# Patient Record
Sex: FEMALE | Race: WHITE | NOT HISPANIC OR LATINO | Employment: OTHER | URBAN - METROPOLITAN AREA
[De-identification: names, ages, dates, MRNs, and addresses within clinical notes are randomized per-mention and may not be internally consistent; named-entity substitution may affect disease eponyms.]

---

## 2017-04-06 ENCOUNTER — OFFICE VISIT (OUTPATIENT)
Dept: GASTROENTEROLOGY | Facility: CLINIC | Age: 82
End: 2017-04-06
Payer: MEDICARE

## 2017-04-06 VITALS
BODY MASS INDEX: 19.46 KG/M2 | HEART RATE: 81 BPM | SYSTOLIC BLOOD PRESSURE: 136 MMHG | DIASTOLIC BLOOD PRESSURE: 64 MMHG | HEIGHT: 66 IN | WEIGHT: 121.06 LBS

## 2017-04-06 DIAGNOSIS — R13.10 DYSPHAGIA, UNSPECIFIED TYPE: Primary | ICD-10-CM

## 2017-04-06 PROCEDURE — 99999 PR PBB SHADOW E&M-NEW PATIENT-LVL II: CPT | Mod: PBBFAC,,, | Performed by: INTERNAL MEDICINE

## 2017-04-06 PROCEDURE — 99202 OFFICE O/P NEW SF 15 MIN: CPT | Mod: PBBFAC,PO | Performed by: INTERNAL MEDICINE

## 2017-04-06 PROCEDURE — 99203 OFFICE O/P NEW LOW 30 MIN: CPT | Mod: S$PBB,,, | Performed by: INTERNAL MEDICINE

## 2017-04-06 RX ORDER — FLUTICASONE PROPIONATE AND SALMETEROL 250; 50 UG/1; UG/1
1 POWDER RESPIRATORY (INHALATION) 2 TIMES DAILY
COMMUNITY
End: 2020-05-11 | Stop reason: CLARIF

## 2017-04-06 RX ORDER — OMEPRAZOLE 20 MG/1
20 CAPSULE, DELAYED RELEASE ORAL EVERY MORNING
COMMUNITY
End: 2020-05-11 | Stop reason: CLARIF

## 2017-04-06 RX ORDER — VITAMIN B COMPLEX
1 CAPSULE ORAL DAILY
COMMUNITY
End: 2020-07-25 | Stop reason: CLARIF

## 2017-04-06 RX ORDER — MOMETASONE FUROATE 50 UG/1
2 SPRAY, METERED NASAL DAILY PRN
COMMUNITY
End: 2020-04-04 | Stop reason: CLARIF

## 2017-04-06 RX ORDER — SUCRALFATE 1 G/1
1 TABLET ORAL 2 TIMES DAILY
COMMUNITY
End: 2019-09-05

## 2017-04-06 RX ORDER — CLONIDINE HYDROCHLORIDE 0.1 MG/1
0.1 TABLET ORAL ONCE
COMMUNITY
End: 2019-09-05

## 2017-04-06 RX ORDER — LEVOCETIRIZINE DIHYDROCHLORIDE 5 MG/1
TABLET, FILM COATED ORAL
Refills: 1 | COMMUNITY
Start: 2017-01-19 | End: 2020-04-04 | Stop reason: CLARIF

## 2017-04-06 RX ORDER — AMLODIPINE BESYLATE 5 MG/1
TABLET ORAL
Refills: 3 | Status: ON HOLD | COMMUNITY
Start: 2017-01-19 | End: 2019-09-06 | Stop reason: SDUPTHER

## 2017-04-06 RX ORDER — ONDANSETRON HYDROCHLORIDE 8 MG/1
8 TABLET, FILM COATED ORAL EVERY 12 HOURS PRN
COMMUNITY
End: 2020-05-11 | Stop reason: CLARIF

## 2017-04-06 RX ORDER — MEGESTROL ACETATE 20 MG/1
40 TABLET ORAL 2 TIMES DAILY
COMMUNITY
End: 2019-09-05

## 2017-04-06 NOTE — PROGRESS NOTES
"Subjective:       Patient ID: Melania Beavers is a 83 y.o. female.    This patient is new to me.  Referring MD:  Dr. Plascencia for dysphagia.      Chief Complaint: Dysphagia    HPI Comments: Patient seen for dysphagia, occurring with solid foods, frequency intermittent, alleviated/exacerbated by nothing in particular, associated signs/symptoms including vague abdominal discomfort, onset recent but she has had similar symptoms in the past.  She has had colonoscopy before and her most recent EGD was with Dr. Hummel and she was dilated at that time.  No GI bleeding or weight loss noted.  No change in bowel habits or family history of GI cancer.        Review of Systems   Constitutional: Negative for chills, fatigue and fever.   HENT: Positive for trouble swallowing. Negative for sore throat.    Respiratory: Negative for cough, shortness of breath and wheezing.    Cardiovascular: Negative for chest pain and palpitations.   Gastrointestinal: Negative for abdominal pain, blood in stool, nausea and vomiting.   Genitourinary: Negative for dysuria and hematuria.   Musculoskeletal: Negative for arthralgias and myalgias.   Skin: Negative for color change and rash.   Neurological: Negative for dizziness and headaches.   Hematological: Negative for adenopathy.   Psychiatric/Behavioral: Negative for confusion. The patient is not nervous/anxious.    All other systems reviewed and are negative.      Objective:       Vitals:    04/06/17 1405   BP: 136/64   Pulse: 81   Weight: 54.9 kg (121 lb 0.5 oz)   Height: 5' 6" (1.676 m)       Physical Exam   Constitutional: She appears well-developed and well-nourished.   HENT:   Head: Normocephalic and atraumatic.   Eyes: Pupils are equal, round, and reactive to light. No scleral icterus.   Neck: Normal range of motion.   Cardiovascular: Normal rate and regular rhythm.    No murmur heard.  Pulmonary/Chest: Effort normal and breath sounds normal. She has no wheezes.   Abdominal: Soft. Bowel sounds " are normal. She exhibits no distension. There is no tenderness.   Musculoskeletal: She exhibits no edema or tenderness.   Lymphadenopathy:     She has no cervical adenopathy.   Neurological: She is alert.   Skin: Skin is warm and dry. No rash noted.         Will request and review old labs, imaging, and endoscopy reports.      Assessment:       1. Dysphagia, unspecified type        Plan:       1.  Avoid NSAIDs  2.  Obtain old records as above  3.  Schedule EGD with possible dilation.  4.  Further recommendations to follow after above.         ADDENDUM:  Colonoscopy with Dr. Hummel in 2012 showed diverticulosis.  EGD from 2015 showed schatzki ring, hiatal hernia and focal intestinal metaplasia.

## 2017-05-03 ENCOUNTER — ANESTHESIA EVENT (OUTPATIENT)
Dept: ENDOSCOPY | Facility: HOSPITAL | Age: 82
End: 2017-05-03
Payer: MEDICARE

## 2017-05-03 ENCOUNTER — ANESTHESIA (OUTPATIENT)
Dept: ENDOSCOPY | Facility: HOSPITAL | Age: 82
End: 2017-05-03
Payer: MEDICARE

## 2017-05-03 ENCOUNTER — SURGERY (OUTPATIENT)
Age: 82
End: 2017-05-03

## 2017-05-03 ENCOUNTER — HOSPITAL ENCOUNTER (OUTPATIENT)
Facility: HOSPITAL | Age: 82
Discharge: HOME | End: 2017-05-03
Attending: INTERNAL MEDICINE | Admitting: INTERNAL MEDICINE
Payer: MEDICARE

## 2017-05-03 VITALS
RESPIRATION RATE: 16 BRPM | SYSTOLIC BLOOD PRESSURE: 178 MMHG | TEMPERATURE: 98 F | HEART RATE: 77 BPM | OXYGEN SATURATION: 97 % | DIASTOLIC BLOOD PRESSURE: 79 MMHG

## 2017-05-03 DIAGNOSIS — K29.70 GASTRITIS, PRESENCE OF BLEEDING UNSPECIFIED, UNSPECIFIED CHRONICITY, UNSPECIFIED GASTRITIS TYPE: ICD-10-CM

## 2017-05-03 DIAGNOSIS — K31.7 GASTRIC POLYP: ICD-10-CM

## 2017-05-03 DIAGNOSIS — K44.9 HIATAL HERNIA: ICD-10-CM

## 2017-05-03 DIAGNOSIS — K22.2 SCHATZKI'S RING: Primary | ICD-10-CM

## 2017-05-03 DIAGNOSIS — R13.10 DYSPHAGIA: ICD-10-CM

## 2017-05-03 PROCEDURE — 27201089 HC SNARE, DISP (ANY): Performed by: INTERNAL MEDICINE

## 2017-05-03 PROCEDURE — 63600175 PHARM REV CODE 636 W HCPCS

## 2017-05-03 PROCEDURE — 25000003 PHARM REV CODE 250

## 2017-05-03 PROCEDURE — 43251 EGD REMOVE LESION SNARE: CPT | Mod: ,,, | Performed by: INTERNAL MEDICINE

## 2017-05-03 PROCEDURE — 25000003 PHARM REV CODE 250: Performed by: INTERNAL MEDICINE

## 2017-05-03 PROCEDURE — 43239 EGD BIOPSY SINGLE/MULTIPLE: CPT | Mod: 59,,, | Performed by: INTERNAL MEDICINE

## 2017-05-03 PROCEDURE — 43239 EGD BIOPSY SINGLE/MULTIPLE: CPT | Performed by: INTERNAL MEDICINE

## 2017-05-03 PROCEDURE — 88342 IMHCHEM/IMCYTCHM 1ST ANTB: CPT | Mod: 26,,, | Performed by: PATHOLOGY

## 2017-05-03 PROCEDURE — 37000009 HC ANESTHESIA EA ADD 15 MINS: Performed by: INTERNAL MEDICINE

## 2017-05-03 PROCEDURE — 37000008 HC ANESTHESIA 1ST 15 MINUTES: Performed by: INTERNAL MEDICINE

## 2017-05-03 PROCEDURE — D9220A PRA ANESTHESIA: Mod: ANES,,, | Performed by: ANESTHESIOLOGY

## 2017-05-03 PROCEDURE — 43248 EGD GUIDE WIRE INSERTION: CPT | Mod: 59,,, | Performed by: INTERNAL MEDICINE

## 2017-05-03 PROCEDURE — 88305 TISSUE EXAM BY PATHOLOGIST: CPT | Performed by: PATHOLOGY

## 2017-05-03 PROCEDURE — 43251 EGD REMOVE LESION SNARE: CPT | Performed by: INTERNAL MEDICINE

## 2017-05-03 PROCEDURE — D9220A PRA ANESTHESIA: Mod: CRNA,,, | Performed by: NURSE ANESTHETIST, CERTIFIED REGISTERED

## 2017-05-03 PROCEDURE — 27201012 HC FORCEPS, HOT/COLD, DISP: Performed by: INTERNAL MEDICINE

## 2017-05-03 PROCEDURE — 43248 EGD GUIDE WIRE INSERTION: CPT | Performed by: INTERNAL MEDICINE

## 2017-05-03 RX ORDER — LIDOCAINE HYDROCHLORIDE 20 MG/ML
INJECTION, SOLUTION EPIDURAL; INFILTRATION; INTRACAUDAL; PERINEURAL
Status: COMPLETED
Start: 2017-05-03 | End: 2017-05-03

## 2017-05-03 RX ORDER — SODIUM CHLORIDE 9 MG/ML
INJECTION, SOLUTION INTRAVENOUS CONTINUOUS
Status: DISCONTINUED | OUTPATIENT
Start: 2017-05-03 | End: 2017-05-03 | Stop reason: HOSPADM

## 2017-05-03 RX ORDER — PROPOFOL 10 MG/ML
INJECTION, EMULSION INTRAVENOUS
Status: COMPLETED
Start: 2017-05-03 | End: 2017-05-03

## 2017-05-03 RX ADMIN — PROPOFOL 100 MG: 10 INJECTION, EMULSION INTRAVENOUS at 11:05

## 2017-05-03 RX ADMIN — LIDOCAINE HYDROCHLORIDE 100 MG: 20 INJECTION, SOLUTION EPIDURAL; INFILTRATION; INTRACAUDAL; PERINEURAL at 11:05

## 2017-05-03 RX ADMIN — PROPOFOL 30 MG: 10 INJECTION, EMULSION INTRAVENOUS at 11:05

## 2017-05-03 RX ADMIN — SODIUM CHLORIDE: 0.9 INJECTION, SOLUTION INTRAVENOUS at 09:05

## 2017-05-03 RX ADMIN — PROPOFOL 20 MG: 10 INJECTION, EMULSION INTRAVENOUS at 11:05

## 2017-05-03 NOTE — ANESTHESIA POSTPROCEDURE EVALUATION
Anesthesia Post Evaluation    Patient: Melania Beavers    Procedure(s) Performed: Procedure(s) (LRB):  ESOPHAGOGASTRODUODENOSCOPY (EGD) (N/A)    Final Anesthesia Type: general  Patient location during evaluation: PACU  Patient participation: Yes- Able to Participate  Level of consciousness: awake and alert  Post-procedure vital signs: reviewed and stable  Pain management: adequate  Airway patency: patent  PONV status at discharge: No PONV  Anesthetic complications: no      Cardiovascular status: hemodynamically stable  Respiratory status: unassisted and room air  Hydration status: euvolemic  Follow-up not needed.        Visit Vitals    BP (!) 178/79    Pulse 77    Temp 36.5 °C (97.7 °F)    Resp 16    SpO2 97%       Pain/Rickey Score: Pain Assessment Performed: Yes (5/3/2017 11:50 AM)  Presence of Pain: complains of pain/discomfort (5/3/2017 11:50 AM)  Rickey Score: 10 (5/3/2017 11:45 AM)

## 2017-05-03 NOTE — PLAN OF CARE
Pt is awake and calm c/o sore throat, tolerating ice chips, Her son is at the bedside  Report to Kathie GONCALVES  Pt is aware to take her BP medications when she goes home

## 2017-05-03 NOTE — DISCHARGE INSTRUCTIONS
Esophageal Dilation     A balloon dilator may be used to widen a stricture in the esophagus.   An esophageal dilation is a procedure used to widen a narrowed section of your esophagus. This is the tube that leads from your throat to your stomach. Narrowing (stricture) of the esophagus can cause problems. These include trouble swallowing. This sheet explains what to expect with esophageal dilation.  Why esophageal dilation is needed  Several problems can be treated with esophageal dilation. They include:  · Peptic stricture. This is caused by reflux esophagitis. With this problem, the esophagus is irritated by acid reflux (heartburn). This occurs when acid from your stomach flows back up into the esophagus. Stomach acid damages the lining of the esophagus. This leads to a buildup of scar tissue. As a result, the esophagus is narrowed.  · Schatzkis ring. This is an abnormal ring of tissue. It forms where the esophagus meets the stomach. It can cause trouble swallowing. It can also cause food to get stuck in the esophagus. The cause of this condition is not known.  · Achalasia. This condition stops food and liquids from moving into your stomach from the esophagus. It affects the lower esophageal sphincter (LES). The LES is a muscular ring that opens (relaxes) when you swallow. With achalasia, the LES does not relax. This condition can also cause problems with peristalsis. This is the normal muscular action of the esophagus that moves food into the stomach.  · Eosinophilic esophagitis. This is a redness and swelling (inflammation) in the esophagus. It is caused by an environmental trigger such as a food allergy. It can lead to pain, trouble swallowing, and strictures.  · Less common causes of stricture. Other causes of stricture include radiation treatment and cancer.  Before you have esophageal dilation  · Tell your provider about any medicines you take. This includes prescription medicines, over-the-counter  medicines, herbs, vitamins, and other supplements. Be sure to mention aspirin or any blood thinners youre taking.  · Let your provider know if you need to take antibiotics before dental procedures. You may need to take them before esophageal dilation as well.  · Tell your provider about any health conditions you have, such as heart or lung disease. Also mention any allergies to medicines.  · Youll need to have an empty stomach for the procedure. Follow your providers instructions for not eating and drinking before the procedure.  · Arrange to have a family member or friend drive you home after the procedure.  During the procedure  · You may be given local anesthesia to numb your throat. Youll also likely be given medicine to relax you. The procedure takes about 15 minutes. It does not cause trouble breathing.  · A tube called an endoscope (scope) is used. This is a narrow tube with a tiny light and camera at the end. The scope is inserted through your mouth and into your esophagus. It lets your provider see inside your esophagus. To help guide your provider, an imaging method called fluoroscopy may also be used. This creates a moving X-ray image on a computer screen.   · Next, special tiny tools are carefully guided through your mouth and down into the esophagus. They widen the stricture and are then removed. Different types of instruments are used. The type used depends on the size and cause of the stricture. Types include:  ¨ Balloon dilator. A tiny empty balloon is put into the stricture using an endoscope. The balloon is slowly filled with air. The air is removed from the balloon when the stricture is widened to the right size. Balloon dilators are used to treat many types of strictures.  ¨ Guided wire dilator. A thin wire is eased down the esophagus. A small tube thats wider on one end is guided down the wire. It is put into the stricture to stretch it. These dilators are used to treat all kinds of  strictures.  ¨ Bougies. These are weighted, cone-shaped tubes. Starting with smaller cones, your provider uses increasingly larger cones until the stricture is stretched the right amount. Bougies are often used to treat strictures that are simple (short, straight, and not very narrow).  After the procedure  · Youll be watched closely until your provider says youre ready to go home. Youll need to have a friend or family member drive you home.  · You may have a sore throat for the rest of the day.  · You may have pain behind your breastbone for a short time afterwards.  · You can start drinking fluids again after the numbness in your throat goes away. You can resume eating the same day or the next day.  Risks and possible complications  Risks and possible complications for esophageal dilation include:  · Infection  · A tear or hole in the esophagus lining, causing bleeding and possibly needing surgery to fix  · Risks of anesthesia  Follow-up  You may need to have the procedure repeated one or more times. This depends on the cause and extent of the narrowing. Repeat procedures can allow the dilation to take place more slowly. This reduces the risks of the procedure.  If your stricture was caused by reflux esophagitis, youll likely need to take medicine to treat that condition. Your provider will tell you more.  When to call your provider  Call your healthcare provider right away if you have any of the following after the procedure:  · Fever of 100.4°F (38.0°C)  · Chest pain  · Trouble swallowing  · Vomiting blood or material that looks like coffee grounds  · Bleeding  · Black, tarry, or bloody stools   Date Last Reviewed: 7/1/2016 © 2000-2016 [employer]. [address] [place] [address], [place] [address]. All rights reserved. This information is not intended as a substitute for professional medical care. Always follow your healthcare professional's instructions.      What Is a Hiatal Hernia?    Hiatal hernia  is when the area where the stomach and esophagus meet bulges up through the diaphragm into the chest cavity. In some cases, part of the stomach may bulge above the diaphragm. Stomach acid may move up into the esophagus and cause symptoms. The symptoms are often blamed on gastroesophageal reflux disease (GERD). You may only know about the hernia when it shows up on an X-ray taken for other reasons.   What you may feel  The hiatus is a normal hole in the diaphragm. The esophagus passes through this hole and leads to the stomach. In some cases, part of the stomach may bulge above the diaphragm. This bulge is called a hernia. Stomach acid may move up into the esophagus and cause symptoms.  When you eat, the muscle at the hiatus relaxes to allow food to pass into the stomach. It tightens again to keep food and digestive acids in the stomach.  Many people with hiatal hernias have mild symptoms. You may notice the following GERD symptoms:  · Heartburn or other chest discomfort  · A feeling of chest fullness after a meal  · Frequent burping  · Acid taste in the mouth  · Trouble swallowing  Treating symptoms  If you have been diagnosed with hiatal hernia, these suggestions may help improve symptoms:  · Lose excess weight. Extra weight puts pressure on the stomach and esophagus.  · Dont lie down after eating. Sit up for at least an hour after eating. Lying down after eating can increase symptoms.  · Avoid certain foods and drinks. These include fatty foods, chocolate, coffee, mint, and other foods that cause symptoms for you.  · Dont smoke or drink alcohol. These can worsen symptoms.  · Look at your medicines. Discuss your medicines with your healthcare provider. Many medicines can cause symptoms.  · Consider an antacid medicine. Ask your healthcare provider about over-the-counter and prescription medicines that may help.  · Ask about surgery, if needed. Surgery is a treatment choice for some people. Your healthcare  provider can determine if surgery is an option for you.    Date Last Reviewed: 10/1/2016  © 4256-5886 [employer]. [address] [place] [address], [place] [address]. All rights reserved. This information is not intended as a substitute for professional medical care. Always follow your healthcare professional's instructions.      Gastritis (Adult)    Gastritis is inflammation and irritation of the stomach lining. It can be present for a short time (acute) or be long lasting (chronic). Gastritis is often caused by infection with bacteria called H pylori. More than a third of people in the [place] have this bacteria in their bodies. In many cases, H pylori causes no problems or symptoms. In some people, though, the infection irritates the stomach lining and causes gastritis. Other causes of stomach irritation include drinking alcohol or taking pain-relieving medicines called NSAIDs (such as aspirin or ibuprofen).   Symptoms of gastritis can include:  · Abdominal pain or bloating  · Loss of appetite  · Nausea or vomiting  · Vomiting blood or having black stools  · Feeling more tired than usual  An inflamed and irritated stomach lining is more likely to develop a sore called an ulcer. To help prevent this, gastritis should be treated.  Home care  If needed, medicines may be prescribed. If you have H pylori infection, treating it will likely relieve your symptoms. Other changes can help reduce stomach irritation and help it heal.  · If you have been prescribed medicines for H pylori infection, take them as directed. Take all of the medicine until it is finished or your healthcare provider tells you to stop, even if you feel better.  · Your healthcare provider may recommend avoiding NSAIDs. If you take daily aspirin for your heart or other medical reasons, do not stop without talking to your healthcare provider first.  · Avoid drinking alcohol.  · Stop smoking. Smoking can irritate the stomach and delay healing. As much  as possible, stay away from second hand smoke.  Follow-up care  Follow up with your healthcare provider, or as advised by our staff. Testing may be needed to check for inflammation or an ulcer.  When to seek medical advice  Call your healthcare provider for any of the following:  · Stomach pain that gets worse or moves to the lower right abdomen (appendix area)  · Chest pain that appears or gets worse, or spreads to the back, neck, shoulder, or arm  · Frequent vomiting (cant keep down liquids)  · Blood in the stool or vomit (red or black in color)  · Feeling weak or dizzy  · Fever of 100.4ºF (38ºC) or higher, or as directed by your healthcare provider  Date Last Reviewed: 6/22/2015  © 4991-1222 [employer]. [address] [place] [address], [place] [address]. All rights reserved. This information is not intended as a substitute for professional medical care. Always follow your healthcare professional's instructions.

## 2017-05-03 NOTE — ANESTHESIA PREPROCEDURE EVALUATION
05/03/2017  Melania Beavers is a 83 y.o., female.    Anesthesia Evaluation    I have reviewed the Patient Summary Reports.    I have reviewed the Nursing Notes.      Review of Systems  Anesthesia Hx:  No problems with previous Anesthesia    Social:  Non-Smoker    Hematology/Oncology:  Hematology Normal   Oncology Normal     EENT/Dental:EENT/Dental Normal   Cardiovascular:   Hypertension, well controlled    Pulmonary:  Pulmonary Normal    Hepatic/GI:   GERD, well controlled Dysphagia    Musculoskeletal:  Musculoskeletal Normal    Neurological:  Neurology Normal    Endocrine:  Endocrine Normal    Dermatological:  Skin Normal    Psych:  Psychiatric Normal           Physical Exam  General:  Well nourished    Airway/Jaw/Neck:  AIRWAY FINDINGS: Normal      Eyes/Ears/Nose:  EYES/EARS/NOSE FINDINGS: Normal   Dental:  DENTAL FINDINGS: Normal   Chest/Lungs:  Chest/Lungs Findings: Clear to auscultation, Normal Respiratory Rate     Heart/Vascular:  Heart Findings: Rate: Normal  Rhythm: Regular Rhythm  Sounds: Normal  Heart Murmur  Systolic  Systolic Heart Murmur Description: L Upper Sternal Border  Systolic Heart Murmur Grade: Grade III     Abdomen:  Abdomen Findings: Normal    Musculoskeletal:  Musculoskeletal Findings: Normal   Skin:  Skin Findings: Normal    Mental Status:  Mental Status Findings: Normal        Anesthesia Plan  Type of Anesthesia, risks & benefits discussed:  Anesthesia Type:  general  Patient's Preference:   Intra-op Monitoring Plan:   Intra-op Monitoring Plan Comments:   Post Op Pain Control Plan:   Post Op Pain Control Plan Comments:   Induction:   IV  Beta Blocker:  Patient is not currently on a Beta-Blocker (No further documentation required).       Informed Consent: Patient understands risks and agrees with Anesthesia plan.  Questions answered. Anesthesia consent signed with patient.  ASA Score:  2     Day of Surgery Review of History & Physical:    H&P update referred to the provider.         Ready For Surgery From Anesthesia Perspective.

## 2017-05-03 NOTE — TRANSFER OF CARE
Anesthesia Transfer of Care Note    Patient: Melania Beavers    Procedure(s) Performed: Procedure(s) (LRB):  ESOPHAGOGASTRODUODENOSCOPY (EGD) (N/A)    Patient location: GI    Anesthesia Type: general    Transport from OR: Transported from OR on room air with adequate spontaneous ventilation    Post pain: adequate analgesia    Post assessment: no apparent anesthetic complications    Post vital signs: stable    Level of consciousness: awake    Nausea/Vomiting: no nausea/vomiting    Complications: none          Last vitals:   Visit Vitals    BP (!) 179/83 (BP Location: Left arm, Patient Position: Lying, BP Method: Automatic)    Pulse 82    Temp 36.6 °C (97.8 °F)    Resp 17    SpO2 96%

## 2017-05-03 NOTE — IP AVS SNAPSHOT
[address] [place] [address]  Phone: [phone]           Patient Discharge Instructions   Our goal is to set you up for success. This packet includes information on your condition, medications, and your home care.  It will help you care for yourself to prevent having to return to the hospital.     Please ask your nurse if you have any questions.      There are many details to remember when preparing to leave the hospital. Here is what you will need to do:    1. Take your medicine. If you are prescribed medications, review your Medication List on the following pages. You may have new medications to  at the pharmacy and others that you'll need to stop taking. Review the instructions for how and when to take your medications. Talk with your doctor or nurses if you are unsure of what to do.     2. Go to your follow-up appointments. Specific follow-up information is listed in the following pages. Your may be contacted by a nurse or clinical provider about future appointments. Be sure we have all of the phone numbers to reach you. Please contact your provider's office if you are unable to make an appointment.     3. Watch for warning signs. Your doctor or nurse will give you detailed warning signs to watch for and when to call for assistance. These instructions may also include educational information about your condition. If you experience any of warning signs to your health, call your doctor.           [facility]  Unless otherwise directed by your provider, please   contact [facility], our nurse care line   that is available for 24/7 assistance.     [phone] (toll-free)     Registered nurses in the [facility]   provide: appointment scheduling, clinical advisement, health education, and other advisory services.                  ** Verify the list of medication(s) below is accurate and up to date. Carry this with you in case of  emergency. If your medications have changed, please notify your healthcare provider.             Medication List      CONTINUE taking these medications        Additional Info                      amlodipine 5 MG tablet   Commonly known as:  NORVASC   Refills:  3    Instructions:  TK SS T PO D     Begin Date    AM    Noon    PM    Bedtime       b complex vitamins capsule   Refills:  0   Dose:  1 capsule    Instructions:  Take 1 capsule by mouth once daily.     Begin Date    AM    Noon    PM    Bedtime       cloNIDine 0.1 MG tablet   Commonly known as:  CATAPRES   Refills:  0   Dose:  0.1 mg    Instructions:  Take 0.1 mg by mouth once.     Begin Date    AM    Noon    PM    Bedtime       FERRALET 90 DUAL-IRON DELIVERY 90-1-12-50 mg-mg-mcg-mg Tab   Refills:  5   Generic drug:  iron,carbon,gluc-FA-B12-C-DSS    Instructions:  TK 1 T PO QD     Begin Date    AM    Noon    PM    Bedtime       fluticasone-salmeterol 250-50 mcg/dose 250-50 mcg/dose diskus inhaler   Commonly known as:  ADVAIR   Refills:  0   Dose:  1 puff    Instructions:  Inhale 1 puff into the lungs 2 (two) times daily. Controller     Begin Date    AM    Noon    PM    Bedtime       levocetirizine 5 MG tablet   Commonly known as:  XYZAL   Refills:  1    Instructions:  TK 1 T PO QD IN THE BALDEMAR     Begin Date    AM    Noon    PM    Bedtime       megestrol 20 MG Tab   Commonly known as:  MEGACE   Refills:  0   Dose:  40 mg    Instructions:  Take 40 mg by mouth 2 (two) times daily.     Begin Date    AM    Noon    PM    Bedtime       mometasone 50 mcg/actuation nasal spray   Commonly known as:  NASONEX   Refills:  0   Dose:  2 spray    Instructions:  2 sprays by Nasal route once daily.     Begin Date    AM    Noon    PM    Bedtime       omeprazole 20 MG capsule   Commonly known as:  PRILOSEC   Refills:  0   Dose:  20 mg    Instructions:  Take 20 mg by mouth 2 (two) times daily.     Begin Date    AM    Noon    PM    Bedtime       ondansetron 8 MG tablet   Commonly  known as:  ZOFRAN   Refills:  0    Instructions:  Take by mouth every 12 (twelve) hours as needed for Nausea.     Begin Date    AM    Noon    PM    Bedtime       sucralfate 1 gram tablet   Commonly known as:  CARAFATE   Refills:  0   Dose:  1 g    Instructions:  Take 1 g by mouth 2 (two) times daily.     Begin Date    AM    Noon    PM    Bedtime       VITAMIN D-3 ORAL   Refills:  0    Instructions:  Take by mouth.     Begin Date    AM    Noon    PM    Bedtime                  Please bring to all follow up appointments:    1. A copy of your discharge instructions.  2. All medicines you are currently taking in their original bottles.  3. Identification and insurance card.    Please arrive 15 minutes ahead of scheduled appointment time.    Please call 24 hours in advance if you must reschedule your appointment and/or time.        Follow-up Information     Follow up with Wang Castanon MD.    Specialty:  Gastroenterology    Why:  As needed    Contact information:    [address] [address]  [phone]          Discharge Instructions     Future Orders    Activity as tolerated     Diet general     Questions:    Total calories:      Fat restriction, if any:      Protein restriction, if any:      Na restriction, if any:      Fluid restriction:      Additional restrictions:          Discharge Instructions         Esophageal Dilation     A balloon dilator may be used to widen a stricture in the esophagus.   An esophageal dilation is a procedure used to widen a narrowed section of your esophagus. This is the tube that leads from your throat to your stomach. Narrowing (stricture) of the esophagus can cause problems. These include trouble swallowing. This sheet explains what to expect with esophageal dilation.  Why esophageal dilation is needed  Several problems can be treated with esophageal dilation. They include:  · Peptic stricture. This is caused by reflux esophagitis. With this problem, the  esophagus is irritated by acid reflux (heartburn). This occurs when acid from your stomach flows back up into the esophagus. Stomach acid damages the lining of the esophagus. This leads to a buildup of scar tissue. As a result, the esophagus is narrowed.  · Schatzkis ring. This is an abnormal ring of tissue. It forms where the esophagus meets the stomach. It can cause trouble swallowing. It can also cause food to get stuck in the esophagus. The cause of this condition is not known.  · Achalasia. This condition stops food and liquids from moving into your stomach from the esophagus. It affects the lower esophageal sphincter (LES). The LES is a muscular ring that opens (relaxes) when you swallow. With achalasia, the LES does not relax. This condition can also cause problems with peristalsis. This is the normal muscular action of the esophagus that moves food into the stomach.  · Eosinophilic esophagitis. This is a redness and swelling (inflammation) in the esophagus. It is caused by an environmental trigger such as a food allergy. It can lead to pain, trouble swallowing, and strictures.  · Less common causes of stricture. Other causes of stricture include radiation treatment and cancer.  Before you have esophageal dilation  · Tell your provider about any medicines you take. This includes prescription medicines, over-the-counter medicines, herbs, vitamins, and other supplements. Be sure to mention aspirin or any blood thinners youre taking.  · Let your provider know if you need to take antibiotics before dental procedures. You may need to take them before esophageal dilation as well.  · Tell your provider about any health conditions you have, such as heart or lung disease. Also mention any allergies to medicines.  · Youll need to have an empty stomach for the procedure. Follow your providers instructions for not eating and drinking before the procedure.  · Arrange to have a family member or friend drive you home  after the procedure.  During the procedure  · You may be given local anesthesia to numb your throat. Youll also likely be given medicine to relax you. The procedure takes about 15 minutes. It does not cause trouble breathing.  · A tube called an endoscope (scope) is used. This is a narrow tube with a tiny light and camera at the end. The scope is inserted through your mouth and into your esophagus. It lets your provider see inside your esophagus. To help guide your provider, an imaging method called fluoroscopy may also be used. This creates a moving X-ray image on a computer screen.   · Next, special tiny tools are carefully guided through your mouth and down into the esophagus. They widen the stricture and are then removed. Different types of instruments are used. The type used depends on the size and cause of the stricture. Types include:  ¨ Balloon dilator. A tiny empty balloon is put into the stricture using an endoscope. The balloon is slowly filled with air. The air is removed from the balloon when the stricture is widened to the right size. Balloon dilators are used to treat many types of strictures.  ¨ Guided wire dilator. A thin wire is eased down the esophagus. A small tube thats wider on one end is guided down the wire. It is put into the stricture to stretch it. These dilators are used to treat all kinds of strictures.  ¨ Bougies. These are weighted, cone-shaped tubes. Starting with smaller cones, your provider uses increasingly larger cones until the stricture is stretched the right amount. Bougies are often used to treat strictures that are simple (short, straight, and not very narrow).  After the procedure  · Youll be watched closely until your provider says youre ready to go home. Youll need to have a friend or family member drive you home.  · You may have a sore throat for the rest of the day.  · You may have pain behind your breastbone for a short time afterwards.  · You can start drinking  fluids again after the numbness in your throat goes away. You can resume eating the same day or the next day.  Risks and possible complications  Risks and possible complications for esophageal dilation include:  · Infection  · A tear or hole in the esophagus lining, causing bleeding and possibly needing surgery to fix  · Risks of anesthesia  Follow-up  You may need to have the procedure repeated one or more times. This depends on the cause and extent of the narrowing. Repeat procedures can allow the dilation to take place more slowly. This reduces the risks of the procedure.  If your stricture was caused by reflux esophagitis, youll likely need to take medicine to treat that condition. Your provider will tell you more.  When to call your provider  Call your healthcare provider right away if you have any of the following after the procedure:  · Fever of 100.4°F (38.0°C)  · Chest pain  · Trouble swallowing  · Vomiting blood or material that looks like coffee grounds  · Bleeding  · Black, tarry, or bloody stools   Date Last Reviewed: 7/1/2016 © 2000-2016 [employer]. [address] [place] [address]. All rights reserved. This information is not intended as a substitute for professional medical care. Always follow your healthcare professional's instructions.      What Is a Hiatal Hernia?    Hiatal hernia is when the area where the stomach and esophagus meet bulges up through the diaphragm into the chest cavity. In some cases, part of the stomach may bulge above the diaphragm. Stomach acid may move up into the esophagus and cause symptoms. The symptoms are often blamed on gastroesophageal reflux disease (GERD). You may only know about the hernia when it shows up on an X-ray taken for other reasons.   What you may feel  The hiatus is a normal hole in the diaphragm. The esophagus passes through this hole and leads to the stomach. In some cases, part of the stomach may bulge above the diaphragm.  This bulge is called a hernia. Stomach acid may move up into the esophagus and cause symptoms.  When you eat, the muscle at the hiatus relaxes to allow food to pass into the stomach. It tightens again to keep food and digestive acids in the stomach.  Many people with hiatal hernias have mild symptoms. You may notice the following GERD symptoms:  · Heartburn or other chest discomfort  · A feeling of chest fullness after a meal  · Frequent burping  · Acid taste in the mouth  · Trouble swallowing  Treating symptoms  If you have been diagnosed with hiatal hernia, these suggestions may help improve symptoms:  · Lose excess weight. Extra weight puts pressure on the stomach and esophagus.  · Dont lie down after eating. Sit up for at least an hour after eating. Lying down after eating can increase symptoms.  · Avoid certain foods and drinks. These include fatty foods, chocolate, coffee, mint, and other foods that cause symptoms for you.  · Dont smoke or drink alcohol. These can worsen symptoms.  · Look at your medicines. Discuss your medicines with your healthcare provider. Many medicines can cause symptoms.  · Consider an antacid medicine. Ask your healthcare provider about over-the-counter and prescription medicines that may help.  · Ask about surgery, if needed. Surgery is a treatment choice for some people. Your healthcare provider can determine if surgery is an option for you.    Date Last Reviewed: 10/1/2016  © 9524-1260 [employer]. [address] [place] [address]. All rights reserved. This information is not intended as a substitute for professional medical care. Always follow your healthcare professional's instructions.      Gastritis (Adult)    Gastritis is inflammation and irritation of the stomach lining. It can be present for a short time (acute) or be long lasting (chronic). Gastritis is often caused by infection with bacteria called H pylori. More than a third of people in the [place]  have this bacteria in their bodies. In many cases, H pylori causes no problems or symptoms. In some people, though, the infection irritates the stomach lining and causes gastritis. Other causes of stomach irritation include drinking alcohol or taking pain-relieving medicines called NSAIDs (such as aspirin or ibuprofen).   Symptoms of gastritis can include:  · Abdominal pain or bloating  · Loss of appetite  · Nausea or vomiting  · Vomiting blood or having black stools  · Feeling more tired than usual  An inflamed and irritated stomach lining is more likely to develop a sore called an ulcer. To help prevent this, gastritis should be treated.  Home care  If needed, medicines may be prescribed. If you have H pylori infection, treating it will likely relieve your symptoms. Other changes can help reduce stomach irritation and help it heal.  · If you have been prescribed medicines for H pylori infection, take them as directed. Take all of the medicine until it is finished or your healthcare provider tells you to stop, even if you feel better.  · Your healthcare provider may recommend avoiding NSAIDs. If you take daily aspirin for your heart or other medical reasons, do not stop without talking to your healthcare provider first.  · Avoid drinking alcohol.  · Stop smoking. Smoking can irritate the stomach and delay healing. As much as possible, stay away from second hand smoke.  Follow-up care  Follow up with your healthcare provider, or as advised by our staff. Testing may be needed to check for inflammation or an ulcer.  When to seek medical advice  Call your healthcare provider for any of the following:  · Stomach pain that gets worse or moves to the lower right abdomen (appendix area)  · Chest pain that appears or gets worse, or spreads to the back, neck, shoulder, or arm  · Frequent vomiting (cant keep down liquids)  · Blood in the stool or vomit (red or black in color)  · Feeling weak or dizzy  · Fever of 100.4ºF  (38ºC) or higher, or as directed by your healthcare provider  Date Last Reviewed: 6/22/2015  © 1314-1793 [employer]. [address] [place] [address], [place] [address]. All rights reserved. This information is not intended as a substitute for professional medical care. Always follow your healthcare professional's instructions.                Admission Information     Date & Time Provider Department CSN    5/3/2017  8:25 AM Wang Castanon MD [facility] [id number]      Care Providers     Provider Role Specialty Primary office phone    Wang Castanon MD Attending Provider Gastroenterology [phone]    Wang Castanon MD Surgeon  Gastroenterology [phone]      Your Vitals Were     BP Pulse Temp Resp SpO2       158/68 (BP Location: Left arm, Patient Position: Lying, BP Method: Automatic) 72 97.7 °F (36.5 °C) 16 96%       Recent Lab Values     No lab values to display.      Allergies as of 5/3/2017     No Known Allergies      Advance Directives     An advance directive is a document which, in the event you are no longer able to make decisions for yourself, tells your healthcare team what kind of treatment you do or do not want to receive, or who you would like to make those decisions for you.  If you do not currently have an advance directive, [facility] encourages you to create one.  For more information call:  [phone]), [phone] ([phone]),  or log on to [email or web address].        Language Assistance Services     ATTENTION: Language assistance services are available, free of charge. Please call [phone].      ATENCIÓN: Si habla español, tiene a woody disposición servicios gratuitos de asistencia lingüística. Llame al [phone].     CHÚ Ý: N?u b?n nói Ti?ng Vi?t, có các d?ch v? h? tr? ngôn ng? mi?n phí dành cho b?n. G?i s? [phone].        [facility] Sign-Up     Activating your [facility] account is as easy as 1-2-3!     1) Visit  [email or web address], select Sign Up Now, enter this activation code and your date of birth, then select Next.  [email or web address]  Expires: 6/17/2017 11:44 AM      2) Create a username and password to use when you visit [facility] in the future and select a security question in case you lose your password and select Next.    3) Enter your e-mail address and click Sign Up!    Additional Information  If you have questions, please e-mail [email or web address] or call [phone] to talk to our [facility] staff. Remember, [facility] is NOT to be used for urgent needs. For medical emergencies, dial 911.          [facility] complies with applicable Federal civil rights laws and does not discriminate on the basis of race, color, national origin, age, disability, or sex.

## 2017-05-31 ENCOUNTER — TELEPHONE (OUTPATIENT)
Dept: GASTROENTEROLOGY | Facility: CLINIC | Age: 82
End: 2017-05-31

## 2019-02-27 ENCOUNTER — TELEPHONE (OUTPATIENT)
Dept: FAMILY MEDICINE | Facility: CLINIC | Age: 84
End: 2019-02-27

## 2019-07-29 DIAGNOSIS — R63.4 LOSS OF WEIGHT: Primary | ICD-10-CM

## 2019-08-06 ENCOUNTER — HOSPITAL ENCOUNTER (OUTPATIENT)
Dept: RADIOLOGY | Facility: HOSPITAL | Age: 84
Discharge: HOME | End: 2019-08-06
Attending: FAMILY MEDICINE
Payer: MEDICARE

## 2019-08-06 DIAGNOSIS — R63.4 LOSS OF WEIGHT: ICD-10-CM

## 2019-08-06 PROCEDURE — 76700 US EXAM ABDOM COMPLETE: CPT | Mod: TC

## 2019-08-06 PROCEDURE — 71046 X-RAY EXAM CHEST 2 VIEWS: CPT | Mod: TC

## 2019-09-05 ENCOUNTER — HOSPITAL ENCOUNTER (INPATIENT)
Facility: HOSPITAL | Age: 84
LOS: 2 days | Discharge: HOME | DRG: 069 | End: 2019-09-07
Attending: EMERGENCY MEDICINE | Admitting: INTERNAL MEDICINE
Payer: MEDICARE

## 2019-09-05 ENCOUNTER — CLINICAL SUPPORT (OUTPATIENT)
Dept: CARDIOLOGY | Facility: HOSPITAL | Age: 84
DRG: 069 | End: 2019-09-05
Attending: EMERGENCY MEDICINE
Payer: MEDICARE

## 2019-09-05 VITALS — BODY MASS INDEX: 15.91 KG/M2 | HEIGHT: 66 IN | WEIGHT: 99 LBS

## 2019-09-05 DIAGNOSIS — R47.01 APHASIA: ICD-10-CM

## 2019-09-05 DIAGNOSIS — I63.9 ISCHEMIC STROKE: ICD-10-CM

## 2019-09-05 DIAGNOSIS — G45.9 TIA (TRANSIENT ISCHEMIC ATTACK): Primary | ICD-10-CM

## 2019-09-05 DIAGNOSIS — I63.9 STROKE: ICD-10-CM

## 2019-09-05 DIAGNOSIS — I63.9 CVA (CEREBRAL VASCULAR ACCIDENT): ICD-10-CM

## 2019-09-05 PROBLEM — I10 HTN (HYPERTENSION): Status: ACTIVE | Noted: 2019-09-05

## 2019-09-05 PROBLEM — K21.9 GERD (GASTROESOPHAGEAL REFLUX DISEASE): Status: ACTIVE | Noted: 2019-09-05

## 2019-09-05 LAB
ALBUMIN SERPL BCP-MCNC: 4.3 G/DL (ref 3.5–5.2)
ALP SERPL-CCNC: 35 U/L (ref 55–135)
ALT SERPL W/O P-5'-P-CCNC: 11 U/L (ref 10–44)
AMPHET+METHAMPHET UR QL: NEGATIVE
ANION GAP SERPL CALC-SCNC: 15 MMOL/L (ref 8–16)
APTT PPP: 32.4 SEC (ref 26.2–34.7)
AST SERPL-CCNC: 18 U/L (ref 10–40)
BARBITURATES UR QL SCN>200 NG/ML: NORMAL
BASOPHILS # BLD AUTO: 0.01 K/UL (ref 0–0.2)
BASOPHILS NFR BLD: 0.2 % (ref 0–1.9)
BENZODIAZ UR QL SCN>200 NG/ML: NEGATIVE
BILIRUB DIRECT SERPL-MCNC: 0.1 MG/DL (ref 0.1–0.3)
BILIRUB SERPL-MCNC: 0.7 MG/DL (ref 0.1–1)
BILIRUB UR QL STRIP: NEGATIVE
BUN SERPL-MCNC: 22 MG/DL (ref 6–30)
BZE UR QL SCN: NEGATIVE
CANNABINOIDS UR QL SCN: NEGATIVE
CHLORIDE SERPL-SCNC: 108 MMOL/L (ref 95–110)
CHOLEST SERPL-MCNC: 187 MG/DL (ref 120–199)
CHOLEST/HDLC SERPL: 3 {RATIO} (ref 2–5)
CK MB SERPL-MCNC: 1.5 NG/ML (ref 0.1–6.5)
CK SERPL-CCNC: 51 U/L (ref 20–180)
CLARITY UR: CLEAR
COLOR UR: YELLOW
CREAT SERPL-MCNC: 1.6 MG/DL (ref 0.5–1.4)
CREAT SERPL-MCNC: 1.7 MG/DL (ref 0.5–1.4)
CREAT UR-MCNC: 58 MG/DL (ref 15–325)
DIFFERENTIAL METHOD: ABNORMAL
EOSINOPHIL # BLD AUTO: 0.1 K/UL (ref 0–0.5)
EOSINOPHIL NFR BLD: 1.4 % (ref 0–8)
ERYTHROCYTE [DISTWIDTH] IN BLOOD BY AUTOMATED COUNT: 14.6 % (ref 11.5–14.5)
EST. GFR  (AFRICAN AMERICAN): 33.6 ML/MIN/1.73 M^2
EST. GFR  (NON AFRICAN AMERICAN): 29.2 ML/MIN/1.73 M^2
ESTIMATED AVG GLUCOSE: 114 MG/DL (ref 68–131)
ESTIMATED AVG GLUCOSE: 114 MG/DL (ref 68–131)
GLUCOSE SERPL-MCNC: 110 MG/DL (ref 70–110)
GLUCOSE UR QL STRIP: NEGATIVE
HBA1C MFR BLD HPLC: 5.6 % (ref 4.5–6.2)
HBA1C MFR BLD HPLC: 5.6 % (ref 4.5–6.2)
HCT VFR BLD AUTO: 31.4 % (ref 37–48.5)
HCT VFR BLD CALC: 30 %PCV (ref 36–54)
HDLC SERPL-MCNC: 62 MG/DL (ref 40–75)
HDLC SERPL: 33.2 % (ref 20–50)
HGB BLD-MCNC: 9.9 G/DL (ref 12–16)
HGB UR QL STRIP: NEGATIVE
IMM GRANULOCYTES # BLD AUTO: 0.06 K/UL (ref 0–0.04)
IMM GRANULOCYTES NFR BLD AUTO: 1.2 % (ref 0–0.5)
INR PPP: 1
KETONES UR QL STRIP: NEGATIVE
LDLC SERPL CALC-MCNC: 109.4 MG/DL (ref 63–159)
LEUKOCYTE ESTERASE UR QL STRIP: NEGATIVE
LIPASE SERPL-CCNC: 58 U/L (ref 4–60)
LYMPHOCYTES # BLD AUTO: 1.4 K/UL (ref 1–4.8)
LYMPHOCYTES NFR BLD: 26.6 % (ref 18–48)
MAGNESIUM SERPL-MCNC: 2 MG/DL (ref 1.6–2.6)
MCH RBC QN AUTO: 29.6 PG (ref 27–31)
MCHC RBC AUTO-ENTMCNC: 31.5 G/DL (ref 32–36)
MCV RBC AUTO: 94 FL (ref 82–98)
MONOCYTES # BLD AUTO: 1.1 K/UL (ref 0.3–1)
MONOCYTES NFR BLD: 21.5 % (ref 4–15)
NEUTROPHILS # BLD AUTO: 2.5 K/UL (ref 1.8–7.7)
NEUTROPHILS NFR BLD: 49.1 % (ref 38–73)
NITRITE UR QL STRIP: NEGATIVE
NONHDLC SERPL-MCNC: 125 MG/DL
NRBC BLD-RTO: 0 /100 WBC
OPIATES UR QL SCN: NEGATIVE
PCP UR QL SCN>25 NG/ML: NEGATIVE
PH UR STRIP: 7 [PH] (ref 5–8)
PLATELET # BLD AUTO: 183 K/UL (ref 150–350)
PMV BLD AUTO: 10.5 FL (ref 9.2–12.9)
POC IONIZED CALCIUM: 1.33 MMOL/L (ref 1.06–1.42)
POC TCO2 (MEASURED): 22 MMOL/L (ref 23–29)
POTASSIUM BLD-SCNC: 4.5 MMOL/L (ref 3.5–5.1)
PROT SERPL-MCNC: 7.1 G/DL (ref 6–8.4)
PROT UR QL STRIP: NEGATIVE
PROTHROMBIN TIME: 13.1 SEC (ref 11.7–14)
RBC # BLD AUTO: 3.35 M/UL (ref 4–5.4)
SAMPLE: ABNORMAL
SODIUM BLD-SCNC: 140 MMOL/L (ref 136–145)
SP GR UR STRIP: 1 (ref 1–1.03)
TOXICOLOGY INFORMATION: NORMAL
TRIGL SERPL-MCNC: 78 MG/DL (ref 30–150)
TROPONIN I SERPL DL<=0.01 NG/ML-MCNC: 0.05 NG/ML (ref 0.02–0.04)
TROPONIN I SERPL DL<=0.01 NG/ML-MCNC: 0.06 NG/ML (ref 0.02–0.04)
TSH SERPL DL<=0.005 MIU/L-ACNC: 2.11 UIU/ML (ref 0.34–5.6)
URN SPEC COLLECT METH UR: NORMAL
UROBILINOGEN UR STRIP-ACNC: NEGATIVE EU/DL
WBC # BLD AUTO: 5.07 K/UL (ref 3.9–12.7)

## 2019-09-05 PROCEDURE — 83735 ASSAY OF MAGNESIUM: CPT

## 2019-09-05 PROCEDURE — 36415 COLL VENOUS BLD VENIPUNCTURE: CPT

## 2019-09-05 PROCEDURE — 99285 EMERGENCY DEPT VISIT HI MDM: CPT | Mod: 25

## 2019-09-05 PROCEDURE — 82553 CREATINE MB FRACTION: CPT

## 2019-09-05 PROCEDURE — 84484 ASSAY OF TROPONIN QUANT: CPT

## 2019-09-05 PROCEDURE — 83690 ASSAY OF LIPASE: CPT

## 2019-09-05 PROCEDURE — 82550 ASSAY OF CK (CPK): CPT

## 2019-09-05 PROCEDURE — 85730 THROMBOPLASTIN TIME PARTIAL: CPT

## 2019-09-05 PROCEDURE — 51702 INSERT TEMP BLADDER CATH: CPT

## 2019-09-05 PROCEDURE — 80061 LIPID PANEL: CPT

## 2019-09-05 PROCEDURE — 80307 DRUG TEST PRSMV CHEM ANLYZR: CPT

## 2019-09-05 PROCEDURE — 84443 ASSAY THYROID STIM HORMONE: CPT

## 2019-09-05 PROCEDURE — 63600175 PHARM REV CODE 636 W HCPCS: Performed by: INTERNAL MEDICINE

## 2019-09-05 PROCEDURE — 21400001 HC TELEMETRY ROOM

## 2019-09-05 PROCEDURE — 93306 TTE W/DOPPLER COMPLETE: CPT

## 2019-09-05 PROCEDURE — 83036 HEMOGLOBIN GLYCOSYLATED A1C: CPT

## 2019-09-05 PROCEDURE — 85610 PROTHROMBIN TIME: CPT

## 2019-09-05 PROCEDURE — 25000003 PHARM REV CODE 250: Performed by: EMERGENCY MEDICINE

## 2019-09-05 PROCEDURE — 84484 ASSAY OF TROPONIN QUANT: CPT | Mod: 91

## 2019-09-05 PROCEDURE — 93005 ELECTROCARDIOGRAM TRACING: CPT

## 2019-09-05 PROCEDURE — 82565 ASSAY OF CREATININE: CPT

## 2019-09-05 PROCEDURE — 85025 COMPLETE CBC W/AUTO DIFF WBC: CPT

## 2019-09-05 PROCEDURE — 80076 HEPATIC FUNCTION PANEL: CPT

## 2019-09-05 PROCEDURE — 81003 URINALYSIS AUTO W/O SCOPE: CPT

## 2019-09-05 RX ORDER — MEGESTROL ACETATE 40 MG/ML
200 SUSPENSION ORAL EVERY MORNING
COMMUNITY
End: 2020-04-04 | Stop reason: CLARIF

## 2019-09-05 RX ORDER — GLUCAGON 1 MG
1 KIT INJECTION
Status: DISCONTINUED | OUTPATIENT
Start: 2019-09-05 | End: 2019-09-07 | Stop reason: HOSPADM

## 2019-09-05 RX ORDER — IBUPROFEN 200 MG
16 TABLET ORAL
Status: DISCONTINUED | OUTPATIENT
Start: 2019-09-05 | End: 2019-09-07 | Stop reason: HOSPADM

## 2019-09-05 RX ORDER — SODIUM CHLORIDE 0.9 % (FLUSH) 0.9 %
10 SYRINGE (ML) INJECTION
Status: DISCONTINUED | OUTPATIENT
Start: 2019-09-05 | End: 2019-09-07 | Stop reason: HOSPADM

## 2019-09-05 RX ORDER — FLUTICASONE PROPIONATE 50 MCG
1 SPRAY, SUSPENSION (ML) NASAL DAILY PRN
Status: DISCONTINUED | OUTPATIENT
Start: 2019-09-05 | End: 2019-09-07 | Stop reason: HOSPADM

## 2019-09-05 RX ORDER — ASCORBIC ACID 500 MG
500 TABLET ORAL 2 TIMES DAILY
Status: ON HOLD | COMMUNITY
End: 2020-04-08 | Stop reason: HOSPADM

## 2019-09-05 RX ORDER — DEXTROSE MONOHYDRATE AND SODIUM CHLORIDE 5; .9 G/100ML; G/100ML
INJECTION, SOLUTION INTRAVENOUS CONTINUOUS
Status: DISCONTINUED | OUTPATIENT
Start: 2019-09-05 | End: 2019-09-06

## 2019-09-05 RX ORDER — NAPROXEN SODIUM 220 MG/1
81 TABLET, FILM COATED ORAL DAILY
Status: DISCONTINUED | OUTPATIENT
Start: 2019-09-06 | End: 2019-09-07 | Stop reason: HOSPADM

## 2019-09-05 RX ORDER — IBUPROFEN 200 MG
24 TABLET ORAL
Status: DISCONTINUED | OUTPATIENT
Start: 2019-09-05 | End: 2019-09-07 | Stop reason: HOSPADM

## 2019-09-05 RX ORDER — NAPROXEN SODIUM 220 MG/1
324 TABLET, FILM COATED ORAL
Status: COMPLETED | OUTPATIENT
Start: 2019-09-05 | End: 2019-09-05

## 2019-09-05 RX ADMIN — ASPIRIN 81 MG 324 MG: 81 TABLET ORAL at 10:09

## 2019-09-05 RX ADMIN — DEXTROSE AND SODIUM CHLORIDE: 5; .9 INJECTION, SOLUTION INTRAVENOUS at 04:09

## 2019-09-05 NOTE — CONSULTS
[facility]  Neurology  Consult Note    Patient Name: Melania Beavers  MRN: [id number]  Admission Date: 9/5/2019  Hospital Length of Stay: 0 days  Code Status: Full Code   Attending Provider: Dominick Gatica MD   Consulting Provider: Larissa Renee NP  Primary Care Physician: Luis Brown MD  Principal Problem:Stroke    Consults   Subjective:     Chief Complaint:  AMS     HPI:   Called by Dr. Gatica from the ED for stat consult. Pt presented to the ED this AM due to the above complaint. Pt lives with her daughter, but was home alone this morning. Her daughter spoke to her on the phone and the patient seemed confused. After going home to check on her, the patient remained confused. No apparent trauma at the scene. The daughter reported that the patient was acting strangely and laughing inappropriately. She has not had any recent medication changes. Upon arrival to the ED, her exam was non-focal, but she was still confused. The patient's last known normal time is not known, as she seems to have woke up with these symptoms. Stat CT head was done and reviewed personally without any obvious acute pathologies, but the patient was not a tPA candidate. Her initial NIHSS score is 4 due to her moderate level of aphasia. Pt was seen in radiology after her CT scan. She is unable to provide any history.      NKDA    PMH: GERD, HTN  PSH: EGD, hysterectomy  No reported family history  Non smoker    Cr 1.6H  LDL pending  A1C pending    CT brain:  Negative acute  -----------------------------    ROS: unable to obtain due to altered mental status    Physical Exam:  Consitutional: NAD  HENT:   Head: Normocephalic and atraumatic.   Eyes: Conjunctivae and EOM are normal.   Neck: Normal range of motion. Neck supple.  Cardiovascular: Normal rate. No murmur.  Pulmonary/Chest: Breath sounds normal.Normal effort   Abdominal: Soft. Bowel sounds are normal. There is no tenderness.   Musculoskeletal: Normal range of  motion. Normal inspection   Neurological: She is alert and oriented to person. She is unable to state her age or the current month. She is unable to follow simple commands. + Repitition, but 0/5 naming objects - does have moderate expressive and receptive aphasia. CN 2-12 intact. Motor exam is nonfocal with 4/5 strength throughout. Sensation normal in upper and lower extremities. No dysmetria/ataxia. Gait not tested. Normal DTRs.   Skin: No rash noted.   Psychiatric: inattentive      Assessment and Plan:     1. Encephalopathy, unclear etiology  - R/o CVA - pt is not a candidate for tPA, as her last known normal time is unclear  - MRI brain, MRA head, CUS and TTE with bubble today  - LDL, HbA1C  - PT/OT/ST  - Evaluate for other possible causes of encephalopathy  - Delirium precautions    Case discussed with Dr. Gatica in the ED. Will follow.    Stroke education was provided.  If patient has acute neurological changes including weakness, confusion, speech changes, facial droop, difficulty walking, and sensory changes immediately call 911.  Follow up Neurology in 2 weeks at [phone].  Medication side effects discussed with the patient and/or caregiver.    Delirium recommendations:  Attempt to facilitate normal sleep-wake cycle: Keep all lights on and window shades open during the day, patient can sit up in bed or ideally out of bed if appropriate, can keep TV on. At night, close window shades, turn off TV, turn lights off and minimize intrusions.  Frequent redirection and reorientation as appropriate.  Minimize use of antipsychotics, only use if nonpharmacologic measures are ineffective.  Identify and treat potential causes such as medications and acute illness. Multiple causes are common.  Optimize physiology, environment, and medications to promote brain recovery.  Detect and treat agitation or distress with non-pharmacologic means, if possible.  Communicate diagnosis to patients and caregivers and provide  ongoing support.  Attempt to prevent delirium complications such as immobility, falls, pressure sores, dehydration, malnourishment, and isolation.      VTE Risk Mitigation (From admission, onward)        Ordered     Place sequential compression device  Until discontinued      09/05/19 1051     IP VTE HIGH RISK PATIENT  Once      09/05/19 1051          Thank you for your consult. I will follow-up with patient. Please contact us if you have any additional questions.    Larissa Renee NP  Neurology  [facility]

## 2019-09-05 NOTE — H&P
Former[facility]  History & Physical    Patient Name: Melania Beavers  MRN: [id number]  Admission Date: 9/5/2019  Attending Physician: Jarad Tran MD   Primary Care Provider: Luis Brown MD         Patient information was obtained from patient, relative(s) and ER records.     Subjective:     Principal Problem:Stroke    Chief Complaint:   Chief Complaint   Patient presents with    Altered Mental Status     altered mental status onset 1 hour.         HPI:  The patient is an 85 years old woman, past medical history hypertension, GERD, coming to emergency department for acute confusion, and also likely slurred speech that occurred this morning.  Patient is disoriented to time and space, however she does know name of the president.  She only has like some left facial droop.  No other focal neurological deficits at this time.  No headache, no double vision, no sensorial deficits, motor strength seems to be 5/5 bilateral upper extremities, bilateral lower extremities,.  No chest pain, no cough, no fever, no nausea, no vomiting, no abdominal pain, no diarrhea, no hematuria, no dysuria, no other acute complaints.  All ROS reviewed, and found negative other than described in history of present illness    Past Medical History:   Diagnosis Date    GERD (gastroesophageal reflux disease)     Hypertension        Past Surgical History:   Procedure Laterality Date    ESOPHAGOGASTRODUODENOSCOPY (EGD) N/A 5/3/2017    Performed by Wang Castanon MD at [facility] ENDO    HYSTERECTOMY         Review of patient's allergies indicates:   Allergen Reactions    Heparin analogues        No current facility-administered medications on file prior to encounter.      Current Outpatient Medications on File Prior to Encounter   Medication Sig    amlodipine (NORVASC) 5 MG tablet TAKE 5MG BY MOUTH ONCE DAILY    ascorbic acid, vitamin C, (VITAMIN C) 500 MG tablet Take 500 mg by mouth 2 (two) times daily.    b  complex vitamins capsule Take 1 capsule by mouth once daily.    FERRALET 90 DUAL-IRON DELIVERY 90-1-12-50 mg-mg-mcg-mg Tab TAKE 1 TABLET BY MOUTH DAILY    fluticasone-salmeterol 250-50 mcg/dose (ADVAIR) 250-50 mcg/dose diskus inhaler Inhale 1 puff into the lungs 2 (two) times daily. Controller    megestrol (MEGACE) 400 mg/10 mL (40 mg/mL) Susp Take 200 mg by mouth every morning.    omeprazole (PRILOSEC) 20 MG capsule Take 20 mg by mouth every morning.     lanolin/mineral oil/petrolatum (ARTIFICIAL TEARS OPHT) Place 1 drop into both eyes 3 (three) times daily as needed.    levocetirizine (XYZAL) 5 MG tablet TAKE 5MG BY MOUTH DAILY AS NEEDED    mometasone (NASONEX) 50 mcg/actuation nasal spray 2 sprays by Nasal route daily as needed.     ondansetron (ZOFRAN) 8 MG tablet Take 8 mg by mouth every 12 (twelve) hours as needed for Nausea.     [DISCONTINUED] CALCIUM CARBONATE/VITAMIN D3 (VITAMIN D-3 ORAL) Take by mouth.    [DISCONTINUED] cloNIDine (CATAPRES) 0.1 MG tablet Take 0.1 mg by mouth once.    [DISCONTINUED] megestrol (MEGACE) 20 MG Tab Take 40 mg by mouth 2 (two) times daily.    [DISCONTINUED] sucralfate (CARAFATE) 1 gram tablet Take 1 g by mouth 2 (two) times daily.     Family History     Mother had HTN        Tobacco Use    Smoking status: Never Smoker   Substance and Sexual Activity    Alcohol use: No    Drug use: No    Sexual activity: Never     Review of Systems  Objective:     Vital Signs (Most Recent):  Temp: 98.2 °F (36.8 °C) (09/05/19 1652)  Pulse: 79 (09/05/19 1652)  Resp: (!) 37 (09/05/19 1200)  BP: (!) 176/76 (09/05/19 1652)  SpO2: 97 % (09/05/19 1652) Vital Signs (24h Range):  Temp:  [98.1 °F (36.7 °C)-98.2 °F (36.8 °C)] 98.2 °F (36.8 °C)  Pulse:  [70-98] 79  Resp:  [18-39] 37  SpO2:  [97 %-100 %] 97 %  BP: (158-182)/(69-82) 176/76     Weight: 45.2 kg (99 lb 10.4 oz)  Body mass index is 16.08 kg/m².    Physical Exam   Constitutional: She appears well-developed and well-nourished.    HENT:   Head: Normocephalic and atraumatic.   Eyes: Pupils are equal, round, and reactive to light. EOM are normal.   Neck: Normal range of motion.   Cardiovascular: Normal rate, regular rhythm, normal heart sounds and intact distal pulses.   Pulmonary/Chest: Effort normal and breath sounds normal.   Abdominal: Soft. Bowel sounds are normal.   Musculoskeletal: Normal range of motion.   Neurological: She is alert.   Skin: Skin is warm and dry.   Psychiatric: She has a normal mood and affect. Her behavior is normal.   Nursing note and vitals reviewed.   Neurological, patient is awake oriented x2, she is not oriented to place and time next somehow she is slowly mentally , she does have some left facial droop.  However she denies any sensorial deficits, motor strength 5/5 bilateral upper extremities, finger-to-nose intact, no pronator drift      CRANIAL NERVES     CN III, IV, VI   Pupils are equal, round, and reactive to light.  Extraocular motions are normal.       Significant Labs:   BMP:   Recent Labs   Lab 09/05/19  0854   CREATININE 1.6*   MG 2.0     CBC:   Recent Labs   Lab 09/05/19  0853 09/05/19  0854   WBC  --  5.07   HGB  --  9.9*   HCT 30* 31.4*   PLT  --  183         Assessment/Plan:     Active Diagnoses:    Diagnosis Date Noted POA    PRINCIPAL PROBLEM:  Stroke [I63.9] 09/05/2019 Unknown    Dysphagia [R13.10] 05/03/2017 Yes    HTN (hypertension) [I10] 09/05/2019 Unknown    GERD (gastroesophageal reflux disease) [K21.9] 09/05/2019 Unknown    Ischemic stroke [I63.9] 09/05/2019 Yes      Problems Resolved During this Admission:     VTE Risk Mitigation (From admission, onward)        Ordered     Place sequential compression device  Until discontinued      09/05/19 1051     IP VTE HIGH RISK PATIENT  Once      09/05/19 1051        Possible stroke, with left facial deficit  The.  Patient was out of the tPA window  NPO for now, carotid Doppler  Echocardiography, Neurology consult, give  aspirin    Hypertension  Not on medications, monitor, for now permissive hypertension    Malnutrition, BMI 16, consider dietary consult    Elevated creatinine, unknown if the patient has chronic kidney disease is baseline, patient likely dehydrated, start IV fluids, monitor    Normocytic anemia, monitor CBC, consider anemia workup if hemoglobin continues to drop  DVT prophylaxis Lovenox subcu          Jarad Tran MD  Department of Hospital Medicine   [facility]

## 2019-09-05 NOTE — ED PROVIDER NOTES
Encounter Date: 9/5/2019       History   No chief complaint on file.      Patient was last seen normal last night.  Patient does live with a daughter but was not seen this morning.  She was talking to her other daughter on the phone and seemed very confused.  Family arrived and patient was confused.  There is no focal weakness.  No known trauma.  Patient with no pain. Family also report patient seems to be laughing intermittently.  No new medications.  No relieving factors at home.  Symptoms continue in the emergency room.        Review of patient's allergies indicates:  No Known Allergies  Past Medical History:   Diagnosis Date    GERD (gastroesophageal reflux disease)     Hypertension      Past Surgical History:   Procedure Laterality Date    ESOPHAGOGASTRODUODENOSCOPY (EGD) N/A 5/3/2017    Performed by Wagn [facility] at [facility]    HYSTERECTOMY       No family history on file.  Social History     Tobacco Use    Smoking status: Never Smoker   Substance Use Topics    Alcohol use: No    Drug use: No     Review of Systems   Constitutional: Negative for chills and fever.   HENT: Negative for congestion.    Eyes: Negative for visual disturbance.   Respiratory: Negative for shortness of breath.    Cardiovascular: Negative for chest pain and palpitations.   Gastrointestinal: Negative for abdominal pain and vomiting.   Genitourinary: Negative for dysuria.   Musculoskeletal: Negative for joint swelling.   Neurological: Negative for headaches.   Psychiatric/Behavioral: Positive for confusion.       Physical Exam     Initial Vitals   BP Pulse Resp Temp SpO2   -- -- -- -- --      MAP       --         Physical Exam    Nursing note and vitals reviewed.  Constitutional: She is not diaphoretic. No distress.   HENT:   Head: Normocephalic and atraumatic.   Eyes: Conjunctivae and EOM are normal.   Neck: Normal range of motion. Neck supple.   Cardiovascular: Normal rate.   Pulmonary/Chest: Breath sounds normal.    Abdominal: Soft. Bowel sounds are normal. There is no tenderness.   Musculoskeletal: Normal range of motion.   Moves all extremities no problems   Neurological: She is alert.   Cranial nerves 2-12 intact. No pronator drift.  Strength is good to all extremities.  Sensation intact.  Strength is very symmetric.   Skin: No rash noted.   Psychiatric:   Patient does follow commands but really does not speak much.  She does get confused with complex commands.         ED Course   Procedures  Labs Reviewed - No data to display       Imaging Results    None          Medical Decision Making:   History:   Old Medical Records: I decided to obtain old medical records.  Clinical Tests:   Lab Tests: Reviewed  Radiological Study: Reviewed  Medical Tests: Reviewed  ED Management:  Patient presents with altered mental status of unclear etiology.  Originally time of onset was very unclear.  Stroke activation initiated.  Daughter now at bedside reports that she was not seen normal this morning and last time normal was last night.  Neurology immediately consult and did evaluate emergency room.  Repeat examination for with daughter and son at bedside now patient continued to no focal neurologic deficits.  Patient now is talking much better.  She is able to hold a conversation.  Family report her speech is much improved.  Patient not a tPA candidate secondary to unknown time of onset and rapid improvement of symptoms. Will give aspirin.  Check urinalysis for possible infection.                      Clinical Impression:       ICD-10-CM ICD-9-CM   1. Ischemic stroke I63.9 434.91   2. Aphasia R47.01 784.3                                Dominick Gatica MD  09/05/19 1027

## 2019-09-05 NOTE — NURSING
Patient passed a bed side swallow test in ED and passed one here on the floor.  Per Dr Tran -- OK to give cardiac diet. RBVO

## 2019-09-06 PROBLEM — G45.9 TIA (TRANSIENT ISCHEMIC ATTACK): Status: ACTIVE | Noted: 2019-09-06

## 2019-09-06 PROBLEM — I63.9 ISCHEMIC STROKE: Status: RESOLVED | Noted: 2019-09-05 | Resolved: 2019-09-06

## 2019-09-06 PROBLEM — G93.41 ACUTE METABOLIC ENCEPHALOPATHY: Status: ACTIVE | Noted: 2019-09-06

## 2019-09-06 PROCEDURE — 21400001 HC TELEMETRY ROOM

## 2019-09-06 PROCEDURE — 97530 THERAPEUTIC ACTIVITIES: CPT

## 2019-09-06 PROCEDURE — 97535 SELF CARE MNGMENT TRAINING: CPT

## 2019-09-06 PROCEDURE — 25000003 PHARM REV CODE 250: Performed by: INTERNAL MEDICINE

## 2019-09-06 PROCEDURE — 92610 EVALUATE SWALLOWING FUNCTION: CPT

## 2019-09-06 PROCEDURE — 95819 EEG AWAKE AND ASLEEP: CPT

## 2019-09-06 PROCEDURE — 63600175 PHARM REV CODE 636 W HCPCS: Performed by: INTERNAL MEDICINE

## 2019-09-06 PROCEDURE — 97161 PT EVAL LOW COMPLEX 20 MIN: CPT

## 2019-09-06 PROCEDURE — 97165 OT EVAL LOW COMPLEX 30 MIN: CPT

## 2019-09-06 RX ORDER — ATORVASTATIN CALCIUM 40 MG/1
40 TABLET, FILM COATED ORAL DAILY
Qty: 90 TABLET | Refills: 3 | Status: SHIPPED | OUTPATIENT
Start: 2019-09-06 | End: 2020-09-05

## 2019-09-06 RX ORDER — LISINOPRIL 2.5 MG/1
2.5 TABLET ORAL DAILY
Qty: 30 TABLET | Refills: 3 | Status: SHIPPED | OUTPATIENT
Start: 2019-09-06 | End: 2019-09-07 | Stop reason: HOSPADM

## 2019-09-06 RX ORDER — NAPROXEN SODIUM 220 MG/1
81 TABLET, FILM COATED ORAL DAILY
Refills: 0 | Status: ON HOLD | COMMUNITY
Start: 2019-09-07 | End: 2019-10-04 | Stop reason: SDUPTHER

## 2019-09-06 RX ORDER — HYDRALAZINE HYDROCHLORIDE 20 MG/ML
10 INJECTION INTRAMUSCULAR; INTRAVENOUS ONCE
Status: COMPLETED | OUTPATIENT
Start: 2019-09-06 | End: 2019-09-06

## 2019-09-06 RX ORDER — SODIUM CHLORIDE 9 MG/ML
INJECTION, SOLUTION INTRAVENOUS CONTINUOUS
Status: DISCONTINUED | OUTPATIENT
Start: 2019-09-06 | End: 2019-09-07 | Stop reason: HOSPADM

## 2019-09-06 RX ORDER — AMLODIPINE BESYLATE 5 MG/1
10 TABLET ORAL DAILY
Qty: 30 TABLET | Refills: 3 | Status: SHIPPED | OUTPATIENT
Start: 2019-09-06 | End: 2019-09-07 | Stop reason: SDUPTHER

## 2019-09-06 RX ADMIN — ASPIRIN 81 MG 81 MG: 81 TABLET ORAL at 08:09

## 2019-09-06 RX ADMIN — SODIUM CHLORIDE 500 ML: 0.9 INJECTION, SOLUTION INTRAVENOUS at 04:09

## 2019-09-06 RX ADMIN — SODIUM CHLORIDE: 0.9 INJECTION, SOLUTION INTRAVENOUS at 10:09

## 2019-09-06 RX ADMIN — DEXTROSE AND SODIUM CHLORIDE: 5; .9 INJECTION, SOLUTION INTRAVENOUS at 01:09

## 2019-09-06 RX ADMIN — HYDRALAZINE HYDROCHLORIDE 10 MG: 20 INJECTION INTRAMUSCULAR; INTRAVENOUS at 02:09

## 2019-09-06 NOTE — PLAN OF CARE
Problem: Infection  Goal: Infection Symptom Resolution    Intervention: Prevent or Manage Infection  No s/s of infection.

## 2019-09-06 NOTE — PT/OT/SLP EVAL
Physical Therapy Evaluation    Patient Name:  Melania Beavers   MRN:  [id number]    Recommendations:     Discharge Recommendations:  home health  Discharge Equipment Recommendations: none   Barriers to discharge: None    Assessment:     Melania Beavers is a 85 y.o. female admitted with a medical diagnosis of Stroke.  She presents with the following impairments/functional limitations:  weakness, impaired endurance, gait instability, impaired functional mobilty, decreased safety awareness, impaired balance, impaired self care skills.    Rehab Prognosis: Good; patient would benefit from acute skilled PT services to address these deficits and reach maximum level of function.    Recent Surgery: * No surgery found *      Plan:     During this hospitalization, patient to be seen 6 x/week to address the identified rehab impairments via gait training, therapeutic activities, therapeutic exercises and progress toward the following goals:    · Plan of Care Expires:  10/06/19    Subjective     Chief Complaint: a little unsteady  Patient/Family Comments/goals: go home  Pain/Comfort:  · Pain Rating 1: 0/10    Patients cultural, spiritual, Gnosticist conflicts given the current situation:      Living Environment:  Lives in single story house with 1 step to enter with daughter  Prior to admission, patients level of function was mod I.  Equipment used at home: walker, rolling, cane, straight, 3-in-1 commode, bath bench.  DME owned (not currently used): none.  Upon discharge, patient will have assistance from her daughter.    Objective:     Communicated with OT prior to session.  Patient found supine with bed alarm, peripheral IV  upon PT entry to room.    General Precautions: Standard, fall   Orthopedic Precautions:    Braces:       Exams:  · Sensation:    · -       Intact  · RLE Strength: WFL  · LLE Strength: WFL    Functional Mobility:  · Bed Mobility:     · Supine to Sit: supervision  · Transfers:     · Sit to Stand:  contact guard  assistance with no AD  · Bed to Chair: contact guard assistance with  no AD  using  Stand Pivot  · Toilet Transfer: minimum assistance with  grab bars  using  Stand Pivot  · Gait: 150ft with no AD and min A. Small step length and slow speed. Mild unteadiness with one loss of balance         Therapeutic Activities and Exercises:   toileting with min A for sit to stand from the commode. SBA with perineal hygiene    AM-PAC 6 CLICK MOBILITY  Total Score:20     Patient left up in chair with all lines intact, call button in reach and NAD and bedside table in front of the patient.    GOALS:   Multidisciplinary Problems     Physical Therapy Goals        Problem: Physical Therapy Goal    Goal Priority Disciplines Outcome Goal Variances Interventions   Physical Therapy Goal     PT, PT/OT      Description:  Goals to be met by: discharge     Patient will increase functional independence with mobility by performin. Supine to sit with Modified [place]  2. Sit to stand transfer with Modified [place]  3. Bed to chair transfer with Stand-by Assistance   4. Gait  x 150 feet with Supervision with no AD                    History:     Past Medical History:   Diagnosis Date    GERD (gastroesophageal reflux disease)     Hypertension        Past Surgical History:   Procedure Laterality Date    ESOPHAGOGASTRODUODENOSCOPY (EGD) N/A 5/3/2017    Performed by [facility]anon [facility] at [facility] ENDO    HYSTERECTOMY         Time Tracking:     PT Received On: 19  PT Start Time: 1005     PT Stop Time: 1020  PT Total Time (min): 15 min     Billable Minutes: Evaluation 5 min in and Therapeutic Activity 10 min      Kathrine Huffman, PT  2019

## 2019-09-06 NOTE — PLAN OF CARE
Problem: SLP Goal  Goal: SLP Goal  Outcome: Ongoing (interventions implemented as appropriate)  No s/s of oropharyngeal dysphagia. Orientedx4. No significant cognitive communication deficits within BSA.

## 2019-09-06 NOTE — PROGRESS NOTES
[facility]  Neurology  Progress Note    Patient Name: Melania Beavers  MRN: [id number]  Admission Date: 9/5/2019  Hospital Length of Stay: 1 days  Code Status: Full Code   Attending Provider: Jarad Tran MD  Primary Care Physician: Luis Brown MD   Principal Problem:Stroke    Subjective:     Interval History: Pt seen and examined. Friend at bedside. Pt is back to baseline and without any complaints.     Chief Complaint:  AMS      HPI:   Called by Dr. Gatica from the ED for stat consult. Pt presented to the ED this AM due to the above complaint. Pt lives with her daughter, but was home alone this morning. Her daughter spoke to her on the phone and the patient seemed confused. After going home to check on her, the patient remained confused. No apparent trauma at the scene. The daughter reported that the patient was acting strangely and laughing inappropriately. She has not had any recent medication changes. Upon arrival to the ED, her exam was non-focal, but she was still confused. The patient's last known normal time is not known, as she seems to have woke up with these symptoms. Stat CT head was done and reviewed personally without any obvious acute pathologies, but the patient was not a tPA candidate. Her initial NIHSS score is 4 due to her moderate level of aphasia. Pt was seen in radiology after her CT scan. She is unable to provide any history.      NKDA     PMH: GERD, HTN  PSH: EGD, hysterectomy  No reported family history  Non smoker     Cr 1.6H  LDL 109H  A1C 5.6     CT brain:  Negative acute    MRI brain:  1. 4 mm hyperintense focus on diffusion-weighted images in the right frontal lobe near the vertex is probably artifactual.  Tiny focus of acute cortical ischemia is felt less likely but not absolutely excluded.  2. Changes of minor chronic microvascular white matter disease.  3. No other significant abnormalities    MRA brain:  Normal    CUS:  1.  Nodular calcified plaque in the right  carotid bulb causing less than 50% stenosis.  2.  No clinically significant degree of stenosis in the left carotid artery.  3.  Bilateral antegrade vertebral artery flow.    TTE: pending    EKG: NSR    Current Neurological Medications: reviewed    Current Facility-Administered Medications   Medication Dose Route Frequency Provider Last Rate Last Dose    aspirin chewable tablet 81 mg  81 mg Oral Daily Jarad Tran MD   81 mg at 09/06/19 0853    dextrose 5 % and 0.9 % NaCl infusion   Intravenous Continuous Jarad Tran MD 75 mL/hr at 09/05/19 1645      dextrose 50% injection 12.5 g  12.5 g Intravenous PRN Jarad Tran MD        dextrose 50% injection 25 g  25 g Intravenous PRN Jarad Tran MD        fluticasone propionate 50 mcg/actuation nasal spray 50 mcg  1 spray Each Nostril Daily PRN Jarad Tran MD        glucagon (human recombinant) injection 1 mg  1 mg Intramuscular PRN Jarad Tran MD        glucose chewable tablet 16 g  16 g Oral BOBN Jarad Tran MD        glucose chewable tablet 24 g  24 g Oral PRN Jarad Tran MD        sodium chloride 0.9% flush 10 mL  10 mL Intravenous PRN Jarad Tran MD           Review of Systemsas per HPI  Objective:     Vital Signs (Most Recent):  Temp: 98.3 °F (36.8 °C) (09/06/19 0745)  Pulse: 76 (09/06/19 0745)  Resp: (!) 21 (09/06/19 0745)  BP: (!) 160/83 (09/06/19 0745)  SpO2: 98 % (09/06/19 0745) Vital Signs (24h Range):  Temp:  [97.8 °F (36.6 °C)-98.3 °F (36.8 °C)] 98.3 °F (36.8 °C)  Pulse:  [70-88] 76  Resp:  [20-39] 21  SpO2:  [97 %-100 %] 98 %  BP: (150-182)/(69-86) 160/83     Weight: 45.9 kg (101 lb 3.1 oz)  Body mass index is 16.33 kg/m².    Physical Exam   Constitutional: She is oriented to person, place, and time. She appears well-developed and well-nourished.   HENT:   Head: Normocephalic and atraumatic.   Eyes: Pupils are equal, round, and reactive to light. Conjunctivae and EOM are normal.   Neck: Normal range of motion. Neck supple.    Pulmonary/Chest: Effort normal.   Abdominal: Soft. She exhibits no distension.   Musculoskeletal: Normal range of motion.   Neurological: She is alert and oriented to person, place, and time. She has a normal Finger-Nose-Finger Test and a normal Heel to [place] Test.   Skin: Skin is warm and dry.   Psychiatric: Her speech is normal and behavior is normal.       NEUROLOGICAL EXAMINATION:     MENTAL STATUS   Oriented to person, place, and time.   Attention: normal. Concentration: normal.   Speech: speech is normal   Level of consciousness: alert  Knowledge: good.   Able to name object. Able to repeat.     CRANIAL NERVES   Cranial nerves II through XII intact.     CN III, IV, VI   Pupils are equal, round, and reactive to light.  Extraocular motions are normal.     MOTOR EXAM     Strength   Right neck flexion: 4/5  Left neck flexion: 4/5  Right neck extension: 4/5  Left neck extension: 4/5  Right deltoid: 4/5  Left deltoid: 4/5  Right biceps: 4/5  Left biceps: 4/5  Right triceps: 4/5  Left triceps: 4/5  Right wrist flexion: 4/5  Left wrist flexion: 4/5  Right wrist extension: 4/5  Left wrist extension: 4/5  Right interossei: 4/5  Left interossei: 4/5  Right abdominals: 4/5  Left abdominals: 4/5  Right iliopsoas: 4/5  Left iliopsoas: 4/5  Right quadriceps: 4/5  Left quadriceps: 4/5  Right hamstrin/5  Left hamstrin/5  Right glutei: 4/5  Left glutei: 4/5  Right anterior tibial: 4/5  Left anterior tibial: 4/5  Right posterior tibial: 4/5  Left posterior tibial: 4/5  Right peroneal: 4/5  Left peroneal: 4/5  Right gastroc: 4/5  Left gastroc: 4/5    SENSORY EXAM   Light touch normal.     GAIT AND COORDINATION      Coordination   Finger to nose coordination: normal  Heel to shin coordination: normal    Tremor   Resting tremor: present       Faint R hand resting tremor, intermittent  No CWR on exam        Significant Labs: All pertinent lab results from the past 24 hours have been reviewed.    Significant Imaging: I have  reviewed and interpreted all pertinent imaging results/findings within the past 24 hours.    Assessment and Plan:   1. Encephalopathy, unclear etiology  - Agree with radiology report - imaging negative for acute CVA  - Resolved, pt now back to baseline  - Suspect clinical TIA  - Recommend daily ASA 81 mg and Lipitor 10mg daily for CVA prevention - stroke education provided  - Will add an EEG today for completeness    After EEG, pt may be discharged at the discretion of IM with follow up as below  No driving until seen in clinic     Stroke education was provided.  If patient has acute neurological changes including weakness, confusion, speech changes, facial droop, difficulty walking, and sensory changes immediately call 911.    Follow up Neurology in 2-4 weeks at [phone].  Medication side effects discussed with the patient and/or caregiver.     cations such as immobility, falls, pressure sores, dehydration, malnourishment, and isolation.  Active Diagnoses:    Diagnosis Date Noted POA    PRINCIPAL PROBLEM:  Stroke [I63.9] 09/05/2019 Unknown    HTN (hypertension) [I10] 09/05/2019 Unknown    GERD (gastroesophageal reflux disease) [K21.9] 09/05/2019 Unknown    Ischemic stroke [I63.9] 09/05/2019 Yes    Dysphagia [R13.10] 05/03/2017 Yes      Problems Resolved During this Admission:       VTE Risk Mitigation (From admission, onward)        Ordered     Place sequential compression device  Until discontinued      09/05/19 1051     IP VTE HIGH RISK PATIENT  Once      09/05/19 1051          Larissa Renee NP  Neurology  [facility]

## 2019-09-06 NOTE — PT/OT/SLP EVAL
Occupational Therapy   Evaluation and Discharge Note    Name: Melania Beavers  MRN: [id number]  Admitting Diagnosis:  Stroke      Recommendations:     Discharge Recommendations: home  Discharge Equipment Recommendations:  none  Barriers to discharge:  None    Assessment:     Melania Beavers is a 85 y.o. female with a medical diagnosis of possible TIA.  Patient appears at baseline level of function and will not benefit from continued skilled OT services.  Plan:     During this hospitalization, patient does not require further acute OT services.  Please re-consult if situation changes.    · Plan of Care Reviewed with: patient    Subjective     Chief Complaint: none  Patient/Family Comments/goals: return home    Occupational Profile:  Living Environment: Pt lives with her daughter in a single story home with 0STE.  She has a walk-in shower with a seat and wall bars inside.  The toilet has a 3-in-one commode over it for added height and armrests to assist with toilet t/f independence.    Previous level of function: Modified Independent; does not go out into community much per pt report   Equipment Used at home:  walker, rolling, 3-in-1 commode, cane, straight, shower chair  Assistance upon Discharge: family PRN; pt does not have 24/7 assist because daughter works     Pain/Comfort:  · Pain Rating 1: 0/10  · Pain Rating Post-Intervention 1: 0/10    Objective:     Communicated with: RN prior to session.  Patient found supine with bed alarm, peripheral IV upon OT entry to room.    General Precautions: Standard, fall   Orthopedic Precautions:N/A   Braces: N/A     Occupational Performance:    Bed Mobility:    · Patient completed Scooting/Bridging with supervision  · Patient completed Supine to Sit with supervision  · Patient completed Sit to Supine with supervision    Functional Mobility/Transfers:  · Patient completed Sit <> Stand Transfer with stand by assistance  with  no assistive device   · Patient completed Toilet Transfer  Stand Pivot technique with stand by assistance with  grab bars  · Functional Mobility:  CGA short distance in hospital room using no AD; AD not used due to environmental limitations in bathroom     Activities of Daily Living:  · Grooming: stand by assistance standing at sink   · Toileting: stand by assistance on toilet     Cognitive/Visual Perceptual:  Cognitive/Psychosocial Skills:     -       Oriented to: Person, Place, Time and Situation   -       Follows Commands/attention:Follows multistep  commands  -       Communication: clear/fluent  -       Memory: No Deficits noted  -       Safety awareness/insight to disability: intact   -       Mood/Affect/Coping skills/emotional control: Appropriate to situation  Visual/Perceptual:      -Intact      Physical Exam:  Balance:    -       good sitting, fair standing  Upper Extremity Range of Motion:     -       Right Upper Extremity: WNL  -       Left Upper Extremity: WNL  Upper Extremity Strength: BUE strength 4/5   Strength: WFL bilaterally   Fine Motor Coordination:    -       Intact  Gross motor coordination:   WFL    AMPAC 6 Click ADL:  AMPAC Total Score: 24    Treatment & Education:  OT role/discharge  Education:    Patient left supine with all lines intact and call button in reach    History:     Past Medical History:   Diagnosis Date    GERD (gastroesophageal reflux disease)     Hypertension        Past Surgical History:   Procedure Laterality Date    ESOPHAGOGASTRODUODENOSCOPY (EGD) N/A 5/3/2017    Performed by [facility] at [facility]    HYSTERECTOMY         Time Tracking:     OT Date of Treatment: 09/06/19  OT Start Time: 0922  OT Stop Time: 0944  OT Total Time (min): 22 min    Billable Minutes:Evaluation 12  Self Care/Home Management 10    Musa Ta, OT  9/6/2019

## 2019-09-06 NOTE — PLAN OF CARE
09/06/19 1644   Discharge Assessment   Assessment Type Discharge Planning Reassessment     Possible DC home this evening or tomorrow for HH, went to pt and family and spoke with them about Freedom of Choice Form for HH, explained to patient and family that they have the right to choose any agency, and a list of agencies was provided to patient and family to review, they verbalized an understanding, but want to look over list first and will sign form later upon dc. CM will follow with dc planning needs.

## 2019-09-06 NOTE — PT/OT/SLP EVAL
Speech Language Pathology Evaluation  Bedside Swallow    Patient Name:  Melania Beavers   MRN:  [id number]  Admitting Diagnosis: Stroke    Recommendations:                 General Recommendations:  cognitive linguistic evaluation to be completed pending etiolgoy of altered mental status  Diet recommendations:  Regular, Thin   Aspiration Precautions: Standard aspiration precautions   General Precautions: Standard, fall  Communication strategies:  none    History:     Past Medical History:   Diagnosis Date    GERD (gastroesophageal reflux disease)     Hypertension        Past Surgical History:   Procedure Laterality Date    ESOPHAGOGASTRODUODENOSCOPY (EGD) N/A 5/3/2017    Performed [facility] at [facility] END[facility]    HYSTERECTOMY         Social History: Patient lives with daughter.    Prior Intubation HX:  None this admit    Modified Barium Swallow: none within epic system    Imaging Results          MRI Brain Without Contrast (Final result)  Result time 09/05/19 13:33:52    Final result by Carlos Szymanski MD (09/05/19 13:33:52)                 Impression:      1. 4 mm hyperintense focus on diffusion-weighted images in the right frontal lobe near the vertex is probably artifactual.  Tiny focus of acute cortical ischemia is felt less likely but not absolutely excluded.  2. Changes of minor chronic microvascular white matter disease.  3. No other significant abnormalities.      Electronically signed by: Carlos Szymanski MD  Date:    09/05/2019  Time:    13:33             Narrative:    EXAMINATION:  MRI BRAIN WITHOUT CONTRAST    CLINICAL HISTORY:  Confusion/delirium, altered LOC, unexplained;.    TECHNIQUE:  Multiplanar noninfusion imaging was performed.    COMPARISON:  CT brain, September 5, 2019    FINDINGS:  There is no evidence of intracranial mass, hemorrhage, or midline shift.  Ventricles and sulci are normal.  There are no pathologic extra-axial fluid collections.    FLAIR images demonstrate findings of  minor chronic microvascular white matter disease.  On diffusion-weighted images, there is a tiny hyperintense focus in the right frontal lobe at the vertex (series 3, image 50) which is probably artifactual in nature.  Tiny (4 mm) focus of acute cortical ischemia is not absolutely excluded, but felt less likely.  No other regions of diffusion restriction are identified.  There is no evidence of demyelinating disease.    Cerebellum and brainstem are unremarkable.  The orbits, paranasal sinuses, skull base, and sella turcica are within normal limits.                               US Carotid Bilateral (Final result)  Result time 09/05/19 12:32:11    Final result by Chaitanya Lord MD (09/05/19 12:32:11)                 Impression:      1.  Nodular calcified plaque in the right carotid bulb causing less than 50% stenosis.    2.  No clinically significant degree of stenosis in the left carotid artery.    3.  Bilateral antegrade vertebral artery flow.      Electronically signed by: Chaitanya Lord MD  Date:    09/05/2019  Time:    12:32             Narrative:    CLINICAL HISTORY:  ([id number])86 y/o  (12/4/1933) F    possible CVA;    TECHNIQUE:  (A#[id number], exam time 9/5/2019 12:23)    US CAROTID BILATERAL [id number]    Duplex sonographic ultrasound of the bilateral carotid arteries.  Color and grayscale images were obtained.    CMS Mandated Quality Data - Carotid - 195    All measurements and percent stenosis described below were determined using NASCE criteria or criteria similar to NASCET, as defined by the Society of Radiologists in Ultrasound Consensus Conference, Radiology, 2003    COMPARISON:  Ultrasound from 05/03/2016.    FINDINGS:  For the RIGHT carotid artery,  there is diffuse intimal thickening with nodular calcified plaques seen in the right carotid bulb.  The highest peak systolic velocity noted in the right internal carotid artery is 113 cm/s. The highest peak diastolic velocity noted in the right  internal carotid artery is 21 cm/s. The ICA/CCA peak systolic ratio is 1.5.  These values suggest less than 50% stenosis..  The right vertebral artery shows antegrade flow. The external carotid artery is patent.    For the LEFT carotid artery,  there is diffuse intimal thickening without focal stenosis. The highest peak systolic velocity noted in the left internal carotid artery is 68 cm/s. The highest peak diastolic velocity noted in the left internal carotid artery is 19 cm/s. The ICA/CCA peak systolic ratio is 1.0.  These values suggest no clinically significant degree of stenosis. The left vertebral artery shows antegrade flow. The external carotid artery is patent.                               MRA Brain without contrast (Final result)  Result time 09/05/19 12:57:56    Final result by Trinidad Lazaro MD (09/05/19 12:57:56)                 Impression:      Normal MRA brain.      Electronically signed by: Trinidad Lazaro MD  Date:    09/05/2019  Time:    12:57             Narrative:    EXAMINATION:  MRA BRAIN WITHOUT CONTRAST    CLINICAL HISTORY:  Stroke;    TECHNIQUE:  3-D time-of-flight MR angiography of brain without contrast.    COMPARISON:  None    FINDINGS:  Major intracranial arteries show normal intraluminal flow related signal. Negative for vascular occlusion, focal stenosis, or dissection. Negative for aneurysm or evidence of vasculitis.                               X-Ray Chest AP Portable (Final result)  Result time 09/05/19 09:28:21    Final result by Paulino Orellana IV, MD (09/05/19 09:28:21)                 Impression:      Stable biapical pleuroparenchymal scarring.    No acute infiltrate.      Electronically signed by: Paulino Orellana IV., MD  Date:    09/05/2019  Time:    09:28             Narrative:    EXAMINATION:  XR CHEST AP PORTABLE    CLINICAL HISTORY:  Altered mental status;    COMPARISON:  08/06/2019.    FINDINGS:  The heart is normal in size.  There is no evidence of acute pulmonary  "vascular engorgement.  There is arteriosclerotic calcification within the aortic arch.    There is biapical pleuroparenchymal scarring, similar to prior exams.  There are no confluent parenchymal infiltrates or significant volume loss.  No effusion is demonstrated.  Monitoring electrodes overlie the chest.                               CT Head Without Contrast (Final result)  Result time 09/05/19 09:10:09    Final result by Carlos Szmyanski MD (09/05/19 09:10:09)                 Impression:      1. Chronic findings as described.  No acute intracranial abnormalities.  2. Findings were discussed with Dr. Gatica at 09:05 on September 5, 2019..      Electronically signed by: Carlos Szymanski MD  Date:    09/05/2019  Time:    09:10             Narrative:    EXAMINATION:  CT HEAD WITHOUT CONTRAST    CLINICAL HISTORY:  Weakness;.    TECHNIQUE:  CMS MANDATED QUALITY DATA - CT RADIATION - 436    All CT scans at this facility utilize dose modulation, iterative reconstruction, and/or weight based dosing when appropriate to reduce radiation dose to as low as reasonably achievable.    Non infusion images were obtained from the skull base to the vertex.    COMPARISON:  None.    FINDINGS:  There is no evidence of intracranial mass, hemorrhage, or midline shift.  Ventricles and sulci are normal for age.  There are no pathologic extra-axial fluid collections.    There is no CT evidence of acute ischemic change.  Changes of mild chronic microvascular white matter disease are noted.  Small focus of encephalomalacia in the right cerebellum.  The calvarium is intact.                                  Prior diet: regular/thin.    Subjective     "Well I guess my kids thought I was confused and got worried. I could see where they were coming from."  Patient goals: none stated      Pain/Comfort:  · Pain Rating 1: 0/10    Objective:     Oral Musculature Evaluation  · Oral Musculature: WFL  · Dentition: present and adequate  · Secretion " Management: adequate  · Mucosal Quality: good  · Mandibular Strength and Mobility: WFL  · Oral Labial Strength and Mobility: WFL  · Lingual Strength and Mobility: WFL  · Velar Elevation: WFL  · Buccal Strength and Mobility: WFL  · Volitional Cough: (present; dry in nature)  · Volitional Swallow: (hyolaryngeal movement present to digital palpation)  · Voice Prior to PO Intake: (clear; no dysphonia)    Cognitive communication status: Pt alert and able to follow simple commands. Oriented x4. Initially, poor initiation however with familiarization of clinician Pt fluent and appropriate at the conversational level. Word retrieval deficit noted x1 which would be WNL for age.     Bedside Swallow Eval:   Consistencies Assessed:  Thin liquids 3oz via sequential cup sips  Solids 1/4 cracker x2     Oral Phase:   Pt with adequate oral containment and coordination across consistencies. Timely mastication, adequate bolus formation and oral clearance which resulted in no evidence of appreciable oral residue with solid consistencies.    Pharyngeal Phase:   Adequate hyolaryngeal movement to digital palpation. Pt tolerated 3oz thin liquid via sequential sips with no overt s/s of aspiration or changes in vocal quality, passing 3oz water challenge. Therefore, risk of aspiration not indicated. Multiple swallows not observed across consistencies trialled.     Compensatory Strategies  None   ·     Treatment: Dicussed stroke protocol and role of SLP in acute care setting. Pt with no further questions or concerns at this time.     Assessment:     Melania Beavers is a 85 y.o. female with an SLP diagnosis of no s/s of oropharyngeal dysphagia.  Cognitive linguistic evaluation to be completed pending etiolgoy of altered mental status.     Goals:   Multidisciplinary Problems     SLP Goals        Problem: SLP Goal    Goal Priority Disciplines Outcome   SLP Goal     SLP Ongoing (interventions implemented as appropriate)                   Plan:      · Patient to be seen:  (pending etiolgoy of altered mental status )   · Plan of Care expires:     · Plan of Care reviewed with:  patient   · SLP Follow-Up:  (cognitive linguistic evaluation to be completed pending etiolgoy of altered mental status)        Time Tracking:     SLP Treatment Date:   09/06/19  Speech Start Time:  0904  Speech Stop Time:  0912     Speech Total Time (min):  8 min    Billable Minutes: Eval Swallow and Oral Function 8    Daniel Figueredo CCC-SLP  09/06/2019

## 2019-09-06 NOTE — PHYSICIAN QUERY
PT Name: Melania Beavers  MR #: [id number]     Physician Query Form - Documentation Clarification      CDS/: Carlyn Henley RN, CCDS               Contact information:  [phone]  09/09/19:  Query withdrawn.  Discharge Summary does not document malnutrition.    This form is a permanent document in the medical record.     Query Date: September 6, 2019    By submitting this query, we are merely seeking further clarification of documentation. Please utilize your independent clinical judgment when addressing the question(s) below.    The Medical record reflects the following:    Supporting Clinical Findings Location in Medical Record   Current outpatient medications on file prior to encounter  megestrol    Constitutional: She appears well-developed and well-nourished.   Malnutrition, BMI 16, consider dietary consult     [facility]      [facility]                                                                                      Doctor, Please specify diagnosis or diagnoses associated with above clinical findings.  Is there anything else you can add to support the diagnosis of malnutrition?    Provider Use Only                                                                                                                                 [  ] Clinically Undetermined

## 2019-09-06 NOTE — PROCEDURES
EEG Report    Patient name: Melania Beavers     33    Date of Service: 19    Duration: 31 minutes    Requested By: Lino Diamond M.D.                                                                                     Reading Physician: Lino Diamond M.D.        Reason for Study: Seizure.     Clinical History: 84yo woman who presented with transient aphasia and confusion. EEG done to rule out seizure activity.    AED/sedation: none      Exam Notes:  The recording was performed with the standard 10-20 electrode placement with additional ECG electrodes.     Summary:    There is a posterior dominant rhythm of 7-8 Hz which attenuates during drowsiness. Stage II sleep structures are not seen.    Photic stimulation is performed with no abnormal reactivity noted. Hyperventilation is not performed.    No epileptiform discharges or seizures are captured.      Impression:    This is an abnormal awake and drowsy routine EEG due to diffuse slowing of the background activity consistent with a mild encephalopathy.

## 2019-09-06 NOTE — PROGRESS NOTES
Critical access hospital Medicine  Progress Note    Patient Name: Melania Beavers  MRN: [id number]  Patient Class: IP- Inpatient   Admission Date: 9/5/2019  Length of Stay: 1 days  Attending Physician: Jarad Tran MD  Primary Care Provider: Luis Brown MD        Subjective:     Principal Problem:TIA (transient ischemic attack)    Interval History:  Patient improved today, discussed the case with Neurology, brain MRI did not show stroke, unclear what was the cause of her acute metabolic encephalopathy, also her EEG was normal.  Her systolic blood pressure was 180, she received hydralazine IV push 1 time dose, afterwards her systolic blood pressure dropped to 90s, the patient was little bit more confused.  Discharge was held.  As per family request Cardiology consult placed for Dr. atkinson.  Also as per family the patient has been was very sensitive her blood pressure medications, with the major drops in blood pressure.    Review of Systems  Objective:     Vital Signs (Most Recent):  Temp: 97.5 °F (36.4 °C) (09/06/19 1527)  Pulse: 87 (09/06/19 1527)  Resp: 17 (09/06/19 1527)  BP: (!) 91/47 (09/06/19 1527)  SpO2: 99 % (09/06/19 1527) Vital Signs (24h Range):  Temp:  [97.5 °F (36.4 °C)-98.3 °F (36.8 °C)] 97.5 °F (36.4 °C)  Pulse:  [73-88] 87  Resp:  [17-24] 17  SpO2:  [97 %-99 %] 99 %  BP: ()/(47-86) 91/47     Weight: 45.9 kg (101 lb 3.1 oz)  Body mass index is 16.33 kg/m².    Intake/Output Summary (Last 24 hours) at 9/6/2019 1646  Last data filed at 9/6/2019 0900  Gross per 24 hour   Intake 360 ml   Output --   Net 360 ml      Physical Exam  General, no acute distress  Lungs:  Clear to auscultation bilaterally  Cardiac regular rhythm rate, no audible murmurs  Abdomen, soft nontender nondistended  Extremities no calf tenderness no edema  Neurological no focal neurological deficits, also her apparent left facial droop seems to be mostly resolved, other sensorial, or other monitor  deficits      Significant Labs:   BMP:   Recent Labs   Lab 09/05/19  0854   CREATININE 1.6*   MG 2.0     CBC:   Recent Labs   Lab 09/05/19  0853 09/05/19  0854   WBC  --  5.07   HGB  --  9.9*   HCT 30* 31.4*   PLT  --  183         Assessment/Plan:      Active Diagnoses:    Diagnosis Date Noted POA    PRINCIPAL PROBLEM:  TIA (transient ischemic attack) [G45.9] 09/06/2019 Unknown    Acute metabolic encephalopathy [G93.41] 09/06/2019 Unknown    Stroke [I63.9] 09/05/2019 No    Dysphagia [R13.10] 05/03/2017 Yes    HTN (hypertension) [I10] 09/05/2019 Yes    GERD (gastroesophageal reflux disease) [K21.9] 09/05/2019 Yes      Problems Resolved During this Admission:    Diagnosis Date Noted Date Resolved POA    Ischemic stroke [I63.9] 09/05/2019 09/06/2019 Yes     VTE Risk Mitigation (From admission, onward)        Ordered     Place sequential compression device  Until discontinued      09/05/19 1051     IP VTE HIGH RISK PATIENT  Once      09/05/19 1051        TIA  9/6/19  Resolved, continue aspirin continue statin which will be on discharge    9/5/19  The.  Patient was out of the tPA window  NPO for now, carotid Doppler  Echocardiography, Neurology consult, give aspirin     Hypertension  She apparently was on amlodipine 5 mg p.o. daily, which was on hold here with was 180.  Patient received hydralazine push 1 time dose 10 mg, blood systolic blood pressure dropped to 90.  Apparently the patient is very sensitive to blood pressure medications, cardiology consult placed as per family.     Malnutrition, BMI 16, consider dietary consult     Elevated creatinine, unknown if the patient has chronic kidney disease is baseline, patient likely dehydrated, start IV fluids, monitor     Normocytic anemia, monitor CBC, consider anemia workup if hemoglobin continues to drop  DVT prophylaxis Lovenox subcu       Jarad Tran MD  Department of Hospital Medicine   [facility]

## 2019-09-06 NOTE — PLAN OF CARE
Problem: Adult Inpatient Plan of Care  Goal: Plan of Care Review  Outcome: Ongoing (interventions implemented as appropriate)  No deficits noted.   09/06/19 0343   Plan of Care Review   Plan of Care Reviewed With patient   Progress improving

## 2019-09-07 VITALS
DIASTOLIC BLOOD PRESSURE: 72 MMHG | WEIGHT: 113.56 LBS | RESPIRATION RATE: 18 BRPM | HEIGHT: 66 IN | BODY MASS INDEX: 18.25 KG/M2 | OXYGEN SATURATION: 99 % | TEMPERATURE: 98 F | SYSTOLIC BLOOD PRESSURE: 168 MMHG | HEART RATE: 75 BPM

## 2019-09-07 LAB
AORTIC ROOT ANNULUS: 2.36 CM
AORTIC VALVE CUSP SEPERATION: 1.37 CM
AV INDEX (PROSTH): 0.26
AV MEAN GRADIENT: 32 MMHG
AV PEAK GRADIENT: 48 MMHG
AV VALVE AREA: 0.99 CM2
AV VELOCITY RATIO: 28.46
BARBITURATES UR QL SCN: NEGATIVE NG/ML
BSA FOR ECHO PROCEDURE: 1.45 M2
CV ECHO LV RWT: 0.47 CM
DOP CALC AO PEAK VEL: 3.47 M/S
DOP CALC AO VTI: 92.91 CM
DOP CALC LVOT AREA: 3.9 CM2
DOP CALC LVOT DIAMETER: 2.22 CM
DOP CALC LVOT PEAK VEL: 98.76 M/S
DOP CALC LVOT STROKE VOLUME: 92.35 CM3
DOP CALCLVOT PEAK VEL VTI: 23.87 CM
E WAVE DECELERATION TIME: 229 MSEC
E/A RATIO: 0.92
E/E' RATIO: 14.17 M/S
ECHO LV POSTERIOR WALL: 0.97 CM (ref 0.6–1.1)
FRACTIONAL SHORTENING: 37 % (ref 28–44)
INTERVENTRICULAR SEPTUM: 1.12 CM (ref 0.6–1.1)
LABORATORY COMMENT REPORT: NORMAL
LEFT ATRIUM SIZE: 3.48 CM
LEFT INTERNAL DIMENSION IN SYSTOLE: 2.59 CM (ref 2.1–4)
LEFT VENTRICLE DIASTOLIC VOLUME INDEX: 42.54 ML/M2
LEFT VENTRICLE DIASTOLIC VOLUME: 63.7 ML
LEFT VENTRICLE MASS INDEX: 94 G/M2
LEFT VENTRICLE SYSTOLIC VOLUME INDEX: 11.2 ML/M2
LEFT VENTRICLE SYSTOLIC VOLUME: 16.8 ML
LEFT VENTRICULAR INTERNAL DIMENSION IN DIASTOLE: 4.1 CM (ref 3.5–6)
LEFT VENTRICULAR MASS: 140.59 G
LV LATERAL E/E' RATIO: 14.17 M/S
LV SEPTAL E/E' RATIO: 14.17 M/S
MV PEAK A VEL: 0.92 M/S
MV PEAK E VEL: 0.85 M/S
PISA TR MAX VEL: 1.98 M/S
PULM VEIN S/D RATIO: 1.58
PV PEAK D VEL: 44.2 M/S
PV PEAK S VEL: 70.01 M/S
PV PEAK VELOCITY: 95.33 CM/S
RA PRESSURE: 3 MMHG
RIGHT VENTRICULAR END-DIASTOLIC DIMENSION: 176 CM
TDI LATERAL: 0.06 M/S
TDI SEPTAL: 0.06 M/S
TDI: 0.06 M/S
TR MAX PG: 16 MMHG
TV REST PULMONARY ARTERY PRESSURE: 19 MMHG

## 2019-09-07 PROCEDURE — 25000003 PHARM REV CODE 250: Performed by: INTERNAL MEDICINE

## 2019-09-07 PROCEDURE — 97116 GAIT TRAINING THERAPY: CPT

## 2019-09-07 RX ORDER — AMLODIPINE BESYLATE 5 MG/1
5 TABLET ORAL DAILY
Status: DISCONTINUED | OUTPATIENT
Start: 2019-09-07 | End: 2019-09-07 | Stop reason: HOSPADM

## 2019-09-07 RX ORDER — AMLODIPINE BESYLATE 5 MG/1
5 TABLET ORAL DAILY
Qty: 30 TABLET | Refills: 3 | OUTPATIENT
Start: 2019-09-07 | End: 2020-04-04 | Stop reason: CLARIF

## 2019-09-07 RX ADMIN — ASPIRIN 81 MG 81 MG: 81 TABLET ORAL at 08:09

## 2019-09-07 RX ADMIN — AMLODIPINE BESYLATE 5 MG: 5 TABLET ORAL at 09:09

## 2019-09-07 NOTE — NURSING
Prescriptions for aspirin 81 mg qd, atorvastatin 40 mg qd, and amlodipine 5 mg qd called in to patient's pharmacy.

## 2019-09-07 NOTE — CONSULTS
[facility]  Cardiology  Consult    Patient Name: Melania Beavers  MRN: [id number]  Admission Date: 9/5/2019  Code Status: Full Code   Attending Provider: Jarad Tran MD   Primary Care Physician: Luis Brown MD  Principal Problem:TIA (transient ischemic attack)    Patient information was obtained from patient and ER records.     Subjective:     Chief Complaint:  TIA    HPI:     The patient is an 85 years old woman, past medical history hypertension, GERD, coming to emergency department for acute confusion, and also likely slurred speech that occurred this morning.  Patient is disoriented to time and space, however she does know name of the president.  She only has like some left facial droop.  No other focal neurological deficits at this time.  No headache, no double vision, no sensorial deficits, motor strength seems to be 5/5 bilateral upper extremities, bilateral lower extremities,.  No chest pain, no cough, no fever, no nausea, no vomiting, no abdominal pain, no diarrhea, no hematuria, no dysuria, no other acute complaints.  All ROS reviewed, and found negative other than described in history of present illness       Past Medical History:   Diagnosis Date    GERD (gastroesophageal reflux disease)     Hypertension        Past Surgical History:   Procedure Laterality Date    ESOPHAGOGASTRODUODENOSCOPY (EGD) N/A 5/3/2017    Performed by Wang Castanon MD at [facility] ENDO    HYSTERECTOMY         Review of patient's allergies indicates:   Allergen Reactions    Heparin analogues        No current facility-administered medications on file prior to encounter.      Current Outpatient Medications on File Prior to Encounter   Medication Sig    ascorbic acid, vitamin C, (VITAMIN C) 500 MG tablet Take 500 mg by mouth 2 (two) times daily.    b complex vitamins capsule Take 1 capsule by mouth once daily.    FERRALET 90 DUAL-IRON DELIVERY 90-1-12-50 mg-mg-mcg-mg Tab TAKE 1 TABLET BY MOUTH DAILY     fluticasone-salmeterol 250-50 mcg/dose (ADVAIR) 250-50 mcg/dose diskus inhaler Inhale 1 puff into the lungs 2 (two) times daily. Controller    megestrol (MEGACE) 400 mg/10 mL (40 mg/mL) Susp Take 200 mg by mouth every morning.    omeprazole (PRILOSEC) 20 MG capsule Take 20 mg by mouth every morning.     [DISCONTINUED] amlodipine (NORVASC) 5 MG tablet TAKE 5MG BY MOUTH ONCE DAILY    lanolin/mineral oil/petrolatum (ARTIFICIAL TEARS OPHT) Place 1 drop into both eyes 3 (three) times daily as needed.    levocetirizine (XYZAL) 5 MG tablet TAKE 5MG BY MOUTH DAILY AS NEEDED    mometasone (NASONEX) 50 mcg/actuation nasal spray 2 sprays by Nasal route daily as needed.     ondansetron (ZOFRAN) 8 MG tablet Take 8 mg by mouth every 12 (twelve) hours as needed for Nausea.      Family History     None        Tobacco Use    Smoking status: Never Smoker   Substance and Sexual Activity    Alcohol use: No    Drug use: No    Sexual activity: Never       REVIEW OF SYSTEMS:    Constitutional: +Weakness and Confusion  Eyes: No double vision, No blurred vision  Neuro: No headaches, No dizziness  Respiratory: Negative for cough, shortness of breath and wheezing.    Cardiovascular: Negative for chest pain. Negative for palpitations and leg swelling.   Gastrointestinal: Negative for abdominal pain, No melena, diarrhea, nausea and vomiting.   Genitourinary: Negative for dysuria and frequency, Negative for hematuria  Skin: Negative for bruising, Negative for edema or discoloration noted.   Endocrine: Negative for polyphagia, Negative for heat intolerance, Negative for cold intolerance  Psychiatric: + Confusion  Musculoskeletal: Negative for neck pain, Negative for muscle weakness, Negative for back pain     Objective:     Vital Signs (Most Recent):  Temp: 98.2 °F (36.8 °C) (09/06/19 1951)  Pulse: 83 (09/06/19 1951)  Resp: 18 (09/06/19 1951)  BP: 130/61 (09/06/19 1951)  SpO2: 98 % (09/06/19 1951) Vital Signs (24h Range):  Temp:  [97.5  °F (36.4 °C)-98.3 °F (36.8 °C)] 98.2 °F (36.8 °C)  Pulse:  [73-88] 83  Resp:  [17-24] 18  SpO2:  [97 %-99 %] 98 %  BP: ()/(47-86) 130/61     Weight: 45.9 kg (101 lb 3.1 oz)  Body mass index is 16.33 kg/m².    SpO2: 98 %  O2 Device (Oxygen Therapy): room air      Intake/Output Summary (Last 24 hours) at 9/6/2019 2002  Last data filed at 9/6/2019 1400  Gross per 24 hour   Intake 600 ml   Output --   Net 600 ml       Lines/Drains/Airways     Peripheral Intravenous Line                 Peripheral IV - Single Lumen 09/06/19 0640 22 G Anterior;Right Forearm less than 1 day                PHYSICAL EXAM:    GENERAL: well built, well nourished, well-developed in no apparent distress alert and oriented.   HEENT: Normocephalic. Pupils normal and conjunctivae normal.  Mucous membranes normal, no cyanosis or icterus, trachea central,no pallor or icterus is noted..   NECK: No JVD. BRUIT noted  THYROID: Thyroid not enlarged. No nodules present..   CARDIAC: Grade 3 systolic murmur   CHEST ANATOMY: normal.   LUNGS: Clear to auscultation. No wheezing or rhonchi..    ABDOMEN: Soft no masses or organomegaly.  No abdomen pulsations or bruits.  Normal bowel sounds. No pulsations and no masses felt, No guarding or rebound.   URINARY: No pimentel catheter   EXTREMITIES: No cyanosis, clubbing or edema noted at this time., no calf tenderness bilaterally.   PERIPHERAL VASCULAR SYSTEM: Good palpable distal pulses.   CENTRAL NERVOUS SYSTEM: No focal motor or sensory deficits noted.   SKIN: Skin without lesions, moist, well perfused.   MUSCLE STRENGTH & TONE: No noteable weakness, atrophy or abnormal movement.       Significant Labs:       Significant IResults for QUAN ROYAL (MRN [id number]) as of 9/6/2019 20:05   Ref. Range 9/5/2019 08:54 9/5/2019 09:40 9/5/2019 15:28 9/5/2019 15:28 9/5/2019 22:17 9/5/2019 22:17   WBC Latest Ref Range: 3.90 - 12.70 K/uL 5.07        RBC Latest Ref Range: 4.00 - 5.40 M/uL 3.35 (L)        Hemoglobin Latest Ref  Range: 12.0 - 16.0 g/dL 9.9 (L)        Hematocrit Latest Ref Range: 37.0 - 48.5 % 31.4 (L)        MCV Latest Ref Range: 82 - 98 fL 94        MCH Latest Ref Range: 27.0 - 31.0 pg 29.6        MCHC Latest Ref Range: 32.0 - 36.0 g/dL 31.5 (L)        RDW Latest Ref Range: 11.5 - 14.5 % 14.6 (H)        Platelets Latest Ref Range: 150 - 350 K/uL 183        MPV Latest Ref Range: 9.2 - 12.9 fL 10.5        Gran% Latest Ref Range: 38.0 - 73.0 % 49.1        Gran # (ANC) Latest Ref Range: 1.8 - 7.7 K/uL 2.5        Lymph% Latest Ref Range: 18.0 - 48.0 % 26.6        Lymph # Latest Ref Range: 1.0 - 4.8 K/uL 1.4        Mono% Latest Ref Range: 4.0 - 15.0 % 21.5 (H)        Mono # Latest Ref Range: 0.3 - 1.0 K/uL 1.1 (H)        Eosinophil% Latest Ref Range: 0.0 - 8.0 % 1.4        Eos # Latest Ref Range: 0.0 - 0.5 K/uL 0.1        Basophil% Latest Ref Range: 0.0 - 1.9 % 0.2        Baso # Latest Ref Range: 0.00 - 0.20 K/uL 0.01        nRBC Latest Ref Range: 0 /100 WBC 0        Differential Method Unknown Automated        Immature Grans (Abs) Latest Ref Range: 0.00 - 0.04 K/uL 0.06 (H)        Immature Granulocytes Latest Ref Range: 0.0 - 0.5 % 1.2 (H)        Coumadin Monitoring INR Unknown 1.0        PT Latest Ref Range: 11.7 - 14.0 sec 13.1        aPTT Latest Ref Range: 26.2 - 34.7 sec 32.4        Creatinine Latest Ref Range: 0.5 - 1.4 mg/dL 1.6 (H)        eGFR if non African American Latest Ref Range: >60 mL/min/1.73 m^2 29.2 (A)        eGFR if African American Latest Ref Range: >60 mL/min/1.73 m^2 33.6 (A)        Magnesium Latest Ref Range: 1.6 - 2.6 mg/dL 2.0        Alkaline Phosphatase Latest Ref Range: 55 - 135 U/L 35 (L)        PROTEIN TOTAL Latest Ref Range: 6.0 - 8.4 g/dL 7.1        Albumin Latest Ref Range: 3.5 - 5.2 g/dL 4.3        BILIRUBIN TOTAL Latest Ref Range: 0.1 - 1.0 mg/dL 0.7        Bilirubin, Direct Latest Ref Range: 0.1 - 0.3 mg/dL 0.1        AST Latest Ref Range: 10 - 40 U/L 18        ALT Latest Ref Range: 10 - 44  U/L 11        Lipase Latest Ref Range: 4 - 60 U/L 58        Triglycerides Latest Ref Range: 30 - 150 mg/dL   78      Cholesterol Latest Ref Range: 120 - 199 mg/dL   187      HDL Latest Ref Range: 40 - 75 mg/dL   62      Hdl/Cholesterol Ratio Latest Ref Range: 20.0 - 50.0 %   33.2      LDL Cholesterol External Latest Ref Range: 63.0 - 159.0 mg/dL   109.4      Non-HDL Cholesterol Latest Units: mg/dL   125      Total Cholesterol/HDL Ratio Latest Ref Range: 2.0 - 5.0    3.0      CPK Latest Ref Range: 20 - 180 U/L 51        CPK MB Latest Ref Range: 0.1 - 6.5 ng/mL 1.5        Troponin I Latest Ref Range: 0.020 - 0.040 ng/mL 0.058 (HH)  0.050 (H) 0.050 (H) 0.048 (H) 0.048 (H)   Hemoglobin A1C External Latest Ref Range: 4.5 - 6.2 %   5.6 5.6     Estimated Avg Glucose Latest Ref Range: 68 - 131 mg/dL   114 114     TSH Latest Ref Range: 0.340 - 5.600 uIU/mL 2.110        maging:     EXAMINATION:  CT HEAD WITHOUT CONTRAST    CLINICAL HISTORY:  Weakness;.    TECHNIQUE:  CMS MANDATED QUALITY DATA - CT RADIATION - 436    All CT scans at this facility utilize dose modulation, iterative reconstruction, and/or weight based dosing when appropriate to reduce radiation dose to as low as reasonably achievable.    Non infusion images were obtained from the skull base to the vertex.    COMPARISON:  None.    FINDINGS:  There is no evidence of intracranial mass, hemorrhage, or midline shift.  Ventricles and sulci are normal for age.  There are no pathologic extra-axial fluid collections.    There is no CT evidence of acute ischemic change.  Changes of mild chronic microvascular white matter disease are noted.  Small focus of encephalomalacia in the right cerebellum.  The calvarium is intact.      Impression       1. Chronic findings as described.  No acute intracranial abnormalities.  2. Findings were discussed with Dr. Gatica at 09:05 on September 5, 2019..       CLINICAL HISTORY:  Confusion/delirium, altered LOC,  unexplained;.    TECHNIQUE:  Multiplanar noninfusion imaging was performed.    COMPARISON:  CT brain, September 5, 2019    FINDINGS:  There is no evidence of intracranial mass, hemorrhage, or midline shift.  Ventricles and sulci are normal.  There are no pathologic extra-axial fluid collections.    FLAIR images demonstrate findings of minor chronic microvascular white matter disease.  On diffusion-weighted images, there is a tiny hyperintense focus in the right frontal lobe at the vertex (series 3, image 50) which is probably artifactual in nature.  Tiny (4 mm) focus of acute cortical ischemia is not absolutely excluded, but felt less likely.  No other regions of diffusion restriction are identified.  There is no evidence of demyelinating disease.    Cerebellum and brainstem are unremarkable.  The orbits, paranasal sinuses, skull base, and sella turcica are within normal limits.      Impression       1. 4 mm hyperintense focus on diffusion-weighted images in the right frontal lobe near the vertex is probably artifactual.  Tiny focus of acute cortical ischemia is felt less likely but not absolutely excluded.  2. Changes of minor chronic microvascular white matter disease.  3. No other significant abnormalities.     1.  Nodular calcified plaque in the right carotid bulb causing less than 50% stenosis.    2.  No clinically significant degree of stenosis in the left carotid artery.    3.  Bilateral antegrade vertebral artery flow.      Electronically signed by: Chaitanya Lord MD  Date: 09/05/2019  Time: 12:32    I HAVE REVIEWED :    The vital signs, nurses notes, and all the pertinent radiology and labs.     Assessment and Plan:     1. Confusion episode R/O TIA  2. History of Aortic Stenosis last EDNA 1.3 in 2017- Repeat--   3. HTN  4. Carotid Bruit- less than 50% in bilaterally  5. COPD  6. GERD  7. History of Anemia- following up with Dr. Tabor    PLAN:  She seems volume depleted to me.  No further cardiac testing  warranted  Recommend wearing support stocking and keeping hydrated  EKG today shows NSR    Active Diagnoses:    Diagnosis Date Noted POA    PRINCIPAL PROBLEM:  TIA (transient ischemic attack) [G45.9] 09/06/2019 Unknown    Acute metabolic encephalopathy [G93.41] 09/06/2019 Unknown    Stroke [I63.9] 09/05/2019 No    HTN (hypertension) [I10] 09/05/2019 Yes    GERD (gastroesophageal reflux disease) [K21.9] 09/05/2019 Yes    Dysphagia [R13.10] 05/03/2017 Yes      Problems Resolved During this Admission:    Diagnosis Date Noted Date Resolved POA    Ischemic stroke [I63.9] 09/05/2019 09/06/2019 Yes           VTE Risk Mitigation (From admission, onward)        Ordered     Place sequential compression device  Until discontinued      09/05/19 1051     IP VTE HIGH RISK PATIENT  Once      09/05/19 1051          Adrianne Ruiz NP  Cardiology   [facility]      I have personally interviewed and examined the patient, I have reviewed the Nurse Practitioner's history and physical, assessment, and plan. I have personally evaluated the patient at bedside and agree with the findings.

## 2019-09-07 NOTE — PT/OT/SLP PROGRESS
Physical Therapy  Treatment    Melania Beavers   MRN: [id number]   Admitting Diagnosis: TIA (transient ischemic attack)    PT Received On: 09/07/19  PT Start Time: 0903     PT Stop Time: 0918    PT Total Time (min): 15 min       Billable Minutes:  Gait Training 15    Treatment Type: Treatment  PT/PTA: PT     PTA Visit Number: 0       General Precautions: Standard, fall  Orthopedic Precautions: N/A   Braces: N/A         Subjective:  Communicated with nurse Dunaway prior to session.    Pain/Comfort  Pain Rating 1: 0/10    Objective:   Patient found with: SCD    Functional Mobility:  Bed Mobility: mod I     Transfers: mos I -supervision     Gait: Ambulated with  ft with supervision-SBA       Stairs:  NT    Balance:   Static Sit: GOOD-: Takes MODERATE challenges from all directions but inconsistently  Dynamic Sit: GOOD-: Incosistently Maintains balance through MODERATE excursions of active trunk movement,     Static Stand: FAIR: Maintains without assist but unable to take challenges  Dynamic stand: FAIR+: Needs CLOSE SUPERVISION during gait and is able to right self with minor LOB     Therapeutic Activities and Exercises:  OOB activity, bedmobility, sitting balance edge of bed, sit to stand and GT with RW. Up in chair as tolerated.    AM-PAC 6 CLICK MOBILITY  How much help from another person does this patient currently need?   1 = Unable, Total/Dependent Assistance  2 = A lot, Maximum/Moderate Assistance  3 = A little, Minimum/Contact Guard/Supervision  4 = None, Modified [place]/Independent    Turning over in bed (including adjusting bedclothes, sheets and blankets)?: 4  Sitting down on and standing up from a chair with arms (e.g., wheelchair, bedside commode, etc.): 4  Moving from lying on back to sitting on the side of the bed?: 4  Moving to and from a bed to a chair (including a wheelchair)?: 4  Need to walk in hospital room?: 4  Climbing 3-5 steps with a railing?: 3  Basic Mobility Total Score: 23    AM-PAC  Raw Score CMS G-Code Modifier Level of Impairment Assistance   6 % Total / Unable   7 - 9 CM 80 - 100% Maximal Assist   10 - 14 CL 60 - 80% Moderate Assist   15 - 19 CK 40 - 60% Moderate Assist   20 - 22 CJ 20 - 40% Minimal Assist   23 CI 1-20% SBA / CGA   24 CH 0% Independent/ Mod I     Patient left up in chair with daughter present.    Assessment:  Melania Beavers is a 85 y.o. female with a medical diagnosis of TIA (transient ischemic attack) and presents with decreased functional mobility and gait.    Rehab identified problem list/impairments: Rehab identified problem list/impairments: weakness, impaired endurance, impaired functional mobilty    Rehab potential is good.    Activity tolerance: Good    Discharge recommendations: Discharge Facility/Level of Care Needs: home     Barriers to discharge:      Equipment recommendations:       GOALS:   Multidisciplinary Problems     Physical Therapy Goals        Problem: Physical Therapy Goal    Goal Priority Disciplines Outcome Goal Variances Interventions   Physical Therapy Goal     PT, PT/OT Ongoing (interventions implemented as appropriate)     Description:  Goals to be met by: discharge     Patient will increase functional independence with mobility by performin. Supine to sit with Modified [place]  2. Sit to stand transfer with Modified [place]  3. Bed to chair transfer with Stand-by Assistance   4. Gait  x 150 feet with Supervision with no AD                     PLAN:    Patient to be seen 6 x/week  to address the above listed problems via gait training, therapeutic activities, therapeutic exercises  Plan of Care expires: 10/06/19  Plan of Care reviewed with: patient, daughter         Deniz Mcgarry, PT  2019

## 2019-09-07 NOTE — DISCHARGE SUMMARY
Replaced by [facility]  Discharge Summary      Patient Name: Melania Beavers  MRN: [id number]  Admission Date: 9/5/2019  Hospital Length of Stay: 2 days  Discharge Date and Time:  09/07/2019 1:22 PM  Attending Physician: Jarad Tran MD   Discharging Provider: Jarad Tran MD  Primary Care Provider: Luis Brown MD      HPI: The patient is an 85 years old woman, past medical history hypertension, GERD, coming to emergency department for acute confusion, and also likely slurred speech that occurred this morning.  Patient is disoriented to time and space, however she does know name of the president.  She only has like some left facial droop.  No other focal neurological deficits at this time.  No headache, no double vision, no sensorial deficits, motor strength seems to be 5/5 bilateral upper extremities, bilateral lower extremities,.  No chest pain, no cough, no fever, no nausea, no vomiting, no abdominal pain, no diarrhea, no hematuria, no dysuria, no other acute complaints.  All ROS reviewed, and found negative other than described in history of present illness     Hospital Course:   The patient was admitted for and a episode of confusion, TA was suspected, so her symptoms are resolved.  The patient had fluctuating blood pressure.  She has been evaluated by Neurology, she underwent an EEG which was not suggestive of seizure activity.  The patient has been cleared by Neurology for discharge. She also had foot patient blood pressure, has been evaluated weighted by Cardiology, she will be discharged home a her prior home medications, and follow-up with the Cardiology as an outpatient.  I discussed discharge plan with the patient, her daughter present at bedside and they voiced understanding and agreement.  Discharge time 31 min.    Physical examination  General, no acute distress  Lungs, clear to auscultation bilaterally  Cardiac regular rhythm and rate, no audible murmurs  Abdomen soft  nontender nondistended  Extremities no calf tenderness no edema    Consults:   Consults (From admission, onward)        Status Ordering Provider     Inpatient consult to Cardiology  Once     Provider:  Fran House MD    Completed ALIN TRAN     Inpatient consult to Hospitalist  Once     Provider:  Alin Tran MD    Acknowledged ALIN TRAN     Inpatient consult to Neurology  Once     Provider:  Lino Diamond MD    Acknowledged ALIN TRAN     Inpatient consult to Neurology  Once     Provider:  Lino Diamond MD    Acknowledged ALIN TRAN     Inpatient consult to Registered Dietitian/Nutritionist  Once     Provider:  (Not yet assigned)    Acknowledged ALIN TRAN          No new Assessment & Plan notes have been filed under this hospital service since the last note was generated.  Service: Hospital Medicine    Final Active Diagnoses:    Diagnosis Date Noted POA    PRINCIPAL PROBLEM:  TIA (transient ischemic attack) [G45.9] 09/06/2019 Unknown    Acute metabolic encephalopathy [G93.41] 09/06/2019 Unknown    Stroke [I63.9] 09/05/2019 No    Dysphagia [R13.10] 05/03/2017 Yes    HTN (hypertension) [I10] 09/05/2019 Yes    GERD (gastroesophageal reflux disease) [K21.9] 09/05/2019 Yes      Problems Resolved During this Admission:    Diagnosis Date Noted Date Resolved POA    Ischemic stroke [I63.9] 09/05/2019 09/06/2019 Yes       Discharged Condition: fair    Disposition: [place]-Health Care [facility] [place]    Follow Up:  Follow-up Information     Luis Brown MD In 3 days.    Specialty:  Family Medicine  Contact information:  [address] [place] [id number]  [phone]                 Patient Instructions:      Ambulatory referral to Cardiology   Referral Priority: Routine Referral Type: Consultation   Referral Reason: Specialty Services Required   Requested Specialty: Cardiology   Number of Visits Requested: 1     Ambulatory referral to Neurology   Referral Priority: Routine Referral Type:  Consultation   Referral Reason: Specialty Services Required   Requested Specialty: Neurology   Number of Visits Requested: 1     Referral to OutPatient  Physical therapy   Referral Priority: Routine Referral Type: Physical Medicine   Referral Reason: Specialty Services Required   Requested Specialty: Physical Therapy   Number of Visits Requested: 1     Referral to Home health   Referral Priority: Routine Referral Type: Home Health Care   Referral Reason: Specialty Services Required   Requested Specialty: Home Health Services   Number of Visits Requested: 1     Diet Cardiac     Skilled Nurse to evaluate patient and develop plan of care to be approved by MD     Patient is homebound   Order Comments: due to weakness     Skilled Nurse to complete comprehensive assessment including vital signs   Order Comments: Instruct on disease process and signs and symptoms of complications to report to MD. Review/verify medication list sent home with the patient at time of discharge and instruct patient/caregiver as needed. Frequency may be adjusted depending on start of care date.     Activity as tolerated       Significant Diagnostic Studies: Labs: CMP No results for input(s): NA, K, CL, CO2, GLU, BUN, CREATININE, CALCIUM, PROT, ALBUMIN, BILITOT, ALKPHOS, AST, ALT, ANIONGAP, ESTGFRAFRICA, EGFRNONAA in the last 48 hours. and CBC No results for input(s): WBC, HGB, HCT, PLT in the last 48 hours.    Pending Diagnostic Studies:     None         Medications:  Reconciled Home Medications:      Medication List      START taking these medications    aspirin 81 MG Chew  Take 1 tablet (81 mg total) by mouth once daily.     atorvastatin 40 MG tablet  Commonly known as:  LIPITOR  Take 1 tablet (40 mg total) by mouth once daily.     lisinopril 2.5 MG tablet  Commonly known as:  PRINIVIL,ZESTRIL  Take 1 tablet (2.5 mg total) by mouth once daily.        CHANGE how you take these medications    amLODIPine 5 MG tablet  Commonly known as:   NORVASC  Take 2 tablets (10 mg total) by mouth once daily.  What changed:  See the new instructions.     megestrol 400 mg/10 mL (40 mg/mL) Susp  Commonly known as:  MEGACE  Take 200 mg by mouth every morning.  What changed:  Another medication with the same name was removed. Continue taking this medication, and follow the directions you see here.        CONTINUE taking these medications    ARTIFICIAL TEARS OPHT  Place 1 drop into both eyes 3 (three) times daily as needed.     b complex vitamins capsule  Take 1 capsule by mouth once daily.     FERRALET 90 DUAL-IRON DELIVERY 90-1-12-50 mg-mg-mcg-mg Tab  Generic drug:  iron,carbon,gluc-FA-B12-C-dss  TAKE 1 TABLET BY MOUTH DAILY     fluticasone-salmeterol 250-50 mcg/dose 250-50 mcg/dose diskus inhaler  Commonly known as:  ADVAIR  Inhale 1 puff into the lungs 2 (two) times daily. Controller     levocetirizine 5 MG tablet  Commonly known as:  XYZAL  TAKE 5MG BY MOUTH DAILY AS NEEDED     mometasone 50 mcg/actuation nasal spray  Commonly known as:  NASONEX  2 sprays by Nasal route daily as needed.     omeprazole 20 MG capsule  Commonly known as:  PRILOSEC  Take 20 mg by mouth every morning.     ondansetron 8 MG tablet  Commonly known as:  ZOFRAN  Take 8 mg by mouth every 12 (twelve) hours as needed for Nausea.     VITAMIN C 500 MG tablet  Generic drug:  ascorbic acid (vitamin C)  Take 500 mg by mouth 2 (two) times daily.            Indwelling Lines/Drains at time of discharge:   Lines/Drains/Airways          None          Time spent on the discharge of patient: 31 minutes  Patient was seen and examined on the date of discharge and determined to be suitable for discharge.         Jarad Tran MD  Department of Hospital Medicine  [facility]

## 2019-09-07 NOTE — PLAN OF CARE
09/07/19 1442   Discharge Reassessment   Assessment Type Discharge Planning Reassessment   Anticipated Discharge Disposition Home-Health   Discharge Plan A Home Health   DME Needed Upon Discharge  none   Patient choice form signed by patient/caregiver Yes  (Completed with the patient today at 1433. She and her dtr chose [facility] from the [facility] list .)   Post-Acute Status   Post-Acute Authorization Home Health/Hospice   Home Health/Hospice Status Referrals Sent  (Referral sent via [facility] fax to [facility]. Message left with answering service to informing of the referral.)

## 2019-09-07 NOTE — PROGRESS NOTES
Interval History: Ms. Beavers is in no distress. She is having a visit  with her family. She denies chest pain or SOB.        Review of Systems     Denies Chest pain, sob, or palpitations  No recent fever, cough chills or congestion  No blood in the urine or stool  No myalgias  No recent arm, neck, or jaw pain  No lightheadedness or dizziness  No Double vision, blurry, vision or headache     Objective:     Vital Signs (Most Recent):  Temp: 98 °F (36.7 °C) (09/07/19 1100)  Pulse: 75 (09/07/19 1100)  Resp: 18 (09/07/19 1100)  BP: (!) 168/72 (09/07/19 1100)  SpO2: 99 % (09/07/19 1100) Vital Signs (24h Range):  Temp:  [98 °F (36.7 °C)-98.6 °F (37 °C)] 98 °F (36.7 °C)  Pulse:  [71-83] 75  Resp:  [16-18] 18  SpO2:  [96 %-99 %] 99 %  BP: (130-176)/(61-77) 168/72     Weight: 51.5 kg (113 lb 8.6 oz)  Body mass index is 18.33 kg/m².     SpO2: 99 %  O2 Device (Oxygen Therapy): room air      Intake/Output Summary (Last 24 hours) at 9/7/2019 1634  Last data filed at 9/7/2019 0400  Gross per 24 hour   Intake 675 ml   Output --   Net 675 ml       Lines/Drains/Airways          None             amLODIPine  5 mg Oral Daily    aspirin  81 mg Oral Daily         PHYSICAL EXAM:    GENERAL: well built, well nourished, well-developed in no apparent distress alert and oriented.   HEENT: Normocephalic. Pupils normal and conjunctivae normal.  Mucous membranes normal, no cyanosis or icterus, trachea central, positive for pallor or icterus is noted..   NECK: No JVD. BRUIT noted  THYROID: Thyroid not enlarged. No nodules present..   CARDIAC: Grade 3 systolic murmur   CHEST ANATOMY: normal.   LUNGS: Clear to auscultation. No wheezing or rhonchi..    ABDOMEN: Soft no masses or organomegaly.  No abdomen pulsations or bruits.  Normal bowel sounds. No pulsations and no masses felt, No guarding or rebound.   URINARY: No pimentel catheter   EXTREMITIES: No cyanosis, clubbing or edema noted at this time., no calf tenderness bilaterally.   PERIPHERAL  VASCULAR SYSTEM: Good palpable distal pulses.   CENTRAL NERVOUS SYSTEM: No focal motor or sensory deficits noted.   SKIN: Skin without lesions, moist, well perfused.   MUSCLE STRENGTH & TONE: No noteable weakness, atrophy or abnormal movement.      I HAVE REVIEWED :    The vital signs, nurses notes, and all the pertinent radiology and labs.       Significant Labs:     Significant Imaging:   · Normal left ventricular systolic function. The estimated ejection fraction is 60%  · Grade I (mild) left ventricular diastolic dysfunction consistent with impaired relaxation.  · No wall motion abnormalities.  · Normal left atrial pressure.  · Normal right ventricular systolic function.  · Mild aortic regurgitation.  · Moderate-to-severe aortic valve stenosis.  · Aortic valve area is 0.99 cm2; peak velocity is 3.47 m/s; mean gradient is 32 mmHg.  · Mild mitral regurgitation.  · Normal central venous pressure (3 mm Hg).    I HAVE REVIEWED :    The vital signs, nurses notes, and all the pertinent radiology and labs.       ASSESSMENT & PLAN:       1. Confusion episode R/O TIA--Improved with BP stabilization and Hydration  2. History of Aortic Stenosis last EDNA 1.3 in 2017- Repeat-- 0.99 cm2, mean gradient 32mmHg  3. HTN  4. Carotid Bruit- less than 50% in bilaterally  5. COPD  6. GERD  7. History of Anemia- following up with Dr. Tabor     PLAN:  From a cardiac standpoint patient can be discharged  Encourage Support Stockings and Hydration  She can follow up in the office in 2 weeks time.     I have personally interviewed and examined the patient, I have reviewed the Nurse Practitioner's history and physical, assessment, and plan. I have personally evaluated the patient at bedside and agree with the findings.

## 2019-09-09 NOTE — PHYSICIAN QUERY
PT Name: Melania Beavers  MR #: [id number]     Physician Query Form - Diagnosis Clarification      CDS/: Carlyn Henley               Contact information:    This form is a permanent document in the medical record.     Query Date: September 9, 2019    By submitting this query, we are merely seeking further clarification of documentation.  Please utilize your independent clinical judgment when addressing the question(s) below.     The medical record contains the following:      Findings Supporting Clinical Information Location in Medical Record   Principal problem:    Stroke   Ischemic stroke  Possible stroke                            Stroke              Ischemic stroke   Dyspahgia  Possible stroke, with left facial deficit  The.  Patient was out of the tPA window  NPO for now, carotid Doppler  Echocardiography, Neurology consult, give aspirin    NIHSS 4 (could not state age or month, followed 1/2 commands, mild aphasia - named 0/5 objects)  I reviewed the MRI which did not show evidence of an acute stroke (I agree with radiology report that the hyperintense focus in the right frontal cortex appears artifactual rather than a true infarct, and it would not explain the patient's symptoms). MRA brain was unremarkable. CUS showed less than 50% stenosis in R ICA, no significant stenosis in L ICA.  -would still recommend further workup for possible TIA    The patient was admitted for and a episode of confusion, TA was suspected, so her symptoms are resolved.    Final Active Diagnoses:   Diagnosis Date Noted POA    PRINCIPAL PROBLEM:  TIA (transient ischemic attack) [G45.9] 09/06/2019 Unknown    Acute metabolic encephalopathy [G93.41] 09/06/2019 Unknown    Stroke [I63.9] 09/05/2019 No    Dysphagia [R13.10] 05/03/2017 Yes     Problems Resolved During this Admission:    Diagnosis Date Noted Date Resolved POA    Ischemic stroke [I63.9] 09/05/2019 09/06/2019 Yes      09/05/19 H & P            09/05/19 Neurology  consult              09/07/19 D/C Summary    09/07/19 D/C Summary                09/07/19 D/C Summary       Please clarify if the stroke diagnosis has been:    [  ] Ruled In:  Present on admission [   ] Yes or  [   ] No   [  ] Ruled In, Now Resolved:  Present on admission [   ] Yes or  [   ] No   [ x ] Ruled Out   [  ] Other/Clarification of findings (please specify):   [  ] Clinically insignificant     [  ] Clinically undetermined     Please document in your progress notes daily for the duration of treatment, until resolved, and include in your discharge summary.    Stroke ruled out, like TIA

## 2019-10-01 ENCOUNTER — HOSPITAL ENCOUNTER (INPATIENT)
Facility: HOSPITAL | Age: 84
LOS: 4 days | Discharge: NURSING, REHAB OR LONG-TERM CARE | DRG: 064 | End: 2019-10-05
Attending: EMERGENCY MEDICINE
Payer: MEDICARE

## 2019-10-01 DIAGNOSIS — I63.9 ACUTE CVA (CEREBROVASCULAR ACCIDENT): ICD-10-CM

## 2019-10-01 DIAGNOSIS — I63.9 STROKE: ICD-10-CM

## 2019-10-01 DIAGNOSIS — R06.00 DYSPNEA: ICD-10-CM

## 2019-10-01 DIAGNOSIS — G45.9 TIA (TRANSIENT ISCHEMIC ATTACK): ICD-10-CM

## 2019-10-01 DIAGNOSIS — R47.01 EXPRESSIVE APHASIA: Primary | ICD-10-CM

## 2019-10-01 DIAGNOSIS — R13.10 DYSPHAGIA, UNSPECIFIED TYPE: ICD-10-CM

## 2019-10-01 DIAGNOSIS — I63.9 CVA (CEREBRAL VASCULAR ACCIDENT): ICD-10-CM

## 2019-10-01 DIAGNOSIS — I95.1 ORTHOSTATIC HYPOTENSION: ICD-10-CM

## 2019-10-01 DIAGNOSIS — R47.01 APHASIA DETERMINED BY EXAMINATION: ICD-10-CM

## 2019-10-01 DIAGNOSIS — R47.01 APHASIC DISTURBANCE: ICD-10-CM

## 2019-10-01 LAB
ALBUMIN SERPL BCP-MCNC: 3.8 G/DL (ref 3.5–5.2)
ALP SERPL-CCNC: 37 U/L (ref 55–135)
ALT SERPL W/O P-5'-P-CCNC: 6 U/L (ref 10–44)
ANION GAP SERPL CALC-SCNC: 8 MMOL/L (ref 8–16)
APTT PPP: 27.3 SEC (ref 26.2–34.7)
AST SERPL-CCNC: 17 U/L (ref 10–40)
BACTERIA #/AREA URNS HPF: ABNORMAL /HPF
BASOPHILS # BLD AUTO: 0.01 K/UL (ref 0–0.2)
BASOPHILS NFR BLD: 0.2 % (ref 0–1.9)
BILIRUB SERPL-MCNC: 1.1 MG/DL (ref 0.1–1)
BILIRUB UR QL STRIP: NEGATIVE
BUN SERPL-MCNC: 24 MG/DL (ref 8–23)
CALCIUM SERPL-MCNC: 9.5 MG/DL (ref 8.7–10.5)
CHLORIDE SERPL-SCNC: 110 MMOL/L (ref 95–110)
CLARITY UR: ABNORMAL
CO2 SERPL-SCNC: 20 MMOL/L (ref 23–29)
COLOR UR: YELLOW
CREAT SERPL-MCNC: 1.7 MG/DL (ref 0.5–1.4)
DIFFERENTIAL METHOD: ABNORMAL
EOSINOPHIL # BLD AUTO: 0.1 K/UL (ref 0–0.5)
EOSINOPHIL NFR BLD: 1.2 % (ref 0–8)
ERYTHROCYTE [DISTWIDTH] IN BLOOD BY AUTOMATED COUNT: 14.3 % (ref 11.5–14.5)
EST. GFR  (AFRICAN AMERICAN): 31.3 ML/MIN/1.73 M^2
EST. GFR  (NON AFRICAN AMERICAN): 27.1 ML/MIN/1.73 M^2
GLUCOSE SERPL-MCNC: 87 MG/DL (ref 70–110)
GLUCOSE UR QL STRIP: NEGATIVE
HCT VFR BLD AUTO: 27.9 % (ref 37–48.5)
HGB BLD-MCNC: 8.9 G/DL (ref 12–16)
HGB UR QL STRIP: ABNORMAL
HYALINE CASTS #/AREA URNS LPF: 10 /LPF
IMM GRANULOCYTES # BLD AUTO: 0.06 K/UL (ref 0–0.04)
IMM GRANULOCYTES NFR BLD AUTO: 1 % (ref 0–0.5)
INR PPP: 1.1
KETONES UR QL STRIP: NEGATIVE
LEUKOCYTE ESTERASE UR QL STRIP: ABNORMAL
LYMPHOCYTES # BLD AUTO: 1.8 K/UL (ref 1–4.8)
LYMPHOCYTES NFR BLD: 30 % (ref 18–48)
MAGNESIUM SERPL-MCNC: 2 MG/DL (ref 1.6–2.6)
MCH RBC QN AUTO: 29.4 PG (ref 27–31)
MCHC RBC AUTO-ENTMCNC: 31.9 G/DL (ref 32–36)
MCV RBC AUTO: 92 FL (ref 82–98)
MICROSCOPIC COMMENT: ABNORMAL
MONOCYTES # BLD AUTO: 1.5 K/UL (ref 0.3–1)
MONOCYTES NFR BLD: 25.1 % (ref 4–15)
NEUTROPHILS # BLD AUTO: 2.6 K/UL (ref 1.8–7.7)
NEUTROPHILS NFR BLD: 42.5 % (ref 38–73)
NITRITE UR QL STRIP: NEGATIVE
NRBC BLD-RTO: 0 /100 WBC
PH UR STRIP: 7 [PH] (ref 5–8)
PLATELET # BLD AUTO: 242 K/UL (ref 150–350)
PMV BLD AUTO: 11 FL (ref 9.2–12.9)
POTASSIUM SERPL-SCNC: 4.8 MMOL/L (ref 3.5–5.1)
PROT SERPL-MCNC: 6.8 G/DL (ref 6–8.4)
PROT UR QL STRIP: ABNORMAL
PROTHROMBIN TIME: 13.7 SEC (ref 11.7–14)
RBC # BLD AUTO: 3.03 M/UL (ref 4–5.4)
RBC #/AREA URNS HPF: 3 /HPF (ref 0–4)
SODIUM SERPL-SCNC: 138 MMOL/L (ref 136–145)
SP GR UR STRIP: 1 (ref 1–1.03)
SQUAMOUS #/AREA URNS HPF: 5 /HPF
TROPONIN I SERPL DL<=0.01 NG/ML-MCNC: 0.03 NG/ML (ref 0.02–0.04)
TSH SERPL DL<=0.005 MIU/L-ACNC: 3.14 UIU/ML (ref 0.34–5.6)
URN SPEC COLLECT METH UR: ABNORMAL
UROBILINOGEN UR STRIP-ACNC: NEGATIVE EU/DL
WBC # BLD AUTO: 6.01 K/UL (ref 3.9–12.7)
WBC #/AREA URNS HPF: >100 /HPF (ref 0–5)

## 2019-10-01 PROCEDURE — 93005 ELECTROCARDIOGRAM TRACING: CPT

## 2019-10-01 PROCEDURE — 85025 COMPLETE CBC W/AUTO DIFF WBC: CPT

## 2019-10-01 PROCEDURE — 85730 THROMBOPLASTIN TIME PARTIAL: CPT

## 2019-10-01 PROCEDURE — 84484 ASSAY OF TROPONIN QUANT: CPT | Mod: 91

## 2019-10-01 PROCEDURE — 84484 ASSAY OF TROPONIN QUANT: CPT

## 2019-10-01 PROCEDURE — 85610 PROTHROMBIN TIME: CPT

## 2019-10-01 PROCEDURE — 80053 COMPREHEN METABOLIC PANEL: CPT

## 2019-10-01 PROCEDURE — 21400001 HC TELEMETRY ROOM

## 2019-10-01 PROCEDURE — 87086 URINE CULTURE/COLONY COUNT: CPT

## 2019-10-01 PROCEDURE — 25000003 PHARM REV CODE 250: Performed by: INTERNAL MEDICINE

## 2019-10-01 PROCEDURE — 81001 URINALYSIS AUTO W/SCOPE: CPT

## 2019-10-01 PROCEDURE — 83735 ASSAY OF MAGNESIUM: CPT

## 2019-10-01 PROCEDURE — 94761 N-INVAS EAR/PLS OXIMETRY MLT: CPT

## 2019-10-01 PROCEDURE — 36415 COLL VENOUS BLD VENIPUNCTURE: CPT

## 2019-10-01 PROCEDURE — 99285 EMERGENCY DEPT VISIT HI MDM: CPT | Mod: 25

## 2019-10-01 PROCEDURE — 63600175 PHARM REV CODE 636 W HCPCS: Performed by: INTERNAL MEDICINE

## 2019-10-01 PROCEDURE — 84443 ASSAY THYROID STIM HORMONE: CPT

## 2019-10-01 RX ORDER — SODIUM CHLORIDE 9 MG/ML
INJECTION, SOLUTION INTRAVENOUS CONTINUOUS
Status: DISCONTINUED | OUTPATIENT
Start: 2019-10-01 | End: 2019-10-05 | Stop reason: HOSPADM

## 2019-10-01 RX ORDER — NAPROXEN SODIUM 220 MG/1
81 TABLET, FILM COATED ORAL DAILY
Status: DISCONTINUED | OUTPATIENT
Start: 2019-10-01 | End: 2019-10-01

## 2019-10-01 RX ORDER — GLUCAGON 1 MG
1 KIT INJECTION
Status: DISCONTINUED | OUTPATIENT
Start: 2019-10-01 | End: 2019-10-05 | Stop reason: HOSPADM

## 2019-10-01 RX ORDER — IBUPROFEN 200 MG
24 TABLET ORAL
Status: DISCONTINUED | OUTPATIENT
Start: 2019-10-01 | End: 2019-10-01 | Stop reason: SDUPTHER

## 2019-10-01 RX ORDER — SODIUM CHLORIDE 0.9 % (FLUSH) 0.9 %
10 SYRINGE (ML) INJECTION
Status: DISCONTINUED | OUTPATIENT
Start: 2019-10-01 | End: 2019-10-05 | Stop reason: HOSPADM

## 2019-10-01 RX ORDER — AMLODIPINE BESYLATE 5 MG/1
5 TABLET ORAL DAILY
Status: DISCONTINUED | OUTPATIENT
Start: 2019-10-02 | End: 2019-10-05 | Stop reason: HOSPADM

## 2019-10-01 RX ORDER — ONDANSETRON 4 MG/1
8 TABLET, ORALLY DISINTEGRATING ORAL EVERY 12 HOURS PRN
Status: DISCONTINUED | OUTPATIENT
Start: 2019-10-01 | End: 2019-10-05 | Stop reason: HOSPADM

## 2019-10-01 RX ORDER — LANOLIN ALCOHOL/MO/W.PET/CERES
100 CREAM (GRAM) TOPICAL DAILY
COMMUNITY
End: 2020-04-04 | Stop reason: CLARIF

## 2019-10-01 RX ORDER — GLUCAGON 1 MG
1 KIT INJECTION
Status: DISCONTINUED | OUTPATIENT
Start: 2019-10-01 | End: 2019-10-01 | Stop reason: SDUPTHER

## 2019-10-01 RX ORDER — AMLODIPINE BESYLATE 5 MG/1
5 TABLET ORAL DAILY
Status: DISCONTINUED | OUTPATIENT
Start: 2019-10-01 | End: 2019-10-01

## 2019-10-01 RX ORDER — IBUPROFEN 200 MG
16 TABLET ORAL
Status: DISCONTINUED | OUTPATIENT
Start: 2019-10-01 | End: 2019-10-01 | Stop reason: SDUPTHER

## 2019-10-01 RX ORDER — AMLODIPINE BESYLATE 5 MG/1
5 TABLET ORAL DAILY
Status: DISCONTINUED | OUTPATIENT
Start: 2019-10-01 | End: 2019-10-01 | Stop reason: SDUPTHER

## 2019-10-01 RX ORDER — IBUPROFEN 200 MG
24 TABLET ORAL
Status: DISCONTINUED | OUTPATIENT
Start: 2019-10-01 | End: 2019-10-05 | Stop reason: HOSPADM

## 2019-10-01 RX ORDER — NAPROXEN SODIUM 220 MG/1
81 TABLET, FILM COATED ORAL DAILY
Status: DISCONTINUED | OUTPATIENT
Start: 2019-10-02 | End: 2019-10-05 | Stop reason: HOSPADM

## 2019-10-01 RX ORDER — FLUTICASONE PROPIONATE 50 MCG
1 SPRAY, SUSPENSION (ML) NASAL DAILY PRN
Status: DISCONTINUED | OUTPATIENT
Start: 2019-10-01 | End: 2019-10-05 | Stop reason: HOSPADM

## 2019-10-01 RX ORDER — ATORVASTATIN CALCIUM 40 MG/1
40 TABLET, FILM COATED ORAL DAILY
Status: DISCONTINUED | OUTPATIENT
Start: 2019-10-01 | End: 2019-10-05 | Stop reason: HOSPADM

## 2019-10-01 RX ORDER — NAPROXEN SODIUM 220 MG/1
81 TABLET, FILM COATED ORAL DAILY
Status: DISCONTINUED | OUTPATIENT
Start: 2019-10-01 | End: 2019-10-01 | Stop reason: SDUPTHER

## 2019-10-01 RX ORDER — FLUTICASONE PROPIONATE 50 MCG
1 SPRAY, SUSPENSION (ML) NASAL DAILY
Status: DISCONTINUED | OUTPATIENT
Start: 2019-10-01 | End: 2019-10-05 | Stop reason: HOSPADM

## 2019-10-01 RX ORDER — IBUPROFEN 200 MG
16 TABLET ORAL
Status: DISCONTINUED | OUTPATIENT
Start: 2019-10-01 | End: 2019-10-05 | Stop reason: HOSPADM

## 2019-10-01 RX ORDER — PANTOPRAZOLE SODIUM 40 MG/1
40 TABLET, DELAYED RELEASE ORAL DAILY
Status: DISCONTINUED | OUTPATIENT
Start: 2019-10-01 | End: 2019-10-05 | Stop reason: HOSPADM

## 2019-10-01 RX ADMIN — AMLODIPINE BESYLATE 5 MG: 5 TABLET ORAL at 12:10

## 2019-10-01 RX ADMIN — ASPIRIN 81 MG 81 MG: 81 TABLET ORAL at 12:10

## 2019-10-01 RX ADMIN — PANTOPRAZOLE SODIUM 40 MG: 40 TABLET, DELAYED RELEASE ORAL at 02:10

## 2019-10-01 RX ADMIN — SODIUM CHLORIDE: 0.9 INJECTION, SOLUTION INTRAVENOUS at 02:10

## 2019-10-01 RX ADMIN — ATORVASTATIN CALCIUM 40 MG: 40 TABLET, FILM COATED ORAL at 02:10

## 2019-10-01 NOTE — Clinical Note
AVELINO Probe unable to be inserted; procedure aborted at this time. Pt will need to have EGD done first.

## 2019-10-01 NOTE — CONSULTS
[facility]  Neurology  Consult Note    Patient Name: Melania Beavers  MRN: [id number]  Admission Date: 10/1/2019  Hospital Length of Stay: 0 days  Code Status: Full Code   Attending Provider: Jarad Tran MD   Consulting Provider: LEANNA Mathew  Primary Care Physician: Luis Brown MD  Principal Problem:<principal problem not specified>    Consults  Subjective:     Chief Complaint:  Aphasia     HPI: This 75 years old woman, past medical history of COPD, TIA, hypertension, who was recently admitted to the hospital about 3 weeks ago for episode of transient aphasia and confusion.  This morning when the patient awoke around 7:00 a.m., she was again aphasic.  Her daughter observed and she brought her to the hospital.  The patient was also confused at this time.  During her prior hospitalization she had extensive workup, including echocardiography, carotid Doppler, and brain MRI were negative.  In ER, her symptoms mostly resolved, the patient was awake alert oriented x3, in no focal deficits, also as per daughter her symptoms have improved.  Neurology has been consulted from the emergency department, no specific recommendations.  I did order brain MRI which showed new interval development of left frontal area, compatible with new infarction.  The patient denies headache, no double vision, no dizziness, no nausea, no vomiting, no chest pain, no cough, no fever, no mother focal weakness, no loss of urine, no sensorial deficits, no dysphagia, no other complaints at this time.  Considering the time of this new left frontal changes, compatible with stroke, is not known, the patient was not a tPA candidate in the emergency department.     Neurology: Pt seen and examined in ER. She was recently seen at [facility] for TIA/stroke workup which was negative. She presented with a 15 min episode of not being able to get her words out and general weakness. On further note, family mentions, she has been having  hallucinations lately. She is oriented to self, age, and month. When asked for place, she first answered- [employer] and then says hospital. Otherwise, neuro exam is normal. She is back to neurological baseline at this time. Last seen normal was last night.      CRT: 1.7  LDL: 109.4  A1c: 5.6    Brain imaging:  CT head:   1. Increasing abnormal low attenuation in the periventricular white matter of the left frontal lobe when compared to September 5. Findings may reflect subacute white matter ischemia (recent MRI brain from September 5, 2019 demonstrated no diffusion restriction at this level.)  Follow-up brain MR may be of benefit for further assessment.  2. Small focus of encephalomalacia in the right cerebellum compatible with previous ischemic insult.  3. Mild chronic small vessel white matter ischemic changes.    Neck Imaging:  CUS 9/5/19:   1.  Nodular calcified plaque in the right carotid bulb causing less than 50% stenosis.    2.  No clinically significant degree of stenosis in the left carotid artery.    3.  Bilateral antegrade vertebral artery flow.      Cardiac imaging:  AVELINO 9/5/19:   · Normal left ventricular systolic function. The estimated ejection fraction is 60%  · Grade I (mild) left ventricular diastolic dysfunction consistent with impaired relaxation.  · No wall motion abnormalities.  · Normal left atrial pressure.  · Normal right ventricular systolic function.  · Mild aortic regurgitation.  · Moderate-to-severe aortic valve stenosis.  · Aortic valve area is 0.99 cm2; peak velocity is 3.47 m/s; mean gradient is 32 mmHg.  · Mild mitral regurgitation.  · Normal central venous pressure (3 mm Hg).  Study is negative for shunt.      Past Medical History:   Diagnosis Date    GERD (gastroesophageal reflux disease)     Hypertension        Past Surgical History:   Procedure Laterality Date    HYSTERECTOMY         Review of patient's allergies indicates:   Allergen Reactions    Heparin analogues        Current  Neurological Medications: MAR reviewed     No current facility-administered medications on file prior to encounter.      Current Outpatient Medications on File Prior to Encounter   Medication Sig    amLODIPine (NORVASC) 5 MG tablet Take 1 tablet (5 mg total) by mouth once daily.    ascorbic acid, vitamin C, (VITAMIN C) 500 MG tablet Take 500 mg by mouth 2 (two) times daily.    b complex vitamins capsule Take 1 capsule by mouth once daily.    cyanocobalamin (VITAMIN B-12) 1000 MCG tablet Take 100 mcg by mouth once daily.    fluticasone-salmeterol 250-50 mcg/dose (ADVAIR) 250-50 mcg/dose diskus inhaler Inhale 1 puff into the lungs 2 (two) times daily. Controller    omeprazole (PRILOSEC) 20 MG capsule Take 20 mg by mouth every morning.     aspirin 81 MG Chew Take 1 tablet (81 mg total) by mouth once daily.    atorvastatin (LIPITOR) 40 MG tablet Take 1 tablet (40 mg total) by mouth once daily.    FERRALET 90 DUAL-IRON DELIVERY 90-1-12-50 mg-mg-mcg-mg Tab TAKE 1 TABLET BY MOUTH DAILY    lanolin/mineral oil/petrolatum (ARTIFICIAL TEARS OPHT) Place 1 drop into both eyes 3 (three) times daily as needed.    levocetirizine (XYZAL) 5 MG tablet TAKE 5MG BY MOUTH DAILY AS NEEDED    megestrol (MEGACE) 400 mg/10 mL (40 mg/mL) Susp Take 200 mg by mouth every morning.    mometasone (NASONEX) 50 mcg/actuation nasal spray 2 sprays by Nasal route daily as needed.     ondansetron (ZOFRAN) 8 MG tablet Take 8 mg by mouth every 12 (twelve) hours as needed for Nausea.       Family History     None        Tobacco Use    Smoking status: Never Smoker   Substance and Sexual Activity    Alcohol use: No    Drug use: No    Sexual activity: Never     Review of Systems   Constitutional: Positive for appetite change.   HENT: Negative.    Eyes: Negative.    Respiratory: Negative.    Cardiovascular: Negative.    Gastrointestinal: Negative.    Endocrine: Negative.    Genitourinary: Negative.    Musculoskeletal: Negative.     Allergic/Immunologic: Negative.    Neurological: Positive for speech difficulty and weakness. Negative for tremors, syncope, facial asymmetry, light-headedness, numbness and headaches.   Hematological: Negative.    Psychiatric/Behavioral: Negative.      Objective:     Vital Signs (Most Recent):  Temp: 98.5 °F (36.9 °C) (10/01/19 1446)  Pulse: 88 (10/01/19 1446)  Resp: 19 (10/01/19 1446)  BP: (!) 142/70 (10/01/19 1446)  SpO2: 98 % (10/01/19 1446) Vital Signs (24h Range):  Temp:  [98.2 °F (36.8 °C)-98.9 °F (37.2 °C)] 98.5 °F (36.9 °C)  Pulse:  [73-88] 88  Resp:  [18-43] 19  SpO2:  [98 %-100 %] 98 %  BP: (142-182)/() 142/70     Weight: 51.5 kg (113 lb 8.6 oz)  Body mass index is 18.33 kg/m².    Physical Exam   Constitutional: She appears well-developed and well-nourished.   HENT:   Head: Normocephalic and atraumatic.   Eyes: Pupils are equal, round, and reactive to light. EOM are normal.   Neck: Normal range of motion. Neck supple.   Cardiovascular: Normal rate and regular rhythm.   Pulmonary/Chest: Effort normal and breath sounds normal.   Abdominal: Soft. Bowel sounds are normal.   Musculoskeletal: Normal range of motion.   Neurological: She is alert. She has a normal Finger-Nose-Finger Test.   Skin: Skin is warm and dry.   Psychiatric: She has a normal mood and affect.       NEUROLOGICAL EXAMINATION:     MENTAL STATUS   Oriented to person.   Disoriented to place. (Mild difficulty with naming place- said [employer] at first and then said hospital )  Oriented to time.     CRANIAL NERVES   Cranial nerves II through XII intact.     CN III, IV, VI   Pupils are equal, round, and reactive to light.  Extraocular motions are normal.     MOTOR EXAM     Strength   Right neck flexion: 4/5  Left neck flexion: 4/5  Right neck extension: 4/5  Left neck extension: 4/5  Right deltoid: 4/5  Left deltoid: 4/5  Right biceps: 4/5  Left biceps: 4/5  Right triceps: 4/5  Left triceps: 4/5  Right wrist flexion: 4/5  Left wrist flexion:  4/5  Right wrist extension: 4/5  Left wrist extension: 4/5  Right interossei: 4/5  Left interossei: 4/5  Right abdominals: 4/5  Left abdominals: 4/5  Right iliopsoas: 4/5  Left iliopsoas: 4/5  Right quadriceps: 4/5  Left quadriceps: 4/5  Right hamstrin/5  Left hamstrin/5  Right glutei: 4/5  Left glutei: 4/5  Right anterior tibial: 4/5  Left anterior tibial: 4/5  Right posterior tibial: 4/5  Left posterior tibial: 4/5  Right peroneal: 4/5  Left peroneal: 4/5  Right gastroc: 4/5  Left gastroc: 4/5    SENSORY EXAM   Light touch normal.     GAIT AND COORDINATION      Coordination   Finger to nose coordination: normal    Tremor   Resting tremor: absent  Intention tremor: absent      Significant Labs: All pertinent lab results from the past 24 hours have been reviewed.    Significant Imaging: I have reviewed and interpreted all pertinent imaging results/findings within the past 24 hours.    Assessment and Plan:   Aphasia, resolved  - Back to baseline, last seen normal last night   - R/O CVA  - CT head shows abnormal low attenuation in the periventricular white matter of the left frontal lobe when compared to .  - Obtain MRI brain non contrast  - Continue ASA/Statin for stroke prevention   - Recent AVELINO/CUS from last admit noted   - PT/OT/ST eval and treat   - Neuro checks per protocol     Stroke education was provided.  If patient has acute neurological changes including weakness, confusion, speech changes, facial droop, difficulty walking, and sensory changes immediately call 911.  Follow up Neurology in 2 weeks at [phone].  Medication side effects discussed with the patient and/or caregiver.      Active Diagnoses:    Diagnosis Date Noted POA    Expressive aphasia [R47.01] 10/01/2019 Yes    TIA (transient ischemic attack) [G45.9] 2019 Yes    Acute metabolic encephalopathy [G93.41] 2019 Yes    HTN (hypertension) [I10] 2019 Yes    GERD (gastroesophageal reflux disease) [K21.9]  09/05/2019 Yes      Problems Resolved During this Admission:       VTE Risk Mitigation (From admission, onward)         Ordered     Place sequential compression device  Until discontinued      10/01/19 1213     IP VTE HIGH RISK PATIENT  Once      10/01/19 1213     Place sequential compression device  Until discontinued      10/01/19 1213     Place FAUSTO hose  Until discontinued      10/01/19 1213                Thank you for your consult. I will follow-up with patient. Please contact us if you have any additional questions.    LEANNA Mathew  Neurology  [facility]

## 2019-10-01 NOTE — ED TRIAGE NOTES
Admit per [facility]   EMS, woke up unable to speak and with generalized weakness. Admitted appx 3 weeks ago with TIA. Has been having symptoms intermittently since then. Last normal last night.

## 2019-10-01 NOTE — ED PROVIDER NOTES
Encounter Date: 10/1/2019       History     Chief Complaint   Patient presents with    Aphasia     85-year-old female who has a history of hypertension, COPD in who 3 weeks ago had a TIA during which time she was confused and disoriented, presents emergency room with complaints of being noticed this morning when she awoke shortly after 7:00 a.m. that she was aphasic.  The patient since her last hospitalization for TIA was noted to have been hallucinating.  The patient was seen by Dr. House yesterday.  The patient's only new medication include Mag Ace and Lipitor that was prescribed from her last hospitalization.  Both medications were with last night.  No history of any recent fever.  The patient was last seen normal approximately 8:30 p.m. last night.  This morning she was noted to have been aphasic but no focal motor weakness was appreciated. No history of any trauma.        Review of patient's allergies indicates:   Allergen Reactions    Heparin analogues      Past Medical History:   Diagnosis Date    GERD (gastroesophageal reflux disease)     Hypertension      Past Surgical History:   Procedure Laterality Date    HYSTERECTOMY       No family history on file.  Social History     Tobacco Use    Smoking status: Never Smoker   Substance Use Topics    Alcohol use: No    Drug use: No     Review of Systems   Constitutional: Negative for activity change, appetite change, chills, diaphoresis and fever.   HENT: Negative.  Negative for sore throat.    Eyes: Negative.    Respiratory: Negative.  Negative for cough and shortness of breath.    Cardiovascular: Negative for chest pain and leg swelling.   Gastrointestinal: Negative for abdominal pain, nausea and vomiting.   Genitourinary: Negative for difficulty urinating and dysuria.   Musculoskeletal: Negative for back pain and myalgias.   Skin: Negative for rash.   Neurological: Positive for speech difficulty. Negative for dizziness, seizures, syncope, facial  asymmetry, weakness, numbness and headaches.   Hematological: Does not bruise/bleed easily.   All other systems reviewed and are negative.      Physical Exam     Initial Vitals [10/01/19 0755]   BP Pulse Resp Temp SpO2   (!) 182/81 82 18 98.9 °F (37.2 °C) 100 %      MAP       --         Physical Exam    Constitutional: She appears well-developed and well-nourished. She is not diaphoretic. No distress.   HENT:   Head: Normocephalic and atraumatic.   Nose: Nose normal.   Mouth/Throat: Oropharynx is clear and moist. No oropharyngeal exudate.   Eyes: Conjunctivae are normal. Pupils are equal, round, and reactive to light. Right eye exhibits no discharge. Left eye exhibits no discharge. No scleral icterus.   Neck: Normal range of motion. Neck supple. No thyromegaly present. No tracheal deviation present. No JVD present.   Cardiovascular: Normal rate, regular rhythm and intact distal pulses. Exam reveals no gallop and no friction rub.    Murmur heard.  Pulmonary/Chest: Breath sounds normal. No stridor. No respiratory distress. She has no wheezes. She has no rhonchi. She has no rales.   Abdominal: Soft. Bowel sounds are normal. She exhibits no distension and no mass. There is no tenderness. There is no rebound and no guarding.   Musculoskeletal: Normal range of motion. She exhibits no edema or tenderness.   The patient is disoriented to year but does recognize her family, who the president is, and the fact that she is in a hospital.  That is not appear to be any obvious aphasic SHARONDA or dysarthria at this time   Lymphadenopathy:     She has no cervical adenopathy.   Neurological: She is alert. She has normal strength and normal reflexes. No cranial nerve deficit or sensory deficit. GCS score is 15. GCS eye subscore is 4. GCS verbal subscore is 5. GCS motor subscore is 6.   Skin: Skin is warm and dry. Capillary refill takes less than 2 seconds. No rash noted. No erythema. There is pallor.         ED Course   Procedures  Labs  Reviewed   APTT   CBC W/ AUTO DIFFERENTIAL   COMPREHENSIVE METABOLIC PANEL   MAGNESIUM   PROTIME-INR   TROPONIN I   TSH   URINALYSIS, REFLEX TO URINE CULTURE          Imaging Results    None                       Attending Attestation:             Attending ED Notes:   Is a 85-year-old female who presented with a history of aphasia this morning of undetermined duration but was at least 11 hr given the time the patient was initially last seen and when she awoke this morning.  Proved.  The CT scan today shows some increased abnormal low attenuation in periventricular white matter of the left frontal lobe.  The findings are suggestive of subacute white matter ischemia.  There is also some small focus in the right cerebellum consistent with day prior ischemic insult.  The labs only abnormal for a BUN 24 creatinine 1.7 and an H&H of 8.9 and 27.9.  During the ED course I spoke to Dr. Diamond, neurology and later Dr. callejas, [facility] medicine will be admitting the patient for further workup.             Clinical Impression:       ICD-10-CM ICD-9-CM   1. Expressive aphasia R47.01 784.3   2. Aphasia determined by examination R47.01 784.3   3. Aphasic disturbance R47.01 784.3   4. Dyspnea R06.00 786.09   5. TIA (transient ischemic attack) G45.9 435.9                                Washington Cotton Jr., MD  10/01/19 1059       Washington Cotton Jr., MD  10/01/19 1112

## 2019-10-02 ENCOUNTER — ANESTHESIA EVENT (OUTPATIENT)
Dept: CARDIOLOGY | Facility: HOSPITAL | Age: 84
DRG: 064 | End: 2019-10-02
Payer: MEDICARE

## 2019-10-02 PROBLEM — I63.9 ACUTE CVA (CEREBROVASCULAR ACCIDENT): Status: ACTIVE | Noted: 2019-09-06

## 2019-10-02 LAB
ANION GAP SERPL CALC-SCNC: 6 MMOL/L (ref 8–16)
ANION GAP SERPL CALC-SCNC: 7 MMOL/L (ref 8–16)
BACTERIA UR CULT: NORMAL
BACTERIA UR CULT: NORMAL
BASOPHILS # BLD AUTO: 0.01 K/UL (ref 0–0.2)
BASOPHILS NFR BLD: 0.2 % (ref 0–1.9)
BUN SERPL-MCNC: 20 MG/DL (ref 8–23)
BUN SERPL-MCNC: 21 MG/DL (ref 8–23)
CALCIUM SERPL-MCNC: 9.1 MG/DL (ref 8.7–10.5)
CALCIUM SERPL-MCNC: 9.3 MG/DL (ref 8.7–10.5)
CHLORIDE SERPL-SCNC: 111 MMOL/L (ref 95–110)
CHLORIDE SERPL-SCNC: 111 MMOL/L (ref 95–110)
CO2 SERPL-SCNC: 20 MMOL/L (ref 23–29)
CO2 SERPL-SCNC: 20 MMOL/L (ref 23–29)
CREAT SERPL-MCNC: 1.4 MG/DL (ref 0.5–1.4)
CREAT SERPL-MCNC: 1.5 MG/DL (ref 0.5–1.4)
DIFFERENTIAL METHOD: ABNORMAL
EOSINOPHIL # BLD AUTO: 0.1 K/UL (ref 0–0.5)
EOSINOPHIL NFR BLD: 1.3 % (ref 0–8)
ERYTHROCYTE [DISTWIDTH] IN BLOOD BY AUTOMATED COUNT: 14 % (ref 11.5–14.5)
ERYTHROCYTE [DISTWIDTH] IN BLOOD BY AUTOMATED COUNT: 14.1 % (ref 11.5–14.5)
EST. GFR  (AFRICAN AMERICAN): 36.4 ML/MIN/1.73 M^2
EST. GFR  (AFRICAN AMERICAN): 39.5 ML/MIN/1.73 M^2
EST. GFR  (NON AFRICAN AMERICAN): 31.5 ML/MIN/1.73 M^2
EST. GFR  (NON AFRICAN AMERICAN): 34.3 ML/MIN/1.73 M^2
GLUCOSE SERPL-MCNC: 123 MG/DL (ref 70–110)
GLUCOSE SERPL-MCNC: 94 MG/DL (ref 70–110)
HCT VFR BLD AUTO: 25.8 % (ref 37–48.5)
HCT VFR BLD AUTO: 27 % (ref 37–48.5)
HGB BLD-MCNC: 8.4 G/DL (ref 12–16)
HGB BLD-MCNC: 8.7 G/DL (ref 12–16)
IMM GRANULOCYTES # BLD AUTO: 0.05 K/UL (ref 0–0.04)
IMM GRANULOCYTES NFR BLD AUTO: 0.8 % (ref 0–0.5)
INR PPP: 1.1
LYMPHOCYTES # BLD AUTO: 1.8 K/UL (ref 1–4.8)
LYMPHOCYTES NFR BLD: 28 % (ref 18–48)
MCH RBC QN AUTO: 29.6 PG (ref 27–31)
MCH RBC QN AUTO: 29.8 PG (ref 27–31)
MCHC RBC AUTO-ENTMCNC: 32.2 G/DL (ref 32–36)
MCHC RBC AUTO-ENTMCNC: 32.6 G/DL (ref 32–36)
MCV RBC AUTO: 92 FL (ref 82–98)
MCV RBC AUTO: 92 FL (ref 82–98)
MONOCYTES # BLD AUTO: 1.6 K/UL (ref 0.3–1)
MONOCYTES NFR BLD: 25 % (ref 4–15)
NEUTROPHILS # BLD AUTO: 2.8 K/UL (ref 1.8–7.7)
NEUTROPHILS NFR BLD: 44.7 % (ref 38–73)
NRBC BLD-RTO: 0 /100 WBC
PLATELET # BLD AUTO: 225 K/UL (ref 150–350)
PLATELET # BLD AUTO: 228 K/UL (ref 150–350)
PMV BLD AUTO: 10.3 FL (ref 9.2–12.9)
PMV BLD AUTO: 10.3 FL (ref 9.2–12.9)
POTASSIUM SERPL-SCNC: 4.4 MMOL/L (ref 3.5–5.1)
POTASSIUM SERPL-SCNC: 4.5 MMOL/L (ref 3.5–5.1)
PROTHROMBIN TIME: 14.2 SEC (ref 11.7–14)
RBC # BLD AUTO: 2.82 M/UL (ref 4–5.4)
RBC # BLD AUTO: 2.94 M/UL (ref 4–5.4)
SODIUM SERPL-SCNC: 137 MMOL/L (ref 136–145)
SODIUM SERPL-SCNC: 138 MMOL/L (ref 136–145)
WBC # BLD AUTO: 6.28 K/UL (ref 3.9–12.7)
WBC # BLD AUTO: 7.9 K/UL (ref 3.9–12.7)

## 2019-10-02 PROCEDURE — 92523 SPEECH SOUND LANG COMPREHEN: CPT

## 2019-10-02 PROCEDURE — 97116 GAIT TRAINING THERAPY: CPT

## 2019-10-02 PROCEDURE — 97535 SELF CARE MNGMENT TRAINING: CPT

## 2019-10-02 PROCEDURE — 92610 EVALUATE SWALLOWING FUNCTION: CPT

## 2019-10-02 PROCEDURE — 25000003 PHARM REV CODE 250: Performed by: INTERNAL MEDICINE

## 2019-10-02 PROCEDURE — 21400001 HC TELEMETRY ROOM

## 2019-10-02 PROCEDURE — 36415 COLL VENOUS BLD VENIPUNCTURE: CPT

## 2019-10-02 PROCEDURE — 80048 BASIC METABOLIC PNL TOTAL CA: CPT

## 2019-10-02 PROCEDURE — 85610 PROTHROMBIN TIME: CPT

## 2019-10-02 PROCEDURE — 25000003 PHARM REV CODE 250: Performed by: EMERGENCY MEDICINE

## 2019-10-02 PROCEDURE — 85025 COMPLETE CBC W/AUTO DIFF WBC: CPT

## 2019-10-02 PROCEDURE — 63600175 PHARM REV CODE 636 W HCPCS: Performed by: INTERNAL MEDICINE

## 2019-10-02 PROCEDURE — 80048 BASIC METABOLIC PNL TOTAL CA: CPT | Mod: 91

## 2019-10-02 PROCEDURE — 97162 PT EVAL MOD COMPLEX 30 MIN: CPT

## 2019-10-02 PROCEDURE — 97167 OT EVAL HIGH COMPLEX 60 MIN: CPT

## 2019-10-02 PROCEDURE — 25000003 PHARM REV CODE 250: Performed by: NURSE PRACTITIONER

## 2019-10-02 PROCEDURE — 97530 THERAPEUTIC ACTIVITIES: CPT

## 2019-10-02 PROCEDURE — 85027 COMPLETE CBC AUTOMATED: CPT

## 2019-10-02 RX ORDER — SODIUM CHLORIDE 0.9 % (FLUSH) 0.9 %
10 SYRINGE (ML) INJECTION
Status: CANCELLED | OUTPATIENT
Start: 2019-10-02

## 2019-10-02 RX ORDER — CLOPIDOGREL BISULFATE 75 MG/1
300 TABLET ORAL ONCE
Status: COMPLETED | OUTPATIENT
Start: 2019-10-02 | End: 2019-10-02

## 2019-10-02 RX ORDER — SODIUM CHLORIDE 9 MG/ML
INJECTION, SOLUTION INTRAVENOUS ONCE
Status: CANCELLED | OUTPATIENT
Start: 2019-10-02 | End: 2019-10-02

## 2019-10-02 RX ORDER — CLOPIDOGREL BISULFATE 75 MG/1
75 TABLET ORAL DAILY
Status: DISCONTINUED | OUTPATIENT
Start: 2019-10-03 | End: 2019-10-05 | Stop reason: HOSPADM

## 2019-10-02 RX ADMIN — PANTOPRAZOLE SODIUM 40 MG: 40 TABLET, DELAYED RELEASE ORAL at 09:10

## 2019-10-02 RX ADMIN — AMLODIPINE BESYLATE 5 MG: 5 TABLET ORAL at 09:10

## 2019-10-02 RX ADMIN — ASPIRIN 81 MG 81 MG: 81 TABLET ORAL at 09:10

## 2019-10-02 RX ADMIN — ATORVASTATIN CALCIUM 40 MG: 40 TABLET, FILM COATED ORAL at 09:10

## 2019-10-02 RX ADMIN — CLOPIDOGREL BISULFATE 300 MG: 75 TABLET, FILM COATED ORAL at 02:10

## 2019-10-02 RX ADMIN — SODIUM CHLORIDE: 0.9 INJECTION, SOLUTION INTRAVENOUS at 01:10

## 2019-10-02 NOTE — ANESTHESIA PREPROCEDURE EVALUATION
10/02/2019  Melania Beavers is a 85 y.o., female.    Patient Active Problem List   Diagnosis    Dysphagia    Stroke    HTN (hypertension)    GERD (gastroesophageal reflux disease)    Acute metabolic encephalopathy    TIA (transient ischemic attack)    Expressive aphasia       Past Surgical History:   Procedure Laterality Date    HYSTERECTOMY               Results for orders placed or performed during the hospital encounter of 10/01/19   EKG 12-lead    Collection Time: 10/01/19  8:08 AM    Narrative    Test Reason : R47.01,    Vent. Rate : 079 BPM     Atrial Rate : 079 BPM     P-R Int : 150 ms          QRS Dur : 084 ms      QT Int : 372 ms       P-R-T Axes : 067 034 066 degrees     QTc Int : 426 ms    Normal sinus rhythm  Normal ECG  When compared with ECG of 05-SEP-2019 08:48,  No significant change was found  Confirmed by Charbel House MD ([id number]) on 10/1/2019 8:42:51 PM    Referred By: [email or web address]   SELF           Confirmed By:Charbel House MD            Lab Results   Component Value Date    WBC 6.28 10/02/2019    HGB 8.7 (L) 10/02/2019    HCT 27.0 (L) 10/02/2019    MCV 92 10/02/2019     10/02/2019     BMP  Lab Results   Component Value Date     10/02/2019    K 4.5 10/02/2019     (H) 10/02/2019    CO2 20 (L) 10/02/2019    BUN 20 10/02/2019    CREATININE 1.5 (H) 10/02/2019    CALCIUM 9.3 10/02/2019    ANIONGAP 7 (L) 10/02/2019    ESTGFRAFRICA 36.4 (A) 10/02/2019    EGFRNONAA 31.5 (A) 10/02/2019             Pre-op Assessment    I have reviewed the Patient Summary Reports.     I have reviewed the Nursing Notes.   I have reviewed the Medications.     Review of Systems  Anesthesia Hx:  No problems with previous Anesthesia  History of prior surgery of interest to airway management or planning: Previous anesthesia: General Denies Family Hx of Anesthesia complications.   Denies Personal Hx of  Anesthesia complications.   Social:  Non-Smoker, No Alcohol Use    Hematology/Oncology:  Hematology Normal   Oncology Normal     Cardiovascular:   Hypertension, well controlled Valvular problems/Murmurs, AS    Pulmonary:  Pulmonary Normal    Renal/:  Renal/ Normal     Hepatic/GI:   GERD, well controlled Hx of esophageal strictures in past, prior dilation procedures per chart   Musculoskeletal:  Spine Disorders: (chronic neck and shoulder pain, no radicular symptoms) cervical Chronic Pain    Neurological:   TIA, (hx of TIA approx. 3 weeks ago, pt with new TIA symptoms with this admit, aphasia on admit, improving -- pt able to converse and answer appropriately but slow to respond) CVA (on imaging studies CVA changes noted)    Endocrine:  Endocrine Normal    Psych:  Psychiatric Normal           Physical Exam  General:  Well nourished    Airway/Jaw/Neck:  Airway Findings: Mouth Opening: Small, but > 3cm Tongue: Normal  General Airway Assessment: Adult  Mallampati: III  Improves to II with phonation.  TM Distance: 4 - 6 cm  Jaw/Neck Findings:  Neck ROM: Decreased flexion      Dental:  Dental Findings: Upper partial dentures, Lower retainer   Chest/Lungs:  Chest/Lungs Findings: Clear to auscultation, Normal Respiratory Rate     Heart/Vascular:  Heart Findings: Rate: Normal  Rhythm: Regular Rhythm  Sounds: Normal  Heart Murmur  Systolic  Systolic Heart Murmur Description: Holosystolic  Systolic Heart Murmur Grade: Grade III     Abdomen:  Abdomen Findings: Normal    Musculoskeletal:  Musculoskeletal Findings: Normal   Skin:  Skin Findings: Normal    Mental Status:  Mental Status Findings:  Cooperative         Anesthesia Plan  Type of Anesthesia, risks & benefits discussed:  Anesthesia Type:  MAC  Patient's Preference:   Intra-op Monitoring Plan: standard ASA monitors  Intra-op Monitoring Plan Comments:   Post Op Pain Control Plan: per primary service following discharge from PACU  Post Op Pain Control Plan Comments:    Induction:    Beta Blocker:         Informed Consent: Patient understands risks and agrees with Anesthesia plan.  Questions answered. Anesthesia consent signed with patient.  ASA Score: 3     Day of Surgery Review of History & Physical:        Anesthesia Plan Notes: MAC  Propofol

## 2019-10-02 NOTE — PLAN OF CARE
Problem: Oral Intake Inadequate  Goal: Improved Oral Intake  Outcome: Ongoing, Progressing  Intervention: Promote and Optimize Oral Intake  Flowsheets (Taken 10/2/2019 1636)  Oral Nutrition Promotion: calorie dense liquids provided     PO intake of meals is ~25%. RD encouraged PO intake and added Glucerna Oral nutrition supplements TID. Continue to monitor and encourage adequate nutrition.

## 2019-10-02 NOTE — PT/OT/SLP EVAL
Speech Language Pathology Evaluation  Cognitive/Bedside Swallow    Patient Name:  Melania Beavers   MRN:  [id number]  Admitting Diagnosis: Acute CVA (cerebrovascular accident)    Recommendations:                  General Recommendations:  Speech/language therapy and Cognitive-linguistic therapy  Diet recommendations:  Regular, Thin   Aspiration Precautions: Standard aspiration precautions   General Precautions: Standard, fall  Communication strategies:  Simple commands; cues for enhanced verbal expression    History:       HPI:  This 75 years old woman, past medical history of COPD, TIA, hypertension, who was recently admitted to the hospital about 3 weeks ago for episode of transient aphasia and confusion.  This morning when the patient awoke around 7:00 a.m., she was again aphasic.  Her daughter observed and she brought her to the hospital.  The patient was also confused at this time.  During her prior hospitalization she had extensive workup, including echocardiography, carotid Doppler, and brain MRI were negative.  In ER, her symptoms mostly resolved, the patient was awake alert oriented x3, in no focal deficits, also as per daughter her symptoms have improved.  Neurology has been consulted from the emergency department, no specific recommendations.  I did order brain MRI which showed new interval development of left frontal area, compatible with new infarction.  The patient denies headache, no double vision, no dizziness, no nausea, no vomiting, no chest pain, no cough, no fever, no mother focal weakness, no loss of urine, no sensorial deficits, no dysphagia, no other complaints at this time.  Considering the time of this new left frontal changes, compatible with stroke, is not known, the patient was not a tPA candidate in the emergency department.    Past Medical History:   Diagnosis Date    GERD (gastroesophageal reflux disease)     Hypertension        Past Surgical History:   Procedure Laterality Date     "HYSTERECTOMY         Social History: Patient lives with daughter    Prior Intubation HX:  None this admit    Modified Barium Swallow: none within epic system     Chest X-Rays: Not completed     Prior diet: Regular/thin.    Subjective     "Would you like to sit down?"   Daughter present at bedside who reports decline in verbal expression following initial TIA ~3 weeks ago   Patient goals: Improve mobility and verbal expression      Pain/Comfort:  · Pain Rating 1: 0/10    Objective:     SLUMS SCORE OF 7/30 indicating significantly impaired neurocognitive function.     *Per screening protocol, score of 1-20 may be representative of Dementia*     Cognitive Status:  · Behavioral Observations: Pleasant, alert & appropriate  · Memory: impaired   ·  Immediate: Throughout isolated and connected repetition of 5 items, Pt able to retain 4/5   ·  Working: Impaired; Significant difficulty with numerical manipulations despite repetition. Functional calculations within word problem completed with 50% acc  ·  Short-term: Impaired; Pt able to recall 2/5 items following 3 min filled delay. Able to answer 1/4 questions correctly following verbal presentation of short paragraph  ·  Long-term: Intact; Pt provided detailed biographical and medical history   · Orientation: Oriented to month; cues for year, state and [place] required.   · Attention: impaired; Poor ability to carry out simple tasks without redirection or repetition   · Pragmatics: WNL; very pleasant and engaged. Joking appropriately with clinician throughout session. No flatness, withdrawal or denial of deficits and difficulties.   · Executive Function: Impaired; Poor planning and organization of simple tasks t/o session.   · Verbal Fluency: Given phonemic category x2 and one minute time constraint Pt generated an average of 2/5 items ind'ly (average >15)  · Impaired clock drawing secondary to inadequate planning/execution (poor spacing with duplicate number with no hands) "     Language: Fluent with intermittent anomia; significant pauses prior to responses     Receptive Language:   Comprehension:   · Follows simple commands   · Able to participate in conversation without notable deficits     Expressive Language:   Verbal:    · Compare/contrast: 75% acc  · Category exclusion: unable to complete with MIN A  · Naming:   · confrontational namin% acc of common objects present at bedside increasing to 80% acc with semantic cues and 90% acc with phrase completion   · Conversational speech: Short phrase length. No paraphasias       Motor Speech:  · No evidence of Apraxia of Speech or Dysarthria  · 100% intelligible     Voice: WNL    Visual-Spatial: WNL  · Attending to R and L planes w/out evidence of inattention, neglect or preferential gaze     Reading and writing not assessed; ultimately Pt would benefit from assessment via western aphasia battery bedside however given short length of stay and completion of standardized screening in addition to informal assessment this date with understanding of deficits, will initiate tx.     Oral Musculature Evaluation  · Oral Musculature: WFL  · Dentition: present and adequate  · Secretion Management: adequate  · Mucosal Quality: good  · Mandibular Strength and Mobility: WFL  · Oral Labial Strength and Mobility: WFL  · Lingual Strength and Mobility: WFL  · Velar Elevation: WFL  · Buccal Strength and Mobility: WFL  · Volitional Cough: (Present and effective )  · Volitional Swallow: Hyolaryngeal movement present to digital palpation  · Voice Prior to PO Intake: Clear; no dysphonia     Bedside Swallow Eval:   Consistencies Assessed:  Thin liquids 3oz via sequential cup sips  Solids 1/4 cracker x2     Oral Phase:   Pt with adequate oral containment and coordination across consistencies. Timely mastication, adequate bolus formation and oral clearance which resulted in no evidence of appreciable oral residue with solid consistencies.    Pharyngeal Phase:    Adequate hyolaryngeal movement to digital palpation. Pt tolerated 3oz thin liquid via sequential sips with no overt s/s of aspiration or changes in vocal quality, passing 3oz water challenge. Therefore, risk of aspiration not indicated. Multiple swallows not observed across consistencies trialled.     Compensatory Strategies  None     Assessment:     Melania Beavers is a 85 y.o. 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 Will initiate treatment to address verbal expression within acute care setting. Pt would certainly benefit from intensive therapy within inpatient rehabilitation facility to maximize function.     Goals:   Multidisciplinary Problems     SLP Goals        Problem: SLP Goal    Goal Priority Disciplines Outcome   SLP Goal     SLP    Description:  1. Pt will participate in semantic feature analysis (SFA) given MOD A to improve improve accuracy and efficiency of word retrieval in verbal expression   2. Pt will participate in verb network strengthening treatment (VNest) given MOD A to improve accuracy and efficiency of word retrieval in verbal expression   3. Participate in assessment of reading and writing skills                     Plan:     · Patient to be seen:  5 x/week   · Plan of Care expires:     · Plan of Care reviewed with:  patient, daughter   · SLP Follow-Up:  Yes       Discharge recommendations:  Discharge Facility/Level of Care Needs: rehabilitation facility   Barriers to Discharge:  high risk for fall     Time Tracking:     SLP Treatment Date:   10/02/19  Speech Start Time:  1445  Speech Stop Time:  1515     Speech Total Time (min):  30 min    Billable Minutes: Eval 15  and Eval Swallow and Oral Function 15    Daniel Figueredo CCC-SLP  10/02/2019

## 2019-10-02 NOTE — PROGRESS NOTES
[facility]  Neurology  Progress Note    Patient Name: Melania Beavers  MRN: [id number]  Admission Date: 10/1/2019  Hospital Length of Stay: 1 days  Code Status: Full Code   Attending Provider: No att. providers found  Primary Care Physician: Luis Brown MD   Principal Problem:<principal problem not specified>    Subjective:     Interval History: Pt seen and examined. She appears back to baseline. Discussed MRI findings and POC.     HPI: This 75 years old woman, past medical history of COPD, TIA, hypertension, who was recently admitted to the hospital about 3 weeks ago for episode of transient aphasia and confusion.  This morning when the patient awoke around 7:00 a.m., she was again aphasic.  Her daughter observed and she brought her to the hospital.  The patient was also confused at this time.  During her prior hospitalization she had extensive workup, including echocardiography, carotid Doppler, and brain MRI were negative.  In ER, her symptoms mostly resolved, the patient was awake alert oriented x3, in no focal deficits, also as per daughter her symptoms have improved.  Neurology has been consulted from the emergency department, no specific recommendations.  I did order brain MRI which showed new interval development of left frontal area, compatible with new infarction.  The patient denies headache, no double vision, no dizziness, no nausea, no vomiting, no chest pain, no cough, no fever, no mother focal weakness, no loss of urine, no sensorial deficits, no dysphagia, no other complaints at this time.  Considering the time of this new left frontal changes, compatible with stroke, is not known, the patient was not a tPA candidate in the emergency department.     Neurology: Pt seen and examined in ER. She was recently seen at [facility] for TIA/stroke workup which was negative. She presented with a 15 min episode of not being able to get her words out and general weakness. On further note, family  mentions, she has been having hallucinations lately. She is oriented to self, age, and month. When asked for place, she first answered- [employer] and then says hospital. Otherwise, neuro exam is normal. She is back to neurological baseline at this time. Last seen normal was last night.       CRT: 1.7  LDL: 109.4  A1c: 5.6     Brain imaging:  CT head:   1. Increasing abnormal low attenuation in the periventricular white matter of the left frontal lobe when compared to September 5. Findings may reflect subacute white matter ischemia (recent MRI brain from September 5, 2019 demonstrated no diffusion restriction at this level.)  Follow-up brain MR may be of benefit for further assessment.  2. Small focus of encephalomalacia in the right cerebellum compatible with previous ischemic insult.  3. Mild chronic small vessel white matter ischemic changes.    MRI brain:   Interval development of focal areas of signal alteration within the left frontal lobe compatible with areas of interval infarction.  While there are foci of encephalomalacia which are likely related to more remote areas of ischemia, there are findings of restricted diffusion compatible with areas of more recent or subacute infarction.    Additional chronic small vessel ischemic changes.     Neck Imaging:  CUS 9/5/19:   1.  Nodular calcified plaque in the right carotid bulb causing less than 50% stenosis.    2.  No clinically significant degree of stenosis in the left carotid artery.    3.  Bilateral antegrade vertebral artery flow.     Cardiac imaging:  AVELINO 9/5/19:   · Normal left ventricular systolic function. The estimated ejection fraction is 60%  · Grade I (mild) left ventricular diastolic dysfunction consistent with impaired relaxation.  · No wall motion abnormalities.  · Normal left atrial pressure.  · Normal right ventricular systolic function.  · Mild aortic regurgitation.  · Moderate-to-severe aortic valve stenosis.  · Aortic valve area is 0.99 cm2; peak  velocity is 3.47 m/s; mean gradient is 32 mmHg.  · Mild mitral regurgitation.  · Normal central venous pressure (3 mm Hg).  Study is negative for shunt.       Current Neurological Medications: MAR reviewed     Current Facility-Administered Medications   Medication Dose Route Frequency Provider Last Rate Last Dose    0.9%  NaCl infusion   Intravenous Continuous Jarad Tran MD 75 mL/hr at 10/01/19 1444      amLODIPine tablet 5 mg  5 mg Oral Daily Washington Cotton Jr., MD   5 mg at 10/02/19 0931    aspirin chewable tablet 81 mg  81 mg Oral Daily Washington Cotton Jr., MD   81 mg at 10/02/19 0931    atorvastatin tablet 40 mg  40 mg Oral Daily Jarad Tran MD   40 mg at 10/02/19 0931    dextrose 50% injection 12.5 g  12.5 g Intravenous PRN Jarad Tran MD        dextrose 50% injection 25 g  25 g Intravenous PRN Jarad Tran MD        fluticasone propionate 50 mcg/actuation nasal spray 50 mcg  1 spray Each Nostril Daily BOBN Jarad Tran MD        fluticasone propionate 50 mcg/actuation nasal spray 50 mcg  1 spray Each Nostril Daily Jarad Tran MD        glucagon (human recombinant) injection 1 mg  1 mg Intramuscular PRN Jarad Tran MD        glucose chewable tablet 16 g  16 g Oral PRN Jarad Tran MD        glucose chewable tablet 24 g  24 g Oral PRN Jarad Tran MD        ondansetron disintegrating tablet 8 mg  8 mg Oral Q12H PRN Jarad Tran MD        pantoprazole EC tablet 40 mg  40 mg Oral Daily Jarad Tran MD   40 mg at 10/02/19 0932    sodium chloride 0.9% flush 10 mL  10 mL Intravenous PRN Jarad Tran MD        sodium chloride 0.9% flush 10 mL  10 mL Intravenous PRN Jarad Tran MD           Review of Systems   Constitutional: Negative.    HENT: Negative.    Eyes: Negative.    Respiratory: Negative.    Cardiovascular: Negative.    Gastrointestinal: Negative.    Endocrine: Negative.    Genitourinary: Negative.    Musculoskeletal: Negative.    Skin: Negative.     Allergic/Immunologic: Negative.    Neurological: Negative.    Hematological: Negative.    Psychiatric/Behavioral: Negative.      Objective:     Vital Signs (Most Recent):  Temp: 98.7 °F (37.1 °C) (10/02/19 0705)  Pulse: 86 (10/02/19 0705)  Resp: 15 (10/02/19 0705)  BP: (!) 165/79 (10/02/19 0705)  SpO2: 98 % (10/02/19 0705) Vital Signs (24h Range):  Temp:  [97.6 °F (36.4 °C)-98.7 °F (37.1 °C)] 98.7 °F (37.1 °C)  Pulse:  [76-88] 86  Resp:  [15-20] 15  SpO2:  [96 %-99 %] 98 %  BP: (128-178)/() 165/79     Weight: 46.3 kg (102 lb 1.2 oz)  Body mass index is 16.48 kg/m².    Physical Exam   Constitutional: She appears well-developed and well-nourished.   HENT:   Head: Normocephalic and atraumatic.   Eyes: Pupils are equal, round, and reactive to light. EOM are normal.   Neck: Normal range of motion. Neck supple.   Cardiovascular: Normal rate and regular rhythm.   Pulmonary/Chest: Effort normal and breath sounds normal.   Abdominal: Soft. Bowel sounds are normal.   Musculoskeletal: Normal range of motion.   Neurological: She is alert. She has a normal Finger-Nose-Finger Test.   Skin: Skin is warm and dry.   Psychiatric: She has a normal mood and affect.       NEUROLOGICAL EXAMINATION:     MENTAL STATUS   Oriented to person.   Oriented to place.   Oriented to time.     CRANIAL NERVES   Cranial nerves II through XII intact.     CN III, IV, VI   Pupils are equal, round, and reactive to light.  Extraocular motions are normal.     MOTOR EXAM     Strength   Right neck flexion: 4/5  Left neck flexion: 4/5  Right neck extension: 4/5  Left neck extension: 4/5  Right deltoid: 4/5  Left deltoid: 4/5  Right biceps: 4/5  Left biceps: 4/5  Right triceps: 4/5  Left triceps: 4/5  Right wrist flexion: 4/5  Left wrist flexion: 4/5  Right wrist extension: 4/5  Left wrist extension: 4/5  Right interossei: 4/5  Left interossei: 4/5  Right abdominals: 4/5  Left abdominals: 4/5  Right iliopsoas: 4/5  Left iliopsoas: 4/5  Right quadriceps:  4/5  Left quadriceps: 4/5  Right hamstrin/5  Left hamstrin/5  Right glutei: 4/5  Left glutei: 4/5  Right anterior tibial: 4/5  Left anterior tibial: 4/5  Right posterior tibial: 4/5  Left posterior tibial: 4/5  Right peroneal: 4/5  Left peroneal: 4/5  Right gastroc: 4/5  Left gastroc: 4/5    SENSORY EXAM   Light touch normal.     GAIT AND COORDINATION      Coordination   Finger to nose coordination: normal    Tremor   Resting tremor: absent  Intention tremor: absent      Significant Labs: All pertinent lab results from the past 24 hours have been reviewed.    Significant Imaging: I have reviewed and interpreted all pertinent imaging results/findings within the past 24 hours.    Assessment and Plan:   Subacute left frontal subcortical infarct   - Back to baseline  - MRA head and neck today   - Consider AVELINO after discussion with pt's family   - Plavix 300mg loading dose today then begin DAPT tomorrow with Plavix 75mg and ASA 81mg x 21 days with eventual transition to monotherapy with Plavix 75 mg ddaily   - Recent AVELINO/CUS from last admit reviewed   - PT/OT/ST eval and treat   - Neuro checks per protocol      Stroke education was provided.  If patient has acute neurological changes including weakness, confusion, speech changes, facial droop, difficulty walking, and sensory changes immediately call 911.  Follow up Neurology in 2 weeks at [phone].  Medication side effects discussed with the patient and/or caregiver.  Active Diagnoses:    Diagnosis Date Noted POA    Expressive aphasia [R47.01] 10/01/2019 Yes    TIA (transient ischemic attack) [G45.9] 2019 Yes    Acute metabolic encephalopathy [G93.41] 2019 Yes    HTN (hypertension) [I10] 2019 Yes    GERD (gastroesophageal reflux disease) [K21.9] 2019 Yes      Problems Resolved During this Admission:       VTE Risk Mitigation (From admission, onward)         Ordered     Place sequential compression device  Until discontinued       10/01/19 1213     IP VTE HIGH RISK PATIENT  Once      10/01/19 1213     Place sequential compression device  Until discontinued      10/01/19 1213     Place FAUSTO hose  Until discontinued      10/01/19 1213                LEANNA Mathew  Neurology  [facility]

## 2019-10-02 NOTE — PROGRESS NOTES
[facility] Medicine  Progress Note    Patient Name: Melania Beavers  MRN: [id number]  Patient Class: IP- Inpatient   Admission Date: 10/1/2019  Length of Stay: 1 days  Attending Physician: No att. providers found  Primary Care Provider: Luis Brown MD        Subjective:     Principal Problem:Acute CVA (cerebrovascular accident)        HPI:  This 75 years old woman, past medical history of COPD, TIA, hypertension, who was recently admitted to the hospital about 3 weeks ago for episode of transient aphasia and confusion.  This morning when the patient awoke around 7:00 a.m., she was again aphasic.  Her daughter observed and she brought her to the hospital.  The patient was also confused at this time.  During her prior hospitalization she had extensive workup, including echocardiography, carotid Doppler, and brain MRI were negative.  In ER, her symptoms mostly resolved, the patient was awake alert oriented x3, in no focal deficits, also as per daughter her symptoms have improved.  Neurology has been consulted from the emergency department, no specific recommendations.  I did order brain MRI which showed new interval development of left frontal area, compatible with new infarction.  The patient denies headache, no double vision, no dizziness, no nausea, no vomiting, no chest pain, no cough, no fever, no mother focal weakness, no loss of urine, no sensorial deficits, no dysphagia, no other complaints at this time.  Considering the time of this new left frontal changes, compatible with stroke, is not known, the patient was not a tPA candidate in the emergency department.    Overview/Hospital Course:  No notes on file    Interval History: imaging studies show interval development of a left frontal stroke    Review of Systems   Constitutional: Negative for chills and fever.   HENT: Negative for congestion.    Respiratory: Negative for shortness of breath and wheezing.    Cardiovascular: Negative  for chest pain and palpitations.   Gastrointestinal: Negative for constipation, diarrhea, nausea and vomiting.   Genitourinary: Negative for dysuria, flank pain, urgency and vaginal discharge.   Musculoskeletal: Negative for back pain and myalgias.     Objective:     Vital Signs (Most Recent):  Temp: 98.7 °F (37.1 °C) (10/02/19 0705)  Pulse: 86 (10/02/19 0705)  Resp: 15 (10/02/19 0705)  BP: (!) 165/79 (10/02/19 0705)  SpO2: 98 % (10/02/19 0705) Vital Signs (24h Range):  Temp:  [97.6 °F (36.4 °C)-98.7 °F (37.1 °C)] 98.7 °F (37.1 °C)  Pulse:  [76-87] 86  Resp:  [15-20] 15  SpO2:  [96 %-98 %] 98 %  BP: (128-165)/(70-79) 165/79     Weight: 46.3 kg (102 lb 1.2 oz)  Body mass index is 16.48 kg/m².  No intake or output data in the 24 hours ending 10/02/19 1549   Physical Exam   Constitutional: She is oriented to person, place, and time. No distress.   Eyes: Pupils are equal, round, and reactive to light. No scleral icterus.   Cardiovascular: Normal rate and regular rhythm.   No murmur heard.  Pulmonary/Chest: Breath sounds normal. She has no wheezes. She has no rales.   Abdominal: Soft. She exhibits no distension. There is no tenderness.   Neurological: She is alert and oriented to person, place, and time. Coordination and gait abnormal.   4/5 strength in b/l UE  3/5 strength in b/l LE   Skin: Skin is warm. Capillary refill takes less than 2 seconds. No rash noted.   Psychiatric: She has a normal mood and affect.   Nursing note and vitals reviewed.      Significant Labs:   BMP:   Recent Labs   Lab 10/01/19  0819 10/02/19  0339   GLU 87 94    138   K 4.8 4.5    111*   CO2 20* 20*   BUN 24* 20   CREATININE 1.7* 1.5*   CALCIUM 9.5 9.3   MG 2.0  --      CBC:   Recent Labs   Lab 10/01/19  0819 10/02/19  0339   WBC 6.01 6.28   HGB 8.9* 8.7*   HCT 27.9* 27.0*    228     Magnesium:   Recent Labs   Lab 10/01/19  0819   MG 2.0       Significant Imaging: I have reviewed all pertinent imaging results/findings within  the past 24 hours.     Imaging Results          MRI Brain Without Contrast (Final result)  Result time 10/01/19 11:47:27    Final result by Paulino Orellana IV, MD (10/01/19 11:47:27)                 Impression:      Interval development of focal areas of signal alteration within the left frontal lobe compatible with areas of interval infarction.  While there are foci of encephalomalacia which are likely related to more remote areas of ischemia, there are findings of restricted diffusion compatible with areas of more recent or subacute infarction.    Additional chronic small vessel ischemic changes.      Electronically signed by: Paulino Orellana IV., MD  Date:    10/01/2019  Time:    11:47             Narrative:    EXAMINATION:  MRI BRAIN WITHOUT CONTRAST    CLINICAL HISTORY:  TIA, initial screening;    TECHNIQUE:  Multiplanar noncontrast imaging is performed.    COMPARISON:  09/05/2019.    FINDINGS:  In the interval, there has been development of focal areas of decreased T1 and increased T2 signal with surrounding changes of increased T2 signal within the left frontal lobe involving the subcortical and periventricular white matter with extension into the corona radiata/centrum semiovale.  The findings are likely representative of areas of interval infarction.  There is mild associated diffusion restriction, at least partly related to T2 shine through.  The findings are likely representative of areas of mixed acute and subacute/remote infarction.    Otherwise, there are scattered changes of periventricular and subcortical white matter hyperintensity within both hemispheres, likely reflection of chronic microvascular ischemia.  Additionally, there are foci of probable remote small vessel infarction within the cerebellar hemispheres bilaterally, unchanged.    There are no findings of acute hemorrhage.  There is no mass effect or midline shift.  The ventricular system is appropriate in size and position for age.  There are  no extra-axial fluid collections.    Appropriate flow voids are present within the major blood vessels.    The skull base and craniocervical junction appear unremarkable.                               CT Head Without Contrast (Final result)  Result time 10/01/19 08:58:15    Final result by Carlos Szymanski MD (10/01/19 08:58:15)                 Impression:      1. Increasing abnormal low attenuation in the periventricular white matter of the left frontal lobe when compared to September 5. Findings may reflect subacute white matter ischemia (recent MRI brain from September 5, 2019 demonstrated no diffusion restriction at this level.)  Follow-up brain MR may be of benefit for further assessment.  2. Small focus of encephalomalacia in the right cerebellum compatible with previous ischemic insult.  3. Mild chronic small vessel white matter ischemic changes.      Electronically signed by: Carlos Szymanski MD  Date:    10/01/2019  Time:    08:58             Narrative:    EXAMINATION:  CT HEAD WITHOUT CONTRAST    CLINICAL HISTORY:  Focal neuro deficit, new, fixed or worsening, >6 hours;.    TECHNIQUE:  CMS MANDATED QUALITY DATA - CT RADIATION - 436    All CT scans at this facility utilize dose modulation, iterative reconstruction, and/or weight based dosing when appropriate to reduce radiation dose to as low as reasonably achievable.    Non infusion images were obtained from the skull base to the vertex.    COMPARISON:  CT brain, September 5, 2019; MRI brain September 5, 2019    FINDINGS:  There is no evidence of intracranial mass, hemorrhage, or midline shift.  Ventricles and sulci are appropriate for age.  There are no pathologic extra-axial fluid collections.    Changes of mild chronic microvascular white matter disease are noted.  In comparison to September 5, 2019, there is increasing abnormal low attenuation in the periventricular white matter of the left frontal lobe, suggesting possible subacute white matter  ischemic insult.  Repeat MR may be of benefit in that regard.  Recent MRI brain from September 5 demonstrated no acute ischemia at this level.    There is a small focus of encephalomalacia in the right cerebellum compatible with previous ischemic insult.  The brainstem is unremarkable.                                    Assessment/Plan:      * Acute CVA (cerebrovascular accident)  Management per stroke protocol  Anticoagulation with aspirin and plavix  AVELINO  PT/OT as tolerated      Acute metabolic encephalopathy  Improved  Likely 2/2 acute CVA      HTN (hypertension)  Stable  Continue current antihypertensives      GERD (gastroesophageal reflux disease)  protonix PO        VTE Risk Mitigation (From admission, onward)         Ordered     Place sequential compression device  Until discontinued      10/01/19 1213     IP VTE HIGH RISK PATIENT  Once      10/01/19 1213     Place sequential compression device  Until discontinued      10/01/19 1213     Place FAUSTO hose  Until discontinued      10/01/19 1213              Per physical therapy recs there is concern for patient being able to safely ambulate. Recommend more therapy in an inpatient setting.  Will consult case management for inpatient rehab/SNF placement.         Annabella Manriquez MD  Department of [facility]   [facility]

## 2019-10-02 NOTE — PT/OT/SLP EVAL
"Occupational Therapy   Evaluation    Name: Melania Beavers  MRN: [id number]  Admitting Diagnosis:  <principal problem not specified>      Recommendations:     Discharge Recommendations: home health OT, home health PT  Discharge Equipment Recommendations:     Barriers to discharge:  None    Assessment:     Melania Beavers is a 85 y.o. female with a medical diagnosis of <principal problem not specified>.  She presents with cooperative affect with OT eval and treat. Performance deficits affecting function: weakness, impaired endurance, impaired self care skills, impaired cognition, impaired functional mobilty, decreased safety awareness, impaired coordination, impaired skin.      Rehab Prognosis: Fair; patient would benefit from acute skilled OT services to address these deficits and reach maximum level of function.       Plan:     Patient to be seen 3 x/week to address the above listed problems via self-care/home management, therapeutic activities, therapeutic exercises  · Plan of Care Expires: 10/02/19  · Plan of Care Reviewed with: patient    Subjective     Chief Complaint: "I feel like I have a burning feeling in my throat"   Patient/Family Comments/goals:  Occupational Profile:  Living Environment: one story house with 4 steps to enter, tub/shower combo and walk in shower in the home, BSC over the toilet, no wall bars  Previous level of function: supervision 24* since the last month 2* past TIA, rollator ambulation, Mod I with ADL's  Roles and Routines: mother, ADL's   Equipment Used at Home:  rollator(shower stool)  Assistance upon Discharge: pt will cont to need 24* supervision, pt with confusion PTA and still with confusion     Pain/Comfort:  · Pain Rating 1: 0/10    Patients cultural, spiritual, Advent conflicts given the current situation: no    Objective:     Communicated with: Nursing prior to session.  Patient found HOB elevated with telemetry, peripheral IV upon OT entry to room.    General Precautions: " Standard, fall   Orthopedic Precautions:N/A   Braces: N/A     Occupational Performance:    Bed Mobility:    · Patient completed Rolling/Turning to Right with modified independence  · Patient completed Scooting/Bridging with stand by assistance  · Patient completed Supine to Sit with stand by assistance  · Patient completed Sit to Supine with supervision    Functional Mobility/Transfers:  · Patient completed Sit <> Stand Transfer with stand by assistance  with  no assistive device   · Patient completed Bed <> Chair Transfer using Step Transfer technique with stand by assistance with slow guarded stepping along side of bed to then transfer back to bed; no chair in the room at this time and pt with confusion   · Functional Mobility: limited due to confusion     Activities of Daily Living:  · Feeding:  supervision with partial plate  · Grooming: supervision sitting on side of bed with tray table     Cognitive/Visual Perceptual:  Cognitive/Psychosocial Skills:     -       Oriented to: Person, Place and not to date or situation    -       Follows Commands/attention:Easily distracted and Follows one-step commands  -       Communication: expressive aphasia, clear/fluent and once pt has her train of thought, she is able to fluently verbalize, however, pt needs increased time to get her thoughts together   -       Memory: Impaired STM, Poor immediate recall and pt with good LTM of recalling her home set up with accuracy with daughter verifying, however pt confused to her current situation   -       Safety awareness/insight to disability: impaired   -       Mood/Affect/Coping skills/emotional control: Pleasant and Guarded  Visual/Perceptual:      -Intact for tracking, no field cut, apraxia intact for ideation, ideomotor and constructional, R/L discrimiation intact     Physical Exam:  Balance: -       good static sitting; fair - static standing, poor + dynamic standing   Dominant hand:    -       right   Upper Extremity Range  of Motion:     -       Right Upper Extremity: WFL  -       Left Upper Extremity: WFL  Upper Extremity Strength:    -       Right Upper Extremity: WNL  -       Left Upper Extremity: WNL  Fine Motor Coordination:    -       Intact  Gross motor coordination:   hand-eye coordination impaired for alternating finger to nose     AMPAC 6 Click ADL:  AMPAC Total Score: 18    Treatment & Education:  Role of OT with pt and daughter;   Education:    Patient left HOB elevated with all lines intact, call button in reach, bed alarm on and nursing regarding bed alarm not activated when OT entered the room, and pt's L arm IV site red, painful and swollen notified    GOALS:   Multidisciplinary Problems     Occupational Therapy Goals        Problem: Occupational Therapy Goal    Goal Priority Disciplines Outcome Interventions   Occupational Therapy Goal     OT, PT/OT     Description:  Goals to be met by: discharge    Patient will increase functional independence with ADLs by performing:    Feeding with Modified [place].  UE Dressing with Supervision.  LE Dressing with Supervision.  Grooming while standing with Supervision.  Toileting from toilet with Supervision for hygiene and clothing management.   Toilet transfer to toilet with Supervision.                      History:     Past Medical History:   Diagnosis Date    GERD (gastroesophageal reflux disease)     Hypertension        Past Surgical History:   Procedure Laterality Date    HYSTERECTOMY         Time Tracking:     OT Date of Treatment: 10/02/19  OT Start Time: 0844  OT Stop Time: 0906  OT Total Time (min): 22 min    Billable Minutes:Evaluation 12  Self Care/Home Management 10    Kaitlynn Cordova OT  10/2/057834:14 AM

## 2019-10-02 NOTE — SUBJECTIVE & OBJECTIVE
Interval History: imaging studies show interval development of a left frontal stroke    Review of Systems   Constitutional: Negative for chills and fever.   HENT: Negative for congestion.    Respiratory: Negative for shortness of breath and wheezing.    Cardiovascular: Negative for chest pain and palpitations.   Gastrointestinal: Negative for constipation, diarrhea, nausea and vomiting.   Genitourinary: Negative for dysuria, flank pain, urgency and vaginal discharge.   Musculoskeletal: Negative for back pain and myalgias.     Objective:     Vital Signs (Most Recent):  Temp: 98.7 °F (37.1 °C) (10/02/19 0705)  Pulse: 86 (10/02/19 0705)  Resp: 15 (10/02/19 0705)  BP: (!) 165/79 (10/02/19 0705)  SpO2: 98 % (10/02/19 0705) Vital Signs (24h Range):  Temp:  [97.6 °F (36.4 °C)-98.7 °F (37.1 °C)] 98.7 °F (37.1 °C)  Pulse:  [76-87] 86  Resp:  [15-20] 15  SpO2:  [96 %-98 %] 98 %  BP: (128-165)/(70-79) 165/79     Weight: 46.3 kg (102 lb 1.2 oz)  Body mass index is 16.48 kg/m².  No intake or output data in the 24 hours ending 10/02/19 1549   Physical Exam   Constitutional: She is oriented to person, place, and time. No distress.   Eyes: Pupils are equal, round, and reactive to light. No scleral icterus.   Cardiovascular: Normal rate and regular rhythm.   No murmur heard.  Pulmonary/Chest: Breath sounds normal. She has no wheezes. She has no rales.   Abdominal: Soft. She exhibits no distension. There is no tenderness.   Neurological: She is alert and oriented to person, place, and time. Coordination and gait abnormal.   4/5 strength in b/l UE  3/5 strength in b/l LE   Skin: Skin is warm. Capillary refill takes less than 2 seconds. No rash noted.   Psychiatric: She has a normal mood and affect.   Nursing note and vitals reviewed.      Significant Labs:   BMP:   Recent Labs   Lab 10/01/19  0819 10/02/19  0339   GLU 87 94    138   K 4.8 4.5    111*   CO2 20* 20*   BUN 24* 20   CREATININE 1.7* 1.5*   CALCIUM 9.5 9.3   MG  2.0  --      CBC:   Recent Labs   Lab 10/01/19  0819 10/02/19  0339   WBC 6.01 6.28   HGB 8.9* 8.7*   HCT 27.9* 27.0*    228     Magnesium:   Recent Labs   Lab 10/01/19  0819   MG 2.0       Significant Imaging: I have reviewed all pertinent imaging results/findings within the past 24 hours.     Imaging Results          MRI Brain Without Contrast (Final result)  Result time 10/01/19 11:47:27    Final result by Paulino Orellana IV, MD (10/01/19 11:47:27)                 Impression:      Interval development of focal areas of signal alteration within the left frontal lobe compatible with areas of interval infarction.  While there are foci of encephalomalacia which are likely related to more remote areas of ischemia, there are findings of restricted diffusion compatible with areas of more recent or subacute infarction.    Additional chronic small vessel ischemic changes.      Electronically signed by: Paulino Orellana IV., MD  Date:    10/01/2019  Time:    11:47             Narrative:    EXAMINATION:  MRI BRAIN WITHOUT CONTRAST    CLINICAL HISTORY:  TIA, initial screening;    TECHNIQUE:  Multiplanar noncontrast imaging is performed.    COMPARISON:  09/05/2019.    FINDINGS:  In the interval, there has been development of focal areas of decreased T1 and increased T2 signal with surrounding changes of increased T2 signal within the left frontal lobe involving the subcortical and periventricular white matter with extension into the corona radiata/centrum semiovale.  The findings are likely representative of areas of interval infarction.  There is mild associated diffusion restriction, at least partly related to T2 shine through.  The findings are likely representative of areas of mixed acute and subacute/remote infarction.    Otherwise, there are scattered changes of periventricular and subcortical white matter hyperintensity within both hemispheres, likely reflection of chronic microvascular ischemia.  Additionally, there  are foci of probable remote small vessel infarction within the cerebellar hemispheres bilaterally, unchanged.    There are no findings of acute hemorrhage.  There is no mass effect or midline shift.  The ventricular system is appropriate in size and position for age.  There are no extra-axial fluid collections.    Appropriate flow voids are present within the major blood vessels.    The skull base and craniocervical junction appear unremarkable.                               CT Head Without Contrast (Final result)  Result time 10/01/19 08:58:15    Final result by Carlos Szymanski MD (10/01/19 08:58:15)                 Impression:      1. Increasing abnormal low attenuation in the periventricular white matter of the left frontal lobe when compared to September 5. Findings may reflect subacute white matter ischemia (recent MRI brain from September 5, 2019 demonstrated no diffusion restriction at this level.)  Follow-up brain MR may be of benefit for further assessment.  2. Small focus of encephalomalacia in the right cerebellum compatible with previous ischemic insult.  3. Mild chronic small vessel white matter ischemic changes.      Electronically signed by: Carlos Szymanski MD  Date:    10/01/2019  Time:    08:58             Narrative:    EXAMINATION:  CT HEAD WITHOUT CONTRAST    CLINICAL HISTORY:  Focal neuro deficit, new, fixed or worsening, >6 hours;.    TECHNIQUE:  CMS MANDATED QUALITY DATA - CT RADIATION - 436    All CT scans at this facility utilize dose modulation, iterative reconstruction, and/or weight based dosing when appropriate to reduce radiation dose to as low as reasonably achievable.    Non infusion images were obtained from the skull base to the vertex.    COMPARISON:  CT brain, September 5, 2019; MRI brain September 5, 2019    FINDINGS:  There is no evidence of intracranial mass, hemorrhage, or midline shift.  Ventricles and sulci are appropriate for age.  There are no pathologic extra-axial  fluid collections.    Changes of mild chronic microvascular white matter disease are noted.  In comparison to September 5, 2019, there is increasing abnormal low attenuation in the periventricular white matter of the left frontal lobe, suggesting possible subacute white matter ischemic insult.  Repeat MR may be of benefit in that regard.  Recent MRI brain from September 5 demonstrated no acute ischemia at this level.    There is a small focus of encephalomalacia in the right cerebellum compatible with previous ischemic insult.  The brainstem is unremarkable.

## 2019-10-02 NOTE — PT/OT/SLP EVAL
"Physical Therapy Evaluation    Patient Name:  Melania Beavers   MRN:  [id number]    Recommendations:     Discharge Recommendations:  home health OT, home health PT, other (see comments)(pt needs 24/7 supervision and assistance due to high fall risk.)   Discharge Equipment Recommendations: none   Barriers to discharge: None    Assessment:     Melania Beavers is a 85 y.o. female admitted with a medical diagnosis of <principal problem not specified>.  She presents with the following impairments/functional limitations:  weakness, impaired functional mobilty, gait instability, impaired endurance, impaired balance, decreased safety awareness, impaired coordination . Good effort from pt. No family present to discuss dc needs. Pt is a high fall risk due to decreased coordination with RW and unsteady on feet. Noted equal smile and tongue protrusion. Spoke to RN and MD re: status.     Rehab Prognosis: Good; patient would benefit from acute skilled PT services to address these deficits and reach maximum level of function.    Recent Surgery: * No surgery found *      Plan:     During this hospitalization, patient to be seen 6 x/week to address the identified rehab impairments via gait training, therapeutic activities, therapeutic exercises and progress toward the following goals:    · Plan of Care Expires:  11/02/19    Subjective     Chief Complaint: "I feel better"  Patient/Family Comments/goals: to go home  Pain/Comfort:  · Pain Rating 1: 0/10    Patients cultural, spiritual, Sabianism conflicts given the current situation:      Living Environment:  Pt lives with daughter in one story home per pt. Unclear if daughter present 24/7. Pt reports mod ind bathing and ambulating in house. Pt does not get out of house much since recent admissions.   Prior to admission, patients level of function was mod ind.  Equipment used at home: rollator.  DME owned (not currently used): unknown.  Upon discharge, patient will have assistance from " daughter..    Objective:     Communicated with rn prior to session.  Patient found supine with telemetry, peripheral IV, bed alarm  upon PT entry to room.    General Precautions: Standard, fall   Orthopedic Precautions:    Braces:       Exams:  · Cognitive Exam:  Patient is oriented to Person, Place, Time and pt is not oriented to situation  · Gross Motor Coordination:  slow and pt had difficulty following instructions.   · RUE Strength: WFL except poor shoulder  · LUE Strength: WFL except poor shoulder  · RLE ROM: WFL  · RLE Strength: WFL  · LLE ROM: WFL  · LLE Strength: WFL    Functional Mobility:  · Bed Mobility:     · Supine to Sit: supervision  · Transfers:     · Sit to Stand:  contact guard assistance with rolling walker  · Gait: pt able to ambulate 100 ft RW with mod assist to keep from running into objects on right side, noted pt became weaker with distance but no c/o dizziness or weakness. Pt fatigued and returned to bed. RN and MD notified.       Therapeutic Activities and Exercises:   education, dc planning, fall prevention, coordination.     AM-PAC 6 CLICK MOBILITY  Total Score:20     Patient left supine with call button in reach, bed alarm on and rn and md notified.    GOALS:   Multidisciplinary Problems     Physical Therapy Goals        Problem: Physical Therapy Goal    Goal Priority Disciplines Outcome Goal Variances Interventions   Physical Therapy Goal     PT, PT/OT      Description:  Goals to be met by: discharge     Patient will increase functional independence with mobility by performin. Gait  x 150 feet with Supervision using Rolling Walker  2. Sit to stand with supervision.  3. Bed to chair transfer supervision.                        History:     Past Medical History:   Diagnosis Date    GERD (gastroesophageal reflux disease)     Hypertension        Past Surgical History:   Procedure Laterality Date    HYSTERECTOMY         Time Tracking:     PT Received On: 10/02/19  PT Start Time:  0953     PT Stop Time: 1022  PT Total Time (min): 29 min     Billable Minutes: Evaluation 6, Gait Training 15 and Therapeutic Activity 8      Miguelina Akins, PT  10/02/2019

## 2019-10-02 NOTE — CONSULTS
"[facility]  Adult Nutrition  Consult Note    SUMMARY     Recommendations    Recommendation/Intervention:   1. RD added ensure enlive TID (to provide 1050 kcal/day and 60 g/day protein)   2. Encourage PO intake of meals and supplments.   3. Recommend that appetite stimulant be considered.     Goals:   1. Patient to meet estimated need via PO intake of meals and supplements.   2. MD to consider starting an appetite stimulant.     Nutrition Goal Status: new  Communication of RD Recs: reviewed with RN    Reason for Assessment    Reason For Assessment: consult  Relevant Medical History: HTN, GERD, Acute CVA, expressive aphasia   General Information Comments: consult for malnutrition     Nutrition Risk Screen    Identified by RD. Poor intake PTA. Severe weight loss PTA per EMR weight history. Weight loss of 10% weight loss in ~ 1 month.     Nutrition/Diet History    Patient Reported Diet/Restrictions/Preferences: general  Typical Food/Fluid Intake: poor intake per daughter   Food Preferences: none voiced   Spiritual, Cultural Beliefs, Orthodoxy Practices, Values that Affect Care: no  Supplemental Drinks or Food Habits: Ensure Enlive(sometimes drinks ensure at home )  Food Allergies: NKFA  Factors Affecting Nutritional Intake: decreased appetite    Anthropometrics    Temp: 98.7 °F (37.1 °C)  Height Method: Stated  Height: 5' 6" (167.6 cm)  Height (inches): 66 in  Weight Method: Standard Scale  Weight: 46.3 kg (102 lb 1.2 oz)  Weight (lb): 102.07 lb  Ideal Body Weight (IBW), Female: 130 lb  % Ideal Body Weight, Female (lb): 78.52 lb  BMI (Calculated): 16.5  BMI Grade: 16 - 16.9 protein-energy malnutrition grade II  Weight Loss: other (see comments)    Severe weight loss PTA per EMR weight history. Weight loss of 10% weight loss in ~ 1 month.     Weight History:  Wt Readings from Last 10 Encounters:   10/01/19 46.3 kg (102 lb 1.2 oz)   09/07/19 51.5 kg (113 lb 8.6 oz)   09/05/19 44.9 kg (99 lb)   04/06/17 54.9 " kg (121 lb 0.5 oz)       Lab/Procedures/Meds: Pertinent Labs Reviewed  Clinical Chemistry:  Recent Labs   Lab 10/01/19  0819 10/02/19  0339    138   K 4.8 4.5    111*   CO2 20* 20*   GLU 87 94   BUN 24* 20   CREATININE 1.7* 1.5*   CALCIUM 9.5 9.3   PROT 6.8  --    ALBUMIN 3.8  --    BILITOT 1.1*  --    ALKPHOS 37*  --    AST 17  --    ALT 6*  --    ANIONGAP 8 7*   ESTGFRAFRICA 31.3* 36.4*   EGFRNONAA 27.1* 31.5*   MG 2.0  --      CBC:   Recent Labs   Lab 10/02/19  0339   WBC 6.28   RBC 2.94*   HGB 8.7*   HCT 27.0*      MCV 92   MCH 29.6   MCHC 32.2     Lab Results   Component Value Date    HGBA1C 5.6 09/05/2019    HGBA1C 5.6 09/05/2019     Cardiac Profile:  Recent Labs   Lab 10/01/19  0819 10/01/19  1439 10/01/19  1757   TROPONINI 0.034 0.034  0.034 0.031  0.031     Thyroid & Parathyroid:  Recent Labs   Lab 10/01/19  0819   TSH 3.140     Medications: Pertinent Medications reviewed  Scheduled Meds:   amLODIPine  5 mg Oral Daily    aspirin  81 mg Oral Daily    atorvastatin  40 mg Oral Daily    [START ON 10/3/2019] clopidogrel  75 mg Oral Daily    fluticasone propionate  1 spray Each Nostril Daily    pantoprazole  40 mg Oral Daily     Continuous Infusions:   sodium chloride 0.9% 75 mL/hr at 10/02/19 1333     PRN Meds:.dextrose 50%, dextrose 50%, fluticasone propionate, glucagon (human recombinant), glucose, glucose, ondansetron, sodium chloride 0.9%, sodium chloride 0.9%    Estimated/Assessed Needs    Weight Used For Calorie Calculations: 46.3 kg (102 lb 1.2 oz)  Energy Calorie Requirements (kcal): 1389 - 1621 (30 - 35)   Energy Need Method: Kcal/kg  Protein Requirements: 56 - 70 (1.2 - 1.5)   Weight Used For Protein Calculations: 46.3 kg (102 lb 1.2 oz)  Fluid Requirements (mL): 1389  Estimated Fluid Requirement Method: RDA Method    Nutrition Prescription Ordered    Current Diet Order: Cardiac     Evaluation of Received Nutrient/Fluid Intake    Energy Calories Required: not meeting  needs  Protein Required: not meeting needs  Fluid Required: not meeting needs  Tolerance: tolerating  % Intake of Estimated Energy Needs: 0 - 25 %  % Meal Intake: 0 - 25 %    Nutrition Risk    Level of Risk/Frequency of Follow-up: high     Dietitian Rounds Brief:    Pt seen due to consult for malnutrition. PO intake of meals is poor. Pt states she has not really had an appetite for a few weeks. Pt thinks she has had a weight loss of ~ 4# in about 2 weeks. PO intake of meals is about 25% or less. RD encourage PO intake. Added ensure enlive TID. If PO intake remains poor, will rec'd appetite stimulant.     Monitor and Evaluation    Food and Nutrient Intake: energy intake, food and beverage intake  Food and Nutrient Adminstration: diet order  Physical Activity and Function: nutrition-related ADLs and IADLs  Anthropometric Measurements: weight change  Biochemical Data, Medical Tests and Procedures: electrolyte and renal panel, inflammatory profile, gastrointestinal profile, lipid profile, glucose/endocrine profile  Nutrition-Focused Physical Findings: overall appearance     Nutrition Follow-Up    RD Follow-up?: Yes     Mery Quijano RD 10/02/2019 4:56 PM

## 2019-10-02 NOTE — ASSESSMENT & PLAN NOTE
Management per stroke protocol  Anticoagulation with aspirin and plavix  AVELINO  PT/OT as tolerated

## 2019-10-02 NOTE — PROGRESS NOTES
Ms. Beavers is an 85 year old female with past medical history significant for COPD, TIA, HTN. She was recently admitted 3 weeks ago with TIA. MRI was negative at that time. Patient presented with confusion, aphasia, and generalized weakness. MRI now reveals interval development of a subacute left frontal subcortical infarct. She has been loaded with Plavix 300mg and will be started on 75 mg daily. Cardiology is consulted for AVELINO. Recent TTE revealed moderate to severe aortic stenosis with normal LVEF.   Upon my exam, she is oriented to person, place, and time. She does seem confused to some recent details. Her son is present at the bedside.  She denies any loose teeth. Denies any bleeding issues or varices. Denies difficulty swallowing, although her son reports she has had esophageal strictures in the past requiring dilation. She is not sure which GI specialist she has been in the past, but thinks the last dilation was about a year ago.   Patient's son states that his sister is more abreast to her medical care and will be the one to provide consent. We will get in touch with her and discuss the procedure.       I have personally seen and examined the patient. I reviewed the notes, assessments, and/or procedures performed by Ms Lizzette Quiroz, I concur with her documentation of Melania Beavers.  Discussed with patient and daughter. Indications, risks, benefits as well as alternatives to the procedure were discussed with the patient and informed consent would be obtained.

## 2019-10-02 NOTE — PLAN OF CARE
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 Will initiate treatment to address verbal expression within acute care setting. Pt would certainly benefit from intensive therapy within inpatient rehabilitation facility to maximize function.

## 2019-10-02 NOTE — PLAN OF CARE
CM met with patient and daughter to complete discharge planning assessment. Patient needed a little extra time to answer questions but was able to answer questions correctly. Patient lives with her daughter, Reyna Beavers [phone] and has 24/7 care. Patient uses a rollator and has a BSC and shower chair. PCP is Dr. Brown, pharmacy is [email or web address], patient denies POA or living will and plans to return home with family. Patient denies any home health currently. CM will follow for dc planning needs.      10/02/19 1020   Discharge Assessment   Assessment Type Discharge Planning Assessment   Confirmed/corrected address and phone number on facesheet? Yes   Assessment information obtained from? Patient;Caregiver   Communicated expected length of stay with patient/caregiver yes   Prior to hospitilization cognitive status: Alert/Oriented   Prior to hospitalization functional status: Assistive Equipment   Current cognitive status: Alert/Oriented   Current Functional Status: Assistive Equipment   Lives With child(christy), adult   Able to Return to Prior Arrangements yes   Is patient able to care for self after discharge? Yes   Patient's perception of discharge disposition home or selfcare   Readmission Within the Last 30 Days no previous admission in last 30 days   Patient currently being followed by outpatient case management? No   Patient currently receives any other outside agency services? No   Equipment Currently Used at Home rollator;3-in-1 commode;shower chair   Do you have any problems affording any of your prescribed medications? No   Is the patient taking medications as prescribed? yes   Does the patient have transportation home? Yes   Transportation Anticipated family or friend will provide   Does the patient receive services at the [facility]? No   Discharge Plan A Home Health   Discharge Plan B Home   DME Needed Upon Discharge  none   Patient/Family in Agreement with Plan yes

## 2019-10-02 NOTE — HPI
This 75 years old woman, past medical history of COPD, TIA, hypertension, who was recently admitted to the hospital about 3 weeks ago for episode of transient aphasia and confusion.  This morning when the patient awoke around 7:00 a.m., she was again aphasic.  Her daughter observed and she brought her to the hospital.  The patient was also confused at this time.  During her prior hospitalization she had extensive workup, including echocardiography, carotid Doppler, and brain MRI were negative.  In ER, her symptoms mostly resolved, the patient was awake alert oriented x3, in no focal deficits, also as per daughter her symptoms have improved.  Neurology has been consulted from the emergency department, no specific recommendations.  I did order brain MRI which showed new interval development of left frontal area, compatible with new infarction.  The patient denies headache, no double vision, no dizziness, no nausea, no vomiting, no chest pain, no cough, no fever, no mother focal weakness, no loss of urine, no sensorial deficits, no dysphagia, no other complaints at this time.  Considering the time of this new left frontal changes, compatible with stroke, is not known, the patient was not a tPA candidate in the emergency department.

## 2019-10-02 NOTE — PLAN OF CARE
10/02/19 1149   Discharge Assessment   Assessment Type Discharge Planning Reassessment   PT recommending IPR for patient. CM informed MD and she will place consult to IPR. CM placed call to Reyna , patient's daughter to inform and see what they would like for the patient. CM will follow.

## 2019-10-03 ENCOUNTER — ANESTHESIA (OUTPATIENT)
Dept: CARDIOLOGY | Facility: HOSPITAL | Age: 84
DRG: 064 | End: 2019-10-03
Payer: MEDICARE

## 2019-10-03 ENCOUNTER — CLINICAL SUPPORT (OUTPATIENT)
Dept: CARDIOLOGY | Facility: HOSPITAL | Age: 84
DRG: 064 | End: 2019-10-03
Attending: INTERNAL MEDICINE
Payer: MEDICARE

## 2019-10-03 PROBLEM — K22.2 ESOPHAGEAL STRICTURE: Status: ACTIVE | Noted: 2019-10-03

## 2019-10-03 PROBLEM — G93.41 ACUTE METABOLIC ENCEPHALOPATHY: Status: RESOLVED | Noted: 2019-09-06 | Resolved: 2019-10-03

## 2019-10-03 LAB
GLUCOSE SERPL-MCNC: 110 MG/DL (ref 70–110)
GLUCOSE SERPL-MCNC: 128 MG/DL (ref 70–110)
GLUCOSE SERPL-MCNC: 94 MG/DL (ref 70–110)

## 2019-10-03 PROCEDURE — 25000003 PHARM REV CODE 250: Performed by: INTERNAL MEDICINE

## 2019-10-03 PROCEDURE — 63600175 PHARM REV CODE 636 W HCPCS: Performed by: INTERNAL MEDICINE

## 2019-10-03 PROCEDURE — 27000675 HC TUBING MICRODRIP: Performed by: ANESTHESIOLOGY

## 2019-10-03 PROCEDURE — 37000008 HC ANESTHESIA 1ST 15 MINUTES: Performed by: INTERNAL MEDICINE

## 2019-10-03 PROCEDURE — 93005 ELECTROCARDIOGRAM TRACING: CPT

## 2019-10-03 PROCEDURE — 97530 THERAPEUTIC ACTIVITIES: CPT

## 2019-10-03 PROCEDURE — 63600175 PHARM REV CODE 636 W HCPCS: Performed by: NURSE ANESTHETIST, CERTIFIED REGISTERED

## 2019-10-03 PROCEDURE — 27202103: Performed by: ANESTHESIOLOGY

## 2019-10-03 PROCEDURE — 25000003 PHARM REV CODE 250: Performed by: EMERGENCY MEDICINE

## 2019-10-03 PROCEDURE — 27000284 HC CANNULA NASAL: Performed by: ANESTHESIOLOGY

## 2019-10-03 PROCEDURE — 93312 ECHO TRANSESOPHAGEAL: CPT | Mod: 52

## 2019-10-03 PROCEDURE — 92507 TX SP LANG VOICE COMM INDIV: CPT

## 2019-10-03 PROCEDURE — 93307 TTE W/O DOPPLER COMPLETE: CPT

## 2019-10-03 PROCEDURE — 25000003 PHARM REV CODE 250: Performed by: NURSE PRACTITIONER

## 2019-10-03 PROCEDURE — 97535 SELF CARE MNGMENT TRAINING: CPT

## 2019-10-03 PROCEDURE — 27000671 HC TUBING MICROBORE EXT: Performed by: ANESTHESIOLOGY

## 2019-10-03 PROCEDURE — 37000009 HC ANESTHESIA EA ADD 15 MINS: Performed by: INTERNAL MEDICINE

## 2019-10-03 PROCEDURE — 21400001 HC TELEMETRY ROOM

## 2019-10-03 RX ORDER — PROPOFOL 10 MG/ML
VIAL (ML) INTRAVENOUS
Status: DISCONTINUED | OUTPATIENT
Start: 2019-10-03 | End: 2019-10-03 | Stop reason: HOSPADM

## 2019-10-03 RX ADMIN — CLOPIDOGREL BISULFATE 75 MG: 75 TABLET, FILM COATED ORAL at 09:10

## 2019-10-03 RX ADMIN — PROPOFOL 30 MG: 10 INJECTION, EMULSION INTRAVENOUS at 08:10

## 2019-10-03 RX ADMIN — PANTOPRAZOLE SODIUM 40 MG: 40 TABLET, DELAYED RELEASE ORAL at 09:10

## 2019-10-03 RX ADMIN — PROPOFOL 70 MG: 10 INJECTION, EMULSION INTRAVENOUS at 08:10

## 2019-10-03 RX ADMIN — ASPIRIN 81 MG 81 MG: 81 TABLET ORAL at 09:10

## 2019-10-03 RX ADMIN — AMLODIPINE BESYLATE 5 MG: 5 TABLET ORAL at 09:10

## 2019-10-03 RX ADMIN — SODIUM CHLORIDE: 0.9 INJECTION, SOLUTION INTRAVENOUS at 07:10

## 2019-10-03 RX ADMIN — ATORVASTATIN CALCIUM 40 MG: 40 TABLET, FILM COATED ORAL at 09:10

## 2019-10-03 NOTE — PLAN OF CARE
Problem: Occupational Therapy Goal  Goal: Occupational Therapy Goal  Description  Goals to be met by: discharge    Patient will increase functional independence with ADLs by performing:    Feeding with Modified [place].  UE Dressing with Supervision.  LE Dressing with Supervision.  Grooming while standing with Supervision.  Toileting from toilet with Supervision for hygiene and clothing management.   Toilet transfer to toilet with Supervision.     Outcome: Adequate for Care Transition

## 2019-10-03 NOTE — NURSING
Lencho. Therapy  at bedside. Orthostatic VS were taken with following results  Lying /69 P 87  SITTING /59 /62 P 89  STANDING BP 93/57 P 98 WITH SYMPTOMS OF DIZZINESS.  PT. LAID BACK IN BED IN SUPINE POSITION AND BP /70 P 91   DR. KNIGHT WAS MADE AWARE. PT LYING IN BED WITH CALL LIGHT NEAR AND BED ALARM ON. WILL CONTINUE TO MONITOR.

## 2019-10-03 NOTE — PT/OT/SLP PROGRESS
"Speech Language Pathology Treatment    Patient Name:  Melania Beavers   MRN:  [id number]  Admitting Diagnosis: Acute CVA (cerebrovascular accident)    Recommendations:                 General Recommendations:  Speech/language therapy and Cognitive-linguistic therapy  Diet recommendations:  Regular, Liquid Diet Level: Thin   Aspiration Precautions: Standard aspiration precautions   General Precautions: Standard, fall, aphasia  Communication strategies:  Simple commands; cues for enhanced verbal expression    Subjective     "It's funny. I only remember after you leave"  Patient goals: none stated      Pain/Comfort:  · Pain Rating 1: 0/10    Objective:     Has the patient been evaluated by SLP for swallowing?   Yes  Keep patient NPO? No   Current Respiratory Status: room air      Decreased initiation and increased delay in responses appreciated. Responses often tangential or unrelated to topic even with simplification of information. Redirections effective in enhancing awareness however, ineffective in eliciting correct and efficient responses. Speech characterized as mostly empty and unspecific of short length (2-3 words). Confrontation naming completed with 60% acc with paraphasias x2 and perseveration x1 increasing to 80% acc with phonemic and semantic cues. Unable to complete compare/contrast as well as semantic feature  Identification tasks as she continued to lose attention and provide unrelated and tangential responses. Communicated Pt change in status with RN who was in agreement. NSG states Pt presenting with AMS (tried to get OOB x2) and orthostatic since attempted AVELINO. MD aware.     Assessment:     Melania Beavers is a 85 y.o. female with an SLP diagnosis of Aphasia and Cognitive-Linguistic Impairment.       Goals:   Multidisciplinary Problems     SLP Goals        Problem: SLP Goal    Goal Priority Disciplines Outcome   SLP Goal     SLP    Description:  1. Pt will participate in semantic feature analysis (SFA) " given MOD A to improve improve accuracy and efficiency of word retrieval in verbal expression   2. Pt will participate in verb network strengthening treatment (VNest) given MOD A to improve accuracy and efficiency of word retrieval in verbal expression   3. Participate in assessment of reading and writing skills                     Plan:     · Patient to be seen:  5 x/week   · Plan of Care expires:     · Plan of Care reviewed with:  patient, daughter   · SLP Follow-Up:  Yes       Discharge recommendations:  rehabilitation facility     Time Tracking:     SLP Treatment Date:   10/03/19  Speech Start Time:  1617  Speech Stop Time:  1640     Speech Total Time (min):  23 min    Billable Minutes: Speech Therapy Individual 23    Daniel Figueredo CCC-SLP  10/03/2019

## 2019-10-03 NOTE — INTERVAL H&P NOTE
The patient has been examined and the H&P has been reviewed:    I concur with the findings and changes have been noted since the H&P was written: Please see the note done by me yesterday    Anesthesia/Surgery risks, benefits and alternative options discussed and understood by patient/family.          Active Hospital Problems    Diagnosis  POA    *Acute CVA (cerebrovascular accident) [I63.9]  Yes    Expressive aphasia [R47.01]  Yes    Acute metabolic encephalopathy [G93.41]  Yes    HTN (hypertension) [I10]  Yes    GERD (gastroesophageal reflux disease) [K21.9]  Yes      Resolved Hospital Problems   No resolved problems to display.

## 2019-10-03 NOTE — PROGRESS NOTES
Attempted to perform a AVELINO. I was not able to advance the probe. I would suggest consulting GI for an EGD and then determine further if AVELINO could be performed. Discussed with Dr Manriquez.

## 2019-10-03 NOTE — CONSULTS
GASTROENTEROLOGY INPATIENT CONSULT NOTE  Patient Name: Melania Beavers  Patient MRN: [id number]  Patient : 1933    Admit Date: 10/1/2019  Service date: 10/3/2019    Reason for Consult: esophageal stricture    PCP: Luis Brown MD    Chief Complaint   Patient presents with    Aphasia       HPI: Patient is a 85 y.o. female with PMHx  HTN (ASA), COTY, HLD, esophageal stricture that presented for evaluation of weakness / confusion. Acute onset, intermittent, progressive but improved. Seen by Neuro and MRI w suspected CVA.   AVELINO attempted today but unable to pass scope. Noel lunch today w/o issues.     CHART REVIEW:   EGD '18 ([facility]) - tortuous esophagus dilated up to 52 Fr    Past Medical History:  Past Medical History:   Diagnosis Date    GERD (gastroesophageal reflux disease)     Hypertension         Past Surgical History:  Past Surgical History:   Procedure Laterality Date    HYSTERECTOMY          Home Medications:  Medications Prior to Admission   Medication Sig Dispense Refill Last Dose    amLODIPine (NORVASC) 5 MG tablet Take 1 tablet (5 mg total) by mouth once daily. 30 tablet 3 2019 at 08:00    ascorbic acid, vitamin C, (VITAMIN C) 500 MG tablet Take 500 mg by mouth 2 (two) times daily.   2019 at 08:00    b complex vitamins capsule Take 1 capsule by mouth once daily.   2019 at 08:00    cyanocobalamin (VITAMIN B-12) 1000 MCG tablet Take 100 mcg by mouth once daily.   2019 at 08:00    fluticasone-salmeterol 250-50 mcg/dose (ADVAIR) 250-50 mcg/dose diskus inhaler Inhale 1 puff into the lungs 2 (two) times daily. Controller   2019 at 08:00    omeprazole (PRILOSEC) 20 MG capsule Take 20 mg by mouth every morning.    2019 at 08:00    aspirin 81 MG Chew Take 1 tablet (81 mg total) by mouth once daily.  0 2019 at 20:00    atorvastatin (LIPITOR) 40 MG tablet Take 1 tablet (40 mg total) by mouth once daily. 90 tablet 3 2019 at 20:00    FERRALET 90 DUAL-IRON  DELIVERY 90-1-12-50 mg-mg-mcg-mg Tab TAKE 1 TABLET BY MOUTH DAILY  5 9/29/2019 at 20:00    lanolin/mineral oil/petrolatum (ARTIFICIAL TEARS OPHT) Place 1 drop into both eyes 3 (three) times daily as needed.   Unknown at Unknown time    levocetirizine (XYZAL) 5 MG tablet TAKE 5MG BY MOUTH DAILY AS NEEDED  1 9/29/2019 at 20:00    megestrol (MEGACE) 400 mg/10 mL (40 mg/mL) Susp Take 200 mg by mouth every morning.   9/29/2019 at 20:00    mometasone (NASONEX) 50 mcg/actuation nasal spray 2 sprays by Nasal route daily as needed.    Unknown at Unknown time    ondansetron (ZOFRAN) 8 MG tablet Take 8 mg by mouth every 12 (twelve) hours as needed for Nausea.    Unknown at Unknown time       Inpatient Medications:   amLODIPine  5 mg Oral Daily    aspirin  81 mg Oral Daily    atorvastatin  40 mg Oral Daily    clopidogrel  75 mg Oral Daily    fluticasone propionate  1 spray Each Nostril Daily    pantoprazole  40 mg Oral Daily     dextrose 50%, dextrose 50%, fluticasone propionate, glucagon (human recombinant), glucose, glucose, ondansetron, sodium chloride 0.9%, sodium chloride 0.9%    Review of patient's allergies indicates:   Allergen Reactions    Heparin analogues        Social History:   Social History     Occupational History    Not on file   Tobacco Use    Smoking status: Never Smoker   Substance and Sexual Activity    Alcohol use: No    Drug use: No    Sexual activity: Never       Family History:   History reviewed. No pertinent family history.    Review of Systems:  A 10 point review of systems was performed and was normal, except as mentioned in the HPI, including constitutional, HEENT, heme, lymph, cardiovascular, respiratory, gastrointestinal, genitourinary, neurologic, endocrine, psychiatric and musculoskeletal.      OBJECTIVE:    Physical Exam:  24 Hour Vital Sign Ranges: Temp:  [97.9 °F (36.6 °C)-98.8 °F (37.1 °C)] 98.2 °F (36.8 °C)  Pulse:  [73-98] 79  Resp:  [16-24] 19  SpO2:  [98 %-100 %] 99  "%  BP: (116-185)/(57-89) 170/80  Most recent vitals: BP (!) 170/80 (BP Location: Left arm, Patient Position: Lying)   Pulse 79   Temp 98.2 °F (36.8 °C) (Oral)   Resp 19   Ht 5' 6" (1.676 m)   Wt 51.4 kg (113 lb 5.1 oz)   LMP  (LMP Unknown)   SpO2 99%   Breastfeeding? No   BMI 18.29 kg/m²    GEN: well-developed, well-nourished, awake and alert, non-toxic appearing adult  HEENT: PERRL, sclera anicteric, oral mucosa pink and moist without lesion  NECK: trachea midline; Good ROM  CV: regular rate and rhythm, no murmurs or gallops  RESP: clear to auscultation bilaterally, no wheezes, rhonci or rales  ABD: soft, non-tender, non-distended, normal bowel sounds, no hepatosplenomegaly, no hernias  EXT: no swelling or edema, 2+ pulses distally  SKIN: no rashes or jaundice  PSYCH: normal affect    Labs:   Recent Labs     10/01/19  0819 10/02/19  0339 10/02/19  1947   WBC 6.01 6.28 7.90   MCV 92 92 92    228 225     Recent Labs     10/01/19  0819 10/02/19  0339 10/02/19  1946    138 137   K 4.8 4.5 4.4    111* 111*   CO2 20* 20* 20*   BUN 24* 20 21   GLU 87 94 123*     No results for input(s): ALB in the last 72 hours.    Invalid input(s): ALKP, SGOT, SGPT, TBIL, DBIL, TPRO  Recent Labs     10/01/19  0819 10/02/19  1946   INR 1.1 1.1         Radiology Review:  MRA Neck without contrast   Final Result      MRA Brain without contrast   Final Result      MRI Brain Without Contrast   Final Result      Interval development of focal areas of signal alteration within the left frontal lobe compatible with areas of interval infarction.  While there are foci of encephalomalacia which are likely related to more remote areas of ischemia, there are findings of restricted diffusion compatible with areas of more recent or subacute infarction.      Additional chronic small vessel ischemic changes.         Electronically signed by: Paulino Orellana IV., MD   Date:    10/01/2019   Time:    11:47      CT Head Without " Contrast   Final Result      1. Increasing abnormal low attenuation in the periventricular white matter of the left frontal lobe when compared to September 5. Findings may reflect subacute white matter ischemia (recent MRI brain from September 5, 2019 demonstrated no diffusion restriction at this level.)  Follow-up brain MR may be of benefit for further assessment.   2. Small focus of encephalomalacia in the right cerebellum compatible with previous ischemic insult.   3. Mild chronic small vessel white matter ischemic changes.         Electronically signed by: Carlos Szymanski MD   Date:    10/01/2019   Time:    08:58            IMPRESSION / RECOMMENDATIONS:   85 y.o. female with PMHx  HTN, COTY, HLD, esophageal stricture that presented for evaluation of weakness / confusion w/ MRI w suspected CVA. AVELINO attempted today but unable to pass scope. Due to CVA while on ASA, now on DAPT. Although increase risks, feel risks > benefits of EGD / Dil so that w/u can be completed and CVA continues to be appropriately treated. Risks, benefits, alternative discussed in detail with patient / family regarding anticipated procedure and possible complications.     -EGD +/- Dilation to facilitate AVELINO  -Ok to continue DAPT    Thank you for this consult.    Dominick Cunningham III  10/3/2019  3:13 PM

## 2019-10-03 NOTE — PLAN OF CARE
Problem: Physical Therapy Goal  Goal: Physical Therapy Goal  Description  Goals to be met by: discharge     Patient will increase functional independence with mobility by performin. Gait  x 150 feet with Supervision using Rolling Walker  2. Sit to stand with supervision.  3. Bed to chair transfer supervision.       Outcome: Ongoing, Not Progressing     Pt with positive orthostatics during treatment today.

## 2019-10-03 NOTE — ANESTHESIA POSTPROCEDURE EVALUATION
Anesthesia Post Evaluation    Patient: Melania Beavers    Procedure(s) Performed: Procedure(s) (LRB):  ECHOCARDIOGRAM,TRANSESOPHAGEAL (N/A)    Final Anesthesia Type: MAC  Patient location during evaluation: PACU  Patient participation: Yes- Able to Participate  Level of consciousness: awake and alert and oriented  Post-procedure vital signs: reviewed and stable  Pain management: adequate  Airway patency: patent  PONV status at discharge: No PONV  Anesthetic complications: no      Cardiovascular status: blood pressure returned to baseline, hemodynamically stable and stable  Respiratory status: unassisted, spontaneous ventilation and room air  Hydration status: euvolemic  Follow-up not needed.          Vitals Value Taken Time   /74 10/3/2019  8:30 AM   Temp 36.7 °C (98.1 °F) 10/3/2019  8:11 AM   Pulse 75 10/3/2019  8:42 AM   Resp 20 10/3/2019  8:42 AM   SpO2 97 % 10/3/2019  8:42 AM   Vitals shown include unvalidated device data.      No case tracking events are documented in the log.      Pain/Rickey Score: Rickey Score: 9 (10/3/2019  8:21 AM)

## 2019-10-03 NOTE — PT/OT/SLP PROGRESS
Occupational Therapy   Treatment    Name: Melania Beavers  MRN: [id number]  Admitting Diagnosis:  Acute CVA (cerebrovascular accident)  Day of Surgery    Recommendations:     Discharge Recommendations: rehabilitation facility  Discharge Equipment Recommendations:     Barriers to discharge:  None    Assessment:     Melania Beavers is a 85 y.o. female with a medical diagnosis of Acute CVA (cerebrovascular accident).  She presents with agreeable attitude with 2 daughters in the room informing OT of pt's status with pt's incontinence of bladder . Performance deficits affecting function are weakness, impaired endurance, impaired self care skills, impaired cognition, impaired functional mobilty, decreased safety awareness, impaired coordination, impaired skin.     Rehab Prognosis:  Good; patient would benefit from acute skilled OT services to address these deficits and reach maximum level of function.       Plan:     Patient to be seen 3 x/week to address the above listed problems via self-care/home management, therapeutic activities, therapeutic exercises, cognitive retraining  · Plan of Care Expires: 10/02/19  · Plan of Care Reviewed with: patient, daughter    Subjective     Pain/Comfort:  · Pain Rating 1: 0/10    Objective:     Communicated with: Nursing and daughters prior to session.  Patient found HOB elevated with telemetry, peripheral IV, bed alarm upon OT entry to room.    General Precautions: Standard, fall   Orthopedic Precautions:N/A   Braces:       Occupational Performance:     Bed Mobility:    · Patient completed Rolling/Turning to Right with supervision and with cues needed to initiate   · Patient completed Scooting/Bridging with contact guard assistance and for increased communication and faciliation of movements due to delayed responses   · Patient completed Supine to Sit with stand by assistance  · Patient completed Sit to Supine with stand by assistance     Functional Mobility/Transfers:  · Patient completed  Sit <> Stand Transfer with contact guard assistance and cues for hand placement from side of the bed and recliner chair   with  rolling walker   · Patient completed Bed <> Chair Transfer using Step Transfer technique with minimum assistance with rolling walker  · Functional Mobility: greatly impaired with OT today due to pt becoming lethargic, weak and feeling that she was going to pass out; pt ambulated from the bedside to the sink X 5 feet then stood at the sink x ~ 25 sec to begin g/h at RW level when the aformentioned feelings occurred; pt was quickly returned to sitting on nearby recliner chair, positioned in supine and called the RN; RN arrived to room to take BP which was 127/67; pt was then recommended by nursing to return back to bed at this time     Activities of Daily Living:  · Grooming: minimum assistance assist with hair; oral care with SBA, hand washing with SBA to reach the soap on the wall and poor execution of rubbing hands together despite max cueing; pt states yes to OT's cues however, does not comply with the motions despite CGuiding of motions; mod I with face washing; all in seated position due to poor standing tolerance   · Upper Body Dressing: minimum assistance  with mod cues for attention to details and impaired spatial relations as well as impaired figure ground of locating her white t-shirt on top of the white sheets; R neglect/impaired R sided attention noted today   · Lower Body Dressing: moderate assistance dep with socks, Min A with PJ pants due to impaired R side attention/concentration with pants leg going under feet w/ pt needed pants strings placed in pt's hand to reduce pants falling to the floor, increased time and cues to attend to pulling pants completly up over hips/buttocks  Retrieval of clothing items out of travel bag with max cues needed to locate bright blue bag sitting next to the pt on her left side as her trunk was rotated to the left; pt was able to reach into bag  once placed on her lap to utilized B integration w/ max cues needed to locate specific articles     [facility] 6 Click ADL: 15    Treatment & Education:  ADL training, mobility training, visual perceptual retraining; family education, stroke education     Patient left supine with all lines intact, call button in reach, bed alarm on and nursing who stated to return pt back to bed in supine for nursing to do orthostatics  notifiedEducation:      GOALS:   Multidisciplinary Problems     Occupational Therapy Goals        Problem: Occupational Therapy Goal    Goal Priority Disciplines Outcome Interventions   Occupational Therapy Goal     OT, PT/OT Adequate for Care Transition    Description:  Goals to be met by: discharge    Patient will increase functional independence with ADLs by performing:    Feeding with Modified [place].  UE Dressing with Supervision.  LE Dressing with Supervision.  Grooming while standing with Supervision.  Toileting from toilet with Supervision for hygiene and clothing management.   Toilet transfer to toilet with Supervision.                      Time Tracking:     OT Date of Treatment: 10/03/19  OT Start Time: 1325  OT Stop Time: 1407  OT Total Time (min): 42 min    Billable Minutes:Self Care/Home Management 30  Therapeutic Activity 12    Kaitlynn oCrdova OT  10/3/2019

## 2019-10-03 NOTE — PLAN OF CARE
10/03/19 1016   Post-Acute Status   Post-Acute Authorization Placement   Post-Acute Placement Status Referrals Sent   To address  consult, CM spoke to daughter, Magy Stallings to discuss post acute placement. CM spoke to daughter about patient's freedom of choice to choose provider of services. CM provided a list of inpatient rehab providers and daughter would like for referral to be sent to [facility] in [place] via [employer] fax. Patient choice disclosure obtained via verbal over the phone permission. CM will follow.     [facility] and [facility] will review. CM to follow.

## 2019-10-03 NOTE — PROGRESS NOTES
Critical access hospital Medicine  Progress Note    Patient Name: Melania Beavers  MRN: [id number]  Patient Class: IP- Inpatient   Admission Date: 10/1/2019  Length of Stay: 2 days  Attending Physician: Annabella Manriquez MD  Primary Care Provider: Luis Brown MD        Subjective:     Principal Problem:Acute CVA (cerebrovascular accident)        HPI:  This 75 years old woman, past medical history of COPD, TIA, hypertension, who was recently admitted to the hospital about 3 weeks ago for episode of transient aphasia and confusion.  This morning when the patient awoke around 7:00 a.m., she was again aphasic.  Her daughter observed and she brought her to the hospital.  The patient was also confused at this time.  During her prior hospitalization she had extensive workup, including echocardiography, carotid Doppler, and brain MRI were negative.  In ER, her symptoms mostly resolved, the patient was awake alert oriented x3, in no focal deficits, also as per daughter her symptoms have improved.  Neurology has been consulted from the emergency department, no specific recommendations.  I did order brain MRI which showed new interval development of left frontal area, compatible with new infarction.  The patient denies headache, no double vision, no dizziness, no nausea, no vomiting, no chest pain, no cough, no fever, no mother focal weakness, no loss of urine, no sensorial deficits, no dysphagia, no other complaints at this time.  Considering the time of this new left frontal changes, compatible with stroke, is not known, the patient was not a tPA candidate in the emergency department.    Overview/Hospital Course:  No notes on file    Interval History: her speech is much improved. Cardiology attempted to perform a AVELINO this morning, this was unsuccessful because of esophageal strictures. She is tolerating her diet.     Review of Systems   Constitutional: Negative for chills and fever.   HENT: Negative for  congestion.    Respiratory: Negative for shortness of breath and wheezing.    Cardiovascular: Negative for chest pain and palpitations.   Gastrointestinal: Negative for constipation, diarrhea, nausea and vomiting.   Genitourinary: Negative for dysuria, flank pain, urgency and vaginal discharge.   Musculoskeletal: Negative for back pain and myalgias.     Objective:     Vital Signs (Most Recent):  Temp: 98.2 °F (36.8 °C) (10/03/19 1200)  Pulse: 79 (10/03/19 1200)  Resp: 19 (10/03/19 1200)  BP: (!) 170/80 (10/03/19 1200)  SpO2: 99 % (10/03/19 1200) Vital Signs (24h Range):  Temp:  [97.9 °F (36.6 °C)-98.8 °F (37.1 °C)] 98.2 °F (36.8 °C)  Pulse:  [73-98] 79  Resp:  [16-24] 19  SpO2:  [98 %-100 %] 99 %  BP: (116-185)/(57-89) 170/80     Weight: 51.4 kg (113 lb 5.1 oz)  Body mass index is 18.29 kg/m².    Intake/Output Summary (Last 24 hours) at 10/3/2019 1411  Last data filed at 10/3/2019 0833  Gross per 24 hour   Intake 300 ml   Output --   Net 300 ml      Physical Exam   Constitutional: She is oriented to person, place, and time. No distress.   Eyes: Pupils are equal, round, and reactive to light. No scleral icterus.   Cardiovascular: Normal rate and regular rhythm.   No murmur heard.  Pulmonary/Chest: Breath sounds normal. She has no wheezes. She has no rales.   Abdominal: Soft. She exhibits no distension. There is no tenderness.   Neurological: She is alert and oriented to person, place, and time.   Skin: Skin is warm. Capillary refill takes less than 2 seconds. No rash noted.   Psychiatric: She has a normal mood and affect.   Nursing note and vitals reviewed.      Significant Labs:   BMP:   Recent Labs   Lab 10/02/19  1946   *      K 4.4   *   CO2 20*   BUN 21   CREATININE 1.4   CALCIUM 9.1     CBC:   Recent Labs   Lab 10/02/19  0339 10/02/19  1947   WBC 6.28 7.90   HGB 8.7* 8.4*   HCT 27.0* 25.8*    225     Lipid Panel: No results for input(s): CHOL, HDL, LDLCALC, TRIG, CHOLHDL in the last 48  hours.  Magnesium: No results for input(s): MG in the last 48 hours.    Significant Imaging: I have reviewed all pertinent imaging results/findings within the past 24 hours.      Assessment/Plan:      * Acute CVA (cerebrovascular accident)  Continue medical management  AVELINO aborted 2/2 esophageal stricture, GI intervention planned.   Continue PT/OT as tolerated      Expressive aphasia  GI consult for possible EGD with dilation      HTN (hypertension)  Stable  Continue current antihypertensives      GERD (gastroesophageal reflux disease)  protonix PO        VTE Risk Mitigation (From admission, onward)         Ordered     Place sequential compression device  Until discontinued      10/01/19 1213     IP VTE HIGH RISK PATIENT  Once      10/01/19 1213     Place sequential compression device  Until discontinued      10/01/19 1213     Place FAUSTO hose  Until discontinued      10/01/19 1213                      Annabella Manriquez MD  Department of Hospital Medicine   [facility]

## 2019-10-03 NOTE — PHYSICIAN QUERY
PT Name: Melania Beavers  MR #: [id number]    Physician Query Form - Consultant Diagnosis Clarification     CDS/: Patricia Castillo               Contact information:  This form is a permanent document in the medical record.     Query Date: October 3, 2019      By submitting this query, we are merely seeking further clarification of documentation.  Please utilize your independent clinical judgment when addressing the question(s) below.      The Medical record contains the following:   RD consult reveals the following;  Malnutrition  Pt with a BMI of 16.5. Megace is on the pt's home med list. Weight loss of 10% weight loss in ~ 1 month.   Pt states she has not really had an appetite for a few weeks.          Do you agree with the Consultants diagnosis of MALNUTRITION?    [ ] Yes, (DOCUMENT SEVERITY)(please include additional indicators if known)   [  ] No   [ x ] Other/Clarification of findings:   [  ] Clinically undetermined     moderate malnutrition

## 2019-10-03 NOTE — SUBJECTIVE & OBJECTIVE
Interval History: her speech is much improved. Cardiology attempted to perform a AVELINO this morning, this was unsuccessful because of esophageal strictures. She is tolerating her diet.     Review of Systems   Constitutional: Negative for chills and fever.   HENT: Negative for congestion.    Respiratory: Negative for shortness of breath and wheezing.    Cardiovascular: Negative for chest pain and palpitations.   Gastrointestinal: Negative for constipation, diarrhea, nausea and vomiting.   Genitourinary: Negative for dysuria, flank pain, urgency and vaginal discharge.   Musculoskeletal: Negative for back pain and myalgias.     Objective:     Vital Signs (Most Recent):  Temp: 98.2 °F (36.8 °C) (10/03/19 1200)  Pulse: 79 (10/03/19 1200)  Resp: 19 (10/03/19 1200)  BP: (!) 170/80 (10/03/19 1200)  SpO2: 99 % (10/03/19 1200) Vital Signs (24h Range):  Temp:  [97.9 °F (36.6 °C)-98.8 °F (37.1 °C)] 98.2 °F (36.8 °C)  Pulse:  [73-98] 79  Resp:  [16-24] 19  SpO2:  [98 %-100 %] 99 %  BP: (116-185)/(57-89) 170/80     Weight: 51.4 kg (113 lb 5.1 oz)  Body mass index is 18.29 kg/m².    Intake/Output Summary (Last 24 hours) at 10/3/2019 1411  Last data filed at 10/3/2019 0833  Gross per 24 hour   Intake 300 ml   Output --   Net 300 ml      Physical Exam   Constitutional: She is oriented to person, place, and time. No distress.   Eyes: Pupils are equal, round, and reactive to light. No scleral icterus.   Cardiovascular: Normal rate and regular rhythm.   No murmur heard.  Pulmonary/Chest: Breath sounds normal. She has no wheezes. She has no rales.   Abdominal: Soft. She exhibits no distension. There is no tenderness.   Neurological: She is alert and oriented to person, place, and time.   Skin: Skin is warm. Capillary refill takes less than 2 seconds. No rash noted.   Psychiatric: She has a normal mood and affect.   Nursing note and vitals reviewed.      Significant Labs:   BMP:   Recent Labs   Lab 10/02/19  1946   *      K  4.4   *   CO2 20*   BUN 21   CREATININE 1.4   CALCIUM 9.1     CBC:   Recent Labs   Lab 10/02/19  0339 10/02/19  1947   WBC 6.28 7.90   HGB 8.7* 8.4*   HCT 27.0* 25.8*    225     Lipid Panel: No results for input(s): CHOL, HDL, LDLCALC, TRIG, CHOLHDL in the last 48 hours.  Magnesium: No results for input(s): MG in the last 48 hours.    Significant Imaging: I have reviewed all pertinent imaging results/findings within the past 24 hours.

## 2019-10-03 NOTE — PT/OT/SLP PROGRESS
Physical Therapy Treatment    Patient Name:  Melania Beavers   MRN:  [id number]    Recommendations:     Discharge Recommendations:  rehabilitation facility   Discharge Equipment Recommendations: none   Barriers to discharge: None    Assessment:     Melania Beavers is a 85 y.o. female admitted with a medical diagnosis of Acute CVA (cerebrovascular accident).  She presents with the following impairments/functional limitations:  weakness, impaired functional mobilty, gait instability, impaired endurance, impaired balance, decreased safety awareness, impaired coordination. Pt with positive, symptomatic orthostatics during PT session today. Pts BP supine (118/69), sitting (113/59) sitting after a few minutes (108/59) standing (93/57 with reports of lightheadedness) and supine (130/70). Pt returned to supine and reported that symptoms resolved. RN present and aware of BP changes at end of treatment. Continue with PT and POC.    Rehab Prognosis: Good; patient would benefit from acute skilled PT services to address these deficits and reach maximum level of function.    Recent Surgery: Procedure(s) (LRB):  ECHOCARDIOGRAM,TRANSESOPHAGEAL (N/A) Day of Surgery    Plan:     During this hospitalization, patient to be seen 6 x/week to address the identified rehab impairments via gait training, therapeutic activities, therapeutic exercises and progress toward the following goals:    · Plan of Care Expires:  11/02/19    Subjective     Chief Complaint: Pt without complaints initially but reported lightheadedness upon standing.  Patient/Family Comments/goals:   Pain/Comfort:  · Pain Rating 1: 0/10  · Pain Rating Post-Intervention 1: 0/10      Objective:     Communicated with RN prior to session.  Patient found supine with telemetry, bed alarm upon PT entry to room.     General Precautions: Standard, fall   Orthopedic Precautions:    Braces:       Functional Mobility:  · Bed Mobility:     · Supine to Sit: supervision  · Sit to Supine:  minimum assistance due to pt reporting lightheadedness secondary to dropping BP  · Transfers:     · Sit to Stand:  contact guard assistance with no AD. Pt unable to stand longer than a few seconds due to positive orthostatic symptoms.      AM-PAC 6 CLICK MOBILITY          Therapeutic Activities and Exercises:   bed mobility; sitting EOB for trunk control and midline orientation; sit <> stands    Patient left supine with all lines intact, call button in reach, bed alarm on and RN notified..    GOALS:   Multidisciplinary Problems     Physical Therapy Goals        Problem: Physical Therapy Goal    Goal Priority Disciplines Outcome Goal Variances Interventions   Physical Therapy Goal     PT, PT/OT Ongoing, Not Progressing     Description:  Goals to be met by: discharge     Patient will increase functional independence with mobility by performin. Gait  x 150 feet with Supervision using Rolling Walker  2. Sit to stand with supervision.  3. Bed to chair transfer supervision.                        Time Tracking:     PT Received On: 10/03/19  PT Start Time: 1407     PT Stop Time: 1430  PT Total Time (min): 23 min     Billable Minutes: Therapeutic Activity 23    Treatment Type: Treatment  PT/PTA: GUILLERMINA     PTA Visit Number: 1     Harika Krishnan PTA  10/03/2019

## 2019-10-03 NOTE — NURSING
Pt stated they assisted pt. Up to standing and she became dizzy. Vs were taken and were normal. Pt stated the dizziness passed after a few seconds. Orthostatic vs were taken and recorded. Pt lying in bed call light in reach bed alarm on.

## 2019-10-03 NOTE — ASSESSMENT & PLAN NOTE
Continue medical management  AVELINO aborted 2/2 esophageal stricture, GI intervention planned.   Continue PT/OT as tolerated

## 2019-10-03 NOTE — TRANSFER OF CARE
"Anesthesia Transfer of Care Note    Patient: Melania Beavers    Procedure(s) Performed: Procedure(s) (LRB):  ECHOCARDIOGRAM,TRANSESOPHAGEAL (N/A)    Patient location: Other: heart center    Anesthesia Type: MAC    Transport from OR: Transported from OR on room air with adequate spontaneous ventilation    Post pain: adequate analgesia    Post assessment: no apparent anesthetic complications    Post vital signs: stable    Level of consciousness: awake    Nausea/Vomiting: no nausea/vomiting    Complications: none    Transfer of care protocol was followed      Last vitals:   Visit Vitals  BP (!) 162/75   Pulse 82   Temp 36.7 °C (98.1 °F) (Oral)   Resp (!) 22   Ht 5' 6" (1.676 m)   Wt 51.4 kg (113 lb 5.1 oz)   LMP  (LMP Unknown)   SpO2 100%   Breastfeeding? No   BMI 18.29 kg/m²     "

## 2019-10-04 ENCOUNTER — CLINICAL SUPPORT (OUTPATIENT)
Dept: CARDIOLOGY | Facility: HOSPITAL | Age: 84
DRG: 064 | End: 2019-10-04
Attending: INTERNAL MEDICINE
Payer: MEDICARE

## 2019-10-04 PROBLEM — I95.1 ORTHOSTATIC HYPOTENSION: Status: ACTIVE | Noted: 2019-10-04

## 2019-10-04 PROBLEM — R47.01 EXPRESSIVE APHASIA: Status: RESOLVED | Noted: 2019-10-01 | Resolved: 2019-10-04

## 2019-10-04 PROCEDURE — 93308 TTE F-UP OR LMTD: CPT

## 2019-10-04 PROCEDURE — 21400001 HC TELEMETRY ROOM

## 2019-10-04 PROCEDURE — 99152 MOD SED SAME PHYS/QHP 5/>YRS: CPT | Performed by: INTERNAL MEDICINE

## 2019-10-04 PROCEDURE — 97112 NEUROMUSCULAR REEDUCATION: CPT

## 2019-10-04 PROCEDURE — 43248 EGD GUIDE WIRE INSERTION: CPT | Performed by: INTERNAL MEDICINE

## 2019-10-04 PROCEDURE — 25000003 PHARM REV CODE 250: Performed by: EMERGENCY MEDICINE

## 2019-10-04 PROCEDURE — 97116 GAIT TRAINING THERAPY: CPT

## 2019-10-04 PROCEDURE — 25000003 PHARM REV CODE 250: Performed by: NURSE PRACTITIONER

## 2019-10-04 PROCEDURE — 94760 N-INVAS EAR/PLS OXIMETRY 1: CPT

## 2019-10-04 PROCEDURE — 63600175 PHARM REV CODE 636 W HCPCS: Performed by: INTERNAL MEDICINE

## 2019-10-04 PROCEDURE — 25000003 PHARM REV CODE 250: Performed by: INTERNAL MEDICINE

## 2019-10-04 RX ORDER — SODIUM CHLORIDE 9 MG/ML
INJECTION, SOLUTION INTRAVENOUS CONTINUOUS PRN
Status: DISCONTINUED | OUTPATIENT
Start: 2019-10-04 | End: 2019-10-05 | Stop reason: HOSPADM

## 2019-10-04 RX ORDER — FENTANYL CITRATE 50 UG/ML
INJECTION, SOLUTION INTRAMUSCULAR; INTRAVENOUS
Status: DISCONTINUED | OUTPATIENT
Start: 2019-10-04 | End: 2019-10-05 | Stop reason: HOSPADM

## 2019-10-04 RX ORDER — NAPROXEN SODIUM 220 MG/1
81 TABLET, FILM COATED ORAL DAILY
Refills: 0 | Status: ON HOLD | COMMUNITY
Start: 2019-10-04 | End: 2020-05-14 | Stop reason: HOSPADM

## 2019-10-04 RX ORDER — CLOPIDOGREL BISULFATE 75 MG/1
75 TABLET ORAL DAILY
Qty: 30 TABLET | Refills: 11 | Status: SHIPPED | OUTPATIENT
Start: 2019-10-05 | End: 2020-10-04

## 2019-10-04 RX ORDER — MIDAZOLAM HYDROCHLORIDE 1 MG/ML
INJECTION INTRAMUSCULAR; INTRAVENOUS
Status: DISCONTINUED | OUTPATIENT
Start: 2019-10-04 | End: 2019-10-05 | Stop reason: HOSPADM

## 2019-10-04 RX ORDER — SODIUM CHLORIDE 9 MG/ML
INJECTION, SOLUTION INTRAVENOUS CONTINUOUS
Status: DISCONTINUED | OUTPATIENT
Start: 2019-10-04 | End: 2019-10-05 | Stop reason: HOSPADM

## 2019-10-04 RX ADMIN — SODIUM CHLORIDE 25 ML: 9 INJECTION, SOLUTION INTRAVENOUS at 08:10

## 2019-10-04 RX ADMIN — ASPIRIN 81 MG 81 MG: 81 TABLET ORAL at 02:10

## 2019-10-04 RX ADMIN — MIDAZOLAM HYDROCHLORIDE 1 MG: 1 INJECTION, SOLUTION INTRAMUSCULAR; INTRAVENOUS at 08:10

## 2019-10-04 RX ADMIN — MIDAZOLAM HYDROCHLORIDE 1 MG: 1 INJECTION, SOLUTION INTRAMUSCULAR; INTRAVENOUS at 09:10

## 2019-10-04 RX ADMIN — FENTANYL CITRATE 25 MCG: 50 INJECTION, SOLUTION INTRAMUSCULAR; INTRAVENOUS at 08:10

## 2019-10-04 RX ADMIN — PANTOPRAZOLE SODIUM 40 MG: 40 TABLET, DELAYED RELEASE ORAL at 09:10

## 2019-10-04 RX ADMIN — AMLODIPINE BESYLATE 5 MG: 5 TABLET ORAL at 02:10

## 2019-10-04 RX ADMIN — FENTANYL CITRATE 25 MCG: 50 INJECTION, SOLUTION INTRAMUSCULAR; INTRAVENOUS at 09:10

## 2019-10-04 RX ADMIN — ATORVASTATIN CALCIUM 40 MG: 40 TABLET, FILM COATED ORAL at 02:10

## 2019-10-04 RX ADMIN — CLOPIDOGREL BISULFATE 75 MG: 75 TABLET, FILM COATED ORAL at 02:10

## 2019-10-04 NOTE — PROGRESS NOTES
[facility]  Neurology  Progress Note    Patient Name: Melania Beavers  MRN: [id number]  Admission Date: 10/1/2019  Hospital Length of Stay: 3 days  Code Status: Full Code   Attending Provider: Annabella Manriquez MD  Primary Care Physician: Luis Brown MD   Principal Problem:Acute CVA (cerebrovascular accident)    Subjective:     Interval History: Pt seen and examined. She appears back to baseline. We were attempting to have a AVELINO performed on the patient. However, due to patient's esophageal stricter this is not a safe procedure at this time. We will hold off on it for now. Discussed MRI/MRA findings and POC with patient. She is an inpatient rehab candidate so she will be transferring there soon.     HPI: This 75 years old woman, past medical history of COPD, TIA, hypertension, who was recently admitted to the hospital about 3 weeks ago for episode of transient aphasia and confusion.  This morning when the patient awoke around 7:00 a.m., she was again aphasic.  Her daughter observed and she brought her to the hospital.  The patient was also confused at this time.  During her prior hospitalization she had extensive workup, including echocardiography, carotid Doppler, and brain MRI were negative.  In ER, her symptoms mostly resolved, the patient was awake alert oriented x3, in no focal deficits, also as per daughter her symptoms have improved.  Neurology has been consulted from the emergency department, no specific recommendations.  I did order brain MRI which showed new interval development of left frontal area, compatible with new infarction.  The patient denies headache, no double vision, no dizziness, no nausea, no vomiting, no chest pain, no cough, no fever, no mother focal weakness, no loss of urine, no sensorial deficits, no dysphagia, no other complaints at this time.  Considering the time of this new left frontal changes, compatible with stroke, is not known, the patient was not a tPA  Telephone Encounter by Shelley Garg MD at 01/22/18 11:13 AM     Author:  Shelley Garg MD Service:  (none) Author Type:  Physician     Filed:  01/22/18 11:13 AM Encounter Date:  1/21/2018 Status:  Signed     :  Shelley Garg MD (Physician)            ok to issue  a refill on this rx with same quantity, same instructions with[AG1.1T]  1[AG1.1M] refills[AG1.1T]        Revision History        User Key Date/Time User Provider Type Action    > AG1.1 01/22/18 11:13 AM Shelley Garg MD Physician Sign    M - Manual, T - Template             candidate in the emergency department.     Neurology: Pt seen and examined in ER. She was recently seen at [facility] for TIA/stroke workup which was negative. She presented with a 15 min episode of not being able to get her words out and general weakness. On further note, family mentions, she has been having hallucinations lately. She is oriented to self, age, and month. When asked for place, she first answered- [employer] and then says hospital. Otherwise, neuro exam is normal. She is back to neurological baseline at this time. Last seen normal was last night.       CRT: 1.4  LDL: 109.4  A1c: 5.6     Brain imaging:  CT head:   1. Increasing abnormal low attenuation in the periventricular white matter of the left frontal lobe when compared to September 5. Findings may reflect subacute white matter ischemia (recent MRI brain from September 5, 2019 demonstrated no diffusion restriction at this level.)  Follow-up brain MR may be of benefit for further assessment.  2. Small focus of encephalomalacia in the right cerebellum compatible with previous ischemic insult.  3. Mild chronic small vessel white matter ischemic changes.    MRI brain:   Interval development of focal areas of signal alteration within the left frontal lobe compatible with areas of interval infarction.  While there are foci of encephalomalacia which are likely related to more remote areas of ischemia, there are findings of restricted diffusion compatible with areas of more recent or subacute infarction.    Additional chronic small vessel ischemic changes.       MRA Brain:    IMPRESSION:  No large vessel occlusion, aneurysm or high-grade stenosis is identified.    Neck Imaging:    MRA Neck:       IMPRESSION:  1. No clinically significant degree of stenosis in the carotid arteries  bilaterally.  2. Asymmetrically small left vertebral artery, possibly a congenital basis with a right dominant posterior circulation.    CUS 9/5/19:   1.  Nodular calcified plaque in the  right carotid bulb causing less than 50% stenosis.    2.  No clinically significant degree of stenosis in the left carotid artery.    3.  Bilateral antegrade vertebral artery flow.     Cardiac imaging:  AVELINO 9/5/19:   · Normal left ventricular systolic function. The estimated ejection fraction is 60%  · Grade I (mild) left ventricular diastolic dysfunction consistent with impaired relaxation.  · No wall motion abnormalities.  · Normal left atrial pressure.  · Normal right ventricular systolic function.  · Mild aortic regurgitation.  · Moderate-to-severe aortic valve stenosis.  · Aortic valve area is 0.99 cm2; peak velocity is 3.47 m/s; mean gradient is 32 mmHg.  · Mild mitral regurgitation.  · Normal central venous pressure (3 mm Hg).  Study is negative for shunt.       Current Neurological Medications: MAR reviewed     Scheduled     Medication Last Action   amLODIPine tablet 5 mg Given   5 mg, Daily, Oral    10/03 0945   aspirin chewable tablet 81 mg Given   81 mg, Daily, Oral    10/03 0945   atorvastatin tablet 40 mg Given   40 mg, Daily, Oral    10/03 0945   clopidogrel tablet 75 mg Given   75 mg, Daily, Oral    10/03 0945   fluticasone propionate 50 mcg/actuation nasal spray 50 mcg Ordered   1 spray, Daily, Each Nostril       pantoprazole EC tablet 40 mg Given   40 mg, Daily, Oral    10/03 0945      Continuous     Medication Last Action   0.9%  NaCl infusion Ordered   20 mL/hr, Continuous, IV       0.9%  NaCl infusion New Bag   No Dose/Rate, Continuous, IV    10/03 0756      PRN     Medication Last Action   0.9%  NaCl infusion New Bag   25 mL, Continuous PRN, IV    10/04 0855   dextrose 50% injection 12.5 g Ordered   12.5 g, PRN, IV       dextrose 50% injection 25 g Ordered   25 g, PRN, IV       fentaNYL injection Given   25 mcg, PRN, IV    10/04 0902   fluticasone propionate 50 mcg/actuation nasal spray 50 mcg Ordered   1 spray, Daily PRN, Each Nostril       glucagon (human recombinant) injection 1 mg Ordered    1 mg, PRN, IM       glucose chewable tablet 16 g Ordered   16 g, PRN, Oral       glucose chewable tablet 24 g Ordered   24 g, PRN, Oral       midazolam (VERSED) 1 mg/mL injection Given   1 mg, PRN, IV    10/04 0906   ondansetron disintegrating tablet 8 mg Ordered   8 mg, Q12H PRN, Oral       sodium chloride 0.9% flush 10 mL Ordered   10 mL, PRN, IV       sodium chloride 0.9% flush 10 mL Ordered   10 mL, PRN, IV                Review of Systems   Constitutional: Negative.    HENT: Negative.    Eyes: Negative.    Respiratory: Negative.    Cardiovascular: Negative.    Gastrointestinal: Negative.    Endocrine: Negative.    Genitourinary: Negative.    Musculoskeletal: Negative.    Skin: Negative.    Allergic/Immunologic: Negative.    Neurological: Negative.    Hematological: Negative.    Psychiatric/Behavioral: Negative.      Objective:     Vitals:    10/04/19 0950   BP: (!) 146/72   Pulse: 76   Resp: 14   Temp: 98.1      Weight: 51.4 kg (113 lb 5.1 oz)  Body mass index is 18.29 kg/m².    Physical Exam   Constitutional: She appears well-developed and well-nourished.   HENT:   Head: Normocephalic and atraumatic.   Eyes: Pupils are equal, round, and reactive to light. EOM are normal.   Neck: Normal range of motion. Neck supple.   Cardiovascular: Normal rate and regular rhythm.   Pulmonary/Chest: Effort normal and breath sounds normal.   Abdominal: Soft. Bowel sounds are normal.   Musculoskeletal: Normal range of motion.   Neurological: She is alert. She has a normal Finger-Nose-Finger Test.   Skin: Skin is warm and dry.   Psychiatric: She has a normal mood and affect.       NEUROLOGICAL EXAMINATION:     MENTAL STATUS   Oriented to person.   Oriented to place.   Oriented to time.     CRANIAL NERVES   Cranial nerves II through XII intact.     CN III, IV, VI   Pupils are equal, round, and reactive to light.  Extraocular motions are normal.     MOTOR EXAM     Strength   Right neck flexion: 4/5  Left neck flexion: 4/5  Right  neck extension: 4/5  Left neck extension: 4/5  Right deltoid: 4/5  Left deltoid: 4/5  Right biceps: 4/5  Left biceps: 4/5  Right triceps: 4/5  Left triceps: 4/5  Right wrist flexion: 4/5  Left wrist flexion: 4/5  Right wrist extension: 4/5  Left wrist extension: 4/5  Right interossei: 4/5  Left interossei: 4/5  Right abdominals: 4/5  Left abdominals: 4/5  Right iliopsoas: 4/5  Left iliopsoas: 4/5  Right quadriceps: 4/5  Left quadriceps: 4/5  Right hamstrin/5  Left hamstrin/5  Right glutei: 4/5  Left glutei: 4/5  Right anterior tibial: 4/5  Left anterior tibial: 4/5  Right posterior tibial: 4/5  Left posterior tibial: 4/5  Right peroneal: 4/5  Left peroneal: 4/5  Right gastroc: 4/5  Left gastroc: 4/5    SENSORY EXAM   Light touch normal.     GAIT AND COORDINATION      Coordination   Finger to nose coordination: normal    Tremor   Resting tremor: absent  Intention tremor: absent      Significant Labs: All pertinent lab results from the past 24 hours have been reviewed.    Significant Imaging: I have reviewed and interpreted all pertinent imaging results/findings within the past 24 hours.    Assessment and Plan:   Subacute left frontal subcortical infarct   - Back to baseline. Neurologically stable at this point.   -Hold off on AVELINO study for now d/t esophageal stricter    Plavix 75mg and ASA 81mg x 21 days with eventual transition to monotherapy with Plavix 75 mg ddaily   -MRA head and neck reviewed with patient  - Recent AVELINO/CUS from last admit reviewed   - PT/OT/ST eval and treat   - Neuro checks per protocol      Stroke education was provided.  If patient has acute neurological changes including weakness, confusion, speech changes, facial droop, difficulty walking, and sensory changes immediately call 911.  Follow up Neurology in 2 weeks at [phone].  Medication side effects discussed with the patient and/or caregiver.  Active Diagnoses:    Diagnosis Date Noted POA    PRINCIPAL PROBLEM:  Acute CVA  (cerebrovascular accident) [I63.9] 09/06/2019 Yes    Esophageal stricture [K22.2] 10/03/2019 Unknown    Expressive aphasia [R47.01] 10/01/2019 Yes    HTN (hypertension) [I10] 09/05/2019 Yes    GERD (gastroesophageal reflux disease) [K21.9] 09/05/2019 Yes      Problems Resolved During this Admission:    Diagnosis Date Noted Date Resolved POA    Acute metabolic encephalopathy [G93.41] 09/06/2019 10/03/2019 Yes       VTE Risk Mitigation (From admission, onward)         Ordered     Place sequential compression device  Until discontinued      10/01/19 1213     IP VTE HIGH RISK PATIENT  Once      10/01/19 1213     Place sequential compression device  Until discontinued      10/01/19 1213     Place FAUSTO hose  Until discontinued      10/01/19 1213              Patient was seen and examined by Dr.Gomez Dr. Diamond  Neurology  [facility]

## 2019-10-04 NOTE — PROGRESS NOTES
[facility] Medicine  Progress Note    Patient Name: Melania Beavers  MRN: [id number]  Patient Class: IP- Inpatient   Admission Date: 10/1/2019  Length of Stay: 3 days  Attending Physician: Annabella Manriquez MD  Primary Care Provider: Luis Brown MD        Subjective:     Principal Problem:Acute CVA (cerebrovascular accident)        HPI:  This 75 years old woman, past medical history of COPD, TIA, hypertension, who was recently admitted to the hospital about 3 weeks ago for episode of transient aphasia and confusion.  This morning when the patient awoke around 7:00 a.m., she was again aphasic.  Her daughter observed and she brought her to the hospital.  The patient was also confused at this time.  During her prior hospitalization she had extensive workup, including echocardiography, carotid Doppler, and brain MRI were negative.  In ER, her symptoms mostly resolved, the patient was awake alert oriented x3, in no focal deficits, also as per daughter her symptoms have improved.  Neurology has been consulted from the emergency department, no specific recommendations.  I did order brain MRI which showed new interval development of left frontal area, compatible with new infarction.  The patient denies headache, no double vision, no dizziness, no nausea, no vomiting, no chest pain, no cough, no fever, no mother focal weakness, no loss of urine, no sensorial deficits, no dysphagia, no other complaints at this time.  Considering the time of this new left frontal changes, compatible with stroke, is not known, the patient was not a tPA candidate in the emergency department.    Overview/Hospital Course:  No notes on file    Interval History: she underwent an EGD with dilation of esophageal strictures. Her nurse noticed that she was orthostatic this afternoon.     Review of Systems   Constitutional: Negative for chills and fever.   HENT: Negative for congestion.    Respiratory: Negative for shortness  of breath and wheezing.    Cardiovascular: Negative for chest pain and palpitations.   Gastrointestinal: Negative for constipation, diarrhea, nausea and vomiting.   Genitourinary: Negative for dysuria, flank pain, urgency and vaginal discharge.   Musculoskeletal: Negative for back pain and myalgias.   Neurological: Positive for light-headedness.     Objective:     Vital Signs (Most Recent):  Temp: 98.1 °F (36.7 °C) (10/04/19 0831)  Pulse: 72 (10/04/19 1310)  Resp: 18 (10/04/19 1310)  BP: (!) 146/72 (10/04/19 0950)  SpO2: 98 % (10/04/19 1310) Vital Signs (24h Range):  Temp:  [98.1 °F (36.7 °C)-98.4 °F (36.9 °C)] 98.1 °F (36.7 °C)  Pulse:  [72-87] 72  Resp:  [10-19] 18  SpO2:  [97 %-100 %] 98 %  BP: (134-184)/(65-96) 146/72     Weight: 48.5 kg (106 lb 14.8 oz)  Body mass index is 17.26 kg/m².    Intake/Output Summary (Last 24 hours) at 10/4/2019 1541  Last data filed at 10/4/2019 0924  Gross per 24 hour   Intake 100 ml   Output 2 ml   Net 98 ml      Physical Exam   Constitutional: She is oriented to person, place, and time. No distress.   Eyes: Pupils are equal, round, and reactive to light. No scleral icterus.   Cardiovascular: Normal rate and regular rhythm.   No murmur heard.  Pulmonary/Chest: Breath sounds normal. She has no wheezes. She has no rales.   Abdominal: Soft. She exhibits no distension. There is no tenderness.   Neurological: She is alert and oriented to person, place, and time.   Skin: Skin is warm. Capillary refill takes less than 2 seconds. No rash noted.   Psychiatric: She has a normal mood and affect.   Nursing note and vitals reviewed.      Significant Labs:   BMP:   Recent Labs   Lab 10/02/19  1946   *      K 4.4   *   CO2 20*   BUN 21   CREATININE 1.4   CALCIUM 9.1     CBC:   Recent Labs   Lab 10/02/19  1947   WBC 7.90   HGB 8.4*   HCT 25.8*        Lipid Panel: No results for input(s): CHOL, HDL, LDLCALC, TRIG, CHOLHDL in the last 48 hours.  Magnesium: No results for  input(s): MG in the last 48 hours.    Significant Imaging: I have reviewed all pertinent imaging results/findings within the past 24 hours.      Assessment/Plan:      * Acute CVA (cerebrovascular accident)  Dual antiplatelet therapy with aspirin and plavix  Plan for AVELINO aborted 2/2 increased risk.   To continue PT/OT at inpatient rehab      Orthostatic hypotension  Could be secondary to dehydration vs neurogenic vs aging  Continue IV hydration overnight      Esophageal stricture  S/p dilation       HTN (hypertension)  Stable  Continue current antihypertensives      GERD (gastroesophageal reflux disease)  protonix PO        VTE Risk Mitigation (From admission, onward)         Ordered     Place sequential compression device  Until discontinued      10/01/19 1213     IP VTE HIGH RISK PATIENT  Once      10/01/19 1213     Place sequential compression device  Until discontinued      10/01/19 1213     Place FAUSTO hose  Until discontinued      10/01/19 1213                      Annabella Manriquez MD  Department of Hospital Medicine   [facility]

## 2019-10-04 NOTE — PLAN OF CARE
Problem: Occupational Therapy Goal  Goal: Occupational Therapy Goal  Description  Goals to be met by: discharge    Patient will increase functional independence with ADLs by performing:    Feeding with Modified [place].  UE Dressing with Supervision.  LE Dressing with Supervision.  Grooming while standing with Supervision.  Toileting from toilet with Supervision for hygiene and clothing management.   Toilet transfer to toilet with Supervision.     Outcome: Ongoing, Progressing

## 2019-10-04 NOTE — ASSESSMENT & PLAN NOTE
Dual antiplatelet therapy with aspirin and plavix  Plan for AVELINO aborted 2/2 increased risk.   To continue PT/OT at inpatient rehab

## 2019-10-04 NOTE — SUBJECTIVE & OBJECTIVE
Interval History: she underwent an EGD with dilation of esophageal strictures. Her nurse noticed that she was orthostatic this afternoon.     Review of Systems   Constitutional: Negative for chills and fever.   HENT: Negative for congestion.    Respiratory: Negative for shortness of breath and wheezing.    Cardiovascular: Negative for chest pain and palpitations.   Gastrointestinal: Negative for constipation, diarrhea, nausea and vomiting.   Genitourinary: Negative for dysuria, flank pain, urgency and vaginal discharge.   Musculoskeletal: Negative for back pain and myalgias.   Neurological: Positive for light-headedness.     Objective:     Vital Signs (Most Recent):  Temp: 98.1 °F (36.7 °C) (10/04/19 0831)  Pulse: 72 (10/04/19 1310)  Resp: 18 (10/04/19 1310)  BP: (!) 146/72 (10/04/19 0950)  SpO2: 98 % (10/04/19 1310) Vital Signs (24h Range):  Temp:  [98.1 °F (36.7 °C)-98.4 °F (36.9 °C)] 98.1 °F (36.7 °C)  Pulse:  [72-87] 72  Resp:  [10-19] 18  SpO2:  [97 %-100 %] 98 %  BP: (134-184)/(65-96) 146/72     Weight: 48.5 kg (106 lb 14.8 oz)  Body mass index is 17.26 kg/m².    Intake/Output Summary (Last 24 hours) at 10/4/2019 1541  Last data filed at 10/4/2019 0924  Gross per 24 hour   Intake 100 ml   Output 2 ml   Net 98 ml      Physical Exam   Constitutional: She is oriented to person, place, and time. No distress.   Eyes: Pupils are equal, round, and reactive to light. No scleral icterus.   Cardiovascular: Normal rate and regular rhythm.   No murmur heard.  Pulmonary/Chest: Breath sounds normal. She has no wheezes. She has no rales.   Abdominal: Soft. She exhibits no distension. There is no tenderness.   Neurological: She is alert and oriented to person, place, and time.   Skin: Skin is warm. Capillary refill takes less than 2 seconds. No rash noted.   Psychiatric: She has a normal mood and affect.   Nursing note and vitals reviewed.      Significant Labs:   BMP:   Recent Labs   Lab 10/02/19  1946   *       K 4.4   *   CO2 20*   BUN 21   CREATININE 1.4   CALCIUM 9.1     CBC:   Recent Labs   Lab 10/02/19  1947   WBC 7.90   HGB 8.4*   HCT 25.8*        Lipid Panel: No results for input(s): CHOL, HDL, LDLCALC, TRIG, CHOLHDL in the last 48 hours.  Magnesium: No results for input(s): MG in the last 48 hours.    Significant Imaging: I have reviewed all pertinent imaging results/findings within the past 24 hours.

## 2019-10-04 NOTE — PLAN OF CARE
Problem: Physical Therapy Goal  Goal: Physical Therapy Goal  Description  Goals to be met by: discharge     Patient will increase functional independence with mobility by performin. Gait  x 150 feet with Supervision using Rolling Walker  2. Sit to stand with supervision.  3. Bed to chair transfer supervision.       Outcome: Ongoing, Progressing    Continue skilled PT to allow progression towards established PT goals.

## 2019-10-04 NOTE — PLAN OF CARE
10/04/19 1316   Discharge Reassessment   Assessment Type Discharge Planning Reassessment   Anticipated Discharge Disposition Rehab   Cm called Cindy diaz [facility] in [facility] to see if pt is eligible. Cindy stated that she is ready to come if the pt is stable. Cindy stated that the pt could come today and if she is ready tomorrow will take pt up until 12 PM. Cm told Dr. Manriquez and she stated she will assess and if pt is ready will send today.

## 2019-10-04 NOTE — PT/OT/SLP PROGRESS
Speech Language Pathology      Melania Beavers  MRN: [id number]    Patient not seen today secondary to Other (Comment)(procedures). EGD; Pt also pending possible AVELINO. Will follow-up next service date.    Daniel Figueredo, CADE-SLP

## 2019-10-04 NOTE — PT/OT/SLP PROGRESS
Occupational Therapy   Treatment    Name: Melania Beavers  MRN: [id number]  Admitting Diagnosis:  Acute CVA (cerebrovascular accident)  Day of Surgery    Recommendations:     Discharge Recommendations: rehabilitation facility  Discharge Equipment Recommendations:     Barriers to discharge:  Decreased caregiver support, Inaccessible home environment    Assessment:     Melania Beavers is a 85 y.o. female with a medical diagnosis of Acute CVA (cerebrovascular accident).  She presents with fatigue but cooperative attitude for in bed coordination, visual perceptual skills assessment/training. Performance deficits affecting function are weakness, impaired endurance, impaired self care skills, impaired cognition, impaired functional mobilty, decreased safety awareness, impaired coordination, impaired skin.     Rehab Prognosis:  Fair; patient would benefit from acute skilled OT services to address these deficits and reach maximum level of function.       Plan:     Patient to be seen 6 x/week to address the above listed problems via self-care/home management, therapeutic activities, therapeutic exercises, cognitive retraining  · Plan of Care Expires: 10/02/19  · Plan of Care Reviewed with: patient    Subjective     Pain/Comfort:  · Pain Rating 1: 0/10    Objective:     Communicated with: Nursing regarding awareness of pt having + OH with PT yesterday that was documented in the chart and PTA prior to session.  Patient found HOB elevated with telemetry, peripheral IV, bed alarm after participating in PT session upon OT entry to room.    General Precautions: Standard, fall   Orthopedic Precautions:N/A   Braces:       Occupational Performance: limited by fatigue factor    Functional Mobility: NT due to max fatigue after PT session that just occurred       Treatment & Education: sitting supported up in bed (semi-fowlers position)  UE coordination and visual perceptual retraining: fingertip to nose alternating arms x 10 reps:1st trial  w/ 25% accuracy,2nd trial with 75% accuracy,3rd trial with 90% accuracy;1st and 2nd trial with slow speed, 3rd trial with increased speed; fingertip to fingertip with 80% accuracy with 2 trials; fingertip to opposite ears alternating arms with 90% accuracy; dysdiadochokinesia wit 50%;    assessment: pt able to draw a [place] with main numbers on the clock at correct locations (12,3,6,9) however, pt unable to accurately draw the arms of the clock to reflect 3:00 (one arm to number 3 however, unable to complete the rest of the task);     Pt is able to write her name in cursive using her R dominant UE legibly; pt with great difficulty reading dark 12 font on paper while wearing glasses missing words and adding words to sentences;     Patient left HOB elevated with all lines intact, call button in reach, bed alarm on and nursing  notifiedEducation:      GOALS:   Multidisciplinary Problems     Occupational Therapy Goals        Problem: Occupational Therapy Goal    Goal Priority Disciplines Outcome Interventions   Occupational Therapy Goal     OT, PT/OT Ongoing, Progressing    Description:  Goals to be met by: discharge    Patient will increase functional independence with ADLs by performing:    Feeding with Modified [place].  UE Dressing with Supervision.  LE Dressing with Supervision.  Grooming while standing with Supervision.  Toileting from toilet with Supervision for hygiene and clothing management.   Toilet transfer to toilet with Supervision.                      Time Tracking:     OT Date of Treatment: 10/04/19  OT Start Time: 1218  OT Stop Time: 1245  OT Total Time (min): 27 min    Billable Minutes:Neuromuscular Re-education 27    Kaitlynn Cordova OT  10/4/22731:24 PM

## 2019-10-04 NOTE — PT/OT/SLP PROGRESS
Occupational Therapy      Patient Name:  Melania Beavers   MRN:  [id number]    Patient not seen this am secondary to Unavailable (Comment)(pt off the unit in endo ). Will follow-up at a later time; OT/RN collaborated care with pt being orthostatic with PT yesterday as written in PT note.     Kaitlynn Cordova OT  10/4/2019

## 2019-10-04 NOTE — [employer] PATIENT INSTRUCTIONS
Discharge Summary/Instructions after an Endoscopic Procedure  Patient Name: Melania Beavers  Patient MRN: [id number]  Patient YOB: 1933 Friday, October 04, 2019  Dominick Cunningham III, MD  RESTRICTIONS:  During your procedure today, you received medications for sedation.  These   medications may affect your judgment, balance and coordination.  Therefore,   for 24 hours, you have the following restrictions:   - DO NOT drive a car, operate machinery, make legal/financial decisions,   sign important papers or drink alcohol.    ACTIVITY:  Today: no heavy lifting, straining or running due to procedural   sedation/anesthesia.  The following day: return to full activity including work.  DIET:  Eat and drink normally unless instructed otherwise.     TREATMENT FOR COMMON SIDE EFFECTS:  - Mild abdominal pain, nausea, belching, bloating or excessive gas:  rest,   eat lightly and use a heating pad.  - Sore Throat: treat with throat lozenges and/or gargle with warm salt   water.  - Because air was used during the procedure, expelling large amounts of air   from your rectum or belching is normal.  - If a bowel prep was taken, you may not have a bowel movement for 1-3 days.    This is normal.  SYMPTOMS TO WATCH FOR AND REPORT TO YOUR PHYSICIAN:  1. Abdominal pain or bloating, other than gas cramps.  2. Chest pain.  3. Back pain.  4. Signs of infection such as: chills or fever occurring within 24 hours   after the procedure.  5. Rectal bleeding, which would show as bright red, maroon, or black stools.   (A tablespoon of blood from the rectum is not serious, especially if   hemorrhoids are present.)  6. Vomiting.  7. Weakness or dizziness.  GO DIRECTLY TO THE NEAREST EMERGENCY ROOM IF YOU HAVE ANY OF THE FOLLOWING:      Difficulty breathing              Chills and/or fever over 101 F   Persistent vomiting and/or vomiting blood   Severe abdominal pain   Severe chest pain   Black, tarry stools   Bleeding- more than one  tablespoon   Any other symptom or condition that you feel may need urgent attention  Your doctor recommends these additional instructions:  If any biopsies were taken, your doctors clinic will contact you in 1 to 2   weeks with any results.  - [facility] to [facility].   - Ok to continue DAPT and proceed w/ AVELINO per cardiology  For questions, problems or results please call your physician - Dominick Cunningham III, MD at Work:  [phone].  [facility], EMERGENCY ROOM PHONE NUMBER: [phone]  IF A COMPLICATION OR EMERGENCY SITUATION ARISES AND YOU ARE UNABLE TO REACH   YOUR PHYSICIAN - GO DIRECTLY TO THE EMERGENCY ROOM.  Dominick Cunningham III, MD  10/4/2019 9:16:10 AM  This report has been verified and signed electronically.  [employer]

## 2019-10-04 NOTE — PT/OT/SLP PROGRESS
Physical Therapy Treatment    Patient Name:  Melania Beavers   MRN:  [id number]    Recommendations:     Discharge Recommendations:  rehabilitation facility   Discharge Equipment Recommendations: none   Barriers to discharge: None    Assessment:     Melania Beavers is a 85 y.o. female admitted with a medical diagnosis of Acute CVA (cerebrovascular accident).  She presents with the following impairments/functional limitations:  weakness, gait instability, impaired endurance, impaired balance, decreased safety awareness, impaired functional mobilty. Nurse extender took orthostatics prior to entering room. Supine: 162/73, sittin/78, standin/75. Pt returned to supine. Asymptomatic standing and pt ambulated 160' with RW and CGA with no LOB or dizziness. Pt followed with WC with no need for sitting rest break.     Rehab Prognosis: Fair; patient would benefit from acute skilled PT services to address these deficits and reach maximum level of function.    Recent Surgery: Procedure(s) (LRB):  EGD (ESOPHAGOGASTRODUODENOSCOPY) (N/A) Day of Surgery    Plan:     During this hospitalization, patient to be seen 6 x/week to address the identified rehab impairments via gait training, therapeutic activities, therapeutic exercises and progress toward the following goals:    · Plan of Care Expires:  19    Subjective     Chief Complaint: none  Patient/Family Comments/goals: walk  Pain/Comfort:  · Pain Rating 1: 0/10      Objective:     Communicated with RN prior to session.  Patient found supine with telemetry, peripheral IV upon PT entry to room.     General Precautions: Standard, fall   Orthopedic Precautions:N/A   Braces: N/A     Functional Mobility:  · Transfers:     · Sit to Stand:  stand by assistance with rolling walker  · Gait: 160' RW CGA no LOB or dizziness  · Balance: fair      AM-PAC 6 CLICK MOBILITY          Therapeutic Activities and Exercises:   pt educated on safe transfers and ambulation    Patient left  supine with call button in reach, chair alarm on and RN notified..    GOALS:   Multidisciplinary Problems     Physical Therapy Goals        Problem: Physical Therapy Goal    Goal Priority Disciplines Outcome Goal Variances Interventions   Physical Therapy Goal     PT, PT/OT Ongoing, Progressing     Description:  Goals to be met by: discharge     Patient will increase functional independence with mobility by performin. Gait  x 150 feet with Supervision using Rolling Walker  2. Sit to stand with supervision.  3. Bed to chair transfer supervision.                        Time Tracking:     PT Received On: 10/04/19  PT Start Time: 1204     PT Stop Time: 1215  PT Total Time (min): 11 min     Billable Minutes: Gait Training 11    Treatment Type: Treatment  PT/PTA: PT     PTA Visit Number: 0     Salima Costa, PT  10/04/2019

## 2019-10-05 VITALS
RESPIRATION RATE: 17 BRPM | WEIGHT: 106.25 LBS | SYSTOLIC BLOOD PRESSURE: 146 MMHG | OXYGEN SATURATION: 93 % | DIASTOLIC BLOOD PRESSURE: 67 MMHG | HEART RATE: 92 BPM | BODY MASS INDEX: 17.07 KG/M2 | HEIGHT: 66 IN | TEMPERATURE: 98 F

## 2019-10-05 PROCEDURE — 25000003 PHARM REV CODE 250: Performed by: INTERNAL MEDICINE

## 2019-10-05 PROCEDURE — 25000003 PHARM REV CODE 250: Performed by: EMERGENCY MEDICINE

## 2019-10-05 PROCEDURE — 99222 1ST HOSP IP/OBS MODERATE 55: CPT | Mod: ,,, | Performed by: PHYSICAL MEDICINE & REHABILITATION

## 2019-10-05 PROCEDURE — 97116 GAIT TRAINING THERAPY: CPT

## 2019-10-05 PROCEDURE — 97530 THERAPEUTIC ACTIVITIES: CPT

## 2019-10-05 PROCEDURE — 99222 PR INITIAL HOSPITAL CARE,LEVL II: ICD-10-PCS | Mod: ,,, | Performed by: PHYSICAL MEDICINE & REHABILITATION

## 2019-10-05 PROCEDURE — 25000003 PHARM REV CODE 250: Performed by: NURSE PRACTITIONER

## 2019-10-05 PROCEDURE — 63600175 PHARM REV CODE 636 W HCPCS: Performed by: INTERNAL MEDICINE

## 2019-10-05 RX ADMIN — SODIUM CHLORIDE: 0.9 INJECTION, SOLUTION INTRAVENOUS at 01:10

## 2019-10-05 RX ADMIN — ASPIRIN 81 MG 81 MG: 81 TABLET ORAL at 09:10

## 2019-10-05 RX ADMIN — CLOPIDOGREL BISULFATE 75 MG: 75 TABLET, FILM COATED ORAL at 09:10

## 2019-10-05 RX ADMIN — PANTOPRAZOLE SODIUM 40 MG: 40 TABLET, DELAYED RELEASE ORAL at 09:10

## 2019-10-05 RX ADMIN — AMLODIPINE BESYLATE 5 MG: 5 TABLET ORAL at 09:10

## 2019-10-05 RX ADMIN — ATORVASTATIN CALCIUM 40 MG: 40 TABLET, FILM COATED ORAL at 09:10

## 2019-10-05 NOTE — PLAN OF CARE
10/05/19 1232   Final Note   Assessment Type Final Discharge Note   Anticipated Discharge Disposition Rehab     Pt to discharge this date to [facility].    LOUANN faxed discharge orders upon receipt and RN Ciara reports she called report to number provided to her on yesterday.  Family to provide transportation in personal vehicle.  No further needs addressed at this time.

## 2019-10-05 NOTE — PT/OT/SLP PROGRESS
Physical Therapy Treatment    Patient Name:  Melania Beavers   MRN:  [id number]    Recommendations:     Discharge Recommendations:  rehabilitation facility   Discharge Equipment Recommendations: none   Barriers to discharge: Patient may need admission to SNF until orthostatic hypotension gets under control    Assessment:     Melania Beavers is a 85 y.o. female admitted with a medical diagnosis of Acute CVA (cerebrovascular accident).  She presents with the following impairments/functional limitations:  weakness, impaired endurance, impaired functional mobilty, gait instability, impaired balance .  Patient readily agreeable to PT treatment.  BP taken frequently during treatment due to patient recent history of orthostatic hypotension with results ranging from 154/70 to11/69 sitting EOB to 92/61 following short gait and 132/67 after sitting back down after gait.  Patient never reported feeling dizzy but BP did go significantly lower.    Rehab Prognosis: Good; patient would benefit from acute skilled PT services to address these deficits and reach maximum level of function.    Recent Surgery: Procedure(s) (LRB):  EGD (ESOPHAGOGASTRODUODENOSCOPY) (N/A) 1 Day Post-Op    Plan:     During this hospitalization, patient to be seen 6 x/week to address the identified rehab impairments via gait training, therapeutic activities, therapeutic exercises and progress toward the following goals:    · Plan of Care Expires:  11/02/19    Subjective     Chief Complaint: neck hurting  Patient/Family Comments/goals: go home and be safe.  Pain/Comfort:  ·        Objective:     Communicated with nurse johnson prior to session.  Patient found supine with oxygen, peripheral IV, SCD upon PT entry to room.     General Precautions: Standard, fall(orthtostatic hypotension)   Orthopedic Precautions:N/A   Braces:       Functional Mobility:  · Bed Mobility:     · Rolling Right: supervision  · Supine to Sit: supervision  · Sit to Supine:  supervision  · Transfers:     · Sit to Stand:  contact guard assistance with rolling walker  · Gait: 100 feet with RW with CGA      AM-PAC 6 CLICK MOBILITY  Turning over in bed (including adjusting bedclothes, sheets and blankets)?: 3  Sitting down on and standing up from a chair with arms (e.g., wheelchair, bedside commode, etc.): 3  Moving from lying on back to sitting on the side of the bed?: 3  Moving to and from a bed to a chair (including a wheelchair)?: 3  Need to walk in hospital room?: 3  Climbing 3-5 steps with a railing?: 3  Basic Mobility Total Score: 18       Therapeutic Activities and Exercises:   gait training x 100 feet with RW with CGA,  Transfer training supine to/from sit with supervision, sit to stand with min assist    Patient left supine with call button in reach and nurse alex notified..    GOALS:   Multidisciplinary Problems     Physical Therapy Goals     Not on file          Multidisciplinary Problems (Resolved)        Problem: Physical Therapy Goal    Goal Priority Disciplines Outcome Goal Variances Interventions   Physical Therapy Goal   (Resolved)     PT, PT/OT Met     Description:  Goals to be met by: discharge     Patient will increase functional independence with mobility by performin. Gait  x 150 feet with Supervision using Rolling Walker  2. Sit to stand with supervision.  3. Bed to chair transfer supervision.                        Time Tracking:     PT Received On: 10/05/19  PT Start Time: 1107     PT Stop Time: 1134  PT Total Time (min): 27 min     Billable Minutes: Gait Training 12 and Therapeutic Activity 15    Treatment Type: Treatment  PT/PTA: PT     PTA Visit Number: 0     Chris MeGilligan, PT  10/05/2019

## 2019-10-05 NOTE — DISCHARGE SUMMARY
[facility]  Discharge Summary      Patient Name: Melania Beavers  MRN: [id number]  Admission Date: 10/1/2019  Hospital Length of Stay: 4 days  Discharge Date and Time:  10/05/2019 9:43 AM  Attending Physician: Annabella Manriquez MD   Discharging Provider: Annabella Manriquez MD  Primary Care Provider: Luis Brown MD      HPI:   This 75 years old woman, past medical history of COPD, TIA, hypertension, who was recently admitted to the hospital about 3 weeks ago for episode of transient aphasia and confusion.  This morning when the patient awoke around 7:00 a.m., she was again aphasic.  Her daughter observed and she brought her to the hospital.  The patient was also confused at this time.  During her prior hospitalization she had extensive workup, including echocardiography, carotid Doppler, and brain MRI were negative.  In ER, her symptoms mostly resolved, the patient was awake alert oriented x3, in no focal deficits, also as per daughter her symptoms have improved.  Neurology has been consulted from the emergency department, no specific recommendations.  I did order brain MRI which showed new interval development of left frontal area, compatible with new infarction.  The patient denies headache, no double vision, no dizziness, no nausea, no vomiting, no chest pain, no cough, no fever, no mother focal weakness, no loss of urine, no sensorial deficits, no dysphagia, no other complaints at this time.  Considering the time of this new left frontal changes, compatible with stroke, is not known, the patient was not a tPA candidate in the emergency department.    Procedure(s) (LRB):  EGD (ESOPHAGOGASTRODUODENOSCOPY) (N/A)      Hospital Course:   10/2: imaging studies show interval development of a left frontal stroke  10/3;  her speech is much improved. Cardiology attempted to perform a AVELINO this morning, this was unsuccessful because of esophageal strictures. She is tolerating her  diet  10/4: she underwent an EGD with dilation of esophageal strictures. Her nurse noticed that she was orthostatic this afternoon.   She was found to have posturall hypotension - I think this is more age related, dehydration has been ruled out and even though patient has a resting tremor, she does not have any other symptoms of neurodegenerative disease. Her daughters at bedside sate that whenever she gets up from a lying down position at home, she usually has to sit up for a few mins before standing.      Consults:   Consults (From admission, onward)        Status Ordering Provider     Inpatient consult to Cardiology  Once     Provider:  Fran House MD    Acknowledged JASMIN LIRA     Inpatient consult to Gastroenterology  Once     Provider:  Shukri Bond MD    Completed OGUNGISELLAOLE, JAZMYNE O.     Inpatient consult to Hospitalist  Once     Provider:  Joe Arce MD    Acknowledged ALIN WU     Inpatient consult to Inpatient Rehab  Once     Provider:  (Not yet assigned)    Acknowledged OGUNMAC, JAZMYNE O.     Inpatient consult to Neurology  Once     Provider:  Lino Diamond MD    Acknowledged ALIN WU     Inpatient consult to Neurology  Once     Provider:  Lino Diamond MD    Acknowledged ALIN WU     Inpatient consult to Neurology  Once     Provider:  Lino Diamond MD    Acknowledged ALIN WU     Inpatient consult to Registered Dietitian/Nutritionist  Once     Provider:  (Not yet assigned)    Completed ALIN WU     Inpatient consult to SNF  Once     Provider:  (Not yet assigned)    Acknowledged OGUNMAC, JAZMYNE O.          No new Assessment & Plan notes have been filed under this hospital service since the last note was generated.  Service: Hospital Medicine    Final Active Diagnoses:    Diagnosis Date Noted POA    PRINCIPAL PROBLEM:  Acute CVA (cerebrovascular accident) [I63.9] 09/06/2019 Yes    Orthostatic hypotension [I95.1] 10/04/2019 No     Esophageal stricture [K22.2] 10/03/2019 Yes    HTN (hypertension) [I10] 09/05/2019 Yes    GERD (gastroesophageal reflux disease) [K21.9] 09/05/2019 Yes      Problems Resolved During this Admission:    Diagnosis Date Noted Date Resolved POA    Expressive aphasia [R47.01] 10/01/2019 10/04/2019 Yes    Acute metabolic encephalopathy [G93.41] 09/06/2019 10/03/2019 Yes       Discharged Condition: good    Disposition: Rehab Facility    Follow Up:  Follow-up Information     Lino Diamond MD In 2 weeks.    Specialty:  Neurology  Contact information:  [address] [address]  [phone]                 Patient Instructions:      Diet Cardiac     Diet Adult Regular     Activity as tolerated     Activity as tolerated       Significant Diagnostic Studies: Labs:   BMP: No results for input(s): GLU, NA, K, CL, CO2, BUN, CREATININE, CALCIUM, MG in the last 48 hours., CBC No results for input(s): WBC, HGB, HCT, PLT in the last 48 hours., Lipid Panel   Lab Results   Component Value Date    CHOL 187 09/05/2019    HDL 62 09/05/2019    LDLCALC 109.4 09/05/2019    TRIG 78 09/05/2019    CHOLHDL 33.2 09/05/2019    and A1C:   Recent Labs   Lab 09/05/19  1528   HGBA1C 5.6  5.6       Pending Diagnostic Studies:     Procedure Component Value Units Date/Time    Echo Color Flow Doppler? Yes; Bubble Contrast? No [[id number]] Resulted:  10/04/19 1506    Order Status:  Sent Lab Status:  In process Updated:  10/04/19 1506     BSA 1.47 m2     Transesophageal echo (AVELINO) [[id number]] Resulted:  10/03/19 0847    Order Status:  Sent Lab Status:  In process Updated:  10/03/19 0847     BSA 1.47 m2          Medications:  Reconciled Home Medications:      Medication List      START taking these medications    clopidogrel 75 mg tablet  Commonly known as:  PLAVIX  Take 1 tablet (75 mg total) by mouth once daily.        CONTINUE taking these medications    amLODIPine 5 MG tablet  Commonly known as:  NORVASC  Take 1 tablet (5 mg total) by  mouth once daily.     ARTIFICIAL TEARS OPHT  Place 1 drop into both eyes 3 (three) times daily as needed.     aspirin 81 MG Chew  Take 1 tablet (81 mg total) by mouth once daily.     atorvastatin 40 MG tablet  Commonly known as:  LIPITOR  Take 1 tablet (40 mg total) by mouth once daily.     b complex vitamins capsule  Take 1 capsule by mouth once daily.     cyanocobalamin 1000 MCG tablet  Commonly known as:  VITAMIN B-12  Take 100 mcg by mouth once daily.     FERRALET 90 DUAL-IRON DELIVERY 90-1-12-50 mg-mg-mcg-mg Tab  Generic drug:  iron,carbon,gluc-FA-B12-C-dss  TAKE 1 TABLET BY MOUTH DAILY     fluticasone-salmeterol 250-50 mcg/dose 250-50 mcg/dose diskus inhaler  Commonly known as:  ADVAIR  Inhale 1 puff into the lungs 2 (two) times daily. Controller     levocetirizine 5 MG tablet  Commonly known as:  XYZAL  TAKE 5MG BY MOUTH DAILY AS NEEDED     megestrol 400 mg/10 mL (40 mg/mL) Susp  Commonly known as:  MEGACE  Take 200 mg by mouth every morning.     mometasone 50 mcg/actuation nasal spray  Commonly known as:  NASONEX  2 sprays by Nasal route daily as needed.     omeprazole 20 MG capsule  Commonly known as:  PRILOSEC  Take 20 mg by mouth every morning.     ondansetron 8 MG tablet  Commonly known as:  ZOFRAN  Take 8 mg by mouth every 12 (twelve) hours as needed for Nausea.     VITAMIN C 500 MG tablet  Generic drug:  ascorbic acid (vitamin C)  Take 500 mg by mouth 2 (two) times daily.            Indwelling Lines/Drains at time of discharge:   Lines/Drains/Airways     None                 Time spent on the discharge of patient: 35 minutes  Patient was seen and examined on the date of discharge and determined to be suitable for discharge.         Annabella Manriquez MD  Department of Hospital Medicine  Formerly [facility], [facility]

## 2019-10-05 NOTE — PT/OT/SLP DISCHARGE
Occupational Therapy Discharge Summary    Melania Beavers  MRN: [id number]   Principal Problem: Acute CVA (cerebrovascular accident)      Patient Discharged from acute Occupational Therapy on 10/5/2019.  Please refer to prior OT note dated 10/4/2019 for functional status.    Assessment:      Patient appropriate for care in another setting.    Objective:     GOALS:   Multidisciplinary Problems     Occupational Therapy Goals     Not on file          Multidisciplinary Problems (Resolved)        Problem: Occupational Therapy Goal    Goal Priority Disciplines Outcome Interventions   Occupational Therapy Goal   (Resolved)     OT, PT/OT Met    Description:  Goals to be met by: discharge    Patient will increase functional independence with ADLs by performing:    Feeding with Modified [place].  UE Dressing with Supervision.  LE Dressing with Supervision.  Grooming while standing with Supervision.  Toileting from toilet with Supervision for hygiene and clothing management.   Toilet transfer to toilet with Supervision.                      Reasons for Discontinuation of Therapy Services  Transfer to alternate level of care.      Plan:     Patient Discharged to: Inpatient Rehab    Musa Ta, OT  10/5/2019

## 2019-10-05 NOTE — HOSPITAL COURSE
10/2: imaging studies show interval development of a left frontal stroke  10/3;  her speech is much improved. Cardiology attempted to perform a AVELINO this morning, this was unsuccessful because of esophageal strictures. She is tolerating her diet  10/4: she underwent an EGD with dilation of esophageal strictures. Her nurse noticed that she was orthostatic this afternoon.   She was found to have posturall hypotension - I think this is more age related, dehydration has been ruled out and even though patient has a resting tremor, she does not have any other symptoms of neurodegenerative disease. Her daughters at bedside sate that whenever she gets up from a lying down position at home, she usually has to sit up for a few mins before standing.

## 2019-10-06 LAB — BSA FOR ECHO PROCEDURE: 1.47 M2

## 2019-10-25 ENCOUNTER — LAB VISIT (OUTPATIENT)
Dept: LAB | Facility: HOSPITAL | Age: 84
End: 2019-10-25
Attending: FAMILY MEDICINE
Payer: MEDICARE

## 2019-10-25 DIAGNOSIS — D64.9 ANEMIA, UNSPECIFIED: Primary | ICD-10-CM

## 2019-10-25 LAB
ALBUMIN SERPL BCP-MCNC: 4.2 G/DL (ref 3.5–5.2)
ALP SERPL-CCNC: 48 U/L (ref 55–135)
ALT SERPL W/O P-5'-P-CCNC: 11 U/L (ref 10–44)
ANION GAP SERPL CALC-SCNC: 8 MMOL/L (ref 8–16)
AST SERPL-CCNC: 15 U/L (ref 10–40)
BASOPHILS # BLD AUTO: 0.01 K/UL (ref 0–0.2)
BASOPHILS NFR BLD: 0.2 % (ref 0–1.9)
BILIRUB SERPL-MCNC: 0.7 MG/DL (ref 0.1–1)
BUN SERPL-MCNC: 24 MG/DL (ref 8–23)
CALCIUM SERPL-MCNC: 9.6 MG/DL (ref 8.7–10.5)
CHLORIDE SERPL-SCNC: 107 MMOL/L (ref 95–110)
CO2 SERPL-SCNC: 21 MMOL/L (ref 23–29)
CREAT SERPL-MCNC: 1.7 MG/DL (ref 0.5–1.4)
DIFFERENTIAL METHOD: ABNORMAL
EOSINOPHIL # BLD AUTO: 0.1 K/UL (ref 0–0.5)
EOSINOPHIL NFR BLD: 1.3 % (ref 0–8)
ERYTHROCYTE [DISTWIDTH] IN BLOOD BY AUTOMATED COUNT: 14.3 % (ref 11.5–14.5)
EST. GFR  (AFRICAN AMERICAN): 31.3 ML/MIN/1.73 M^2
EST. GFR  (NON AFRICAN AMERICAN): 27.1 ML/MIN/1.73 M^2
GLUCOSE SERPL-MCNC: 98 MG/DL (ref 70–110)
HCT VFR BLD AUTO: 27.5 % (ref 37–48.5)
HGB BLD-MCNC: 8.9 G/DL (ref 12–16)
IMM GRANULOCYTES # BLD AUTO: 0.09 K/UL (ref 0–0.04)
IMM GRANULOCYTES NFR BLD AUTO: 1.6 % (ref 0–0.5)
LYMPHOCYTES # BLD AUTO: 1.2 K/UL (ref 1–4.8)
LYMPHOCYTES NFR BLD: 21.4 % (ref 18–48)
MCH RBC QN AUTO: 30.3 PG (ref 27–31)
MCHC RBC AUTO-ENTMCNC: 32.4 G/DL (ref 32–36)
MCV RBC AUTO: 94 FL (ref 82–98)
MONOCYTES # BLD AUTO: 1.3 K/UL (ref 0.3–1)
MONOCYTES NFR BLD: 23.2 % (ref 4–15)
NEUTROPHILS # BLD AUTO: 2.9 K/UL (ref 1.8–7.7)
NEUTROPHILS NFR BLD: 52.3 % (ref 38–73)
NRBC BLD-RTO: 0 /100 WBC
PLATELET # BLD AUTO: 201 K/UL (ref 150–350)
PMV BLD AUTO: 11.4 FL (ref 9.2–12.9)
POTASSIUM SERPL-SCNC: 4.3 MMOL/L (ref 3.5–5.1)
PROT SERPL-MCNC: 6.9 G/DL (ref 6–8.4)
RBC # BLD AUTO: 2.94 M/UL (ref 4–5.4)
SODIUM SERPL-SCNC: 136 MMOL/L (ref 136–145)
WBC # BLD AUTO: 5.47 K/UL (ref 3.9–12.7)

## 2019-10-25 PROCEDURE — 36415 COLL VENOUS BLD VENIPUNCTURE: CPT

## 2019-10-25 PROCEDURE — 85025 COMPLETE CBC W/AUTO DIFF WBC: CPT

## 2019-10-25 PROCEDURE — 80053 COMPREHEN METABOLIC PANEL: CPT

## 2019-10-31 LAB — BSA FOR ECHO PROCEDURE: 1.47 M2

## 2019-12-03 PROBLEM — D64.9 NORMOCHROMIC NORMOCYTIC ANEMIA: Status: ACTIVE | Noted: 2019-12-03

## 2019-12-03 PROBLEM — D64.9 ANEMIA, UNSPECIFIED: Status: ACTIVE | Noted: 2019-12-03

## 2019-12-04 ENCOUNTER — TELEPHONE (OUTPATIENT)
Dept: HEMATOLOGY/ONCOLOGY | Facility: CLINIC | Age: 84
End: 2019-12-04

## 2019-12-04 NOTE — TELEPHONE ENCOUNTER
Called patient back to r/s the apt from this morning but no answer- left voicemail asking for her to return the call. AE

## 2019-12-04 NOTE — TELEPHONE ENCOUNTER
----- Message from Yoana Grey RN sent at 12/4/2019  9:07 AM CST -----  Please call to re-schedule.  ----- Message -----  From: Jane Morales  Sent: 12/4/2019   8:55 AM CST  To: Sharona Linder called to reschedule the patient's appointment fro today. Please call her back at [phone].

## 2020-03-11 ENCOUNTER — OFFICE VISIT (OUTPATIENT)
Dept: PODIATRY | Facility: CLINIC | Age: 85
End: 2020-03-11
Payer: MEDICARE

## 2020-03-11 VITALS
DIASTOLIC BLOOD PRESSURE: 85 MMHG | SYSTOLIC BLOOD PRESSURE: 167 MMHG | HEIGHT: 66 IN | BODY MASS INDEX: 17.15 KG/M2 | HEART RATE: 69 BPM

## 2020-03-11 DIAGNOSIS — R26.2 DIFFICULTY IN WALKING, NOT ELSEWHERE CLASSIFIED: ICD-10-CM

## 2020-03-11 DIAGNOSIS — L60.2 OG (ONYCHOGRYPHOSIS): ICD-10-CM

## 2020-03-11 DIAGNOSIS — R20.2 PARESTHESIA OF BOTH FEET: ICD-10-CM

## 2020-03-11 DIAGNOSIS — M20.12 VALGUS DEFORMITY OF BOTH GREAT TOES: ICD-10-CM

## 2020-03-11 DIAGNOSIS — R23.4 THINNING OF SKIN: ICD-10-CM

## 2020-03-11 DIAGNOSIS — M20.11 VALGUS DEFORMITY OF BOTH GREAT TOES: ICD-10-CM

## 2020-03-11 DIAGNOSIS — R20.9 BILATERAL COLD FEET: ICD-10-CM

## 2020-03-11 DIAGNOSIS — B35.1 ONYCHOMYCOSIS DUE TO DERMATOPHYTE: Primary | ICD-10-CM

## 2020-03-11 PROCEDURE — 11721 ROUTINE FOOT CARE: ICD-10-PCS | Mod: Q9,S$PBB,, | Performed by: PODIATRIST

## 2020-03-11 PROCEDURE — 99203 PR OFFICE/OUTPT VISIT, NEW, LEVL III, 30-44 MIN: ICD-10-PCS | Mod: S$PBB,25,, | Performed by: PODIATRIST

## 2020-03-11 PROCEDURE — 11721 DEBRIDE NAIL 6 OR MORE: CPT | Mod: Q9,PBBFAC,GZ | Performed by: PODIATRIST

## 2020-03-11 PROCEDURE — 99203 OFFICE O/P NEW LOW 30 MIN: CPT | Mod: S$PBB,25,, | Performed by: PODIATRIST

## 2020-03-11 PROCEDURE — 99214 OFFICE O/P EST MOD 30 MIN: CPT | Performed by: PODIATRIST

## 2020-03-11 RX ORDER — SUCRALFATE 1 G/1
1 TABLET ORAL
COMMUNITY
Start: 2018-09-13 | End: 2020-04-04 | Stop reason: CLARIF

## 2020-03-11 RX ORDER — MECLIZINE HCL 12.5 MG 12.5 MG/1
TABLET ORAL
COMMUNITY
Start: 2018-05-24 | End: 2020-04-04 | Stop reason: CLARIF

## 2020-03-11 RX ORDER — ACETAMINOPHEN 500 MG
1 TABLET ORAL DAILY
COMMUNITY
Start: 2019-05-02 | End: 2020-05-11 | Stop reason: CLARIF

## 2020-03-11 RX ORDER — DONEPEZIL HYDROCHLORIDE 5 MG/1
TABLET, FILM COATED ORAL
COMMUNITY
Start: 2020-02-19 | End: 2020-04-04 | Stop reason: CLARIF

## 2020-03-11 NOTE — PATIENT INSTRUCTIONS
Nail Fungal Infection  A nail fungal infection changes the way fingernails and toenails look. They may thicken, discolor, change shape, or split. This condition is hard to treat because nails grow slowly and have limited blood supply. The infection often comes back after treatment.  There are 2 types of medicines used to treat this condition:  · Topical anti-fungal medicines. These are applied to the surface of the skin and nail area. These medicines are not very effective because they cant get deep into the nail.  · Oral antifungal medicines. These medicines work better because they go into the nail from the inside out. But the infection may still come back. It may take 9 to 12 months for your nail to look normal again. This means you are cured. You can repeat treatment if needed. Most people take these medicines without any problems. It is rare to stop therapy because of side effects. But your healthcare provider may give you some monitoring tests. Talk about possible side effects with your provider before starting treatment.  If medicines fail, the nail can be removed surgically or chemically. These methods physically remove the fungus from the body. This helps medical treatment be more effective.  Home care  · Use medicines exactly as directed for as long as directed. Treating a fungal infection can take longer than other kinds of infections.  · Smoking is a risk factor for fungal infection. This is one more reason to quit.  · Wear absorbent socks, and shoes that let your feet breathe. Sweaty feet increase your risk of fungal infection. They also make an existing infection harder to treat.  · Use footwear when in damp public places like swimming pools, gyms, and shower rooms. This will help you avoid the fungus that grows there.  · Don't share nail clippers or scissors with others.  Follow-up care  Follow up with your healthcare provider, or as advised.  When to seek medical advice  Call your healthcare  provider right away if any of these occur:  · Skin by the nail becomes red, swollen, painful, or drains pus (a creamy yellow or white liquid)  · Side effects from oral anti-fungal medicines  Date Last Reviewed: 8/1/2016  © 4313-7297 [employer]. [address] [place] [address]. All rights reserved. This information is not intended as a substitute for professional medical care. Always follow your healthcare professional's instructions.

## 2020-03-11 NOTE — PROGRESS NOTES
[address] [facility] [address]  [place] [address]  Phone: [phone]   [phone]    Patient's PCP:Luis Brown MD  Referring Provider: No ref. provider found    Subjective:      Chief Complaint:: Nail Care    HPI  Melania Beavers is a 86 y.o. female who presents with a complaint with complaints of long, thick toenails of both feet.  Gradual onset, worsening over past several weeks, aggravated by increased weight bearing, shoe gear, pressure.  Periodic debridement helps symptoms.  Patient also complains of a burning pain in both her feet.  Patient presents to the office with a high blood pressure. Patient previously took BP medicine but hospital told pt she does not need to take it anymore.     Systemic Doctor: Dr. House (Cardiologist)  Date Last Seen: 12/2019-01/2020    Vitals:    03/11/20 1425   BP: (!) 167/85   Pulse: 69     Shoe Size:     Past Surgical History:   Procedure Laterality Date    ESOPHAGOGASTRODUODENOSCOPY N/A 10/4/2019    Procedure: EGD (ESOPHAGOGASTRODUODENOSCOPY);  Surgeon: Dominick Cunningham III, MD;  Location: [facility];  Service: Endoscopy;  Laterality: N/A;    HYSTERECTOMY      PERCUTANEOUS PINNING OF HIP Left 4/5/2020    Procedure: PINNING, HIP, PERCUTANEOUS;  Surgeon: Anibal Kidd MD;  Location: [facility] OR;  Service: Orthopedics;  Laterality: Left;     Past Medical History:   Diagnosis Date    Anemia, unspecified 12/3/2019    GERD (gastroesophageal reflux disease)     Hypertension     Normochromic normocytic anemia 12/3/2019     History reviewed. No pertinent family history.     Social History:   Marital Status:   Alcohol History:  reports that she does not drink alcohol.  Tobacco History:  reports that she has never smoked. She does not have any smokeless tobacco history on file.  Drug History:  reports that she does not use drugs.    Review of patient's allergies indicates:   Allergen Reactions    Doxycycline monohydrate Other (See Comments)      dizzyness    Heparin     Heparin analogues        Current Outpatient Medications   Medication Sig Dispense Refill    aspirin 81 MG Chew Take 1 tablet (81 mg total) by mouth once daily.  0    atorvastatin (LIPITOR) 40 MG tablet Take 1 tablet (40 mg total) by mouth once daily. 90 tablet 3    b complex vitamins capsule Take 1 capsule by mouth once daily.      cholecalciferol, vitamin D3, 125 mcg (5,000 unit) Tab Take 1 tablet by mouth once daily.       clopidogrel (PLAVIX) 75 mg tablet Take 1 tablet (75 mg total) by mouth once daily. 30 tablet 11    fluticasone-salmeterol 250-50 mcg/dose (ADVAIR) 250-50 mcg/dose diskus inhaler Inhale 1 puff into the lungs 2 (two) times daily. Controller      omeprazole (PRILOSEC) 20 MG capsule Take 20 mg by mouth every morning.       ondansetron (ZOFRAN) 8 MG tablet Take 8 mg by mouth every 12 (twelve) hours as needed for Nausea.       HYDROcodone-acetaminophen (NORCO) 5-325 mg per tablet Take 1 tablet by mouth every 6 (six) hours as needed. 20 tablet 0    megestroL (MEGACE) 20 MG Tab Take 40 mg by mouth 2 (two) times daily.       No current facility-administered medications for this visit.        Review of Systems      Objective:        Physical Exam:   Foot Exam  Physical Exam  This patient has documented high risk feet requiring routine maintenance secondary to diabetes mellitis and those secondary complications of diabetes, as mentioned.    Physical examination: General: Pt. is well-developed, well-nourished, appears stated age, in no acute distress, alert and oriented x 3.    Vascular: Dorsalis pedis and posterior tibial pulses are 1/4 Bilaterally. Toes are cool to touch. Feet are warm proximally.    There is decreased digital hair. Skin is atrophic, slightly hyperpigmented, and mildly edematous. Capillary refill greater than 5 seconds all toes/distal feet    Neurologic: Cocoa-Vladimir 5.07 monofilament is present bilateral feet. Sharp/dull sensation present  "Bilateral feet.    Vibratory sensation present bilateral    Paresthesias, and burning bilateral feet with no clearly identified trigger or source.    Musculoskeletal: adequate joint range of motion without pain, limitation, nor crepitation Bilateral feet and ankle joints. Muscle strength is 5/5 in all groups bilaterally.    Lymphatics: no lymphangitic streaking bilaterally.    Dermatologic: Elongated, thickened, dystrophic, discolored nails x 10. Xerosis Bilaterally.      Patient instructed on proper foot hygeine. We discussed wearing proper shoe gear, daily foot inspections, never walking without protective shoe gear, never putting sharp instruments to feet, routine podiatric nail visits every 2-3 months. Nails were aggressively reduced and debrided x 10 mechanically and with electric  down to the nailbed in order to thin the nail plates. Pt. tolerated well and related comfort. Pt. to RTC in 2-3 months for follow-up. Pt. to wear extra-depth shoes and custom   Imaging:            Assessment:       1. Onychomycosis due to dermatophyte    2. OG (onychogryphosis) - Left Foot    3. Valgus deformity of both great toes    4. Bilateral cold feet    5. Difficulty in walking, not elsewhere classified    6. Thinning of skin    7. Paresthesia of both feet      Plan:   Onychomycosis due to dermatophyte    OG (onychogryphosis) - Left Foot  -     Routine Foot Care    Valgus deformity of both great toes  -     Routine Foot Care    Bilateral cold feet  -     Routine Foot Care    Difficulty in walking, not elsewhere classified  -     Routine Foot Care    Thinning of skin  -     Routine Foot Care    Paresthesia of both feet    Other orders  -     Cancel: Nail Removal      Follow up in about 3 months (around 6/11/2020) for Q9.    Routine Foot Care  Date/Time: 3/11/2020 1:40 PM  Performed by: Belkis Nathan DPM  Authorized by: Belkis Nathan DPM     Time out: Immediately prior to procedure a "time out" was called to verify the " correct patient, procedure, equipment, support staff and site/side marked as required.    Consent Done?:  Not Needed    Nail Care Type:  Debride  Location(s): All  (Left 1st Toe, Left 3rd Toe, Left 2nd Toe, Left 4th Toe, Left 5th Toe, Right 1st Toe, Right 2nd Toe, Right 3rd Toe, Right 4th Toe and Right 5th Toe)  Patient tolerance:  Patient tolerated the procedure well with no immediate complications     Instruments Used: Nail Nipper   Manually reduced with electric .        - None    Counseling:     I provided patient education verbally regarding:   Patient diagnosis, treatment options, as well as alternatives, risks, and benefits.     This note was created using Dragon voice recognition software that occasionally misinterpreted phrases or words.       Fungal infection of toenails explained. Treatment options including no treatment, periodic debridement, topical medications, oral medications, and removal of the nail were discussed, as well as success rates and risks of recurrence. We agreed on periodic debridement

## 2020-04-04 ENCOUNTER — HOSPITAL ENCOUNTER (INPATIENT)
Facility: HOSPITAL | Age: 85
LOS: 4 days | Discharge: HOME HEALTH | DRG: 482 | End: 2020-04-08
Attending: EMERGENCY MEDICINE | Admitting: INTERNAL MEDICINE
Payer: MEDICARE

## 2020-04-04 ENCOUNTER — ANESTHESIA EVENT (OUTPATIENT)
Dept: SURGERY | Facility: HOSPITAL | Age: 85
DRG: 482 | End: 2020-04-04
Payer: MEDICARE

## 2020-04-04 DIAGNOSIS — S72.002A CLOSED FRACTURE OF LEFT HIP, INITIAL ENCOUNTER: Primary | ICD-10-CM

## 2020-04-04 DIAGNOSIS — S72.002A CLOSED FRACTURE OF LEFT HIP: ICD-10-CM
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
## 2020-04-04 LAB
ALBUMIN SERPL BCP-MCNC: 4.1 G/DL (ref 3.5–5.2)
ALP SERPL-CCNC: 47 U/L (ref 55–135)
ALT SERPL W/O P-5'-P-CCNC: 14 U/L (ref 10–44)
ANION GAP SERPL CALC-SCNC: 8 MMOL/L (ref 8–16)
AST SERPL-CCNC: 15 U/L (ref 10–40)
BASOPHILS # BLD AUTO: 0.01 K/UL (ref 0–0.2)
BASOPHILS NFR BLD: 0.1 % (ref 0–1.9)
BILIRUB SERPL-MCNC: 0.8 MG/DL (ref 0.1–1)
BUN SERPL-MCNC: 19 MG/DL (ref 8–23)
CALCIUM SERPL-MCNC: 9.5 MG/DL (ref 8.7–10.5)
CHLORIDE SERPL-SCNC: 111 MMOL/L (ref 95–110)
CO2 SERPL-SCNC: 20 MMOL/L (ref 23–29)
CREAT SERPL-MCNC: 1.6 MG/DL (ref 0.5–1.4)
DIFFERENTIAL METHOD: ABNORMAL
EOSINOPHIL # BLD AUTO: 0.2 K/UL (ref 0–0.5)
EOSINOPHIL NFR BLD: 2.2 % (ref 0–8)
ERYTHROCYTE [DISTWIDTH] IN BLOOD BY AUTOMATED COUNT: 14.6 % (ref 11.5–14.5)
EST. GFR  (AFRICAN AMERICAN): 33.4 ML/MIN/1.73 M^2
EST. GFR  (NON AFRICAN AMERICAN): 29 ML/MIN/1.73 M^2
FERRITIN SERPL-MCNC: 51 NG/ML (ref 20–300)
GLUCOSE SERPL-MCNC: 121 MG/DL (ref 70–110)
GLUCOSE SERPL-MCNC: 96 MG/DL (ref 70–110)
HCT VFR BLD AUTO: 29 % (ref 37–48.5)
HGB BLD-MCNC: 9.4 G/DL (ref 12–16)
IMM GRANULOCYTES # BLD AUTO: 0.13 K/UL (ref 0–0.04)
IMM GRANULOCYTES NFR BLD AUTO: 1.9 % (ref 0–0.5)
INR PPP: 1.1
IRON SERPL-MCNC: 31 UG/DL (ref 30–160)
LYMPHOCYTES # BLD AUTO: 1.3 K/UL (ref 1–4.8)
LYMPHOCYTES NFR BLD: 18.7 % (ref 18–48)
MAGNESIUM SERPL-MCNC: 2 MG/DL (ref 1.6–2.6)
MCH RBC QN AUTO: 29.8 PG (ref 27–31)
MCHC RBC AUTO-ENTMCNC: 32.4 G/DL (ref 32–36)
MCV RBC AUTO: 92 FL (ref 82–98)
MONOCYTES # BLD AUTO: 1.7 K/UL (ref 0.3–1)
MONOCYTES NFR BLD: 24.3 % (ref 4–15)
NEUTROPHILS # BLD AUTO: 3.6 K/UL (ref 1.8–7.7)
NEUTROPHILS NFR BLD: 52.8 % (ref 38–73)
NRBC BLD-RTO: 0 /100 WBC
PLATELET # BLD AUTO: 189 K/UL (ref 150–350)
PMV BLD AUTO: 11 FL (ref 9.2–12.9)
POTASSIUM SERPL-SCNC: 4.1 MMOL/L (ref 3.5–5.1)
PROT SERPL-MCNC: 6.9 G/DL (ref 6–8.4)
PROTHROMBIN TIME: 13.9 SEC (ref 10.6–14.8)
RBC # BLD AUTO: 3.15 M/UL (ref 4–5.4)
RETICS/RBC NFR AUTO: 1 % (ref 0.5–2.5)
SATURATED IRON: 13 % (ref 20–50)
SODIUM SERPL-SCNC: 139 MMOL/L (ref 136–145)
TOTAL IRON BINDING CAPACITY: 234 UG/DL (ref 250–450)
TRANSFERRIN SERPL-MCNC: 167 MG/DL (ref 200–375)
TROPONIN I SERPL DL<=0.01 NG/ML-MCNC: <0.03 NG/ML
WBC # BLD AUTO: 6.86 K/UL (ref 3.9–12.7)

## 2020-04-04 PROCEDURE — 93005 ELECTROCARDIOGRAM TRACING: CPT | Performed by: INTERNAL MEDICINE

## 2020-04-04 PROCEDURE — 12000002 HC ACUTE/MED SURGE SEMI-PRIVATE ROOM

## 2020-04-04 PROCEDURE — 85025 COMPLETE CBC W/AUTO DIFF WBC: CPT

## 2020-04-04 PROCEDURE — 63600175 PHARM REV CODE 636 W HCPCS: Performed by: INTERNAL MEDICINE

## 2020-04-04 PROCEDURE — 82728 ASSAY OF FERRITIN: CPT

## 2020-04-04 PROCEDURE — 85610 PROTHROMBIN TIME: CPT

## 2020-04-04 PROCEDURE — 83540 ASSAY OF IRON: CPT

## 2020-04-04 PROCEDURE — 80053 COMPREHEN METABOLIC PANEL: CPT

## 2020-04-04 PROCEDURE — 83735 ASSAY OF MAGNESIUM: CPT

## 2020-04-04 PROCEDURE — 25000003 PHARM REV CODE 250: Performed by: INTERNAL MEDICINE

## 2020-04-04 PROCEDURE — 85045 AUTOMATED RETICULOCYTE COUNT: CPT

## 2020-04-04 PROCEDURE — 36415 COLL VENOUS BLD VENIPUNCTURE: CPT

## 2020-04-04 PROCEDURE — 96374 THER/PROPH/DIAG INJ IV PUSH: CPT

## 2020-04-04 PROCEDURE — 63600175 PHARM REV CODE 636 W HCPCS: Performed by: EMERGENCY MEDICINE

## 2020-04-04 PROCEDURE — 99285 EMERGENCY DEPT VISIT HI MDM: CPT | Mod: 25

## 2020-04-04 PROCEDURE — 96375 TX/PRO/DX INJ NEW DRUG ADDON: CPT

## 2020-04-04 PROCEDURE — 83036 HEMOGLOBIN GLYCOSYLATED A1C: CPT

## 2020-04-04 PROCEDURE — 84484 ASSAY OF TROPONIN QUANT: CPT

## 2020-04-04 RX ORDER — NAPROXEN SODIUM 220 MG/1
81 TABLET, FILM COATED ORAL DAILY
Status: DISCONTINUED | OUTPATIENT
Start: 2020-04-04 | End: 2020-04-08 | Stop reason: HOSPADM

## 2020-04-04 RX ORDER — ONDANSETRON 2 MG/ML
4 INJECTION INTRAMUSCULAR; INTRAVENOUS EVERY 8 HOURS PRN
Status: DISCONTINUED | OUTPATIENT
Start: 2020-04-04 | End: 2020-04-08 | Stop reason: HOSPADM

## 2020-04-04 RX ORDER — ONDANSETRON 2 MG/ML
4 INJECTION INTRAMUSCULAR; INTRAVENOUS
Status: COMPLETED | OUTPATIENT
Start: 2020-04-04 | End: 2020-04-04

## 2020-04-04 RX ORDER — IBUPROFEN 200 MG
16 TABLET ORAL
Status: DISCONTINUED | OUTPATIENT
Start: 2020-04-04 | End: 2020-04-08 | Stop reason: HOSPADM

## 2020-04-04 RX ORDER — DOCUSATE SODIUM 100 MG/1
100 CAPSULE, LIQUID FILLED ORAL DAILY PRN
Status: DISCONTINUED | OUTPATIENT
Start: 2020-04-04 | End: 2020-04-08 | Stop reason: HOSPADM

## 2020-04-04 RX ORDER — ATORVASTATIN CALCIUM 40 MG/1
40 TABLET, FILM COATED ORAL DAILY
Status: DISCONTINUED | OUTPATIENT
Start: 2020-04-04 | End: 2020-04-04

## 2020-04-04 RX ORDER — GLUCAGON 1 MG
1 KIT INJECTION
Status: DISCONTINUED | OUTPATIENT
Start: 2020-04-04 | End: 2020-04-08 | Stop reason: HOSPADM

## 2020-04-04 RX ORDER — MEGESTROL ACETATE 20 MG/1
40 TABLET ORAL 2 TIMES DAILY
COMMUNITY

## 2020-04-04 RX ORDER — ACETAMINOPHEN 325 MG/1
650 TABLET ORAL EVERY 4 HOURS PRN
Status: DISCONTINUED | OUTPATIENT
Start: 2020-04-04 | End: 2020-04-08 | Stop reason: HOSPADM

## 2020-04-04 RX ORDER — TALC
6 POWDER (GRAM) TOPICAL NIGHTLY PRN
Status: DISCONTINUED | OUTPATIENT
Start: 2020-04-04 | End: 2020-04-08 | Stop reason: HOSPADM

## 2020-04-04 RX ORDER — MORPHINE SULFATE 4 MG/ML
3 INJECTION, SOLUTION INTRAMUSCULAR; INTRAVENOUS EVERY 4 HOURS PRN
Status: DISCONTINUED | OUTPATIENT
Start: 2020-04-04 | End: 2020-04-08 | Stop reason: HOSPADM

## 2020-04-04 RX ORDER — CLOPIDOGREL BISULFATE 75 MG/1
75 TABLET ORAL DAILY
Status: DISCONTINUED | OUTPATIENT
Start: 2020-04-04 | End: 2020-04-08 | Stop reason: HOSPADM

## 2020-04-04 RX ORDER — MEGESTROL ACETATE 20 MG/1
40 TABLET ORAL 2 TIMES DAILY
Status: DISCONTINUED | OUTPATIENT
Start: 2020-04-04 | End: 2020-04-08 | Stop reason: HOSPADM

## 2020-04-04 RX ORDER — PANTOPRAZOLE SODIUM 40 MG/1
40 TABLET, DELAYED RELEASE ORAL DAILY
Status: DISCONTINUED | OUTPATIENT
Start: 2020-04-04 | End: 2020-04-08 | Stop reason: HOSPADM

## 2020-04-04 RX ORDER — ATORVASTATIN CALCIUM 40 MG/1
40 TABLET, FILM COATED ORAL NIGHTLY
Status: DISCONTINUED | OUTPATIENT
Start: 2020-04-04 | End: 2020-04-08 | Stop reason: HOSPADM

## 2020-04-04 RX ORDER — ACETAMINOPHEN 325 MG/1
650 TABLET ORAL EVERY 8 HOURS PRN
Status: DISCONTINUED | OUTPATIENT
Start: 2020-04-04 | End: 2020-04-08 | Stop reason: HOSPADM

## 2020-04-04 RX ORDER — MORPHINE SULFATE 4 MG/ML
4 INJECTION, SOLUTION INTRAMUSCULAR; INTRAVENOUS
Status: COMPLETED | OUTPATIENT
Start: 2020-04-04 | End: 2020-04-04

## 2020-04-04 RX ORDER — IBUPROFEN 200 MG
24 TABLET ORAL
Status: DISCONTINUED | OUTPATIENT
Start: 2020-04-04 | End: 2020-04-08 | Stop reason: HOSPADM

## 2020-04-04 RX ORDER — HEPARIN SODIUM 5000 [USP'U]/ML
5000 INJECTION, SOLUTION INTRAVENOUS; SUBCUTANEOUS EVERY 12 HOURS
Status: DISCONTINUED | OUTPATIENT
Start: 2020-04-04 | End: 2020-04-04

## 2020-04-04 RX ADMIN — MORPHINE SULFATE 2 MG: 4 INJECTION INTRAVENOUS at 03:04

## 2020-04-04 RX ADMIN — ATORVASTATIN CALCIUM 40 MG: 40 TABLET, FILM COATED ORAL at 08:04

## 2020-04-04 RX ADMIN — MORPHINE SULFATE 4 MG: 4 INJECTION INTRAVENOUS at 11:04

## 2020-04-04 RX ADMIN — ONDANSETRON 4 MG: 2 INJECTION INTRAMUSCULAR; INTRAVENOUS at 11:04

## 2020-04-04 RX ADMIN — MORPHINE SULFATE 2 MG: 4 INJECTION INTRAVENOUS at 08:04

## 2020-04-04 RX ADMIN — MEGESTROL ACETATE 40 MG: 20 TABLET ORAL at 08:04

## 2020-04-04 NOTE — ED NOTES
Pt's family member contacted, pt's daughter Mrs. Orellana made aware pt has a room assigment, room 1204 and is on her way to room, family requesting to know what time is pt's surgery tomorrow, informed that at this time I do not know, that most likely the receiving nurse will know later during the night or tomorrow morning, advised to please feel free to call at any time for information,  Reyna verbalized understanding

## 2020-04-04 NOTE — ED NOTES
Dr kam made aware of pt's high blood pressure readings while in the ER, 214/97, 195/87, 194/85, most recent 180/97, informed pt was removed from her HTN meds, currently rates pain 9/10, md verbalized understanding,new orders for morphine 4mg ivp and zofran 4 mg ivp received

## 2020-04-04 NOTE — ED NOTES
"Pt states ' I need to get a word to my daughter" will call pt's daughter and have her speak to pt   "

## 2020-04-04 NOTE — ED NOTES
Pt's daughter's . Reyna contacted using pt's phone, placed on phone with pt and informed of plan of care, fracture to hip, surgery tomorrow morning, admission, family verbalized understanding

## 2020-04-04 NOTE — ANESTHESIA PREPROCEDURE EVALUATION
04/04/2020  Melania Beavers is a 86 y.o., female.    Anesthesia Evaluation    I have reviewed the Patient Summary Reports.    I have reviewed the Nursing Notes.   I have reviewed the Medications.     Review of Systems  Anesthesia Hx:  No problems with previous Anesthesia  Neg history of prior surgery. Denies Family Hx of Anesthesia complications.   Denies Personal Hx of Anesthesia complications.   Social:  Non-Smoker    Hematology/Oncology:         -- Anemia:   Cardiovascular:   Hypertension    Hepatic/GI:   GERD Esophageal stricture   Neurological:   CVA (Dysphagia)      Patient Active Problem List   Diagnosis    Dysphagia    Stroke    HTN (hypertension)    GERD (gastroesophageal reflux disease)    Acute CVA (cerebrovascular accident)    Esophageal stricture    Orthostatic hypotension    Anemia, unspecified    Normochromic normocytic anemia    Closed fracture of left hip       Past Surgical History:   Procedure Laterality Date    ESOPHAGOGASTRODUODENOSCOPY N/A 10/4/2019    Procedure: EGD (ESOPHAGOGASTRODUODENOSCOPY);  Surgeon: Dominick Cunningham III, MD;  Location: [facility];  Service: Endoscopy;  Laterality: N/A;    HYSTERECTOMY          Tobacco Use:  The patient  reports that she has never smoked. She does not have any smokeless tobacco history on file.     Results for orders placed or performed during the hospital encounter of 04/04/20   EKG 12-LEAD    Collection Time: 04/04/20  8:49 AM    Narrative    Test Reason : R07.9,    Vent. Rate : 090 BPM     Atrial Rate : 090 BPM     P-R Int : 208 ms          QRS Dur : 080 ms      QT Int : 356 ms       P-R-T Axes : 063 057 067 degrees     QTc Int : 435 ms    Normal sinus rhythm  Normal ECG  When compared with ECG of 03-OCT-2019 04:50,  Premature supraventricular complexes are no longer Present    Referred By: [email or web address]   SELF           Confirmed By:          Imaging Results          CT Pelvis Without Contrast (Final result)  Result time 04/04/20 10:30:38    Final result by Clarence Rao MD (04/04/20 10:30:38)                 Impression:      Acute nondisplaced fracture involving the left femoral head/neck junction.    Osteopenia.    Cholelithiasis, right nephrolithiasis, atherosclerosis, and other incidental findings as above.      Electronically signed by: Clarence Rao MD  Date:    04/04/2020  Time:    10:30             Narrative:    EXAMINATION:  CT PELVIS WITHOUT CONTRAST    CLINICAL HISTORY:  Pelvic fracture, known or suspected;    TECHNIQUE:  CMS Mandated Quality Data-CT Radiation Dose-436    All CT scans at this facility dose modulation, iterative reconstruction, and or weight-based dosing when appropriate to reduce radiation dose to as low as reasonably achievable.    COMPARISON:  Left hip radiography same day    FINDINGS:  Osteopenia.  Acute nondisplaced fracture at the junction of the left femoral head and neck best visualized on axial images 75 through 80.  No pelvic fracture identified.  Transitional lumbosacral vertebral body with ankylosis of left transverse process and left sacral ala.  Degenerative change of SI joints and pubic symphysis.  Sclerotic bone island within the anterior left acetabulum.  Phleboliths within the left hemipelvis.    Cholelithiasis.  Right nephrolithiasis.  Atherosclerotic calcification of the abdominal aorta its branch vessels.  Distal colonic diverticula.                               X-ray Chest AP Portable (Final result)  Result time 04/04/20 09:32:50    Final result by Clarence Rao MD (04/04/20 09:32:50)                 Impression:      Stable chronic findings as above.  No acute pulmonary process.      Electronically signed by: Clarence Rao MD  Date:    04/04/2020  Time:    09:32             Narrative:    EXAMINATION:  XR CHEST AP PORTABLE    COMPARISON:  09/05/2019    FINDINGS:  Cardiomediastinal  silhouette is within normal limits.  Lungs appear hyperexpanded.  Biapical pleuroparenchymal scarring.  Prominent costochondral calcifications noted bilaterally.  Probable skin fold projecting over left lateral chest.  No confluent airspace disease.  No pleural effusion.  Osteopenia.                               X-Ray Hip 2 View Left (Edited Result - FINAL)  Result time 04/04/20 10:32:02    Addendum 1 of 1 by Clarence Rao MD (04/04/20 10:32:02)      After review of same day CT pelvis, in retrospect, nondisplaced fracture involving the left femoral head/neck junction is faintly visible on the oblique view only.      Electronically signed by: Clarence Rao MD  Date:    04/04/2020  Time:    10:32               Final result by Clarence Rao MD (04/04/20 09:31:11)                 Impression:      No acute osseous abnormality.      Electronically signed by: Clarence Rao MD  Date:    04/04/2020  Time:    09:31             Narrative:    EXAMINATION:  XR HIP 2 VIEW LEFT    CLINICAL HISTORY:  Unspecified fall, initial encounter    COMPARISON:  06/11/2015    FINDINGS:  Osteopenia.  No acute fracture of the pelvis or left hip.  Minor degenerative hip joint space narrowing bilaterally.  Left bianca pelvic calcifications could represent phleboliths.                                 Lab Results   Component Value Date    WBC 6.86 04/04/2020    HGB 9.4 (L) 04/04/2020    HCT 29.0 (L) 04/04/2020    MCV 92 04/04/2020     04/04/2020     BMP  Lab Results   Component Value Date     04/04/2020    K 4.1 04/04/2020     (H) 04/04/2020    CO2 20 (L) 04/04/2020    BUN 19 04/04/2020    CREATININE 1.6 (H) 04/04/2020    CALCIUM 9.5 04/04/2020    ANIONGAP 8 04/04/2020    ESTGFRAFRICA 33.4 (A) 04/04/2020    EGFRNONAA 29.0 (A) 04/04/2020           Physical Exam  General:  Well nourished    Airway/Jaw/Neck:  Airway Findings: Mouth Opening: Normal Tongue: Normal  General Airway Assessment: Adult  Mallampati: II  TM  Distance: Normal, at least 6 cm  Jaw/Neck Findings:  Neck ROM: Normal ROM      Dental:  Dental Findings:   Chest/Lungs:  Chest/Lungs Findings: Clear to auscultation, Normal Respiratory Rate     Heart/Vascular:  Heart Findings: Rate: Normal  Rhythm: Regular Rhythm  Sounds: Normal        Mental Status:  Mental Status Findings:  Alert and Oriented         Anesthesia Plan  Type of Anesthesia, risks & benefits discussed:  Anesthesia Type:  general  Patient's Preference:   Intra-op Monitoring Plan: standard ASA monitors  Intra-op Monitoring Plan Comments:   Post Op Pain Control Plan: multimodal analgesia  Post Op Pain Control Plan Comments:   Induction:   IV  Beta Blocker:  Patient is not currently on a Beta-Blocker (No further documentation required).       Informed Consent: Patient understands risks and agrees with Anesthesia plan.  Questions answered. Anesthesia consent signed with patient.  ASA Score: 3     Day of Surgery Review of History & Physical:    H&P update referred to the surgeon.         Ready For Surgery From Anesthesia Perspective.

## 2020-04-04 NOTE — ED TRIAGE NOTES
"Present to the ER with c/o " because I fell" " I twisted my foot and fell on the floor" reports daughter was present during fall, " I just stood up by my chair" reports she did not have " time to use" walker prior to fall, c/o L hip c/o " my hip and over on the side" no shortening, no rotation to L foot, rates pain 9/10  "

## 2020-04-04 NOTE — H&P
[facility] Medicine History & Physical Examination   Patient Name: Melania Beavers  MRN: [id number]  Patient Class: IP- Inpatient   Admission Date: 4/4/2020  8:02 AM  Length of Stay: 0  Attending Physician: Rissa Murray MD  Primary Care Provider: Luis Brown MD  Face-to-Face encounter date: 04/04/2020  Code Status:full  Chief Complaint: Hip Pain        Patient information was obtained from patient, past medical records and ER records.   HISTORY OF PRESENT ILLNESS:   Melania Beavers is a 86 y.o.  female who  has a past medical history of Anemia, unspecified (12/3/2019), GERD (gastroesophageal reflux disease), Hypertension, and Normochromic normocytic anemia (12/3/2019).. The patient presented to [facility] on 4/4/2020 with a primary complaint of Hip Pain  .  Patient had a mechanical fall yesterday evening, twisted her feet and fell on her left side upon trying to sit on a chair, no episode of syncope, no seizure-like activity, over the night hip pain was gradually was, brought into the hospital, CT pelvis showed acute nondisplaced fracture of the left femoral head neck junction.  Patient's blood pressure was elevated, received pain medication morphine.  Orthopedic consulted by ED, scheduled for surgery tomorrow.  Of note patient had a recent history of stroke last August without any residual defects, ambulates without support.on DAPT took it yesterday morning  .  Patient was also treated for vit D deficiency, history of COPD not on home oxygen.  No sick contact with COVID and denies fever and respiratory symtoms,CXR unremarkable.  Denies CP/SOB, change in bowel or bladder habit, hx of constipationlast BM 2 days ago   REVIEW OF SYSTEMS:   10 Point Review of System was performed and was found to be negative except for that mentioned already in the HPI above.     PAST MEDICAL HISTORY:     Past Medical History:   Diagnosis Date    Anemia, unspecified 12/3/2019    GERD  (gastroesophageal reflux disease)     Hypertension     Normochromic normocytic anemia 12/3/2019       PAST SURGICAL HISTORY:     Past Surgical History:   Procedure Laterality Date    ESOPHAGOGASTRODUODENOSCOPY N/A 10/4/2019    Procedure: EGD (ESOPHAGOGASTRODUODENOSCOPY);  Surgeon: Dominick Cunningham III, MD;  Location: [facility];  Service: Endoscopy;  Laterality: N/A;    HYSTERECTOMY         ALLERGIES:   Doxycycline monohydrate; Heparin; and Heparin analogues    FAMILY HISTORY:   No family history on file.    SOCIAL HISTORY:     Social History     Tobacco Use    Smoking status: Never Smoker   Substance Use Topics    Alcohol use: No        Social History     Substance and Sexual Activity   Sexual Activity Never        HOME MEDICATIONS:     Prior to Admission medications    Medication Sig Start Date End Date Taking? Authorizing Provider   aspirin 81 MG Chew Take 1 tablet (81 mg total) by mouth once daily. 10/4/19 10/24/20 Yes Annabella Manriquez MD   atorvastatin (LIPITOR) 40 MG tablet Take 1 tablet (40 mg total) by mouth once daily. 9/6/19 9/5/20 Yes Jarad Tran MD   b complex vitamins capsule Take 1 capsule by mouth once daily.   Yes Historical Provider, MD   cholecalciferol, vitamin D3, 125 mcg (5,000 unit) Tab Take 1 tablet by mouth once daily.  5/2/19  Yes Historical Provider, MD   clopidogrel (PLAVIX) 75 mg tablet Take 1 tablet (75 mg total) by mouth once daily. 10/5/19 10/4/20 Yes Annabella Manriquez MD   fluticasone-salmeterol 250-50 mcg/dose (ADVAIR) 250-50 mcg/dose diskus inhaler Inhale 1 puff into the lungs 2 (two) times daily. Controller   Yes Historical Provider, MD   megestroL (MEGACE) 20 MG Tab Take 40 mg by mouth 2 (two) times daily.   Yes Historical Provider, MD   omeprazole (PRILOSEC) 20 MG capsule Take 20 mg by mouth every morning.    Yes Historical Provider, MD   ascorbic acid, vitamin C, (VITAMIN C) 500 MG tablet Take 500 mg by mouth 2 (two) times daily.    Historical Provider, MD  "  ondansetron (ZOFRAN) 8 MG tablet Take 8 mg by mouth every 12 (twelve) hours as needed for Nausea.     Historical Provider, MD   amLODIPine (NORVASC) 5 MG tablet Take 1 tablet (5 mg total) by mouth once daily.  Patient not taking: Reported on 3/11/2020 9/7/19 4/4/20  Jarad Tran MD   cyanocobalamin (VITAMIN B-12) 1000 MCG tablet Take 100 mcg by mouth once daily.  4/4/20  Historical Provider, MD   donepeziL (ARICEPT) 5 MG tablet  2/19/20 4/4/20  Historical Provider, MD   FERRALET 90 DUAL-IRON DELIVERY 90-1-12-50 mg-mg-mcg-mg Tab TAKE 1 TABLET BY MOUTH DAILY 1/19/17 4/4/20  Historical Provider, MD   lanolin/mineral oil/petrolatum (ARTIFICIAL TEARS OPHT) Place 1 drop into both eyes 3 (three) times daily as needed.  4/4/20  Historical Provider, MD   levocetirizine (XYZAL) 5 MG tablet TAKE 5MG BY MOUTH DAILY AS NEEDED 1/19/17 4/4/20  Historical Provider, MD   meclizine (ANTIVERT) 12.5 mg tablet Tid prn dizziness 5/24/18 4/4/20  Historical Provider, MD   megestrol (MEGACE) 400 mg/10 mL (40 mg/mL) Susp Take 200 mg by mouth every morning.  4/4/20  Historical Provider, MD   mometasone (NASONEX) 50 mcg/actuation nasal spray 2 sprays by Nasal route daily as needed.   4/4/20  Historical Provider, MD   sucralfate (CARAFATE) 1 gram tablet Take 1 g by mouth. 9/13/18 4/4/20  Historical Provider, MD         PHYSICAL EXAM:   BP (!) 207/89   Pulse 93   Temp 98.7 °F (37.1 °C) (Oral)   Resp (!) 26   Ht 5' 7" (1.702 m)   Wt 44 kg (97 lb)   LMP  (LMP Unknown)   SpO2 98%   BMI 15.19 kg/m²   Vitals Reviewed  General appearance: Well-developed, thin buitl   female in no apparent distress.plesant  Skin: No Rash.   Neuro: Motor and sensory exams grossly intact. Good tone.no short limp or external rotation, tender hip+, sensation and distal pulse intact  HENT: Atraumatic head. Moist mucous membranes of oral cavity.  Eyes: Normal extraocular movements.   Neck: Supple. No evidence of lymphadenopathy. No " thyroidomegaly.  Lungs: Clear to auscultation bilaterally. No wheezing present.   Heart: Regular rate and rhythm. S1 and S2 present with systolic murmurs+. No pedal edema. No JVD present.   Abdomen: Soft, scaphoid, non-tender. No rebound tenderness/guarding. No masses or organomegaly. Bowel sounds are normal. Bladder is not palpable.   Extremities: No cyanosis, clubbing, or edema.  Psych/mental status: Alert and oriented. Cooperative. Responds appropriately to questions.   EMERGENCY DEPARTMENT LABS AND IMAGING:     Labs Reviewed   COMPREHENSIVE METABOLIC PANEL - Abnormal; Notable for the following components:       Result Value    Chloride 111 (*)     CO2 20 (*)     Creatinine 1.6 (*)     Alkaline Phosphatase 47 (*)     eGFR if  33.4 (*)     eGFR if non  29.0 (*)     All other components within normal limits   CBC W/ AUTO DIFFERENTIAL - Abnormal; Notable for the following components:    RBC 3.15 (*)     Hemoglobin 9.4 (*)     Hematocrit 29.0 (*)     RDW 14.6 (*)     Immature Granulocytes 1.9 (*)     Immature Grans (Abs) 0.13 (*)     Mono # 1.7 (*)     Mono% 24.3 (*)     All other components within normal limits   MAGNESIUM   TROPONIN I   PROTIME-INR   HEMOGLOBIN A1C       CT Pelvis Without Contrast   Final Result      Acute nondisplaced fracture involving the left femoral head/neck junction.      Osteopenia.      Cholelithiasis, right nephrolithiasis, atherosclerosis, and other incidental findings as above.         Electronically signed by: Clarence Rao MD   Date:    04/04/2020   Time:    10:30      X-ray Chest AP Portable   Final Result      Stable chronic findings as above.  No acute pulmonary process.         Electronically signed by: Clarence Rao MD   Date:    04/04/2020   Time:    09:32      X-Ray Hip 2 View Left   Final Result   Addendum 1 of 1      After review of same day CT pelvis, in retrospect, nondisplaced fracture    involving the left femoral head/neck junction is  faintly visible on the    oblique view only.         Electronically signed by: Clarence Rao MD   Date:    04/04/2020   Time:    10:32      Final          ASSESSMENT & PLAN:   Melania Beavers is a 86 y.o. female admitted for    # left hip neck nondisplaced fracture  # Hypertensive urgency improved with pain control  # CKD3 stable  # Anemia -chronic/stable   # hx of recent CVA on 8/2019  # vit D deficiency   # COPD -stable  # hx of constipation    Admit to [facility]  NPO from [place]  Morphine for pain/zofren  Hydralazine iv prn for uncontrolled HTN  Hold DAPT till sx  Orthopedic surgery consulted follow-up recommendations   Anemia w/u  Colace prn    DVT Prophylaxis: hx of heparin allergynoted on SCD    INPATIENT LIST OF MEDICATIONS     Current Facility-Administered Medications:     acetaminophen tablet 650 mg, 650 mg, Oral, Q4H PRN, Rissa Murray MD    acetaminophen tablet 650 mg, 650 mg, Oral, Q8H PRN, Rissa Murray MD    [START ON 4/6/2020] aspirin chewable tablet 81 mg, 81 mg, Oral, Daily, Rissa Murray MD    atorvastatin tablet 40 mg, 40 mg, Oral, Daily, Rissa Murray MD    [START ON 4/13/2020] clopidogreL tablet 75 mg, 75 mg, Oral, Daily, Rissa Murray MD    dextrose 50% injection 12.5 g, 12.5 g, Intravenous, PRN, Rissa Murray MD    dextrose 50% injection 25 g, 25 g, Intravenous, PRN, Rissa Murray MD    glucagon (human recombinant) injection 1 mg, 1 mg, Intramuscular, PRN, Rissa Murray MD    glucose chewable tablet 16 g, 16 g, Oral, PRN, Rissa Murray MD    glucose chewable tablet 24 g, 24 g, Oral, PRN, Rissa Murray MD    [START ON 4/5/2020] megestroL tablet 40 mg, 40 mg, Oral, BID, Rissa Murray MD    melatonin tablet 6 mg, 6 mg, Oral, Nightly PRN, Rissa Murray MD    ondansetron injection 4 mg, 4 mg, Intravenous, Q8H PRN, Rissa Murray MD    pantoprazole EC tablet 40 mg, 40 mg, Oral, Daily, Rissa Murray MD    Current Outpatient  Medications:     aspirin 81 MG Chew, Take 1 tablet (81 mg total) by mouth once daily., Disp: , Rfl: 0    atorvastatin (LIPITOR) 40 MG tablet, Take 1 tablet (40 mg total) by mouth once daily., Disp: 90 tablet, Rfl: 3    b complex vitamins capsule, Take 1 capsule by mouth once daily., Disp: , Rfl:     cholecalciferol, vitamin D3, 125 mcg (5,000 unit) Tab, Take 1 tablet by mouth once daily. , Disp: , Rfl:     clopidogrel (PLAVIX) 75 mg tablet, Take 1 tablet (75 mg total) by mouth once daily., Disp: 30 tablet, Rfl: 11    fluticasone-salmeterol 250-50 mcg/dose (ADVAIR) 250-50 mcg/dose diskus inhaler, Inhale 1 puff into the lungs 2 (two) times daily. Controller, Disp: , Rfl:     megestroL (MEGACE) 20 MG Tab, Take 40 mg by mouth 2 (two) times daily., Disp: , Rfl:     omeprazole (PRILOSEC) 20 MG capsule, Take 20 mg by mouth every morning. , Disp: , Rfl:     ascorbic acid, vitamin C, (VITAMIN C) 500 MG tablet, Take 500 mg by mouth 2 (two) times daily., Disp: , Rfl:     ondansetron (ZOFRAN) 8 MG tablet, Take 8 mg by mouth every 12 (twelve) hours as needed for Nausea. , Disp: , Rfl:       Scheduled Meds:   [START ON 4/6/2020] aspirin  81 mg Oral Daily    atorvastatin  40 mg Oral Daily    [START ON 4/13/2020] clopidogreL  75 mg Oral Daily    [START ON 4/5/2020] megestroL  40 mg Oral BID    pantoprazole  40 mg Oral Daily     Continuous Infusions:  PRN Meds:.acetaminophen, acetaminophen, dextrose 50%, dextrose 50%, glucagon (human recombinant), glucose, glucose, melatonin, ondansetron      Rissa Murray  [facility] Hospitalist  04/04/2020

## 2020-04-04 NOTE — ED PROVIDER NOTES
Encounter Date: 4/4/2020       History     Chief Complaint   Patient presents with    Hip Pain     Patient presents complaining of left pain after losing balance twisting motion.  The pain became worse this morning.  The worst symptoms are moderate.  She has not had this before.  Movement makes it worse nothing makes it better.        Review of patient's allergies indicates:   Allergen Reactions    Doxycycline monohydrate Other (See Comments)     dizzyness    Heparin     Heparin analogues      Past Medical History:   Diagnosis Date    Anemia, unspecified 12/3/2019    GERD (gastroesophageal reflux disease)     Hypertension     Normochromic normocytic anemia 12/3/2019     Past Surgical History:   Procedure Laterality Date    ESOPHAGOGASTRODUODENOSCOPY N/A 10/4/2019    Procedure: EGD (ESOPHAGOGASTRODUODENOSCOPY);  Surgeon: Dominick Cunningham III, MD;  Location: [facility];  Service: Endoscopy;  Laterality: N/A;    HYSTERECTOMY       No family history on file.  Social History     Tobacco Use    Smoking status: Never Smoker   Substance Use Topics    Alcohol use: No    Drug use: No     Review of Systems   Musculoskeletal:        Left hip pain   All other systems reviewed and are negative.      Physical Exam     Initial Vitals [04/04/20 0837]   BP Pulse Resp Temp SpO2   (!) 212/97 95 20 98.4 °F (36.9 °C) 98 %      MAP       --         Physical Exam    Nursing note and vitals reviewed.  Constitutional:   Frail, elderly   HENT:   Head: Normocephalic and atraumatic.   Mouth/Throat: Oropharynx is clear and moist.   Eyes: EOM are normal.   Neck: Normal range of motion. Neck supple.   Cardiovascular: Normal rate, regular rhythm, normal heart sounds and intact distal pulses.   Pulmonary/Chest: Breath sounds normal. No respiratory distress.   Abdominal: Soft.   Musculoskeletal:   Patient is tender over the left hip.  There is decreased range of motion secondary to pain.   Neurological: She is alert and oriented to  person, place, and time.   Skin: Skin is warm and dry. Capillary refill takes less than 2 seconds.   Psychiatric: She has a normal mood and affect. Her behavior is normal. Judgment and thought content normal.         ED Course   Procedures  Labs Reviewed   COMPREHENSIVE METABOLIC PANEL - Abnormal; Notable for the following components:       Result Value    Chloride 111 (*)     CO2 20 (*)     Creatinine 1.6 (*)     Alkaline Phosphatase 47 (*)     eGFR if  33.4 (*)     eGFR if non  29.0 (*)     All other components within normal limits   CBC W/ AUTO DIFFERENTIAL - Abnormal; Notable for the following components:    RBC 3.15 (*)     Hemoglobin 9.4 (*)     Hematocrit 29.0 (*)     RDW 14.6 (*)     Immature Granulocytes 1.9 (*)     Immature Grans (Abs) 0.13 (*)     Mono # 1.7 (*)     Mono% 24.3 (*)     All other components within normal limits   MAGNESIUM   TROPONIN I   PROTIME-INR        ECG Results          EKG 12-LEAD (In process)  Result time 04/04/20 09:10:17    In process by Interface, Lab In [facility] (04/04/20 09:10:17)                 Narrative:    Test Reason : R07.9,    Vent. Rate : 090 BPM     Atrial Rate : 090 BPM     P-R Int : 208 ms          QRS Dur : 080 ms      QT Int : 356 ms       P-R-T Axes : 063 057 067 degrees     QTc Int : 435 ms    Normal sinus rhythm  Normal ECG  When compared with ECG of 03-OCT-2019 04:50,  Premature supraventricular complexes are no longer Present    Referred By: [email or web address]   SELF           Confirmed By:                             Imaging Results          CT Pelvis Without Contrast (Final result)  Result time 04/04/20 10:30:38    Final result by Clarence Rao MD (04/04/20 10:30:38)                 Impression:      Acute nondisplaced fracture involving the left femoral head/neck junction.    Osteopenia.    Cholelithiasis, right nephrolithiasis, atherosclerosis, and other incidental findings as above.      Electronically signed by: Clarence Rao  MD  Date:    04/04/2020  Time:    10:30             Narrative:    EXAMINATION:  CT PELVIS WITHOUT CONTRAST    CLINICAL HISTORY:  Pelvic fracture, known or suspected;    TECHNIQUE:  CMS Mandated Quality Data-CT Radiation Dose-436    All CT scans at this facility dose modulation, iterative reconstruction, and or weight-based dosing when appropriate to reduce radiation dose to as low as reasonably achievable.    COMPARISON:  Left hip radiography same day    FINDINGS:  Osteopenia.  Acute nondisplaced fracture at the junction of the left femoral head and neck best visualized on axial images 75 through 80.  No pelvic fracture identified.  Transitional lumbosacral vertebral body with ankylosis of left transverse process and left sacral ala.  Degenerative change of SI joints and pubic symphysis.  Sclerotic bone island within the anterior left acetabulum.  Phleboliths within the left hemipelvis.    Cholelithiasis.  Right nephrolithiasis.  Atherosclerotic calcification of the abdominal aorta its branch vessels.  Distal colonic diverticula.                               X-ray Chest AP Portable (Final result)  Result time 04/04/20 09:32:50    Final result by Clarence Rao MD (04/04/20 09:32:50)                 Impression:      Stable chronic findings as above.  No acute pulmonary process.      Electronically signed by: Clarence Rao MD  Date:    04/04/2020  Time:    09:32             Narrative:    EXAMINATION:  XR CHEST AP PORTABLE    COMPARISON:  09/05/2019    FINDINGS:  Cardiomediastinal silhouette is within normal limits.  Lungs appear hyperexpanded.  Biapical pleuroparenchymal scarring.  Prominent costochondral calcifications noted bilaterally.  Probable skin fold projecting over left lateral chest.  No confluent airspace disease.  No pleural effusion.  Osteopenia.                               X-Ray Hip 2 View Left (Edited Result - FINAL)  Result time 04/04/20 10:32:02    Addendum 1 of 1 by Clarence Rao MD  (04/04/20 10:32:02)      After review of same day CT pelvis, in retrospect, nondisplaced fracture involving the left femoral head/neck junction is faintly visible on the oblique view only.      Electronically signed by: Clarence Rao MD  Date:    04/04/2020  Time:    10:32               Final result by Clarence Rao MD (04/04/20 09:31:11)                 Impression:      No acute osseous abnormality.      Electronically signed by: Clarence Rao MD  Date:    04/04/2020  Time:    09:31             Narrative:    EXAMINATION:  XR HIP 2 VIEW LEFT    CLINICAL HISTORY:  Unspecified fall, initial encounter    COMPARISON:  06/11/2015    FINDINGS:  Osteopenia.  No acute fracture of the pelvis or left hip.  Minor degenerative hip joint space narrowing bilaterally.  Left bianca pelvic calcifications could represent phleboliths.                                 Medical Decision Making:   ED Management:  Patient indeed does have a left hip fracture on CT scan.  Dr. Maxwell going to take to the operating room in the morning.  Recommends NPO at midnight.                                 Clinical Impression:       ICD-10-CM ICD-9-CM   1. Closed fracture of left hip, initial encounter S72.002A 820.8   2. Fall W19.XXXA E888.9             ED Disposition Condition    Admit                           Tico Padilla MD  04/04/20 1104

## 2020-04-05 ENCOUNTER — ANESTHESIA (OUTPATIENT)
Dept: SURGERY | Facility: HOSPITAL | Age: 85
DRG: 482 | End: 2020-04-05
Payer: MEDICARE

## 2020-04-05 LAB
ANION GAP SERPL CALC-SCNC: 10 MMOL/L (ref 8–16)
ANISOCYTOSIS BLD QL SMEAR: SLIGHT
BASOPHILS NFR BLD: 0 % (ref 0–1.9)
BUN SERPL-MCNC: 19 MG/DL (ref 8–23)
CALCIUM SERPL-MCNC: 9.7 MG/DL (ref 8.7–10.5)
CHLORIDE SERPL-SCNC: 108 MMOL/L (ref 95–110)
CO2 SERPL-SCNC: 21 MMOL/L (ref 23–29)
CREAT SERPL-MCNC: 1.4 MG/DL (ref 0.5–1.4)
DIFFERENTIAL METHOD: ABNORMAL
EOSINOPHIL NFR BLD: 3 % (ref 0–8)
ERYTHROCYTE [DISTWIDTH] IN BLOOD BY AUTOMATED COUNT: 14.3 % (ref 11.5–14.5)
EST. GFR  (AFRICAN AMERICAN): 39.2 ML/MIN/1.73 M^2
EST. GFR  (NON AFRICAN AMERICAN): 34 ML/MIN/1.73 M^2
ESTIMATED AVG GLUCOSE: 120 MG/DL (ref 68–131)
GLUCOSE SERPL-MCNC: 113 MG/DL (ref 70–110)
GLUCOSE SERPL-MCNC: 114 MG/DL (ref 70–110)
GLUCOSE SERPL-MCNC: 115 MG/DL (ref 70–110)
GLUCOSE SERPL-MCNC: 93 MG/DL (ref 70–110)
HBA1C MFR BLD HPLC: 5.8 % (ref 4.5–6.2)
HCT VFR BLD AUTO: 26.6 % (ref 37–48.5)
HGB BLD-MCNC: 8.6 G/DL (ref 12–16)
HYPOCHROMIA BLD QL SMEAR: ABNORMAL
IMM GRANULOCYTES # BLD AUTO: ABNORMAL K/UL (ref 0–0.04)
IMM GRANULOCYTES NFR BLD AUTO: ABNORMAL % (ref 0–0.5)
LYMPHOCYTES NFR BLD: 20 % (ref 18–48)
MAGNESIUM SERPL-MCNC: 2 MG/DL (ref 1.6–2.6)
MCH RBC QN AUTO: 30 PG (ref 27–31)
MCHC RBC AUTO-ENTMCNC: 32.3 G/DL (ref 32–36)
MCV RBC AUTO: 93 FL (ref 82–98)
MONOCYTES NFR BLD: 32 % (ref 4–15)
MYELOCYTES NFR BLD MANUAL: 1 %
NEUTROPHILS NFR BLD: 44 % (ref 38–73)
NRBC BLD-RTO: 0 /100 WBC
PLATELET # BLD AUTO: 175 K/UL (ref 150–350)
PMV BLD AUTO: 10.7 FL (ref 9.2–12.9)
POTASSIUM SERPL-SCNC: 4.5 MMOL/L (ref 3.5–5.1)
RBC # BLD AUTO: 2.87 M/UL (ref 4–5.4)
SODIUM SERPL-SCNC: 139 MMOL/L (ref 136–145)
WBC # BLD AUTO: 8.2 K/UL (ref 3.9–12.7)

## 2020-04-05 PROCEDURE — C1713 ANCHOR/SCREW BN/BN,TIS/BN: HCPCS | Performed by: ORTHOPAEDIC SURGERY

## 2020-04-05 PROCEDURE — S0028 INJECTION, FAMOTIDINE, 20 MG: HCPCS | Performed by: NURSE ANESTHETIST, CERTIFIED REGISTERED

## 2020-04-05 PROCEDURE — 85007 BL SMEAR W/DIFF WBC COUNT: CPT

## 2020-04-05 PROCEDURE — 36000710: Performed by: ORTHOPAEDIC SURGERY

## 2020-04-05 PROCEDURE — 27235 PR PERCUT FIX PROX/NECK FEMUR FX: ICD-10-PCS | Mod: LT,,, | Performed by: ORTHOPAEDIC SURGERY

## 2020-04-05 PROCEDURE — 63600175 PHARM REV CODE 636 W HCPCS: Performed by: ORTHOPAEDIC SURGERY

## 2020-04-05 PROCEDURE — 99223 PR INITIAL HOSPITAL CARE,LEVL III: ICD-10-PCS | Mod: 57,,, | Performed by: ORTHOPAEDIC SURGERY

## 2020-04-05 PROCEDURE — 36000711: Performed by: ORTHOPAEDIC SURGERY

## 2020-04-05 PROCEDURE — 85027 COMPLETE CBC AUTOMATED: CPT

## 2020-04-05 PROCEDURE — C1769 GUIDE WIRE: HCPCS | Performed by: ORTHOPAEDIC SURGERY

## 2020-04-05 PROCEDURE — 25000003 PHARM REV CODE 250: Performed by: ORTHOPAEDIC SURGERY

## 2020-04-05 PROCEDURE — 27201423 OPTIME MED/SURG SUP & DEVICES STERILE SUPPLY: Performed by: ORTHOPAEDIC SURGERY

## 2020-04-05 PROCEDURE — 25000003 PHARM REV CODE 250: Performed by: STUDENT IN AN ORGANIZED HEALTH CARE EDUCATION/TRAINING PROGRAM

## 2020-04-05 PROCEDURE — 83735 ASSAY OF MAGNESIUM: CPT

## 2020-04-05 PROCEDURE — 27000673 HC TUBING BLOOD Y: Performed by: STUDENT IN AN ORGANIZED HEALTH CARE EDUCATION/TRAINING PROGRAM

## 2020-04-05 PROCEDURE — 99223 1ST HOSP IP/OBS HIGH 75: CPT | Mod: 57,,, | Performed by: ORTHOPAEDIC SURGERY

## 2020-04-05 PROCEDURE — 12000002 HC ACUTE/MED SURGE SEMI-PRIVATE ROOM

## 2020-04-05 PROCEDURE — 27202107 HC XP QUATRO SENSOR: Performed by: STUDENT IN AN ORGANIZED HEALTH CARE EDUCATION/TRAINING PROGRAM

## 2020-04-05 PROCEDURE — 37000008 HC ANESTHESIA 1ST 15 MINUTES: Performed by: ORTHOPAEDIC SURGERY

## 2020-04-05 PROCEDURE — 36415 COLL VENOUS BLD VENIPUNCTURE: CPT

## 2020-04-05 PROCEDURE — 37000009 HC ANESTHESIA EA ADD 15 MINS: Performed by: ORTHOPAEDIC SURGERY

## 2020-04-05 PROCEDURE — 27201107 HC STYLET, STANDARD: Performed by: STUDENT IN AN ORGANIZED HEALTH CARE EDUCATION/TRAINING PROGRAM

## 2020-04-05 PROCEDURE — 27235 TREAT THIGH FRACTURE: CPT | Mod: LT,,, | Performed by: ORTHOPAEDIC SURGERY

## 2020-04-05 PROCEDURE — 63600175 PHARM REV CODE 636 W HCPCS: Performed by: NURSE ANESTHETIST, CERTIFIED REGISTERED

## 2020-04-05 PROCEDURE — 27000671 HC TUBING MICROBORE EXT: Performed by: STUDENT IN AN ORGANIZED HEALTH CARE EDUCATION/TRAINING PROGRAM

## 2020-04-05 PROCEDURE — 71000033 HC RECOVERY, INTIAL HOUR: Performed by: ORTHOPAEDIC SURGERY

## 2020-04-05 PROCEDURE — 25000003 PHARM REV CODE 250: Performed by: NURSE ANESTHETIST, CERTIFIED REGISTERED

## 2020-04-05 PROCEDURE — 71000039 HC RECOVERY, EACH ADD'L HOUR: Performed by: ORTHOPAEDIC SURGERY

## 2020-04-05 PROCEDURE — 80048 BASIC METABOLIC PNL TOTAL CA: CPT

## 2020-04-05 RX ORDER — OXYCODONE HYDROCHLORIDE 5 MG/1
5 TABLET ORAL
Status: DISCONTINUED | OUTPATIENT
Start: 2020-04-05 | End: 2020-04-05

## 2020-04-05 RX ORDER — CEFAZOLIN SODIUM 1 G/3ML
INJECTION, POWDER, FOR SOLUTION INTRAMUSCULAR; INTRAVENOUS
Status: DISCONTINUED | OUTPATIENT
Start: 2020-04-05 | End: 2020-04-05

## 2020-04-05 RX ORDER — ONDANSETRON 2 MG/ML
INJECTION INTRAMUSCULAR; INTRAVENOUS
Status: DISCONTINUED | OUTPATIENT
Start: 2020-04-05 | End: 2020-04-05

## 2020-04-05 RX ORDER — FAMOTIDINE 10 MG/ML
INJECTION INTRAVENOUS
Status: DISCONTINUED | OUTPATIENT
Start: 2020-04-05 | End: 2020-04-05

## 2020-04-05 RX ORDER — DIPHENHYDRAMINE HYDROCHLORIDE 50 MG/ML
12.5 INJECTION INTRAMUSCULAR; INTRAVENOUS
Status: DISCONTINUED | OUTPATIENT
Start: 2020-04-05 | End: 2020-04-05

## 2020-04-05 RX ORDER — PHENYLEPHRINE HYDROCHLORIDE 10 MG/ML
INJECTION INTRAVENOUS
Status: DISCONTINUED | OUTPATIENT
Start: 2020-04-05 | End: 2020-04-05

## 2020-04-05 RX ORDER — FENTANYL CITRATE 50 UG/ML
INJECTION, SOLUTION INTRAMUSCULAR; INTRAVENOUS
Status: DISCONTINUED | OUTPATIENT
Start: 2020-04-05 | End: 2020-04-05

## 2020-04-05 RX ORDER — SUCCINYLCHOLINE CHLORIDE 20 MG/ML
INJECTION INTRAMUSCULAR; INTRAVENOUS
Status: DISCONTINUED | OUTPATIENT
Start: 2020-04-05 | End: 2020-04-05

## 2020-04-05 RX ORDER — PROPOFOL 10 MG/ML
VIAL (ML) INTRAVENOUS
Status: DISCONTINUED | OUTPATIENT
Start: 2020-04-05 | End: 2020-04-05

## 2020-04-05 RX ORDER — CEFAZOLIN SODIUM 2 G/50ML
2 SOLUTION INTRAVENOUS
Status: ACTIVE | OUTPATIENT
Start: 2020-04-05 | End: 2020-04-06

## 2020-04-05 RX ORDER — LIDOCAINE HYDROCHLORIDE 20 MG/ML
INJECTION, SOLUTION EPIDURAL; INFILTRATION; INTRACAUDAL; PERINEURAL
Status: DISCONTINUED | OUTPATIENT
Start: 2020-04-05 | End: 2020-04-05

## 2020-04-05 RX ORDER — SODIUM CHLORIDE 0.9 % (FLUSH) 0.9 %
10 SYRINGE (ML) INJECTION
Status: DISCONTINUED | OUTPATIENT
Start: 2020-04-05 | End: 2020-04-08 | Stop reason: HOSPADM

## 2020-04-05 RX ORDER — MEPERIDINE HYDROCHLORIDE 50 MG/ML
12.5 INJECTION INTRAMUSCULAR; INTRAVENOUS; SUBCUTANEOUS EVERY 10 MIN PRN
Status: DISCONTINUED | OUTPATIENT
Start: 2020-04-05 | End: 2020-04-05

## 2020-04-05 RX ORDER — HYDROCODONE BITARTRATE AND ACETAMINOPHEN 5; 325 MG/1; MG/1
1 TABLET ORAL EVERY 4 HOURS PRN
Status: DISCONTINUED | OUTPATIENT
Start: 2020-04-05 | End: 2020-04-08 | Stop reason: HOSPADM

## 2020-04-05 RX ORDER — SODIUM CHLORIDE, SODIUM LACTATE, POTASSIUM CHLORIDE, CALCIUM CHLORIDE 600; 310; 30; 20 MG/100ML; MG/100ML; MG/100ML; MG/100ML
INJECTION, SOLUTION INTRAVENOUS CONTINUOUS PRN
Status: DISCONTINUED | OUTPATIENT
Start: 2020-04-05 | End: 2020-04-05

## 2020-04-05 RX ORDER — LABETALOL HYDROCHLORIDE 5 MG/ML
10 INJECTION, SOLUTION INTRAVENOUS ONCE
Status: COMPLETED | OUTPATIENT
Start: 2020-04-05 | End: 2020-04-05

## 2020-04-05 RX ORDER — AMOXICILLIN 250 MG
1 CAPSULE ORAL 2 TIMES DAILY
Status: DISCONTINUED | OUTPATIENT
Start: 2020-04-05 | End: 2020-04-08 | Stop reason: HOSPADM

## 2020-04-05 RX ORDER — LOPERAMIDE HYDROCHLORIDE 2 MG/1
2 CAPSULE ORAL CONTINUOUS PRN
Status: DISCONTINUED | OUTPATIENT
Start: 2020-04-05 | End: 2020-04-08 | Stop reason: HOSPADM

## 2020-04-05 RX ORDER — HYDROMORPHONE HYDROCHLORIDE 1 MG/ML
0.2 INJECTION, SOLUTION INTRAMUSCULAR; INTRAVENOUS; SUBCUTANEOUS
Status: DISCONTINUED | OUTPATIENT
Start: 2020-04-05 | End: 2020-04-05

## 2020-04-05 RX ORDER — ONDANSETRON 2 MG/ML
4 INJECTION INTRAMUSCULAR; INTRAVENOUS DAILY PRN
Status: DISCONTINUED | OUTPATIENT
Start: 2020-04-05 | End: 2020-04-05

## 2020-04-05 RX ADMIN — FENTANYL CITRATE 50 MCG: 50 INJECTION INTRAMUSCULAR; INTRAVENOUS at 07:04

## 2020-04-05 RX ADMIN — FAMOTIDINE 20 MG: 10 INJECTION, SOLUTION INTRAVENOUS at 07:04

## 2020-04-05 RX ADMIN — SODIUM CHLORIDE, SODIUM LACTATE, POTASSIUM CHLORIDE, AND CALCIUM CHLORIDE: .6; .31; .03; .02 INJECTION, SOLUTION INTRAVENOUS at 07:04

## 2020-04-05 RX ADMIN — LIDOCAINE HYDROCHLORIDE 100 MG: 20 INJECTION, SOLUTION EPIDURAL; INFILTRATION; INTRACAUDAL; PERINEURAL at 07:04

## 2020-04-05 RX ADMIN — MEGESTROL ACETATE 40 MG: 20 TABLET ORAL at 03:04

## 2020-04-05 RX ADMIN — OXYCODONE HYDROCHLORIDE 5 MG: 5 TABLET ORAL at 03:04

## 2020-04-05 RX ADMIN — ONDANSETRON 4 MG: 2 INJECTION INTRAMUSCULAR; INTRAVENOUS at 07:04

## 2020-04-05 RX ADMIN — CLOPIDOGREL BISULFATE 75 MG: 75 TABLET, FILM COATED ORAL at 03:04

## 2020-04-05 RX ADMIN — CEFAZOLIN 2 G: 330 INJECTION, POWDER, FOR SOLUTION INTRAMUSCULAR; INTRAVENOUS at 07:04

## 2020-04-05 RX ADMIN — LABETALOL HYDROCHLORIDE 10 MG: 5 INJECTION INTRAVENOUS at 08:04

## 2020-04-05 RX ADMIN — SENNOSIDES AND DOCUSATE SODIUM 1 TABLET: 8.6; 5 TABLET ORAL at 03:04

## 2020-04-05 RX ADMIN — PHENYLEPHRINE HYDROCHLORIDE 200 MCG: 10 INJECTION INTRAVENOUS at 07:04

## 2020-04-05 RX ADMIN — CEFAZOLIN SODIUM 2 G: 2 SOLUTION INTRAVENOUS at 01:04

## 2020-04-05 RX ADMIN — SUCCINYLCHOLINE CHLORIDE 120 MG: 20 INJECTION, SOLUTION INTRAMUSCULAR; INTRAVENOUS at 07:04

## 2020-04-05 RX ADMIN — ASPIRIN 81 MG 81 MG: 81 TABLET ORAL at 03:04

## 2020-04-05 RX ADMIN — PROPOFOL 100 MG: 10 INJECTION, EMULSION INTRAVENOUS at 07:04

## 2020-04-05 NOTE — ANESTHESIA PROCEDURE NOTES
Intubation  Performed by: Lupillo Villalpando CRNA  Authorized by: Yoav Gutierres MD     Intubation:     Induction:  Rapid sequence induction    Intubated:  Postinduction    Mask Ventilation:  N/a    Attempts:  1    Attempted By:  CRNA    Method of Intubation:  Direct    Blade:  Young 2    Laryngeal View Grade: Grade I - full view of chords      Difficult Airway Encountered?: No      Complications:  None    Airway Device:  Oral endotracheal tube    Airway Device Size:  7.0    Style/Cuff Inflation:  Cuffed    Tube secured:  20    Secured at:  The lips    Placement Verified By:  Capnometry    Complicating Factors:  None    Findings Post-Intubation:  BS equal bilateral and atraumatic/condition of teeth unchanged

## 2020-04-05 NOTE — TRANSFER OF CARE
"Anesthesia Transfer of Care Note    Patient: Melania Beavers    Procedure(s) Performed: Procedure(s) (LRB):  PINNING, HIP, PERCUTANEOUS (Left)    Patient location: PACU    Anesthesia Type: general    Transport from OR: Transported from OR on room air with adequate spontaneous ventilation    Post pain: adequate analgesia    Post assessment: no apparent anesthetic complications    Post vital signs: stable    Level of consciousness: awake and alert    Nausea/Vomiting: no nausea/vomiting    Complications: none    Transfer of care protocol was followed      Last vitals:   Visit Vitals  BP (!) 161/78   Pulse 88   Temp 36.9 °C (98.4 °F) (Oral)   Resp 16   Ht 5' 7" (1.702 m)   Wt 44.7 kg (98 lb 8.7 oz)   LMP  (LMP Unknown)   SpO2 95%   Breastfeeding? No   BMI 15.43 kg/m²     "

## 2020-04-05 NOTE — CONSULTS
[facility]  Orthopedics  Consult Note    Patient Name: Melania Beavers  MRN: [id number]  Admission Date: 4/4/2020  Hospital Length of Stay: 1 days  Attending Provider: Rissa Murray MD  Primary Care Provider: Luis Brown MD    Patient information was obtained from patient, relative(s), past medical records and ER records.     Inpatient consult to Orthopedic Surgery  Consult performed by: Anibal Kidd MD  Consult ordered by: Rissa Murray MD  Reason for consult: L femoral neck        Subjective:     Principal Problem:<principal problem not specified>    Chief Complaint:   Chief Complaint   Patient presents with    Hip Pain        HPI: Melania Beavers is a 86 y.o.  female who  has a past medical history of Anemia, unspecified (12/3/2019), GERD (gastroesophageal reflux disease), Hypertension, and Normochromic normocytic anemia (12/3/2019).. The patient presented to [facility] on 4/4/2020 with a primary complaint of Hip Pain  .  Patient had a mechanical fall yesterday evening, twisted her feet and fell on her left side upon trying to sit on a chair, no episode of syncope, no seizure-like activity, over the night hip pain was gradually was, brought into the hospital, CT pelvis showed acute nondisplaced fracture of the left femoral head neck junction.  Patient's blood pressure was elevated, received pain medication morphine.  Orthopedic consulted by ED, scheduled for surgery tomorrow.  Of note patient had a recent history of stroke last August without any residual defects, ambulates without support.on DAPT took it yesterday morning  .  Patient was also treated for vit D deficiency, history of COPD not on home oxygen.  No sick contact with COVID and denies fever and respiratory symtoms,CXR unremarkable.  Denies CP/SOB, change in bowel or bladder habit, hx of constipationlast BM 2 days ago      Past Medical History:   Diagnosis Date    Anemia, unspecified 12/3/2019     GERD (gastroesophageal reflux disease)     Hypertension     Normochromic normocytic anemia 12/3/2019       Past Surgical History:   Procedure Laterality Date    ESOPHAGOGASTRODUODENOSCOPY N/A 10/4/2019    Procedure: EGD (ESOPHAGOGASTRODUODENOSCOPY);  Surgeon: Dominick Cunningham III, MD;  Location: [facility];  Service: Endoscopy;  Laterality: N/A;    HYSTERECTOMY         Review of patient's allergies indicates:   Allergen Reactions    Doxycycline monohydrate Other (See Comments)     dizzyness    Heparin     Heparin analogues        Current Facility-Administered Medications   Medication    acetaminophen tablet 650 mg    acetaminophen tablet 650 mg    aspirin chewable tablet 81 mg    atorvastatin tablet 40 mg    clopidogreL tablet 75 mg    dextrose 50% injection 12.5 g    dextrose 50% injection 25 g    diphenhydrAMINE injection 12.5 mg    docusate sodium capsule 100 mg    glucagon (human recombinant) injection 1 mg    glucose chewable tablet 16 g    glucose chewable tablet 24 g    HYDROmorphone injection 0.2 mg    megestroL tablet 40 mg    melatonin tablet 6 mg    meperidine injection 12.5 mg    morphine injection 3 mg    ondansetron injection 4 mg    ondansetron injection 4 mg    oxyCODONE immediate release tablet 5 mg    pantoprazole EC tablet 40 mg    promethazine (PHENERGAN) 6.25 mg in dextrose 5 % 50 mL IVPB    sodium chloride 0.9% flush 10 mL     Facility-Administered Medications Ordered in Other Encounters   Medication    ceFAZolin injection    famotidine (PF) injection    fentaNYL injection    lactated ringers infusion    lidocaine (PF) 20 mg/ml (2%) injection    ondansetron injection    phenylephrine injection    propofol (DIPRIVAN) 10 mg/mL infusion    succinylcholine injection     Family History     None        Tobacco Use    Smoking status: Never Smoker   Substance and Sexual Activity    Alcohol use: No    Drug use: No    Sexual activity: Never     Review of  "Systems   Constitution: Negative for chills and fever.   HENT: Negative for congestion.    Eyes: Negative for blurred vision.   Cardiovascular: Negative for chest pain and syncope.   Respiratory: Negative for cough and shortness of breath.    Endocrine: Negative for polyuria.   Hematologic/Lymphatic: Negative for bleeding problem. Does not bruise/bleed easily.   Skin: Negative for rash.   Musculoskeletal: Positive for falls, joint pain and joint swelling. Negative for muscle cramps, muscle weakness and myalgias.   Gastrointestinal: Negative for abdominal pain, nausea and vomiting.   Genitourinary: Negative for flank pain.   Neurological: Negative for numbness and seizures.   Psychiatric/Behavioral: Negative for altered mental status.   Allergic/Immunologic: Negative for persistent infections.     Objective:     Vital Signs (Most Recent):  Temp: 98.7 °F (37.1 °C) (04/04/20 2332)  Pulse: 77 (04/04/20 2332)  Resp: 18 (04/04/20 2332)  BP: (!) 149/71 (04/04/20 2332)  SpO2: 95 % (04/04/20 2332) Vital Signs (24h Range):  Temp:  [97.8 °F (36.6 °C)-98.7 °F (37.1 °C)] 98.7 °F (37.1 °C)  Pulse:  [77-95] 77  Resp:  [17-25] 18  SpO2:  [95 %-99 %] 95 %  BP: (149-212)/(68-97) 149/71     Weight: 44.7 kg (98 lb 8.7 oz)  Height: 5' 7" (170.2 cm)  Body mass index is 15.43 kg/m².      Intake/Output Summary (Last 24 hours) at 4/5/2020 0811  Last data filed at 4/5/2020 0751  Gross per 24 hour   Intake 120 ml   Output 225 ml   Net -105 ml       General    Nursing note and vitals reviewed.  Constitutional: She is oriented to person, place, and time. She appears well-developed and well-nourished. No distress.   HENT:   Head: Atraumatic.   Eyes: EOM are normal.   Cardiovascular: Normal rate.    Pulmonary/Chest: Effort normal.   Abdominal: Soft.   Neurological: She is alert and oriented to person, place, and time.   Psychiatric: Her behavior is normal.             Right Hip Exam   Right hip exam is normal.   Left Hip Exam     Inspection "   Swelling: present  Erythema: absent    Tenderness   The patient tender to palpation of the trochanteric bursa.    Range of Motion   Extension: abnormal   Flexion: abnormal     Tests   Log Roll: positive    Other   Sensation: normal          Vascular Exam       Left Pulses  Dorsalis Pedis:      2+              Significant Labs:   CBC:   Recent Labs   Lab 04/04/20  0843 04/05/20  0606   WBC 6.86 8.20   HGB 9.4* 8.6*   HCT 29.0* 26.6*    175     CMP:   Recent Labs   Lab 04/04/20  0843 04/05/20  0605    139   K 4.1 4.5   * 108   CO2 20* 21*   GLU 96 93   BUN 19 19   CREATININE 1.6* 1.4   CALCIUM 9.5 9.7   PROT 6.9  --    ALBUMIN 4.1  --    BILITOT 0.8  --    ALKPHOS 47*  --    AST 15  --    ALT 14  --    ANIONGAP 8 10   EGFRNONAA 29.0* 34.0*     Coagulation:   Recent Labs   Lab 04/04/20  0843   INR 1.1     All pertinent labs within the past 24 hours have been reviewed.    Significant Imaging: X-Ray: I have reviewed all pertinent results/findings and my personal findings are:  L hip: no fracture seen     CT: nondisplaced left femoral neck fracture    Assessment/Plan:     Closed fracture of left hip  S/p CRPP  Start PT. WBAT LLE with walker  Placement  Pain control  Monitor H/H        Thank you for your consult. I will follow-up with patient. Please contact us if you have any additional questions.    Anibal Kidd MD  Orthopedics  [facility]

## 2020-04-05 NOTE — HPI
Melania Beavers is a 86 y.o.  female who  has a past medical history of Anemia, unspecified (12/3/2019), GERD (gastroesophageal reflux disease), Hypertension, and Normochromic normocytic anemia (12/3/2019).. The patient presented to [facility] on 4/4/2020 with a primary complaint of Hip Pain  .  Patient had a mechanical fall yesterday evening, twisted her feet and fell on her left side upon trying to sit on a chair, no episode of syncope, no seizure-like activity, over the night hip pain was gradually was, brought into the hospital, CT pelvis showed acute nondisplaced fracture of the left femoral head neck junction.  Patient's blood pressure was elevated, received pain medication morphine.  Orthopedic consulted by ED, scheduled for surgery tomorrow.  Of note patient had a recent history of stroke last August without any residual defects, ambulates without support.on DAPT took it yesterday morning  .  Patient was also treated for vit D deficiency, history of COPD not on home oxygen.  No sick contact with COVID and denies fever and respiratory symtoms,CXR unremarkable.  Denies CP/SOB, change in bowel or bladder habit, hx of constipationlast BM 2 days ago

## 2020-04-05 NOTE — SUBJECTIVE & OBJECTIVE
Past Medical History:   Diagnosis Date    Anemia, unspecified 12/3/2019    GERD (gastroesophageal reflux disease)     Hypertension     Normochromic normocytic anemia 12/3/2019       Past Surgical History:   Procedure Laterality Date    ESOPHAGOGASTRODUODENOSCOPY N/A 10/4/2019    Procedure: EGD (ESOPHAGOGASTRODUODENOSCOPY);  Surgeon: Dominick Cunningham III, MD;  Location: [facility];  Service: Endoscopy;  Laterality: N/A;    HYSTERECTOMY         Review of patient's allergies indicates:   Allergen Reactions    Doxycycline monohydrate Other (See Comments)     dizzyness    Heparin     Heparin analogues        Current Facility-Administered Medications   Medication    acetaminophen tablet 650 mg    acetaminophen tablet 650 mg    aspirin chewable tablet 81 mg    atorvastatin tablet 40 mg    clopidogreL tablet 75 mg    dextrose 50% injection 12.5 g    dextrose 50% injection 25 g    diphenhydrAMINE injection 12.5 mg    docusate sodium capsule 100 mg    glucagon (human recombinant) injection 1 mg    glucose chewable tablet 16 g    glucose chewable tablet 24 g    HYDROmorphone injection 0.2 mg    megestroL tablet 40 mg    melatonin tablet 6 mg    meperidine injection 12.5 mg    morphine injection 3 mg    ondansetron injection 4 mg    ondansetron injection 4 mg    oxyCODONE immediate release tablet 5 mg    pantoprazole EC tablet 40 mg    promethazine (PHENERGAN) 6.25 mg in dextrose 5 % 50 mL IVPB    sodium chloride 0.9% flush 10 mL     Facility-Administered Medications Ordered in Other Encounters   Medication    ceFAZolin injection    famotidine (PF) injection    fentaNYL injection    lactated ringers infusion    lidocaine (PF) 20 mg/ml (2%) injection    ondansetron injection    phenylephrine injection    propofol (DIPRIVAN) 10 mg/mL infusion    succinylcholine injection     Family History     None        Tobacco Use    Smoking status: Never Smoker   Substance and Sexual Activity     "Alcohol use: No    Drug use: No    Sexual activity: Never     Review of Systems   Constitution: Negative for chills and fever.   HENT: Negative for congestion.    Eyes: Negative for blurred vision.   Cardiovascular: Negative for chest pain and syncope.   Respiratory: Negative for cough and shortness of breath.    Endocrine: Negative for polyuria.   Hematologic/Lymphatic: Negative for bleeding problem. Does not bruise/bleed easily.   Skin: Negative for rash.   Musculoskeletal: Positive for falls, joint pain and joint swelling. Negative for muscle cramps, muscle weakness and myalgias.   Gastrointestinal: Negative for abdominal pain, nausea and vomiting.   Genitourinary: Negative for flank pain.   Neurological: Negative for numbness and seizures.   Psychiatric/Behavioral: Negative for altered mental status.   Allergic/Immunologic: Negative for persistent infections.     Objective:     Vital Signs (Most Recent):  Temp: 98.7 °F (37.1 °C) (04/04/20 2332)  Pulse: 77 (04/04/20 2332)  Resp: 18 (04/04/20 2332)  BP: (!) 149/71 (04/04/20 2332)  SpO2: 95 % (04/04/20 2332) Vital Signs (24h Range):  Temp:  [97.8 °F (36.6 °C)-98.7 °F (37.1 °C)] 98.7 °F (37.1 °C)  Pulse:  [77-95] 77  Resp:  [17-25] 18  SpO2:  [95 %-99 %] 95 %  BP: (149-212)/(68-97) 149/71     Weight: 44.7 kg (98 lb 8.7 oz)  Height: 5' 7" (170.2 cm)  Body mass index is 15.43 kg/m².      Intake/Output Summary (Last 24 hours) at 4/5/2020 0811  Last data filed at 4/5/2020 0751  Gross per 24 hour   Intake 120 ml   Output 225 ml   Net -105 ml       General    Nursing note and vitals reviewed.  Constitutional: She is oriented to person, place, and time. She appears well-developed and well-nourished. No distress.   HENT:   Head: Atraumatic.   Eyes: EOM are normal.   Cardiovascular: Normal rate.    Pulmonary/Chest: Effort normal.   Abdominal: Soft.   Neurological: She is alert and oriented to person, place, and time.   Psychiatric: Her behavior is normal.             Right " Hip Exam   Right hip exam is normal.   Left Hip Exam     Inspection   Swelling: present  Erythema: absent    Tenderness   The patient tender to palpation of the trochanteric bursa.    Range of Motion   Extension: abnormal   Flexion: abnormal     Tests   Log Roll: positive    Other   Sensation: normal          Vascular Exam       Left Pulses  Dorsalis Pedis:      2+              Significant Labs:   CBC:   Recent Labs   Lab 04/04/20  0843 04/05/20  0606   WBC 6.86 8.20   HGB 9.4* 8.6*   HCT 29.0* 26.6*    175     CMP:   Recent Labs   Lab 04/04/20  0843 04/05/20  0605    139   K 4.1 4.5   * 108   CO2 20* 21*   GLU 96 93   BUN 19 19   CREATININE 1.6* 1.4   CALCIUM 9.5 9.7   PROT 6.9  --    ALBUMIN 4.1  --    BILITOT 0.8  --    ALKPHOS 47*  --    AST 15  --    ALT 14  --    ANIONGAP 8 10   EGFRNONAA 29.0* 34.0*     Coagulation:   Recent Labs   Lab 04/04/20  0843   INR 1.1     All pertinent labs within the past 24 hours have been reviewed.    Significant Imaging: X-Ray: I have reviewed all pertinent results/findings and my personal findings are:  L hip: no fracture seen     CT: nondisplaced left femoral neck fracture

## 2020-04-05 NOTE — OP NOTE
[facility]  Orthopedic Surgery  Operative Note    SUMMARY     Date of Procedure: 4/5/2020     Procedure: Procedure(s) (LRB):  PINNING, HIP, PERCUTANEOUS (Left)       Surgeon(s) and Role:     * Anibal Kidd MD - Primary    Assisting Surgeon: Greg Barber    Pre-Operative Diagnosis: Closed fracture of left hip, initial encounter [S72.002A]    Post-Operative Diagnosis: Post-Op Diagnosis Codes:     * Closed fracture of left hip, initial encounter [S72.002A]    Anesthesia: General    Technical Procedures Used: pinning of left femoral neck    Complications: No    Estimated Blood Loss (EBL): 25ml           Implants:   Implant Name Type Inv. Item Serial No.  Lot No. LRB No. Used   LOG [id number] - SYNTHES 6.5 CANNULATED SCREW SET - 1              Specimens:   Specimen (12h ago, onward)    None                  Condition: Good    Disposition: PACU - hemodynamically stable.    Attestation: I was present and scrubbed for the entire procedure.     INDICATIONS FOR THE PROCEDURE: A 86 year-old patient sustained a fall. The patient sustained a hip fracture. The patient was medically cleared and after discussion with the family proceeded with the procedure above.     PROCEDURE IN DETAIL: Risks, benefits and alternatives of the procedure were   explained to the patient and family including, but not limited to damage to   nerves, arteries, blood vessels. Also explained the risk of nonunion, malunion,  hardware prominence, hardware failure, cut out as well as infection, DVT, PE as  well as anesthetic complications including seizure, stroke, heart attack and   death. They understood this and signed informed consent. The patient's left   hip was marked prior to coming to the Operating Room. Once a formal timeout was  done in which correct patient, procedure and op site were all correctly   identified and confirmed by the entire operating team. Then,2gm Ancef were given prior to surgical incision. General  anesthesia was induced. The patient's left lower extremity was prepped and draped in normal   sterile fashion. Fluoro was brought in to make sure that we could adequately   reduce the fracture closed which we could with traction on the fracture table.   A 3 cm incision was made over the lateral thigh and through the IT band. A guidewire was inserted right along the inferior neck and centered in the neck to the tip of the head. This was checked on AP and lateral. This was then drilled and an 85 6.5mm partially threaded screw was advanced with good bite. A second parallel screw was inserted starting proximally and hugging the posterior cortex. This was confirmed and then screw was drilled and inserted. The second screw measured 85.  A third screw was placed aiming superiorly and anteriorly to space out the screws and another 85 was placed. These were all short of the subchondral bone and confirmed not to enter the joint. Wires were removed.     After this was   done, we proceeded with closing. Wounds were irrigated with normal   saline. Deep tissue was closed using 0 Vicryl, subcutaneous tissue was closed   using 2-0 Vicryl and then skin staples. Sterile dressing was applied. They were  extubated, awakened, and was transferred from the Operating Room to the Recovery  Room in stable condition.

## 2020-04-05 NOTE — PLAN OF CARE
Pt stable; VSS wdl. Pt has no pain or complains.  Pt said that she will self turn.       Problem: Adult Inpatient Plan of Care  Goal: Plan of Care Review  Outcome: Ongoing, Progressing  Goal: Patient-Specific Goal (Individualization)  Outcome: Ongoing, Progressing  Goal: Absence of Hospital-Acquired Illness or Injury  Outcome: Ongoing, Progressing  Goal: Optimal Comfort and Wellbeing  Outcome: Ongoing, Progressing  Goal: Readiness for Transition of Care  Outcome: Ongoing, Progressing  Goal: Rounds/Family Conference  Outcome: Ongoing, Progressing

## 2020-04-06 LAB
ANION GAP SERPL CALC-SCNC: 11 MMOL/L (ref 8–16)
ANION GAP SERPL CALC-SCNC: 11 MMOL/L (ref 8–16)
BASOPHILS NFR BLD: 0 % (ref 0–1.9)
BASOPHILS NFR BLD: 0 % (ref 0–1.9)
BUN SERPL-MCNC: 17 MG/DL (ref 8–23)
BUN SERPL-MCNC: 17 MG/DL (ref 8–23)
BURR CELLS BLD QL SMEAR: ABNORMAL
BURR CELLS BLD QL SMEAR: ABNORMAL
CALCIUM SERPL-MCNC: 9.4 MG/DL (ref 8.7–10.5)
CALCIUM SERPL-MCNC: 9.4 MG/DL (ref 8.7–10.5)
CHLORIDE SERPL-SCNC: 108 MMOL/L (ref 95–110)
CHLORIDE SERPL-SCNC: 108 MMOL/L (ref 95–110)
CO2 SERPL-SCNC: 19 MMOL/L (ref 23–29)
CO2 SERPL-SCNC: 19 MMOL/L (ref 23–29)
CREAT SERPL-MCNC: 1.5 MG/DL (ref 0.5–1.4)
CREAT SERPL-MCNC: 1.5 MG/DL (ref 0.5–1.4)
DIFFERENTIAL METHOD: ABNORMAL
DIFFERENTIAL METHOD: ABNORMAL
EOSINOPHIL NFR BLD: 1 % (ref 0–8)
EOSINOPHIL NFR BLD: 1 % (ref 0–8)
ERYTHROCYTE [DISTWIDTH] IN BLOOD BY AUTOMATED COUNT: 14.3 % (ref 11.5–14.5)
ERYTHROCYTE [DISTWIDTH] IN BLOOD BY AUTOMATED COUNT: 14.3 % (ref 11.5–14.5)
EST. GFR  (AFRICAN AMERICAN): 36.1 ML/MIN/1.73 M^2
EST. GFR  (AFRICAN AMERICAN): 36.1 ML/MIN/1.73 M^2
EST. GFR  (NON AFRICAN AMERICAN): 31.3 ML/MIN/1.73 M^2
EST. GFR  (NON AFRICAN AMERICAN): 31.3 ML/MIN/1.73 M^2
GLUCOSE SERPL-MCNC: 101 MG/DL (ref 70–110)
GLUCOSE SERPL-MCNC: 109 MG/DL (ref 70–110)
GLUCOSE SERPL-MCNC: 121 MG/DL (ref 70–110)
HCT VFR BLD AUTO: 25.5 % (ref 37–48.5)
HCT VFR BLD AUTO: 25.5 % (ref 37–48.5)
HGB BLD-MCNC: 7.8 G/DL (ref 12–16)
HGB BLD-MCNC: 7.8 G/DL (ref 12–16)
IMM GRANULOCYTES # BLD AUTO: ABNORMAL K/UL (ref 0–0.04)
IMM GRANULOCYTES # BLD AUTO: ABNORMAL K/UL (ref 0–0.04)
IMM GRANULOCYTES NFR BLD AUTO: ABNORMAL % (ref 0–0.5)
IMM GRANULOCYTES NFR BLD AUTO: ABNORMAL % (ref 0–0.5)
LYMPHOCYTES NFR BLD: 8 % (ref 18–48)
LYMPHOCYTES NFR BLD: 8 % (ref 18–48)
MAGNESIUM SERPL-MCNC: 1.8 MG/DL (ref 1.6–2.6)
MCH RBC QN AUTO: 29.8 PG (ref 27–31)
MCH RBC QN AUTO: 29.8 PG (ref 27–31)
MCHC RBC AUTO-ENTMCNC: 30.6 G/DL (ref 32–36)
MCHC RBC AUTO-ENTMCNC: 30.6 G/DL (ref 32–36)
MCV RBC AUTO: 97 FL (ref 82–98)
MCV RBC AUTO: 97 FL (ref 82–98)
MONOCYTES NFR BLD: 2 % (ref 4–15)
MONOCYTES NFR BLD: 2 % (ref 4–15)
NEUTROPHILS NFR BLD: 67 % (ref 38–73)
NEUTROPHILS NFR BLD: 67 % (ref 38–73)
NEUTS BAND NFR BLD MANUAL: 22 %
NEUTS BAND NFR BLD MANUAL: 22 %
NRBC BLD-RTO: 0 /100 WBC
NRBC BLD-RTO: 0 /100 WBC
PLATELET # BLD AUTO: 159 K/UL (ref 150–350)
PLATELET # BLD AUTO: 159 K/UL (ref 150–350)
PMV BLD AUTO: 11.4 FL (ref 9.2–12.9)
PMV BLD AUTO: 11.4 FL (ref 9.2–12.9)
POTASSIUM SERPL-SCNC: 4.4 MMOL/L (ref 3.5–5.1)
POTASSIUM SERPL-SCNC: 4.4 MMOL/L (ref 3.5–5.1)
RBC # BLD AUTO: 2.62 M/UL (ref 4–5.4)
RBC # BLD AUTO: 2.62 M/UL (ref 4–5.4)
SODIUM SERPL-SCNC: 138 MMOL/L (ref 136–145)
SODIUM SERPL-SCNC: 138 MMOL/L (ref 136–145)
WBC # BLD AUTO: 10.81 K/UL (ref 3.9–12.7)
WBC # BLD AUTO: 10.81 K/UL (ref 3.9–12.7)

## 2020-04-06 PROCEDURE — 97535 SELF CARE MNGMENT TRAINING: CPT

## 2020-04-06 PROCEDURE — 63600175 PHARM REV CODE 636 W HCPCS: Performed by: ORTHOPAEDIC SURGERY

## 2020-04-06 PROCEDURE — 25000003 PHARM REV CODE 250: Performed by: ORTHOPAEDIC SURGERY

## 2020-04-06 PROCEDURE — 97530 THERAPEUTIC ACTIVITIES: CPT

## 2020-04-06 PROCEDURE — 12000002 HC ACUTE/MED SURGE SEMI-PRIVATE ROOM

## 2020-04-06 PROCEDURE — 85007 BL SMEAR W/DIFF WBC COUNT: CPT

## 2020-04-06 PROCEDURE — 94761 N-INVAS EAR/PLS OXIMETRY MLT: CPT

## 2020-04-06 PROCEDURE — 36415 COLL VENOUS BLD VENIPUNCTURE: CPT

## 2020-04-06 PROCEDURE — 85027 COMPLETE CBC AUTOMATED: CPT

## 2020-04-06 PROCEDURE — 97166 OT EVAL MOD COMPLEX 45 MIN: CPT

## 2020-04-06 PROCEDURE — 97162 PT EVAL MOD COMPLEX 30 MIN: CPT

## 2020-04-06 PROCEDURE — 25000003 PHARM REV CODE 250: Performed by: INTERNAL MEDICINE

## 2020-04-06 PROCEDURE — 80048 BASIC METABOLIC PNL TOTAL CA: CPT

## 2020-04-06 PROCEDURE — 83735 ASSAY OF MAGNESIUM: CPT

## 2020-04-06 RX ORDER — BALSAM PERU/CASTOR OIL
OINTMENT (GRAM) TOPICAL 2 TIMES DAILY
Status: DISCONTINUED | OUTPATIENT
Start: 2020-04-06 | End: 2020-04-08 | Stop reason: HOSPADM

## 2020-04-06 RX ADMIN — CASTOR OIL AND BALSAM, PERU: 788; 87 OINTMENT TOPICAL at 12:04

## 2020-04-06 RX ADMIN — MEGESTROL ACETATE 40 MG: 20 TABLET ORAL at 11:04

## 2020-04-06 RX ADMIN — MORPHINE SULFATE 3 MG: 4 INJECTION INTRAVENOUS at 12:04

## 2020-04-06 RX ADMIN — CLOPIDOGREL BISULFATE 75 MG: 75 TABLET, FILM COATED ORAL at 11:04

## 2020-04-06 RX ADMIN — SENNOSIDES AND DOCUSATE SODIUM 1 TABLET: 8.6; 5 TABLET ORAL at 11:04

## 2020-04-06 RX ADMIN — ASPIRIN 81 MG 81 MG: 81 TABLET ORAL at 11:04

## 2020-04-06 RX ADMIN — HYDROCODONE BITARTRATE AND ACETAMINOPHEN 1 TABLET: 5; 325 TABLET ORAL at 06:04

## 2020-04-06 NOTE — SUBJECTIVE & OBJECTIVE
"Principal Problem:Closed fracture of left hip      Interval History: Comfortable but not alert    Review of patient's allergies indicates:   Allergen Reactions    Doxycycline monohydrate Other (See Comments)     dizzyness    Heparin     Heparin analogues        Current Facility-Administered Medications   Medication    acetaminophen tablet 650 mg    acetaminophen tablet 650 mg    aspirin chewable tablet 81 mg    atorvastatin tablet 40 mg    balsam peru-castor oiL Oint    clopidogreL tablet 75 mg    dextrose 50% injection 12.5 g    dextrose 50% injection 25 g    docusate sodium capsule 100 mg    glucagon (human recombinant) injection 1 mg    glucose chewable tablet 16 g    glucose chewable tablet 24 g    HYDROcodone-acetaminophen 5-325 mg per tablet 1 tablet    loperamide capsule 2 mg    megestroL tablet 40 mg    melatonin tablet 6 mg    morphine injection 3 mg    ondansetron injection 4 mg    pantoprazole EC tablet 40 mg    senna-docusate 8.6-50 mg per tablet 1 tablet    sodium chloride 0.9% flush 10 mL    sodium chloride 0.9% flush 10 mL     Objective:     Vital Signs (Most Recent):  Temp: 99.6 °F (37.6 °C) (04/06/20 0800)  Pulse: 98 (04/06/20 0800)  Resp: 18 (04/06/20 0800)  BP: (!) 155/79 (04/06/20 0800)  SpO2: 96 % (04/06/20 0800) Vital Signs (24h Range):  Temp:  [98.4 °F (36.9 °C)-99.6 °F (37.6 °C)] 99.6 °F (37.6 °C)  Pulse:  [95-98] 98  Resp:  [18] 18  SpO2:  [96 %] 96 %  BP: (155-170)/(79-82) 155/79     Weight: 44.7 kg (98 lb 8.7 oz)  Height: 5' 7" (170.2 cm)  Body mass index is 15.43 kg/m².      Intake/Output Summary (Last 24 hours) at 4/6/2020 6261  Last data filed at 4/6/2020 0537  Gross per 24 hour   Intake 30 ml   Output 425 ml   Net -395 ml       General    Nursing note and vitals reviewed.  Constitutional: She is oriented to person, place, and time. She appears well-developed and well-nourished. No distress.   HENT:   Head: Atraumatic.   Eyes: EOM are normal.   Cardiovascular: " Normal rate.    Pulmonary/Chest: Effort normal.   Abdominal: Soft.   Neurological: She is alert and oriented to person, place, and time.   Psychiatric: Her behavior is normal.             Right Hip Exam   Right hip exam is normal.   Left Hip Exam     Inspection   Swelling: present  Erythema: absent    Tenderness   The patient tender to palpation of the trochanteric bursa.    Range of Motion   Extension: abnormal   Flexion: abnormal     Tests   Log Roll: positive    Other   Sensation: normal          Vascular Exam       Left Pulses  Dorsalis Pedis:      2+              Significant Labs:   CBC:   Recent Labs   Lab 04/05/20  0606 04/06/20  0448   WBC 8.20 10.81  10.81   HGB 8.6* 7.8*  7.8*   HCT 26.6* 25.5*  25.5*    159  159     CMP:   Recent Labs   Lab 04/05/20  0605 04/06/20 0448    138  138   K 4.5 4.4  4.4    108  108   CO2 21* 19*  19*   GLU 93 101  101   BUN 19 17  17   CREATININE 1.4 1.5*  1.5*   CALCIUM 9.7 9.4  9.4   ANIONGAP 10 11  11   EGFRNONAA 34.0* 31.3*  31.3*     Coagulation: No results for input(s): LABPROT, INR, APTT in the last 48 hours.  All pertinent labs within the past 24 hours have been reviewed.    Significant Imaging: None

## 2020-04-06 NOTE — PLAN OF CARE
Pt is pending evaluation by PT/OT and has a closed fracture of the left hip. Pt states she wishes to go home regardless of what is recommended. D/C date unknown at this time. SW/CM to continue to follow.     04/06/20 1042   Discharge Assessment   Assessment Type Discharge Planning Assessment   Confirmed/corrected address and phone number on facesheet? Yes   Assessment information obtained from? Patient   Communicated expected length of stay with patient/caregiver no   Prior to hospitilization cognitive status: Alert/Oriented   Prior to hospitalization functional status: Independent   Current cognitive status: Alert/Oriented   Current Functional Status: Needs Assistance   Lives With child(christy), adult  (Lives with dtr Reyna)   Able to Return to Prior Arrangements other (see comments)   Is patient able to care for self after discharge? Unable to determine at this time (comments)   Who are your caregiver(s) and their phone number(s)? Dtr Reyna [phone]. Pt states she has no medical POA.   Readmission Within the Last 30 Days no previous admission in last 30 days   Patient currently being followed by outpatient case management? No   Patient currently receives any other outside agency services? No   Equipment Currently Used at Home shower chair;walker, standard   Part D Coverage Pt has Medicare and BCBS   Do you have any problems affording any of your prescribed medications? No   Is the patient taking medications as prescribed? yes   Does the patient have transportation home? Yes   Transportation Anticipated family or friend will provide   Discharge Plan A Other  (TBD after therapy evaluation)   Discharge Plan B Home with family   DME Needed Upon Discharge  other (see comments)  (TBD)   Patient/Family in Agreement with Plan unable to assess  (Therapy recommendations are not yet available. However, pt states she wants to go home with the support of her dtrs and does not want placement or home health if recommended.)

## 2020-04-06 NOTE — PROGRESS NOTES
04/06/20 0930        Wound Other (comment) posterior Sacral Spine   No Date First Assessed or Time First Assessed found.   Primary Wound Type: Other (comment)  Side: Right  Orientation: posterior  Location: Sacral Spine   Wound Image    Dressing Appearance Open to air   Drainage Amount None   Appearance Purple   Tissue loss description Not applicable   Periwound Area Ecchymotic   Wound Length (cm) 2 cm   Wound Width (cm) 2.2 cm   Wound Depth (cm)   (closed)   Wound Surface Area (cm^2) 4.4 cm^2   Spoke with MD and received orders

## 2020-04-06 NOTE — PROGRESS NOTES
"[facility]  Orthopedics  Progress Note    Patient Name: Melania Beavers  MRN: [id number]  Admission Date: 4/4/2020  Hospital Length of Stay: 2 days  Attending Provider: Rissa Murray MD  Primary Care Provider: Luis rBown MD  Follow-up For: Procedure(s) (LRB):  PINNING, HIP, PERCUTANEOUS (Left)    Post-Operative Day: 1 Day Post-Op  Subjective:     Principal Problem:Closed fracture of left hip      Interval History: Comfortable but not alert    Review of patient's allergies indicates:   Allergen Reactions    Doxycycline monohydrate Other (See Comments)     dizzyness    Heparin     Heparin analogues        Current Facility-Administered Medications   Medication    acetaminophen tablet 650 mg    acetaminophen tablet 650 mg    aspirin chewable tablet 81 mg    atorvastatin tablet 40 mg    balsam peru-castor oiL Oint    clopidogreL tablet 75 mg    dextrose 50% injection 12.5 g    dextrose 50% injection 25 g    docusate sodium capsule 100 mg    glucagon (human recombinant) injection 1 mg    glucose chewable tablet 16 g    glucose chewable tablet 24 g    HYDROcodone-acetaminophen 5-325 mg per tablet 1 tablet    loperamide capsule 2 mg    megestroL tablet 40 mg    melatonin tablet 6 mg    morphine injection 3 mg    ondansetron injection 4 mg    pantoprazole EC tablet 40 mg    senna-docusate 8.6-50 mg per tablet 1 tablet    sodium chloride 0.9% flush 10 mL    sodium chloride 0.9% flush 10 mL     Objective:     Vital Signs (Most Recent):  Temp: 99.6 °F (37.6 °C) (04/06/20 0800)  Pulse: 98 (04/06/20 0800)  Resp: 18 (04/06/20 0800)  BP: (!) 155/79 (04/06/20 0800)  SpO2: 96 % (04/06/20 0800) Vital Signs (24h Range):  Temp:  [98.4 °F (36.9 °C)-99.6 °F (37.6 °C)] 99.6 °F (37.6 °C)  Pulse:  [95-98] 98  Resp:  [18] 18  SpO2:  [96 %] 96 %  BP: (155-170)/(79-82) 155/79     Weight: 44.7 kg (98 lb 8.7 oz)  Height: 5' 7" (170.2 cm)  Body mass index is 15.43 kg/m².      Intake/Output Summary " (Last 24 hours) at 4/6/2020 1454  Last data filed at 4/6/2020 0537  Gross per 24 hour   Intake 30 ml   Output 425 ml   Net -395 ml       General    Nursing note and vitals reviewed.  Constitutional: She is oriented to person, place, and time. She appears well-developed and well-nourished. No distress.   HENT:   Head: Atraumatic.   Eyes: EOM are normal.   Cardiovascular: Normal rate.    Pulmonary/Chest: Effort normal.   Abdominal: Soft.   Neurological: She is alert and oriented to person, place, and time.   Psychiatric: Her behavior is normal.             Right Hip Exam   Right hip exam is normal.   Left Hip Exam     Inspection   Swelling: present  Erythema: absent    Tenderness   The patient tender to palpation of the trochanteric bursa.    Range of Motion   Extension: abnormal   Flexion: abnormal     Tests   Log Roll: positive    Other   Sensation: normal          Vascular Exam       Left Pulses  Dorsalis Pedis:      2+              Significant Labs:   CBC:   Recent Labs   Lab 04/05/20  0606 04/06/20  0448   WBC 8.20 10.81  10.81   HGB 8.6* 7.8*  7.8*   HCT 26.6* 25.5*  25.5*    159  159     CMP:   Recent Labs   Lab 04/05/20  0605 04/06/20  0448    138  138   K 4.5 4.4  4.4    108  108   CO2 21* 19*  19*   GLU 93 101  101   BUN 19 17  17   CREATININE 1.4 1.5*  1.5*   CALCIUM 9.7 9.4  9.4   ANIONGAP 10 11  11   EGFRNONAA 34.0* 31.3*  31.3*     Coagulation: No results for input(s): LABPROT, INR, APTT in the last 48 hours.  All pertinent labs within the past 24 hours have been reviewed.    Significant Imaging: None    Assessment/Plan:     * Closed fracture of left hip  S/p CRPP  Cont PT. WBAT LLE with walker  Placement  Pain control  Monitor H/H          Anibal Kidd MD  Orthopedics  [facility]

## 2020-04-06 NOTE — PT/OT/SLP EVAL
Occupational Therapy   Evaluation    Name: Melania Beavers  MRN: [id number]  Admitting Diagnosis:  Closed fracture of left hip 1 Day Post-Op    Recommendations:     Discharge Recommendations: home with home health  Discharge Equipment Recommendations:  other (see comments)(TBD)  Barriers to discharge:  None    Assessment:     Melania Beavers is a 86 y.o. female with a medical diagnosis of Closed fracture of left hip.  She presents with decreased mobility secondary to fall resulting in LLE fx. Performance deficits affecting function: weakness, impaired endurance, impaired self care skills, impaired functional mobilty, gait instability, decreased safety awareness.      Rehab Prognosis: Fair; patient would benefit from acute skilled OT services to address these deficits and reach maximum level of function.       Plan:     Patient to be seen 5 x/week to address the above listed problems via self-care/home management, therapeutic activities, therapeutic exercises  · Plan of Care Expires: 05/06/20  · Plan of Care Reviewed with: patient    Subjective     Chief Complaint: decreased mobility   Patient/Family Comments/goals: to get out of bed and get moving    Occupational Profile:  Living Environment: lives at home with daughter who works during the day. Patient reports having a private care giver during the day when daughter is at work.  Previous level of function: receives assistance with bathing and dressing. Modified independent with household mobility using a rollator.   Roles and Routines: limited homemaker  Equipment Used at Home:  rollator, shower chair, bedside commode  Assistance upon Discharge: daughter and private care giver.    Pain/Comfort:  · Pain Rating 1: 0/10  · Pain Rating Post-Intervention 1: 0/10    Patients cultural, spiritual, Scientologist conflicts given the current situation: no    Objective:     Communicated with: nurse prior to session.  Patient found supine with peripheral IV upon OT entry to  room.    General Precautions: Standard, fall   Orthopedic Precautions:LLE weight bearing as tolerated   Braces: N/A     Occupational Performance:    Bed Mobility:    · Patient completed Supine to Sit with maximal assistance  · Patient completed Sit to Supine with maximal assistance     Activities of Daily Living:  · Grooming: minimum assistance to wash face sitting EOB.  · Lower Body Dressing: total assistance to don/doff socks sitting EOB.    Cognitive/Visual Perceptual:  Cognitive/Psychosocial Skills:     -       Oriented to: Person, Place and Situation   -       Follows Commands/attention:Follows one-step commands  -       Communication: clear/fluent  -       Memory: Impaired STM  -       Safety awareness/insight to disability: impaired   -       Mood/Affect/Coping skills/emotional control: Cooperative and Pleasant  Visual/Perceptual:      -Intact Wears glasses for everyday use    Physical Exam:  Upper Extremity Range of Motion:     -       Right Upper Extremity: WFL  -       Left Upper Extremity: WFL  Upper Extremity Strength:    -       Right Upper Extremity: 3+/5  -       Left Upper Extremity: 3+/5   Strength:    -       Right Upper Extremity: fair  -       Left Upper Extremity: fair  Fine Motor Coordination:    -       Intact    AMPAC 6 Click ADL:  AMPAC Total Score: 14    Treatment & Education:  Patient educated on the purpose of Occupational Therapy and the importance of getting OOB.  Education:    Patient left supine with all lines intact, call button in reach and bed alarm on    GOALS:   Multidisciplinary Problems     Occupational Therapy Goals        Problem: Occupational Therapy Goal    Goal Priority Disciplines Outcome Interventions   Occupational Therapy Goal     OT, PT/OT     Description:  Goals to be met by: discharge     Patient will increase functional independence with ADLs by performing:    UE Dressing with Stand-by Assistance.  LE Dressing with Stand-by Assistance.  Grooming while seated  with Stand-by Assistance.  Toileting from toilet with Minimal Assistance for hygiene and clothing management.   Supine to sit with Minimal Assistance.  Toilet transfer to toilet with Minimal Assistance.                      History:     Past Medical History:   Diagnosis Date    Anemia, unspecified 12/3/2019    GERD (gastroesophageal reflux disease)     Hypertension     Normochromic normocytic anemia 12/3/2019       Past Surgical History:   Procedure Laterality Date    ESOPHAGOGASTRODUODENOSCOPY N/A 10/4/2019    Procedure: EGD (ESOPHAGOGASTRODUODENOSCOPY);  Surgeon: Dominick Cunningham III, MD;  Location: [facility];  Service: Endoscopy;  Laterality: N/A;    HYSTERECTOMY         Time Tracking:     OT Date of Treatment:    OT Start Time: 1030  OT Stop Time: 1050  OT Total Time (min): 20 min    Billable Minutes:Evaluation 10  Self Care/Home Management 10    Jamin Salas OT  4/6/2020

## 2020-04-06 NOTE — PROGRESS NOTES
"[facility] Progress Note    Patient Name: Melania Beavers MRN: [id number]   Patient Class: IP- Inpatient  Length of Stay: 1   Admission Date: 4/4/2020  8:02 AM Attending Physician: Rissa Murray MD   Primary Care Provider: Luis Brown MD Face-to-Face encounter date: 04/05/2020   Chief Complaint: Hip Pain        Subjective:    Hospital course:  Patient was admitted full closed left hip fracture, scheduled for surgery today     Interval History: .No concerns/issues overnight reported by the patient or the nursing staff.  Aspirin and Plavix on hold  Will restart after the procedure  Consult PT OT    Review of Systems All other Review of Systems were found to be negative expect for that mentioned already in HPI.     Objective:   Physical Exam  BP (!) 170/82   Pulse 95   Temp 98.4 °F (36.9 °C) (Oral)   Resp 18   Ht 5' 7" (1.702 m)   Wt 44.7 kg (98 lb 8.7 oz)   LMP  (LMP Unknown)   SpO2 96%   Breastfeeding? No   BMI 15.43 kg/m²   Vitals reviewed.    Constitutional: No acute distress.   HENT: Atraumatic. PERRLA  Cardiovascular: Normal rate, regular rhythm and normal heart sounds.   Pulmonary/Chest: Effort normal. She has no wheezes.   Abdominal: Soft. no tenderness,no distension,bowel sounds are normal  Neurological: AOX3  Skin: Skin is warm and dry left.  hip pain controlled with morphine    Following labs were Reviewed   Recent Labs   Lab 04/05/20  0605 04/05/20  0606   WBC  --  8.20   HGB  --  8.6*   HCT  --  26.6*   PLT  --  175   CALCIUM 9.7  --      --    K 4.5  --    CO2 21*  --      --    BUN 19  --    CREATININE 1.4  --      No results found for: POCTGLUCOSE     Microbiology Results (last 7 days)     ** No results found for the last 168 hours. **        FL Flouro Usage   Final Result      CT Pelvis Without Contrast   Final Result      Acute nondisplaced fracture involving the left femoral head/neck junction.      Osteopenia.      Cholelithiasis, right " nephrolithiasis, atherosclerosis, and other incidental findings as above.         Electronically signed by: Clarence Rao MD   Date:    04/04/2020   Time:    10:30      X-ray Chest AP Portable   Final Result      Stable chronic findings as above.  No acute pulmonary process.         Electronically signed by: Clarence Rao MD   Date:    04/04/2020   Time:    09:32      X-Ray Hip 2 View Left   Final Result   Addendum 1 of 1      After review of same day CT pelvis, in retrospect, nondisplaced fracture    involving the left femoral head/neck junction is faintly visible on the    oblique view only.         Electronically signed by: Clarence Rao MD   Date:    04/04/2020   Time:    10:32      Final          Inpatient medications  Scheduled Meds:   aspirin  81 mg Oral Daily    atorvastatin  40 mg Oral QHS    clopidogreL  75 mg Oral Daily    megestroL  40 mg Oral BID    pantoprazole  40 mg Oral Daily    senna-docusate 8.6-50 mg  1 tablet Oral BID     Continuous Infusions:   loperamide       PRN Meds:.acetaminophen, acetaminophen, dextrose 50%, dextrose 50%, diphenhydrAMINE, docusate sodium, glucagon (human recombinant), glucose, glucose, HYDROcodone-acetaminophen, HYDROmorphone, loperamide, melatonin, meperidine, morphine, ondansetron, ondansetron, oxyCODONE, promethazine (PHENERGAN) IVPB, sodium chloride 0.9%, sodium chloride 0.9%      Assessment & Plan:   Melania Beavers is a 86 y.o. female admitted for  Closed fracture of left hip  S/p CRPP  Start PT. WBAT LLE with walker  Placement  Pain control  Monitor H/H           Core measures:  - Code status: Full  - DVT PPx: bilateral SCDs or heparin/Lovenox   - lines/ tubes:       Discharge Planning:   No mobility needs. Patient will be discharged in   PT C/S for debility/fraility/deconditioning and assistance in discharge needs.       Above encounter included review of the medical records, interviewing and examining the patient face-to-face, discussion with family  and other health care providers, ordering and interpreting lab/test results and formulating a plan of care.

## 2020-04-06 NOTE — NURSING
Pt is having some effects from anesthesia & pulled IV off. She is also refusing tele. Dr. Leach notified. Will continue to monitor pt & attempt to start a new IV later when pt is more cooperative. Ancef did not infuse r/t above.     Edit to add: Pt also refusing meds, per Jessica, nurse extender. Will try again later.

## 2020-04-06 NOTE — ANESTHESIA POSTPROCEDURE EVALUATION
Anesthesia Post Evaluation    Patient: Melania Beavers    Procedure(s) Performed: Procedure(s) (LRB):  PINNING, HIP, PERCUTANEOUS (Left)    Final Anesthesia Type: general    Patient location during evaluation: PACU  Patient participation: Yes- Able to Participate  Level of consciousness: awake and alert  Post-procedure vital signs: reviewed and stable  Pain management: adequate  Airway patency: patent  MUKUND mitigation strategies: Multimodal analgesia, Extubation while patient is awake, Verification of full reversal of neuromuscular block and Extubation and recovery carried out in lateral, semiupright, or other nonsupine position  PONV status at discharge: No PONV  Anesthetic complications: no      Cardiovascular status: hemodynamically stable  Respiratory status: unassisted, spontaneous ventilation and room air  Hydration status: euvolemic  Follow-up not needed.          Vitals Value Taken Time   /82 4/5/2020  3:04 PM   Temp 36.9 °C (98.4 °F) 4/5/2020  3:04 PM   Pulse 95 4/5/2020  3:04 PM   Resp 18 4/5/2020  3:04 PM   SpO2 96 % 4/5/2020  3:04 PM         Event Time     Out of Recovery 04/05/2020 10:14:32          Pain/Rickey Score: Pain Rating Prior to Med Admin: 9 (4/5/2020  3:24 PM)  Pain Rating Post Med Admin: 0 (4/5/2020  3:41 PM)  Rickey Score: 9 (4/5/2020 10:00 AM)

## 2020-04-07 LAB
ANION GAP SERPL CALC-SCNC: 8 MMOL/L (ref 8–16)
ANISOCYTOSIS BLD QL SMEAR: SLIGHT
BASOPHILS NFR BLD: 0 % (ref 0–1.9)
BUN SERPL-MCNC: 18 MG/DL (ref 8–23)
CALCIUM SERPL-MCNC: 9.2 MG/DL (ref 8.7–10.5)
CHLORIDE SERPL-SCNC: 110 MMOL/L (ref 95–110)
CO2 SERPL-SCNC: 18 MMOL/L (ref 23–29)
CREAT SERPL-MCNC: 1.2 MG/DL (ref 0.5–1.4)
DIFFERENTIAL METHOD: ABNORMAL
EOSINOPHIL NFR BLD: 0 % (ref 0–8)
ERYTHROCYTE [DISTWIDTH] IN BLOOD BY AUTOMATED COUNT: 14.2 % (ref 11.5–14.5)
EST. GFR  (AFRICAN AMERICAN): 47.3 ML/MIN/1.73 M^2
EST. GFR  (NON AFRICAN AMERICAN): 41 ML/MIN/1.73 M^2
GLUCOSE SERPL-MCNC: 130 MG/DL (ref 70–110)
GLUCOSE SERPL-MCNC: 155 MG/DL (ref 70–110)
GLUCOSE SERPL-MCNC: 189 MG/DL (ref 70–110)
GLUCOSE SERPL-MCNC: 84 MG/DL (ref 70–110)
GLUCOSE SERPL-MCNC: 90 MG/DL (ref 70–110)
HCT VFR BLD AUTO: 23.4 % (ref 37–48.5)
HGB BLD-MCNC: 7.6 G/DL (ref 12–16)
HYPOCHROMIA BLD QL SMEAR: ABNORMAL
IMM GRANULOCYTES # BLD AUTO: ABNORMAL K/UL (ref 0–0.04)
IMM GRANULOCYTES NFR BLD AUTO: ABNORMAL % (ref 0–0.5)
LYMPHOCYTES NFR BLD: 9 % (ref 18–48)
MAGNESIUM SERPL-MCNC: 1.9 MG/DL (ref 1.6–2.6)
MCH RBC QN AUTO: 29.7 PG (ref 27–31)
MCHC RBC AUTO-ENTMCNC: 32.5 G/DL (ref 32–36)
MCV RBC AUTO: 91 FL (ref 82–98)
MONOCYTES NFR BLD: 14 % (ref 4–15)
NEUTROPHILS NFR BLD: 73 % (ref 38–73)
NEUTS BAND NFR BLD MANUAL: 4 %
NRBC BLD-RTO: 0 /100 WBC
OVALOCYTES BLD QL SMEAR: ABNORMAL
PLATELET # BLD AUTO: 160 K/UL (ref 150–350)
PLATELET BLD QL SMEAR: ABNORMAL
PMV BLD AUTO: 11.5 FL (ref 9.2–12.9)
POTASSIUM SERPL-SCNC: 3.9 MMOL/L (ref 3.5–5.1)
RBC # BLD AUTO: 2.56 M/UL (ref 4–5.4)
SCHISTOCYTES BLD QL SMEAR: PRESENT
SODIUM SERPL-SCNC: 136 MMOL/L (ref 136–145)
WBC # BLD AUTO: 8.49 K/UL (ref 3.9–12.7)

## 2020-04-07 PROCEDURE — 80048 BASIC METABOLIC PNL TOTAL CA: CPT

## 2020-04-07 PROCEDURE — 97530 THERAPEUTIC ACTIVITIES: CPT

## 2020-04-07 PROCEDURE — 36415 COLL VENOUS BLD VENIPUNCTURE: CPT

## 2020-04-07 PROCEDURE — 83735 ASSAY OF MAGNESIUM: CPT

## 2020-04-07 PROCEDURE — 97116 GAIT TRAINING THERAPY: CPT | Mod: CQ

## 2020-04-07 PROCEDURE — 85007 BL SMEAR W/DIFF WBC COUNT: CPT

## 2020-04-07 PROCEDURE — 25000003 PHARM REV CODE 250: Performed by: ORTHOPAEDIC SURGERY

## 2020-04-07 PROCEDURE — 12000002 HC ACUTE/MED SURGE SEMI-PRIVATE ROOM

## 2020-04-07 PROCEDURE — 94761 N-INVAS EAR/PLS OXIMETRY MLT: CPT

## 2020-04-07 PROCEDURE — 97535 SELF CARE MNGMENT TRAINING: CPT

## 2020-04-07 PROCEDURE — 97530 THERAPEUTIC ACTIVITIES: CPT | Mod: CQ

## 2020-04-07 PROCEDURE — 85027 COMPLETE CBC AUTOMATED: CPT

## 2020-04-07 RX ADMIN — MELATONIN 6 MG: at 09:04

## 2020-04-07 RX ADMIN — CASTOR OIL AND BALSAM, PERU: 788; 87 OINTMENT TOPICAL at 09:04

## 2020-04-07 RX ADMIN — ASPIRIN 81 MG 81 MG: 81 TABLET ORAL at 09:04

## 2020-04-07 RX ADMIN — ATORVASTATIN CALCIUM 40 MG: 40 TABLET, FILM COATED ORAL at 09:04

## 2020-04-07 RX ADMIN — SENNOSIDES AND DOCUSATE SODIUM 1 TABLET: 8.6; 5 TABLET ORAL at 09:04

## 2020-04-07 RX ADMIN — MEGESTROL ACETATE 40 MG: 20 TABLET ORAL at 09:04

## 2020-04-07 RX ADMIN — HYDROCODONE BITARTRATE AND ACETAMINOPHEN 1 TABLET: 5; 325 TABLET ORAL at 09:04

## 2020-04-07 RX ADMIN — HYDROCODONE BITARTRATE AND ACETAMINOPHEN 1 TABLET: 5; 325 TABLET ORAL at 12:04

## 2020-04-07 RX ADMIN — HYDROCODONE BITARTRATE AND ACETAMINOPHEN 1 TABLET: 5; 325 TABLET ORAL at 06:04

## 2020-04-07 RX ADMIN — HYDROCODONE BITARTRATE AND ACETAMINOPHEN 1 TABLET: 5; 325 TABLET ORAL at 11:04

## 2020-04-07 RX ADMIN — PANTOPRAZOLE SODIUM 40 MG: 40 TABLET, DELAYED RELEASE ORAL at 06:04

## 2020-04-07 RX ADMIN — CASTOR OIL AND BALSAM, PERU: 788; 87 OINTMENT TOPICAL at 12:04

## 2020-04-07 RX ADMIN — CLOPIDOGREL BISULFATE 75 MG: 75 TABLET, FILM COATED ORAL at 09:04

## 2020-04-07 NOTE — PLAN OF CARE
04/07/20 1646   Discharge Reassessment   Assessment Type Discharge Planning Reassessment   Anticipated Discharge Disposition Rehab   Patient choice form signed by patient/caregiver List from [employer]  (Due to COVID-19 precautions, Mary Beavers, daughter ([phone]) contacted via phone, IPR / SNF options discussed, and Pt Choice Form completed with verbal / phone consent at 1507.)   Post-Acute Status   Post-Acute Authorization Placement   Post-Acute Placement Status Referrals Sent     SW contacted by Pt's daughter, Mary Beavers, this date.  She reports that she discussed Pt's care needs and thought about options overnight, and has concerns about caring for Pt at home.  She reports she is concerned she will not be able to care for her without additional therapy.    Discussed options at this time, as Pt appears to be a candidate for Inpatient Rehab.  At this time, IPR consult has been requested from MD, and SW awaiting response.  After discussing options, daughter's preference in IPR is [facility].    If IPR is not recommended by MD, daughter's preference in SNF is 1. [facility]  [facility]. [facility] and [facility]. [facility].    SW sending referrals and will await response.

## 2020-04-07 NOTE — PLAN OF CARE
Problem: Occupational Therapy Goal  Goal: Occupational Therapy Goal  Description  Goals to be met by: discharge     Patient will increase functional independence with ADLs by performing:    UE Dressing with Stand-by Assistance.  LE Dressing with Stand-by Assistance.  Grooming while seated with Stand-by Assistance.  Toileting from toilet with Minimal Assistance for hygiene and clothing management.   Supine to sit with Minimal Assistance.  Toilet transfer to toilet with Minimal Assistance.     Outcome: Ongoing, Progressing

## 2020-04-07 NOTE — PT/OT/SLP PROGRESS
Occupational Therapy   Treatment    Name: Melania Beavers  MRN: [id number]  Admitting Diagnosis:  Closed fracture of left hip  2 Days Post-Op    Recommendations:     Discharge Recommendations: nursing facility, skilled  Discharge Equipment Recommendations:  other (see comments)(TBD)  Barriers to discharge:  None    Assessment:     Melania Beavers is a 86 y.o. female with a medical diagnosis of Closed fracture of left hip.  She presents with weakness. Patient required motivation to work with therapy this session.  Performance deficits affecting function are weakness, impaired endurance, impaired self care skills, impaired functional mobilty, gait instability, impaired balance.     Rehab Prognosis:  Fair; patient would benefit from acute skilled OT services to address these deficits and reach maximum level of function.       Plan:     Patient to be seen 5 x/week to address the above listed problems via self-care/home management, therapeutic activities, therapeutic exercises  · Plan of Care Expires: 05/06/20  · Plan of Care Reviewed with: patient    Subjective     Pain/Comfort:  · Pain Rating 1: 5/10  · Location - Side 1: Left  · Location 1: hip  · Pain Rating Post-Intervention 1: 5/10    Objective:     Communicated with: nurse prior to session.  Patient found up in chair with telemetry, peripheral IV upon OT entry to room.    General Precautions: Standard, fall   Orthopedic Precautions:LLE weight bearing as tolerated   Braces: N/A     Occupational Performance:     Bed Mobility:    · Patient completed Sit to Supine with maximal assistance     Functional Mobility/Transfers:  · Patient completed Chair <> Bed Transfer using Step Transfer technique with moderate assistance with hand-held assist and rolling walker    Activities of Daily Living:  · Declined ADL activity.      [facility] 6 Click ADL:      Treatment & Education:  Patient required motivation to work with therapy. Participated in transfer from chair to bed, but declined  ADL activity. Patient educated on the purpose of Occupational Therapy and the importance of getting OOB.    Patient left supine with all lines intact, call button in reach and bed alarm onEducation:      GOALS:   Multidisciplinary Problems     Occupational Therapy Goals        Problem: Occupational Therapy Goal    Goal Priority Disciplines Outcome Interventions   Occupational Therapy Goal     OT, PT/OT Ongoing, Progressing    Description:  Goals to be met by: discharge     Patient will increase functional independence with ADLs by performing:    UE Dressing with Stand-by Assistance.  LE Dressing with Stand-by Assistance.  Grooming while seated with Stand-by Assistance.  Toileting from toilet with Minimal Assistance for hygiene and clothing management.   Supine to sit with Minimal Assistance.  Toilet transfer to toilet with Minimal Assistance.                      Time Tracking:     OT Date of Treatment: 04/07/20  OT Start Time: 1346  OT Stop Time: 1409  OT Total Time (min): 23 min    Billable Minutes:Self Care/Home Management 10  Therapeutic Activity 13    Jamin Salas OT  4/7/2020

## 2020-04-07 NOTE — PT/OT/SLP PROGRESS
Physical Therapy Treatment    Patient Name:  Melania Beavers   MRN:  [id number]    Recommendations:     Discharge Recommendations:  nursing facility, skilled   Discharge Equipment Recommendations: walker, rolling   Barriers to discharge: None    Assessment:     Melania Beavers is a 86 y.o. female admitted with a medical diagnosis of Closed fracture of left hip.  She presents with the following impairments/functional limitations:  weakness, impaired endurance, impaired self care skills, impaired functional mobilty, gait instability, impaired balance, decreased lower extremity function, decreased ROM. Pt tolerated tx well. She was able to progress distance ambulate without onset of incident. Pt noted /c improvement in bed mobiltiy and transfers; however, req's cueing for safety awareness. She was able to perform minimal there-ex /c limted ROM noted. Pt will continue to benefit from skilled PT to improve BLE strength to allow her to return to PLOF without pain or limitations.     Rehab Prognosis: Good; patient would benefit from acute skilled PT services to address these deficits and reach maximum level of function.    Recent Surgery: Procedure(s) (LRB):  PINNING, HIP, PERCUTANEOUS (Left) 2 Days Post-Op    Plan:     During this hospitalization, patient to be seen daily to address the identified rehab impairments via gait training, therapeutic activities, therapeutic exercises and progress toward the following goals:    · Plan of Care Expires:  05/06/20    Subjective     Chief Complaint: Pain in L hip   Patient/Family Comments/goals: improve ability to ambulate  Pain/Comfort:  · Pain Rating 1: 9/10  · Location - Side 1: Left  · Location 1: hip  · Pain Addressed 1: Nurse notified  · Pain Rating Post-Intervention 1: 8/10      Objective:     Communicated with RN prior to session.  Patient found supine with peripheral IV, telemetry upon PT entry to room.     General Precautions: Standard, fall   Orthopedic Precautions:LLE weight  bearing as tolerated   Braces: N/A     Functional Mobility:  · Bed Mobility:     · Rolling Right: minimum assistance  · Scooting: moderate assistance  · Supine to Sit: moderate assistance  · Transfers:     · Toilet Transfer: moderate assistance with  rolling walker  using  Step Transfer and pt req's assistance for RW management.  · Gait: 15ft RW mod a, assist for RW management and safety, slow krystal, NBOS, small step length, flexed posture.        AM-PAC 6 CLICK MOBILITY          Therapeutic Activities and Exercises:   Don and Doff brief while standing /c CGA for balance and max a for brief management, pt able to perform personal hygiene. Sit<>Stand x 3 min a. Seated: Marches, LAQ's, AP's x 15 reps bilaterally,      Patient left up in chair with all lines intact, call button in reach and in no apparent distress. ..    GOALS:   Multidisciplinary Problems     Physical Therapy Goals        Problem: Physical Therapy Goal    Goal Priority Disciplines Outcome Goal Variances Interventions   Physical Therapy Goal     PT, PT/OT Ongoing, Progressing     Description:  Goals to be met by: D/C  Patient will increase functional independence with mobility by performin. Supine to sit with Maximum Assistance  2. Sit to stand transfer with Moderate Assistance  3. Bed to chair transfer with Moderate Assistance using Rolling Walker  4. Gait  x 20 feet with Moderate Assistance using Rolling Walker.                       Time Tracking:     PT Received On: 20  PT Start Time: 1125     PT Stop Time: 1155  PT Total Time (min): 30 min     Billable Minutes: Gait Training 15 and Therapeutic Activity 15    Treatment Type: Treatment  PT/PTA: PTA     PTA Visit Number: 1     Adrianne Fong, PTA  2020

## 2020-04-07 NOTE — PLAN OF CARE
Problem: Physical Therapy Goal  Goal: Physical Therapy Goal  Description  Goals to be met by: D/C  Patient will increase functional independence with mobility by performin. Supine to sit with Maximum Assistance  2. Sit to stand transfer with Moderate Assistance  3. Bed to chair transfer with Moderate Assistance using Rolling Walker  4. Gait  x 20 feet with Moderate Assistance using Rolling Walker.      Outcome: Ongoing, Progressing

## 2020-04-07 NOTE — PROGRESS NOTES
"[facility] Medicine Progress Note  Patient Name: Melania Beavers MRN: [id number]   Patient Class: IP- Inpatient  Length of Stay: 3   Admission Date: 4/4/2020  8:02 AM Attending Physician: Jassi Draper MD   Primary Care Provider: Luis Brown MD Face-to-Face encounter date: 04/07/2020   Chief Complaint: Hip Pain    Assessment & Plan:   Melania Beavers is a 86 y.o. female admitted for    Closed fracture of left hip  S/p CRPP  c/w PT. WBAT LLE with walker  Discussed with  and PT.  Recommended SNF  Pain control  Monitor H/H     Discharge Planning:   S/w consulted for SNF  Subjective:    Interval History: Patient still has pain. She is weak and not able to get out of chair without assistance. I do not think she is safe to go home at this point.     Review of Systems All other Review of Systems were found to be negative expect for that mentioned already in HPI.   Objective:   Physical Exam  BP (!) 157/79   Pulse 97   Temp 99.2 °F (37.3 °C) (Oral)   Resp 18   Ht 5' 7" (1.702 m)   Wt 44.7 kg (98 lb 8.7 oz)   LMP  (LMP Unknown)   SpO2 97%   Breastfeeding? No   BMI 15.43 kg/m²   Vitals reviewed.    Constitutional: pain on movement. Otherwise appears comfortable in chair  HENT: Atraumatic.   Cardiovascular: Normal rate, regular rhythm and normal heart sounds.   Pulmonary/Chest: Effort normal. Clear to auscultation bilaterally. No wheezes.   Abdominal: Soft. Bowel sounds are normal. Exhibits no distension and no mass. No tenderness  Neurological: Alert.   Skin: Skin is warm and dry.     Following labs were Reviewed   Recent Labs   Lab 04/07/20  0425   WBC 8.49   HGB 7.6*   HCT 23.4*      CALCIUM 9.2      K 3.9   CO2 18*      BUN 18   CREATININE 1.2     No results found for: POCTGLUCOSE     All labs within the past 24 hours have been reviewed  Microbiology Results (last 7 days)     ** No results found for the last 168 hours. **        FL Flouro Usage   Final " Result      CT Pelvis Without Contrast   Final Result      Acute nondisplaced fracture involving the left femoral head/neck junction.      Osteopenia.      Cholelithiasis, right nephrolithiasis, atherosclerosis, and other incidental findings as above.         Electronically signed by: Clarence Rao MD   Date:    04/04/2020   Time:    10:30      X-ray Chest AP Portable   Final Result      Stable chronic findings as above.  No acute pulmonary process.         Electronically signed by: Clarence Rao MD   Date:    04/04/2020   Time:    09:32      X-Ray Hip 2 View Left   Final Result   Addendum 1 of 1      After review of same day CT pelvis, in retrospect, nondisplaced fracture    involving the left femoral head/neck junction is faintly visible on the    oblique view only.         Electronically signed by: Clarence Rao MD   Date:    04/04/2020   Time:    10:32      Final          Inpatient medications  Scheduled Meds:   aspirin  81 mg Oral Daily    atorvastatin  40 mg Oral QHS    balsam peru-castor oiL   Topical (Top) BID    clopidogreL  75 mg Oral Daily    megestroL  40 mg Oral BID    pantoprazole  40 mg Oral Daily    senna-docusate 8.6-50 mg  1 tablet Oral BID     Continuous Infusions:   loperamide       PRN Meds:.acetaminophen, acetaminophen, dextrose 50%, dextrose 50%, docusate sodium, glucagon (human recombinant), glucose, glucose, HYDROcodone-acetaminophen, loperamide, melatonin, morphine, ondansetron, sodium chloride 0.9%, sodium chloride 0.9%    Above encounter included review of the medical records, interviewing and examining the patient face-to-face, discussion with family and other health care providers, ordering and interpreting lab/test results and formulating a plan of care.     Medical Decision Making:    [x] Low Complexity  [] Moderate Complexity  [] High Complexity    Jassi Draper  [facility] Hospitalist  04/07/2020

## 2020-04-07 NOTE — NURSING
Report given to Rivka, no concerns for shift, pain controlled with PO medication, participated with PT/OT.

## 2020-04-07 NOTE — PROGRESS NOTES
"[facility] Medicine Progress Note    Patient Name: Melania Beavers MRN: [id number]   Patient Class: IP- Inpatient  Length of Stay: 2   Admission Date: 4/4/2020  8:02 AM Attending Physician: Rissa Murray MD   Primary Care Provider: Luis Brown MD Face-to-Face encounter date: 04/06/2020   Chief Complaint: Hip Pain        Subjective:    Hospital course:  Patient was admitted for closed left hip fracture, scheduled for surgery -percutaneous pinning  4/5:Aspirin and Plavix on hold  Will restart after the procedure  Consult PT OT      Interval History: .No concerns/issues overnight reported by the patient or the nursing staff.  Feels uncontrolled pain and appears weak,   preferred to get discharged to home   consulted for home health    Review of Systems All other Review of Systems were found to be negative expect for that mentioned already in HPI.     Objective:   Physical Exam  /77   Pulse 94   Temp 98.3 °F (36.8 °C)   Resp 17   Ht 5' 7" (1.702 m)   Wt 44.7 kg (98 lb 8.7 oz)   LMP  (LMP Unknown)   SpO2 98%   Breastfeeding? No   BMI 15.43 kg/m²   Vitals reviewed.    Constitutional: No acute distress.   HENT: Atraumatic. PERRLA  Cardiovascular: Normal rate, regular rhythm and normal heart sounds.   Pulmonary/Chest: Effort normal. She has no wheezes.   Abdominal: Soft. no tenderness,no distension,bowel sounds are normal on pimentel  Neurological: AOX3  Skin: Skin is warm and dry left.  hip pain controlled with morphine  Left hip tenderness+, sensation and DP intact    Following labs were Reviewed   Recent Labs   Lab 04/06/20  0448   WBC 10.81  10.81   HGB 7.8*  7.8*   HCT 25.5*  25.5*     159   CALCIUM 9.4  9.4     138   K 4.4  4.4   CO2 19*  19*     108   BUN 17  17   CREATININE 1.5*  1.5*     No results found for: POCTGLUCOSE     Microbiology Results (last 7 days)     ** No results found for the last 168 hours. **        FL Flouro " Usage   Final Result      CT Pelvis Without Contrast   Final Result      Acute nondisplaced fracture involving the left femoral head/neck junction.      Osteopenia.      Cholelithiasis, right nephrolithiasis, atherosclerosis, and other incidental findings as above.         Electronically signed by: Clarence Rao MD   Date:    04/04/2020   Time:    10:30      X-ray Chest AP Portable   Final Result      Stable chronic findings as above.  No acute pulmonary process.         Electronically signed by: Clarence Rao MD   Date:    04/04/2020   Time:    09:32      X-Ray Hip 2 View Left   Final Result   Addendum 1 of 1      After review of same day CT pelvis, in retrospect, nondisplaced fracture    involving the left femoral head/neck junction is faintly visible on the    oblique view only.         Electronically signed by: Clarence Rao MD   Date:    04/04/2020   Time:    10:32      Final          Inpatient medications  Scheduled Meds:   aspirin  81 mg Oral Daily    atorvastatin  40 mg Oral QHS    balsam peru-castor oiL   Topical (Top) BID    clopidogreL  75 mg Oral Daily    megestroL  40 mg Oral BID    pantoprazole  40 mg Oral Daily    senna-docusate 8.6-50 mg  1 tablet Oral BID     Continuous Infusions:   loperamide       PRN Meds:.acetaminophen, acetaminophen, dextrose 50%, dextrose 50%, docusate sodium, glucagon (human recombinant), glucose, glucose, HYDROcodone-acetaminophen, loperamide, melatonin, morphine, ondansetron, sodium chloride 0.9%, sodium chloride 0.9%      Assessment & Plan:   Melania Beavers is a 86 y.o. female admitted for  Closed fracture of left hip  S/p CRPP  c/w PT. WBAT LLE with walker  Home health PT on d/c  Pain control  Monitor H/H             Core measures:  - Code status: Full  - DVT PPx: /Lovenox         Discharge Planning:   No mobility needs. Patient will be discharged in   PT C/S for debility/fraility/deconditioning and assistance in discharge needs.       Above encounter  included review of the medical records, interviewing and examining the patient face-to-face, discussion with family and other health care providers, ordering and interpreting lab/test results and formulating a plan of care.

## 2020-04-07 NOTE — NURSING
Pt's pimentel removed at 0630 with no issues. Oncoming nurse, Annie, notified. Due to void at 1230.

## 2020-04-07 NOTE — NURSING
Report given to IVANNA Valencia. No acute changes during PM shift. Pain controlled with PO pain meds.

## 2020-04-08 VITALS
HEIGHT: 67 IN | TEMPERATURE: 99 F | SYSTOLIC BLOOD PRESSURE: 171 MMHG | OXYGEN SATURATION: 98 % | BODY MASS INDEX: 15.47 KG/M2 | HEART RATE: 91 BPM | RESPIRATION RATE: 18 BRPM | DIASTOLIC BLOOD PRESSURE: 84 MMHG | WEIGHT: 98.56 LBS

## 2020-04-08 LAB
ABO + RH BLD: NORMAL
ANION GAP SERPL CALC-SCNC: 8 MMOL/L (ref 8–16)
ANISOCYTOSIS BLD QL SMEAR: SLIGHT
BASOPHILS NFR BLD: 0 % (ref 0–1.9)
BLD GP AB SCN CELLS X3 SERPL QL: NORMAL
BLD PROD TYP BPU: NORMAL
BLOOD UNIT EXPIRATION DATE: NORMAL
BLOOD UNIT TYPE CODE: 6200
BLOOD UNIT TYPE: NORMAL
BUN SERPL-MCNC: 24 MG/DL (ref 8–23)
CALCIUM SERPL-MCNC: 9.2 MG/DL (ref 8.7–10.5)
CHLORIDE SERPL-SCNC: 106 MMOL/L (ref 95–110)
CO2 SERPL-SCNC: 20 MMOL/L (ref 23–29)
CODING SYSTEM: NORMAL
CREAT SERPL-MCNC: 1.5 MG/DL (ref 0.5–1.4)
DIFFERENTIAL METHOD: ABNORMAL
DISPENSE STATUS: NORMAL
EOSINOPHIL NFR BLD: 0 % (ref 0–8)
ERYTHROCYTE [DISTWIDTH] IN BLOOD BY AUTOMATED COUNT: 14.1 % (ref 11.5–14.5)
EST. GFR  (AFRICAN AMERICAN): 36.1 ML/MIN/1.73 M^2
EST. GFR  (NON AFRICAN AMERICAN): 31.3 ML/MIN/1.73 M^2
GLUCOSE SERPL-MCNC: 100 MG/DL (ref 70–110)
GLUCOSE SERPL-MCNC: 108 MG/DL (ref 70–110)
GLUCOSE SERPL-MCNC: 120 MG/DL (ref 70–110)
GLUCOSE SERPL-MCNC: 164 MG/DL (ref 70–110)
HCT VFR BLD AUTO: 21.3 % (ref 37–48.5)
HGB BLD-MCNC: 6.9 G/DL (ref 12–16)
IMM GRANULOCYTES # BLD AUTO: ABNORMAL K/UL (ref 0–0.04)
IMM GRANULOCYTES NFR BLD AUTO: ABNORMAL % (ref 0–0.5)
LYMPHOCYTES NFR BLD: 28 % (ref 18–48)
MAGNESIUM SERPL-MCNC: 2 MG/DL (ref 1.6–2.6)
MCH RBC QN AUTO: 30.3 PG (ref 27–31)
MCHC RBC AUTO-ENTMCNC: 32.4 G/DL (ref 32–36)
MCV RBC AUTO: 93 FL (ref 82–98)
MONOCYTES NFR BLD: 16 % (ref 4–15)
NEUTROPHILS NFR BLD: 53 % (ref 38–73)
NEUTS BAND NFR BLD MANUAL: 3 %
NRBC BLD-RTO: 0 /100 WBC
NUM UNITS TRANS PACKED RBC: NORMAL
PLATELET # BLD AUTO: 165 K/UL (ref 150–350)
PMV BLD AUTO: 11.6 FL (ref 9.2–12.9)
POTASSIUM SERPL-SCNC: 3.8 MMOL/L (ref 3.5–5.1)
RBC # BLD AUTO: 2.28 M/UL (ref 4–5.4)
SODIUM SERPL-SCNC: 134 MMOL/L (ref 136–145)
WBC # BLD AUTO: 6.62 K/UL (ref 3.9–12.7)

## 2020-04-08 PROCEDURE — 86901 BLOOD TYPING SEROLOGIC RH(D): CPT

## 2020-04-08 PROCEDURE — 80048 BASIC METABOLIC PNL TOTAL CA: CPT

## 2020-04-08 PROCEDURE — 94761 N-INVAS EAR/PLS OXIMETRY MLT: CPT

## 2020-04-08 PROCEDURE — 97530 THERAPEUTIC ACTIVITIES: CPT

## 2020-04-08 PROCEDURE — P9016 RBC LEUKOCYTES REDUCED: HCPCS

## 2020-04-08 PROCEDURE — 36430 TRANSFUSION BLD/BLD COMPNT: CPT

## 2020-04-08 PROCEDURE — 86920 COMPATIBILITY TEST SPIN: CPT

## 2020-04-08 PROCEDURE — 82962 GLUCOSE BLOOD TEST: CPT

## 2020-04-08 PROCEDURE — 25000003 PHARM REV CODE 250: Performed by: ORTHOPAEDIC SURGERY

## 2020-04-08 PROCEDURE — 97116 GAIT TRAINING THERAPY: CPT

## 2020-04-08 PROCEDURE — 83735 ASSAY OF MAGNESIUM: CPT

## 2020-04-08 PROCEDURE — 85025 COMPLETE CBC W/AUTO DIFF WBC: CPT

## 2020-04-08 PROCEDURE — 36415 COLL VENOUS BLD VENIPUNCTURE: CPT

## 2020-04-08 PROCEDURE — 25000003 PHARM REV CODE 250: Performed by: HOSPITALIST

## 2020-04-08 RX ORDER — LABETALOL HYDROCHLORIDE 5 MG/ML
10 INJECTION, SOLUTION INTRAVENOUS ONCE
Status: COMPLETED | OUTPATIENT
Start: 2020-04-08 | End: 2020-04-08

## 2020-04-08 RX ORDER — HYDROCODONE BITARTRATE AND ACETAMINOPHEN 500; 5 MG/1; MG/1
TABLET ORAL
Status: DISCONTINUED | OUTPATIENT
Start: 2020-04-08 | End: 2020-04-08 | Stop reason: HOSPADM

## 2020-04-08 RX ORDER — HYDROCODONE BITARTRATE AND ACETAMINOPHEN 5; 325 MG/1; MG/1
1 TABLET ORAL EVERY 6 HOURS PRN
Qty: 20 TABLET | Refills: 0 | Status: SHIPPED | OUTPATIENT
Start: 2020-04-08 | End: 2020-04-13

## 2020-04-08 RX ADMIN — MEGESTROL ACETATE 40 MG: 20 TABLET ORAL at 10:04

## 2020-04-08 RX ADMIN — PANTOPRAZOLE SODIUM 40 MG: 40 TABLET, DELAYED RELEASE ORAL at 05:04

## 2020-04-08 RX ADMIN — CASTOR OIL AND BALSAM, PERU: 788; 87 OINTMENT TOPICAL at 09:04

## 2020-04-08 RX ADMIN — LABETALOL HYDROCHLORIDE 10 MG: 5 INJECTION, SOLUTION INTRAVENOUS at 05:04

## 2020-04-08 RX ADMIN — ASPIRIN 81 MG 81 MG: 81 TABLET ORAL at 10:04

## 2020-04-08 RX ADMIN — SENNOSIDES AND DOCUSATE SODIUM 1 TABLET: 8.6; 5 TABLET ORAL at 10:04

## 2020-04-08 RX ADMIN — CLOPIDOGREL BISULFATE 75 MG: 75 TABLET, FILM COATED ORAL at 10:04

## 2020-04-08 NOTE — PROGRESS NOTES
"[facility]  Adult Nutrition   Progress Note (Initial Assessment)     SUMMARY     Recommendations  Recommendation/Intervention:   1. Continue present diet as tolerated by pt   2. RD to add Ensure Enlive TID (1050 kcal, 60 g pro) to aid PO intake   3.  to assist with meal choices daily  Goals: 1. Pt to meet at least 75% EEN via PO diet/supplements   Nutrition Goal Status: new    Dietitian Rounds Brief    Pt assessed 2' LOS. Noted s/p hip fx repair (4/5). Eating bkfst during rounds. No N/V reported. Appetite fair. Eating some of meals, but not completing. Pt appears visually thin. NFPE not able to performed at this time 2' COVID precautions. Offered supplement, pt accepted.     Reason for Assessment  Reason For Assessment: length of stay  Diagnosis: surgery/postoperative complications(hip fx)  Relevant Medical History: GERD, HTN, anemia    Nutrition Risk Screen  Nutrition Risk Screen: no indicators present       Wound Other (comment) posterior Sacral Spine-Wound Image: Images linked      Nutrition/Diet History  Patient Reported Diet/Restrictions/Preferences: general  Spiritual, Cultural Beliefs, [employer]ism Practices, Values that Affect Care: no  Food Allergies: NKFA  Factors Affecting Nutritional Intake: decreased appetite    Anthropometrics  Temp: 98.3 °F (36.8 °C)  Height Method: Stated  Height: 5' 7" (170.2 cm)  Height (inches): 67 in  Weight Method: Bed Scale  Weight: 44.7 kg (98 lb 8.7 oz)  Weight (lb): 98.55 lb  Ideal Body Weight (IBW), Female: 135 lb  % Ideal Body Weight, Female (lb): 73 %  BMI (Calculated): 15.4  BMI Grade: less than 16 protein-energy malnutrition grade III       Weight History:  Wt Readings from Last 10 Encounters:   04/08/20 44.7 kg (98 lb 8.7 oz)   10/05/19 48.2 kg (106 lb 4.2 oz)   09/07/19 51.5 kg (113 lb 8.6 oz)   09/05/19 44.9 kg (99 lb)   04/06/17 54.9 kg (121 lb 0.5 oz)       Lab/Procedures/Meds: Pertinent Labs Reviewed  Clinical Chemistry:  Recent Labs   Lab " 04/04/20  0843  04/08/20  0443      < > 134*   K 4.1   < > 3.8   *   < > 106   CO2 20*   < > 20*   GLU 96   < > 100   BUN 19   < > 24*   CREATININE 1.6*   < > 1.5*   CALCIUM 9.5   < > 9.2   PROT 6.9  --   --    ALBUMIN 4.1  --   --    BILITOT 0.8  --   --    ALKPHOS 47*  --   --    AST 15  --   --    ALT 14  --   --    ANIONGAP 8   < > 8   ESTGFRAFRICA 33.4*   < > 36.1*   EGFRNONAA 29.0*   < > 31.3*   MG 2.0   < > 2.0    < > = values in this interval not displayed.     CBC:   Recent Labs   Lab 04/08/20  0443   WBC 6.62   RBC 2.28*   HGB 6.9*   HCT 21.3*      MCV 93   MCH 30.3   MCHC 32.4     Cardiac Profile:  Recent Labs   Lab 04/04/20  0843   TROPONINI <0.030     Diabetes:  Recent Labs   Lab 04/04/20  0843   HGBA1C 5.8       Medications: Pertinent Medications reviewed  Scheduled Meds:   aspirin  81 mg Oral Daily    atorvastatin  40 mg Oral QHS    balsam peru-castor oiL   Topical (Top) BID    clopidogreL  75 mg Oral Daily    megestroL  40 mg Oral BID    pantoprazole  40 mg Oral Daily    senna-docusate 8.6-50 mg  1 tablet Oral BID     Continuous Infusions:   loperamide       PRN Meds:.acetaminophen, acetaminophen, dextrose 50%, dextrose 50%, docusate sodium, glucagon (human recombinant), glucose, glucose, HYDROcodone-acetaminophen, loperamide, melatonin, morphine, ondansetron, sodium chloride 0.9%, sodium chloride 0.9%    Estimated/Assessed Needs  Weight Used For Calorie Calculations: 44.7 kg (98 lb 8.7 oz)  Energy Calorie Requirements (kcal): 3874-1759 (35-40 kcal/kg)  Energy Need Method: Kcal/kg  Protein Requirements: 44-53 g (1-1.2 g/kg)(2' GFR)  Weight Used For Protein Calculations: 44.7 kg (98 lb 8.7 oz)  Fluid Requirements (mL): 1 ml/kcal or per MD  Estimated Fluid Requirement Method: RDA Method  RDA Method (mL): 1564       Nutrition Prescription Ordered  Current Diet Order: Regular    Evaluation of Received Nutrient/Fluid Intake  Energy Calories Required: not meeting needs  Protein  Required: not meeting needs  Fluid Required: meeting needs  Tolerance: tolerating   No intake or output data in the 24 hours ending 04/08/20 0837     % Meal Intake: 50 - 75 %    Nutrition Risk  Level of Risk/Frequency of Follow-up: moderate - high     Monitor and Evaluation  Food and Nutrient Intake: food and beverage intake, energy intake  Food and Nutrient Adminstration: diet order  Physical Activity and Function: nutrition-related ADLs and IADLs  Biochemical Data, Medical Tests and Procedures: electrolyte and renal panel, gastrointestinal profile, glucose/endocrine profile  Nutrition-Focused Physical Findings: overall appearance     Nutrition Follow-Up  RD Follow-up?: Yes    Kena Uriarte RD 04/08/2020 8:37 AM

## 2020-04-08 NOTE — PLAN OF CARE
04/08/20 1727   Final Note   Assessment Type Final Discharge Note   Anticipated Discharge Disposition Home-Health   Post-Acute Status   Post-Acute Authorization Home Health   Home Health Status Referrals Sent     After discussing placement with [facility] liaison and family, Pt's daughter requested that she is discharged home this date.  Her preference in  agency is [facility] , and SW sent referral via fax, and will await response.  Family to provide transportation this evening.    4/9/2020  0938  LOUANN following up with [facility] , and confirmed receipt of referral with Lindsey.  No further needs addressed at this time.

## 2020-04-08 NOTE — PLAN OF CARE
Problem: Skin Injury Risk Increased  Goal: Skin Health and Integrity  Outcome: Ongoing, Progressing  Intervention: Promote and Optimize Oral Intake  Flowsheets (Taken 4/8/2020 0840)  Oral Nutrition Promotion: calorie dense liquids provided     Problem: Oral Intake Inadequate  Goal: Improved Oral Intake  Outcome: Ongoing, Progressing  Intervention: Promote and Optimize Oral Intake  Flowsheets (Taken 4/8/2020 0840)  Oral Nutrition Promotion: calorie dense liquids provided

## 2020-04-08 NOTE — PT/OT/SLP PROGRESS
Occupational Therapy      Patient Name:  Melania Beavers   MRN:  [id number]    Patient not seen today secondary to Other (Comment)(Hold per RN, receiving blood.). Will follow-up tomorrow.    Jamin Salas OT  4/8/2020

## 2020-04-08 NOTE — PT/OT/SLP PROGRESS
"Physical Therapy Treatment    Patient Name:  Melania Beavers   MRN:  [id number]    Recommendations:     Discharge Recommendations:  home health PT, nursing facility, skilled   Discharge Equipment Recommendations: walker, rolling   Barriers to discharge: None    Assessment:     Melania Beavers is a 86 y.o. female admitted with a medical diagnosis of Closed fracture of left hip.  She presents with the following impairments/functional limitations:  weakness, impaired endurance, gait instability, decreased coordination, pain, decreased safety awareness, impaired functional mobilty, impaired balance, decreased lower extremity function, decreased ROM, orthopedic precautions.    Rehab Prognosis: Good; patient would benefit from acute skilled PT services to address these deficits and reach maximum level of function.    Recent Surgery: Procedure(s) (LRB):  PINNING, HIP, PERCUTANEOUS (Left) 3 Days Post-Op    Plan:     During this hospitalization, patient to be seen daily to address the identified rehab impairments via gait training, therapeutic activities, therapeutic exercises, neuromuscular re-education and progress toward the following goals:    · Plan of Care Expires:  05/06/20    Subjective     Chief Complaint: L hip pain /c movement  Patient/Family Comments/goals: "I can't wait to go home"  Pain/Comfort:  · Pain Rating 1: 6/10  · Location - Side 1: Left  · Location 1: hip      Objective:     Communicated with nurse prior to session.  Patient found supine with bed alarm, telemetry, peripheral IV upon PT entry to room.     General Precautions: Standard, fall, airborne, droplet, contact   Orthopedic Precautions:LLE weight bearing as tolerated   Braces:       Functional Mobility:  · Bed Mobility:  Supine to Sit: contact guard assistance  · Transfers:     · Sit to Stand:  contact guard assistance with rolling walker  · Bed to Chair: minimum assistance with  rolling walker  using  Stand Pivot  · Gait: 2x 20' RW /c Mario  · Balance: " standing Poor+      AM-PAC 6 CLICK MOBILITY  Turning over in bed (including adjusting bedclothes, sheets and blankets)?: 3  Sitting down on and standing up from a chair with arms (e.g., wheelchair, bedside commode, etc.): 3  Moving from lying on back to sitting on the side of the bed?: 3  Moving to and from a bed to a chair (including a wheelchair)?: 3  Need to walk in hospital room?: 3  Climbing 3-5 steps with a railing?: 1  Basic Mobility Total Score: 16       Therapeutic Activities and Exercises:   PT encouraged pt to sit UIC at bedside /c BLEs elevated    Patient left up in chair with all lines intact and call button in reach..    GOALS:   Multidisciplinary Problems     Physical Therapy Goals        Problem: Physical Therapy Goal    Goal Priority Disciplines Outcome Goal Variances Interventions   Physical Therapy Goal     PT, PT/OT Ongoing, Progressing     Description:  Goals to be met by: D/C  Patient will increase functional independence with mobility by performin. Supine to sit with Maximum Assistance  2. Sit to stand transfer with Moderate Assistance  3. Bed to chair transfer with Moderate Assistance using Rolling Walker  4. Gait  x 20 feet with Moderate Assistance using Rolling Walker.                       Time Tracking:     PT Received On: 20  PT Start Time: 1030     PT Stop Time: 1053  PT Total Time (min): 23 min     Billable Minutes: Gait Training 13 and Therapeutic Activity 10    Treatment Type: Treatment  PT/PTA: PT     PTA Visit Number: 1     Cindy Choudhary, PT  2020

## 2020-04-09 NOTE — HPI
Melania Beavers is a 86 y.o.  female who  has a past medical history of Anemia, unspecified (12/3/2019), GERD (gastroesophageal reflux disease), Hypertension, and Normochromic normocytic anemia (12/3/2019).. The patient presented to [facility] on 4/4/2020 with a primary complaint of Hip Pain  .  Patient had a mechanical fall yesterday evening, twisted her feet and fell on her left side upon trying to sit on a chair, no episode of syncope, no seizure-like activity, over the night hip pain was gradually was, brought into the hospital, CT pelvis showed acute nondisplaced fracture of the left femoral head neck junction.  Patient's blood pressure was elevated, received pain medication morphine.  Orthopedic consulted by ED, scheduled for surgery tomorrow.  Of note patient had a recent history of stroke last August without any residual defects, ambulates without support.on DAPT took it yesterday morning  .  Patient was also treated for vit D deficiency, history of COPD not on home oxygen.  No sick contact with COVID and denies fever and respiratory symtoms,CXR unremarkable.  Denies CP/SOB, change in bowel or bladder habit, hx of constipationlast BM 2 days ago

## 2020-04-09 NOTE — PT/OT/SLP DISCHARGE
Occupational Therapy Discharge Summary    Melania Beavers  MRN: [id number]   Principal Problem: Closed fracture of left hip      Patient Discharged from acute Occupational Therapy on 4/8/2020.  Please refer to prior OT note dated 4/7/2020 for functional status.    Assessment:      Patient has not met goals.    Objective:     GOALS:   Multidisciplinary Problems     Occupational Therapy Goals     Not on file          Multidisciplinary Problems (Resolved)        Problem: Occupational Therapy Goal    Goal Priority Disciplines Outcome Interventions   Occupational Therapy Goal   (Resolved)     OT, PT/OT Met    Description:  Goals to be met by: discharge     Patient will increase functional independence with ADLs by performing:    UE Dressing with Stand-by Assistance.  LE Dressing with Stand-by Assistance.  Grooming while seated with Stand-by Assistance.  Toileting from toilet with Minimal Assistance for hygiene and clothing management.   Supine to sit with Minimal Assistance.  Toilet transfer to toilet with Minimal Assistance.                      Reasons for Discontinuation of Therapy Services  Patient d/c home.      Plan:     Patient Discharged to: Home with Home Health Service    Jamin Salas, OT  4/9/2020

## 2020-04-09 NOTE — DISCHARGE SUMMARY
[facility]  Discharge Summary    DOS: 04/08/2020      Patient Name: Melania Beavers  MRN: [id number]  Admission Date: 4/4/2020  Hospital Length of Stay: 4 days  Discharge Date and Time:  04/08/2020 8:54 PM  Attending Physician: Elaine att. providers found   Discharging Provider: Zander Fonseca MD  Primary Care Provider: Luis Brown MD      HPI:   Melania Beavers is a 86 y.o.  female who  has a past medical history of Anemia, unspecified (12/3/2019), GERD (gastroesophageal reflux disease), Hypertension, and Normochromic normocytic anemia (12/3/2019).. The patient presented to [facility] on 4/4/2020 with a primary complaint of Hip Pain  .  Patient had a mechanical fall yesterday evening, twisted her feet and fell on her left side upon trying to sit on a chair, no episode of syncope, no seizure-like activity, over the night hip pain was gradually was, brought into the hospital, CT pelvis showed acute nondisplaced fracture of the left femoral head neck junction.  Patient's blood pressure was elevated, received pain medication morphine.  Orthopedic consulted by ED, scheduled for surgery tomorrow.  Of note patient had a recent history of stroke last August without any residual defects, ambulates without support.on DAPT took it yesterday morning  .  Patient was also treated for vit D deficiency, history of COPD not on home oxygen.  No sick contact with COVID and denies fever and respiratory symtoms,CXR unremarkable.  Denies CP/SOB, change in bowel or bladder habit, hx of constipationlast BM 2 days ago     Procedure(s) (LRB):  PINNING, HIP, PERCUTANEOUS (Left)      Hospital Course:   Patient was admitted for closed fracture of left hip and underwent CRPP.  Postop recovery was uneventful except generalized weakness and anemia requiring 1 unit PRBC today.  Initially there was a plan to send her to rehab however today PT OT recommended home health. I spoke with Dr. zaidi clearly  expressed that we should send her home with home health.  She was discharged home with home PT OT arrangements.  She received 1 unit of blood today.  I discussed with daughter that they should follow-up with her PCP and address why she is on dual antiplatelets however right now at least up to 3 week it is not a bad idea considering recent hip surgery for VTE prophylaxis.  Provided some pain medicine prescription.     Today upon my assessment patient denied any new symptoms.  She did well with physical therapy.  No acute events overnight as per nursing staff.     Vitals reviewed.  Nursing notes reviewed     Constitutional: pain on movement. Otherwise appears comfortable in chair  HENT: Atraumatic.   Cardiovascular: Normal rate, regular rhythm and normal heart sounds.   Pulmonary/Chest: Effort normal. Clear to auscultation bilaterally. No wheezes.   Abdominal: Soft. Bowel sounds are normal. Exhibits no distension and no mass. No tenderness  Neurological: Alert.   Skin: Skin is warm and dry.     Consults:   Consults (From admission, onward)        Status Ordering Provider     Inpatient consult to Orthopedic Surgery  Once     Provider:  Anibal Kidd MD    Completed AIDAN HARP          No new Assessment & Plan notes have been filed under this hospital service since the last note was generated.  Service: Hospital Medicine    Final Active Diagnoses:    Diagnosis Date Noted POA    PRINCIPAL PROBLEM:  Closed fracture of left hip [S72.002A] 04/04/2020 Yes      Problems Resolved During this Admission:       Discharged Condition: good    [place] [facility] [place] Up:  Follow-up Information     Luis Brown MD In 2 weeks.    Specialty:  Family Medicine  Contact information:  [address] [facility]  [place] [address]  [phone]             Anibal Kidd MD In 2 weeks.    Specialties:  Sports Medicine, Orthopedic Surgery  Contact information:  [address] [place] [address] [address]  [place]  [address]  [phone]                 Patient Instructions:      Diet Cardiac     Notify your health care provider if you experience any of the following:  severe uncontrolled pain     Notify your health care provider if you experience any of the following:  redness, tenderness, or signs of infection (pain, swelling, redness, odor or green/yellow discharge around incision site)     Notify your health care provider if you experience any of the following:  temperature >100.4     Activity as tolerated       Significant Diagnostic Studies: Labs:   BMP:   Recent Labs   Lab 04/07/20  0425 04/08/20  0443   GLU 84 100    134*   K 3.9 3.8    106   CO2 18* 20*   BUN 18 24*   CREATININE 1.2 1.5*   CALCIUM 9.2 9.2   MG 1.9 2.0       Pending Diagnostic Studies:     None         Medications:  Reconciled Home Medications:      Medication List      START taking these medications    HYDROcodone-acetaminophen 5-325 mg per tablet  Commonly known as:  NORCO  Take 1 tablet by mouth every 6 (six) hours as needed.        CONTINUE taking these medications    aspirin 81 MG Chew  Take 1 tablet (81 mg total) by mouth once daily.     atorvastatin 40 MG tablet  Commonly known as:  LIPITOR  Take 1 tablet (40 mg total) by mouth once daily.     b complex vitamins capsule  Take 1 capsule by mouth once daily.     cholecalciferol (vitamin D3) 125 mcg (5,000 unit) Tab  Take 1 tablet by mouth once daily.     clopidogreL 75 mg tablet  Commonly known as:  PLAVIX  Take 1 tablet (75 mg total) by mouth once daily.     fluticasone-salmeterol 250-50 mcg/dose 250-50 mcg/dose diskus inhaler  Commonly known as:  ADVAIR  Inhale 1 puff into the lungs 2 (two) times daily. Controller     megestroL 20 MG Tab  Commonly known as:  MEGACE  Take 40 mg by mouth 2 (two) times daily.     omeprazole 20 MG capsule  Commonly known as:  PRILOSEC  Take 20 mg by mouth every morning.     ondansetron 8 MG tablet  Commonly known as:  ZOFRAN  Take 8 mg by mouth every 12  (twelve) hours as needed for Nausea.        STOP taking these medications    VITAMIN C 500 MG tablet  Generic drug:  ascorbic acid (vitamin C)            Indwelling Lines/Drains at time of discharge:   Lines/Drains/Airways     None                 Time spent on the discharge of patient: 29 minutes  Patient was seen and examined on the date of discharge and determined to be suitable for discharge.         Zander Fonseca MD  Department of Hospital Medicine  [facility]

## 2020-04-09 NOTE — HOSPITAL COURSE
Patient was admitted for closed fracture of left hip and underwent CRPP.  Postop recovery was uneventful except generalized weakness and anemia requiring 1 unit PRBC today.  Initially there was a plan to send her to rehab however today PT OT recommended home health. I spoke with  who clearly expressed that we should send her home with home health.  She was discharged home with home PT OT arrangements.  She received 1 unit of blood today.  I discussed with daughter that they should follow-up with her PCP and address why she is on dual antiplatelets however right now at least up to 3 week it is not a bad idea considering recent hip surgery for VTE prophylaxis.  Provided some pain medicine prescription.     Today upon my assessment patient denied any new symptoms.  She did well with physical therapy.  No acute events overnight as per nursing staff.     Vitals reviewed.  Nursing notes reviewed     Constitutional: pain on movement. Otherwise appears comfortable in chair  HENT: Atraumatic.   Cardiovascular: Normal rate, regular rhythm and normal heart sounds.   Pulmonary/Chest: Effort normal. Clear to auscultation bilaterally. No wheezes.   Abdominal: Soft. Bowel sounds are normal. Exhibits no distension and no mass. No tenderness  Neurological: Alert.   Skin: Skin is warm and dry.

## 2020-04-16 ENCOUNTER — TELEPHONE (OUTPATIENT)
Dept: ORTHOPEDICS | Facility: CLINIC | Age: 85
End: 2020-04-16

## 2020-04-16 NOTE — TELEPHONE ENCOUNTER
----- Message from Savanna Kent MA sent at 4/16/2020  8:58 AM CDT -----  Contact: Nurse Levine from [facility]   Nurse Levine from [facility] requesting a order for enema for constipation for patient .      Please call to advice Nurse Levine from [facility] [phone]

## 2020-04-20 DIAGNOSIS — M25.552 LEFT HIP PAIN: Primary | ICD-10-CM

## 2020-04-23 ENCOUNTER — OFFICE VISIT (OUTPATIENT)
Dept: ORTHOPEDICS | Facility: CLINIC | Age: 85
End: 2020-04-23
Payer: MEDICARE

## 2020-04-23 ENCOUNTER — HOSPITAL ENCOUNTER (OUTPATIENT)
Dept: RADIOLOGY | Facility: HOSPITAL | Age: 85
Discharge: HOME | End: 2020-04-23
Attending: ORTHOPAEDIC SURGERY
Payer: MEDICARE

## 2020-04-23 VITALS — RESPIRATION RATE: 16 BRPM | BODY MASS INDEX: 15.38 KG/M2 | HEIGHT: 67 IN | WEIGHT: 98 LBS

## 2020-04-23 DIAGNOSIS — M25.552 LEFT HIP PAIN: ICD-10-CM

## 2020-04-23 DIAGNOSIS — Z87.81 STATUS POST FRACTURE OF HIP: Primary | ICD-10-CM

## 2020-04-23 DIAGNOSIS — S72.002D CLOSED FRACTURE OF LEFT HIP WITH ROUTINE HEALING, SUBSEQUENT ENCOUNTER: Primary | ICD-10-CM

## 2020-04-23 PROCEDURE — 99999 PR PBB SHADOW E&M-EST. PATIENT-LVL III: CPT | Mod: PBBFAC,,, | Performed by: ORTHOPAEDIC SURGERY

## 2020-04-23 PROCEDURE — 73502 X-RAY EXAM HIP UNI 2-3 VIEWS: CPT | Mod: 26,LT,, | Performed by: RADIOLOGY

## 2020-04-23 PROCEDURE — 99213 OFFICE O/P EST LOW 20 MIN: CPT | Mod: PBBFAC,25,PN | Performed by: ORTHOPAEDIC SURGERY

## 2020-04-23 PROCEDURE — 73502 X-RAY EXAM HIP UNI 2-3 VIEWS: CPT | Mod: TC,PN,LT

## 2020-04-23 PROCEDURE — 99024 POSTOP FOLLOW-UP VISIT: CPT | Mod: POP,,, | Performed by: ORTHOPAEDIC SURGERY

## 2020-04-23 PROCEDURE — 99999 PR PBB SHADOW E&M-EST. PATIENT-LVL III: ICD-10-PCS | Mod: PBBFAC,,, | Performed by: ORTHOPAEDIC SURGERY

## 2020-04-23 PROCEDURE — 99024 PR POST-OP FOLLOW-UP VISIT: ICD-10-PCS | Mod: POP,,, | Performed by: ORTHOPAEDIC SURGERY

## 2020-04-23 PROCEDURE — 73502 XR HIP 2 VIEW LEFT: ICD-10-PCS | Mod: 26,LT,, | Performed by: RADIOLOGY

## 2020-04-23 RX ORDER — HYDROCODONE BITARTRATE AND ACETAMINOPHEN 5; 325 MG/1; MG/1
1 TABLET ORAL EVERY 6 HOURS PRN
Qty: 30 TABLET | Refills: 0 | Status: SHIPPED | OUTPATIENT
Start: 2020-04-23 | End: 2020-05-11 | Stop reason: CLARIF

## 2020-04-23 NOTE — PROGRESS NOTES
Past Medical History:   Diagnosis Date    Anemia, unspecified 12/3/2019    GERD (gastroesophageal reflux disease)     Hypertension     Normochromic normocytic anemia 12/3/2019       Past Surgical History:   Procedure Laterality Date    ESOPHAGOGASTRODUODENOSCOPY N/A 10/4/2019    Procedure: EGD (ESOPHAGOGASTRODUODENOSCOPY);  Surgeon: Dominick Cunningham III, MD;  Location: [facility] ENDO;  Service: Endoscopy;  Laterality: N/A;    HYSTERECTOMY      PERCUTANEOUS PINNING OF HIP Left 4/5/2020    Procedure: PINNING, HIP, PERCUTANEOUS;  Surgeon: Anibal Kidd MD;  Location: [facility] OR;  Service: Orthopedics;  Laterality: Left;       Current Outpatient Medications   Medication Sig    aspirin 81 MG Chew Take 1 tablet (81 mg total) by mouth once daily.    atorvastatin (LIPITOR) 40 MG tablet Take 1 tablet (40 mg total) by mouth once daily.    b complex vitamins capsule Take 1 capsule by mouth once daily.    cholecalciferol, vitamin D3, 125 mcg (5,000 unit) Tab Take 1 tablet by mouth once daily.     clopidogrel (PLAVIX) 75 mg tablet Take 1 tablet (75 mg total) by mouth once daily.    fluticasone-salmeterol 250-50 mcg/dose (ADVAIR) 250-50 mcg/dose diskus inhaler Inhale 1 puff into the lungs 2 (two) times daily. Controller    megestroL (MEGACE) 20 MG Tab Take 40 mg by mouth 2 (two) times daily.    omeprazole (PRILOSEC) 20 MG capsule Take 20 mg by mouth every morning.     ondansetron (ZOFRAN) 8 MG tablet Take 8 mg by mouth every 12 (twelve) hours as needed for Nausea.     HYDROcodone-acetaminophen (NORCO) 5-325 mg per tablet Take 1 tablet by mouth every 6 (six) hours as needed for Pain.     No current facility-administered medications for this visit.        Review of patient's allergies indicates:   Allergen Reactions    Doxycycline monohydrate Other (See Comments)     dizzyness    Heparin     Heparin analogues        History reviewed. No pertinent family history.    Social History     Socioeconomic History     Marital status:      Spouse name: Not on file    Number of children: Not on file    Years of education: Not on file    Highest education level: Not on file   Occupational History    Not on file   Social Needs    Financial resource strain: Not on file    Food insecurity:     Worry: Not on file     Inability: Not on file    Transportation needs:     Medical: Not on file     Non-medical: Not on file   Tobacco Use    Smoking status: Never Smoker   Substance and Sexual Activity    Alcohol use: No    Drug use: No    Sexual activity: Never   Lifestyle    Physical activity:     Days per week: Not on file     Minutes per session: Not on file    Stress: Not on file   Relationships    Social connections:     Talks on phone: Not on file     Gets together: Not on file     Attends Jewish service: Not on file     Active member of club or organization: Not on file     Attends meetings of clubs or organizations: Not on file     Relationship status: Not on file   Other Topics Concern    Not on file   Social History Narrative    Not on file       Chief Complaint:   Chief Complaint   Patient presents with    Left Hip - Post-op Evaluation       Date of surgery:  April 5, 2020    History of present illness:  This is an 86-year-old female seen for percutaneous hip pinning.  Patient is doing well.  No wound issues.  Pain is a 2/10.  Walking with a walker.      Review of Systems:    Musculoskeletal:  See HPI        Physical Examination:    Vital Signs:    Vitals:    04/23/20 1055   Resp: 16       Body mass index is 15.35 kg/m².    This a well-developed, well nourished patient in no acute distress.  They are alert and oriented and cooperative to examination.  Pt. walks with a mild antalgic gait.      Examination left hip shows well-healing surgical incision.  Minimal bruising and swelling.  Minimal pain with range of motion.  No significant hematoma.    X-rays:  X-rays of the left hip are ordered and reviewed  which show fracture well reduced with screws in position     Assessment::  Status post percutaneous pinning of a left femoral neck fracture    Plan:  Reviewed the x-ray with her today.  We took out her stitches.  Patient may continue to ambulate with home health physical therapy.  Okay to get the incision wet.  I will see her back in a month with x-rays of the left hip.    This note was created using M Modal voice recognition software that occasionally misinterpreted phrases or words.

## 2020-04-30 ENCOUNTER — HOSPITAL ENCOUNTER (INPATIENT)
Facility: HOSPITAL | Age: 85
LOS: 3 days | Discharge: NURSING, REHAB OR LONG-TERM CARE | DRG: 065 | End: 2020-05-04
Attending: EMERGENCY MEDICINE | Admitting: INTERNAL MEDICINE
Payer: MEDICARE

## 2020-04-30 DIAGNOSIS — G45.9 TIA (TRANSIENT ISCHEMIC ATTACK): Primary | ICD-10-CM

## 2020-04-30 DIAGNOSIS — I63.9 ACUTE CVA (CEREBROVASCULAR ACCIDENT): ICD-10-CM

## 2020-04-30 LAB
ALBUMIN SERPL BCP-MCNC: 3.6 G/DL (ref 3.5–5.2)
ALP SERPL-CCNC: 63 U/L (ref 55–135)
ALT SERPL W/O P-5'-P-CCNC: 13 U/L (ref 10–44)
ANION GAP SERPL CALC-SCNC: 7 MMOL/L (ref 8–16)
AST SERPL-CCNC: 16 U/L (ref 10–40)
BASOPHILS # BLD AUTO: 0.03 K/UL (ref 0–0.2)
BASOPHILS NFR BLD: 0.6 % (ref 0–1.9)
BILIRUB SERPL-MCNC: 0.8 MG/DL (ref 0.1–1)
BILIRUB UR QL STRIP: NEGATIVE
BUN SERPL-MCNC: 16 MG/DL (ref 8–23)
CALCIUM SERPL-MCNC: 9.6 MG/DL (ref 8.7–10.5)
CHLORIDE SERPL-SCNC: 111 MMOL/L (ref 95–110)
CHOLEST SERPL-MCNC: 178 MG/DL (ref 120–199)
CHOLEST/HDLC SERPL: 3.7 {RATIO} (ref 2–5)
CLARITY UR: CLEAR
CO2 SERPL-SCNC: 22 MMOL/L (ref 23–29)
COLOR UR: YELLOW
CREAT SERPL-MCNC: 1.4 MG/DL (ref 0.5–1.4)
DIFFERENTIAL METHOD: ABNORMAL
EOSINOPHIL # BLD AUTO: 0.1 K/UL (ref 0–0.5)
EOSINOPHIL NFR BLD: 1.9 % (ref 0–8)
ERYTHROCYTE [DISTWIDTH] IN BLOOD BY AUTOMATED COUNT: 14.1 % (ref 11.5–14.5)
EST. GFR  (AFRICAN AMERICAN): 39.2 ML/MIN/1.73 M^2
EST. GFR  (NON AFRICAN AMERICAN): 34 ML/MIN/1.73 M^2
GLUCOSE SERPL-MCNC: 87 MG/DL (ref 70–110)
GLUCOSE SERPL-MCNC: 92 MG/DL (ref 70–110)
GLUCOSE UR QL STRIP: NEGATIVE
HCT VFR BLD AUTO: 30.3 % (ref 37–48.5)
HDLC SERPL-MCNC: 48 MG/DL (ref 40–75)
HDLC SERPL: 27 % (ref 20–50)
HGB BLD-MCNC: 9.6 G/DL (ref 12–16)
HGB UR QL STRIP: NEGATIVE
IMM GRANULOCYTES # BLD AUTO: 0.04 K/UL (ref 0–0.04)
IMM GRANULOCYTES NFR BLD AUTO: 0.8 % (ref 0–0.5)
INR PPP: 1.1
KETONES UR QL STRIP: NEGATIVE
LDLC SERPL CALC-MCNC: 110.4 MG/DL (ref 63–159)
LEUKOCYTE ESTERASE UR QL STRIP: NEGATIVE
LYMPHOCYTES # BLD AUTO: 1.2 K/UL (ref 1–4.8)
LYMPHOCYTES NFR BLD: 25.4 % (ref 18–48)
MCH RBC QN AUTO: 29.3 PG (ref 27–31)
MCHC RBC AUTO-ENTMCNC: 31.7 G/DL (ref 32–36)
MCV RBC AUTO: 92 FL (ref 82–98)
MONOCYTES # BLD AUTO: 0.8 K/UL (ref 0.3–1)
MONOCYTES NFR BLD: 16.5 % (ref 4–15)
NEUTROPHILS # BLD AUTO: 2.6 K/UL (ref 1.8–7.7)
NEUTROPHILS NFR BLD: 54.8 % (ref 38–73)
NITRITE UR QL STRIP: NEGATIVE
NONHDLC SERPL-MCNC: 130 MG/DL
NRBC BLD-RTO: 0 /100 WBC
PH UR STRIP: 7 [PH] (ref 5–8)
PLATELET # BLD AUTO: 249 K/UL (ref 150–350)
PMV BLD AUTO: 10.4 FL (ref 9.2–12.9)
POTASSIUM SERPL-SCNC: 4.9 MMOL/L (ref 3.5–5.1)
PROT SERPL-MCNC: 6.9 G/DL (ref 6–8.4)
PROT UR QL STRIP: ABNORMAL
PROTHROMBIN TIME: 13.6 SEC (ref 10.6–14.8)
RBC # BLD AUTO: 3.28 M/UL (ref 4–5.4)
SARS-COV-2 RDRP RESP QL NAA+PROBE: NEGATIVE
SODIUM SERPL-SCNC: 140 MMOL/L (ref 136–145)
SP GR UR STRIP: 1.02 (ref 1–1.03)
TRIGL SERPL-MCNC: 98 MG/DL (ref 30–150)
TSH SERPL DL<=0.005 MIU/L-ACNC: 1.65 UIU/ML (ref 0.34–5.6)
URN SPEC COLLECT METH UR: ABNORMAL
UROBILINOGEN UR STRIP-ACNC: NEGATIVE EU/DL
WBC # BLD AUTO: 4.8 K/UL (ref 3.9–12.7)

## 2020-04-30 PROCEDURE — U0002 COVID-19 LAB TEST NON-CDC: HCPCS

## 2020-04-30 PROCEDURE — G0378 HOSPITAL OBSERVATION PER HR: HCPCS

## 2020-04-30 PROCEDURE — 93005 ELECTROCARDIOGRAM TRACING: CPT | Performed by: INTERNAL MEDICINE

## 2020-04-30 PROCEDURE — 99285 EMERGENCY DEPT VISIT HI MDM: CPT | Mod: 25

## 2020-04-30 PROCEDURE — 80053 COMPREHEN METABOLIC PANEL: CPT

## 2020-04-30 PROCEDURE — 85025 COMPLETE CBC W/AUTO DIFF WBC: CPT

## 2020-04-30 PROCEDURE — 81003 URINALYSIS AUTO W/O SCOPE: CPT

## 2020-04-30 PROCEDURE — 82962 GLUCOSE BLOOD TEST: CPT

## 2020-04-30 PROCEDURE — 80061 LIPID PANEL: CPT

## 2020-04-30 PROCEDURE — 85610 PROTHROMBIN TIME: CPT

## 2020-04-30 PROCEDURE — 96374 THER/PROPH/DIAG INJ IV PUSH: CPT

## 2020-04-30 PROCEDURE — 84443 ASSAY THYROID STIM HORMONE: CPT

## 2020-04-30 PROCEDURE — 25500020 PHARM REV CODE 255: Performed by: EMERGENCY MEDICINE

## 2020-04-30 PROCEDURE — 25000003 PHARM REV CODE 250: Performed by: EMERGENCY MEDICINE

## 2020-04-30 RX ORDER — PANTOPRAZOLE SODIUM 40 MG/1
40 TABLET, DELAYED RELEASE ORAL DAILY
Status: DISCONTINUED | OUTPATIENT
Start: 2020-05-01 | End: 2020-05-04 | Stop reason: HOSPADM

## 2020-04-30 RX ORDER — ASPIRIN 325 MG
325 TABLET ORAL
Status: COMPLETED | OUTPATIENT
Start: 2020-04-30 | End: 2020-04-30

## 2020-04-30 RX ORDER — CLOPIDOGREL BISULFATE 75 MG/1
75 TABLET ORAL DAILY
Status: DISCONTINUED | OUTPATIENT
Start: 2020-04-30 | End: 2020-05-04 | Stop reason: HOSPADM

## 2020-04-30 RX ORDER — ACETAMINOPHEN 325 MG/1
650 TABLET ORAL EVERY 4 HOURS PRN
Status: DISCONTINUED | OUTPATIENT
Start: 2020-04-30 | End: 2020-05-04 | Stop reason: HOSPADM

## 2020-04-30 RX ORDER — LABETALOL HYDROCHLORIDE 5 MG/ML
10 INJECTION, SOLUTION INTRAVENOUS
Status: COMPLETED | OUTPATIENT
Start: 2020-04-30 | End: 2020-04-30

## 2020-04-30 RX ORDER — HYDROCODONE BITARTRATE AND ACETAMINOPHEN 5; 325 MG/1; MG/1
1 TABLET ORAL EVERY 6 HOURS PRN
Status: DISCONTINUED | OUTPATIENT
Start: 2020-04-30 | End: 2020-05-04 | Stop reason: HOSPADM

## 2020-04-30 RX ORDER — ACETAMINOPHEN 500 MG
1 TABLET ORAL DAILY
Status: DISCONTINUED | OUTPATIENT
Start: 2020-04-30 | End: 2020-05-01

## 2020-04-30 RX ORDER — IBUPROFEN 200 MG
16 TABLET ORAL
Status: DISCONTINUED | OUTPATIENT
Start: 2020-04-30 | End: 2020-05-04 | Stop reason: HOSPADM

## 2020-04-30 RX ORDER — LACTULOSE 10 G/15ML
10 SOLUTION ORAL; RECTAL 2 TIMES DAILY PRN
COMMUNITY
End: 2020-05-11 | Stop reason: CLARIF

## 2020-04-30 RX ORDER — NAPROXEN SODIUM 220 MG/1
81 TABLET, FILM COATED ORAL DAILY
Status: DISCONTINUED | OUTPATIENT
Start: 2020-04-30 | End: 2020-05-04 | Stop reason: HOSPADM

## 2020-04-30 RX ORDER — IBUPROFEN 200 MG
24 TABLET ORAL
Status: DISCONTINUED | OUTPATIENT
Start: 2020-04-30 | End: 2020-05-04 | Stop reason: HOSPADM

## 2020-04-30 RX ORDER — GLUCAGON 1 MG
1 KIT INJECTION
Status: DISCONTINUED | OUTPATIENT
Start: 2020-04-30 | End: 2020-05-04 | Stop reason: HOSPADM

## 2020-04-30 RX ORDER — SODIUM CHLORIDE 0.9 % (FLUSH) 0.9 %
3 SYRINGE (ML) INJECTION EVERY 6 HOURS PRN
Status: DISCONTINUED | OUTPATIENT
Start: 2020-04-30 | End: 2020-05-04 | Stop reason: HOSPADM

## 2020-04-30 RX ORDER — TALC
9 POWDER (GRAM) TOPICAL NIGHTLY PRN
Status: DISCONTINUED | OUTPATIENT
Start: 2020-04-30 | End: 2020-05-04 | Stop reason: HOSPADM

## 2020-04-30 RX ORDER — ONDANSETRON 4 MG/1
8 TABLET, ORALLY DISINTEGRATING ORAL EVERY 8 HOURS PRN
Status: DISCONTINUED | OUTPATIENT
Start: 2020-04-30 | End: 2020-05-04 | Stop reason: HOSPADM

## 2020-04-30 RX ORDER — ATORVASTATIN CALCIUM 40 MG/1
40 TABLET, FILM COATED ORAL DAILY
Status: DISCONTINUED | OUTPATIENT
Start: 2020-04-30 | End: 2020-05-04 | Stop reason: HOSPADM

## 2020-04-30 RX ADMIN — IOHEXOL 100 ML: 350 INJECTION, SOLUTION INTRAVENOUS at 02:04

## 2020-04-30 RX ADMIN — LABETALOL HYDROCHLORIDE 10 MG: 5 INJECTION, SOLUTION INTRAVENOUS at 01:04

## 2020-04-30 RX ADMIN — ASPIRIN 325 MG ORAL TABLET 325 MG: 325 PILL ORAL at 02:04

## 2020-04-30 NOTE — H&P
[facility] Medicine History & Physical Examination   Patient Name: Melania Beavers  MRN: [id number]  Patient Class: OP- Observation   Admission Date: 4/30/2020 12:32 PM  Length of Stay: 0  Attending Physician: Jassi Draper MD  Primary Care Provider: Luis Brown MD  Face-to-Face encounter date: 04/30/2020  Code Status: Full code  Chief Complaint: Cerebrovascular Accident        Patient information was obtained from patient, past medical records and ER records.   HISTORY OF PRESENT ILLNESS:   Melania Beavers is a 86 y.o. White female who  has a past medical history of Anemia, unspecified (12/3/2019), GERD (gastroesophageal reflux disease), Hypertension, and Normochromic normocytic anemia (12/3/2019).. The patient presented to [facility] on 4/30/2020 with a primary complaint of Cerebrovascular Accident    History was obtained from the patient and ER physician Sign-out. Patient was sent to the emergency by home health nurse when she noticed left sided weakness. Patient cant appreciate any difference on both sides. Reports history of prior episodes in the past. No change in vision, hearing, headache, fever, cough, congestion, runny nose, chest pain, shortness of breath, palpitations, abdominal pain, diarrhea, constipation, vomiting, dysuria, hematuria, joint pain and back pain.     In the emergency room, patient CBC was unremarkable. CMP was unremarkable. CT head was negative.   Decision to admit was taken and patient was informed about the plan of care.   REVIEW OF SYSTEMS:   10 Point Review of System was performed and was found to be negative except for that mentioned already in the HPI above.     PAST MEDICAL HISTORY:     Past Medical History:   Diagnosis Date    Anemia, unspecified 12/3/2019    GERD (gastroesophageal reflux disease)     Hypertension     Normochromic normocytic anemia 12/3/2019       PAST SURGICAL HISTORY:     Past Surgical History:   Procedure  Laterality Date    ESOPHAGOGASTRODUODENOSCOPY N/A 10/4/2019    Procedure: EGD (ESOPHAGOGASTRODUODENOSCOPY);  Surgeon: Dominick Cunningham III, MD;  Location: [facility] ENDO;  Service: Endoscopy;  Laterality: N/A;    HYSTERECTOMY      PERCUTANEOUS PINNING OF HIP Left 4/5/2020    Procedure: PINNING, HIP, PERCUTANEOUS;  Surgeon: Anibal Kidd MD;  Location: [facility] OR;  Service: Orthopedics;  Laterality: Left;       ALLERGIES:   Doxycycline monohydrate; Heparin; and Heparin analogues    FAMILY HISTORY:   History reviewed. No pertinent family history.    SOCIAL HISTORY:     Social History     Tobacco Use    Smoking status: Never Smoker   Substance Use Topics    Alcohol use: No        Social History     Substance and Sexual Activity   Sexual Activity Never        HOME MEDICATIONS:     Prior to Admission medications    Medication Sig Start Date End Date Taking? Authorizing Provider   aspirin 81 MG Chew Take 1 tablet (81 mg total) by mouth once daily. 10/4/19 10/24/20 Yes Annabella Manriquez MD   atorvastatin (LIPITOR) 40 MG tablet Take 1 tablet (40 mg total) by mouth once daily. 9/6/19 9/5/20 Yes Jarad Tran MD   b complex vitamins capsule Take 1 capsule by mouth once daily.   Yes Historical Provider, MD   cholecalciferol, vitamin D3, 125 mcg (5,000 unit) Tab Take 1 tablet by mouth once daily.  5/2/19  Yes Historical Provider, MD   clopidogrel (PLAVIX) 75 mg tablet Take 1 tablet (75 mg total) by mouth once daily. 10/5/19 10/4/20 Yes Annabella Manriquez MD   lactulose (CHRONULAC) 10 gram/15 mL solution Take 10 g by mouth 2 (two) times daily as needed.   Yes Historical Provider, MD   omeprazole (PRILOSEC) 20 MG capsule Take 20 mg by mouth every morning.    Yes Historical Provider, MD   fluticasone-salmeterol 250-50 mcg/dose (ADVAIR) 250-50 mcg/dose diskus inhaler Inhale 1 puff into the lungs 2 (two) times daily. Controller    Historical Provider, MD   HYDROcodone-acetaminophen (NORCO) 5-325 mg per tablet Take 1  "tablet by mouth every 6 (six) hours as needed for Pain. 4/23/20   Anibal Kidd MD   megestroL (MEGACE) 20 MG Tab Take 40 mg by mouth 2 (two) times daily.    Historical Provider, MD   ondansetron (ZOFRAN) 8 MG tablet Take 8 mg by mouth every 12 (twelve) hours as needed for Nausea.     Historical Provider, MD         PHYSICAL EXAM:   BP (!) 174/74   Pulse 88   Temp 98.3 °F (36.8 °C) (Oral)   Resp (!) 24   Ht 5' 7" (1.702 m)   Wt 45.4 kg (100 lb)   LMP  (LMP Unknown)   SpO2 96%   BMI 15.66 kg/m²   Vitals Reviewed  General appearance: weak appearing White female in no apparent distress.  Skin: No Rash.   Neuro: Motor and sensory exams grossly intact. Good tone. Power in all 4 extremities 5/5.   HENT: Atraumatic head. Moist mucous membranes of oral cavity.  Eyes: Normal extraocular movements.   Neck: Supple. No evidence of lymphadenopathy. No thyroidomegaly.  Lungs: Clear to auscultation bilaterally. No wheezing present.   Heart: Regular rate and rhythm. S1 and S2 present with no murmurs/gallop/rub. No pedal edema. No JVD present.   Abdomen: Soft, non-distended, non-tender. No rebound tenderness/guarding. No masses or organomegaly. Bowel sounds are normal. Bladder is not palpable.   Extremities: No cyanosis, clubbing.  Psych/mental status: Alert and oriented. Cooperative. Responds appropriately to questions.   EMERGENCY DEPARTMENT LABS AND IMAGING:     Labs Reviewed   CBC W/ AUTO DIFFERENTIAL - Abnormal; Notable for the following components:       Result Value    RBC 3.28 (*)     Hemoglobin 9.6 (*)     Hematocrit 30.3 (*)     Mean Corpuscular Hemoglobin Conc 31.7 (*)     Immature Granulocytes 0.8 (*)     Mono% 16.5 (*)     All other components within normal limits    Narrative:     Pink tube collected and BID #[id number] in lab NO orders   COMPREHENSIVE METABOLIC PANEL - Abnormal; Notable for the following components:    Chloride 111 (*)     CO2 22 (*)     Anion Gap 7 (*)     eGFR if  39.2 " (*)     eGFR if non  34.0 (*)     All other components within normal limits    Narrative:     Pink tube collected and BID #[id number] in lab NO orders   PROTIME-INR    Narrative:     Pink tube collected and BID #[id number] in lab NO orders   TSH    Narrative:     Pink tube collected and BID #[id number] in lab NO orders   LIPID PANEL    Narrative:     Pink tube collected and BID #[id number] in lab NO orders   SARS-COV-2 RNA AMPLIFICATION, QUAL   URINALYSIS, REFLEX TO URINE CULTURE   POCT GLUCOSE   POCT GLUCOSE MONITORING CONTINUOUS       CTA Head and Neck (xpd)   Final Result      1. The intracranial vessels are patent.   2. Moderate atherosclerosis at the carotid siphons.  Small bilateral carotid bulb atherosclerotic plaques.         Electronically signed by: Anibal Duarte MD   Date:    04/30/2020   Time:    14:26      X-Ray Chest AP Portable   ED Interpretation   No acute cardiopulmonary process      Final Result      1. Hyperinflated lungs with prominence of the interstitium is consistent with emphysematous lung changes.   2. No acute airspace opacities identified.         Electronically signed by: Anibal Duarte MD   Date:    04/30/2020   Time:    13:19      CT Head Without Contrast   Final Result      No acute intracranial abnormality.         Electronically signed by: Anibal Duarte MD   Date:    04/30/2020   Time:    12:54      MRA Brain without contrast    (Results Pending)   MRI Brain Without Contrast    (Results Pending)   US Carotid Bilateral    (Results Pending)       ASSESSMENT & PLAN:   Melania Beavers is a 86 y.o. female admitted for    1. Stroke/TIA: CVA/Stroke: she will be admitted to the telemetry floor. Pt is not a candidate for t-PA because the symptom resolved on presentation. We will continue aspirin 81mg and plavis for stroke treatment/stroke prevention and on SCD for deep venous thrombosis prophylaxis since mobility is impaired.  Pt will have speech, occupational, and physical therapy.   For now, we will hold antihypertensive medications for permissive hypertension for 24-48 hours.  Diabetes control will be assessed with accuchecks and a hemoglobin AIC. Lipid panel will be obtained. Further workup include MRI/MRA of the brain and U/S duplex of the neck and echocardiography with bubble study has been ordered.    Anticipate discharge 1-2 days. May need rehab stay depending on functional status, safety as relates to ability to care for self while recovering from stroke.      DVT Prophylaxis: will be placed on SCD for DVT prophylaxis and will be advised to be as mobile as possible and sit in a chair as tolerated.   ________________________________________________________________________________    Discharge Planning and Disposition: PT/OT consulted  Face-to-Face encounter date: 04/30/2020  Encounter included review of the medical records, interviewing and examining the patient face-to-face, discussion with family and other health care providers including emergency medicine physician, admission orders, interpreting lab/test results and formulating a plan of care.   Medical Decision Making during this encounter was  [_] Low Complexity  [_] Moderate Complexity  [x] High Complexity  _________________________________________________________________________________    INPATIENT LIST OF MEDICATIONS     Current Facility-Administered Medications:     acetaminophen tablet 650 mg, 650 mg, Oral, Q4H PRN, Jassi Draper MD    aspirin chewable tablet 81 mg, 81 mg, Oral, Daily, Jassi Draper MD    atorvastatin tablet 40 mg, 40 mg, Oral, Daily, Jassi Draper MD    cholecalciferol (vitamin D3) 125 mcg (5,000 unit) tablet 5,000 Units, 1 tablet, Oral, Daily, Jassi Draper MD    clopidogreL tablet 75 mg, 75 mg, Oral, Daily, Jassi Draper MD    dextrose 50% injection 12.5 g, 12.5 g, Intravenous, PRN, Jassi Draper MD    dextrose 50% injection 25 g, 25 g, Intravenous, PRN, Jassi  ASIA Draper MD    glucagon (human recombinant) injection 1 mg, 1 mg, Intramuscular, PRN, Jassi Draper MD    glucose chewable tablet 16 g, 16 g, Oral, PRN, Jassi Draper MD    glucose chewable tablet 24 g, 24 g, Oral, PRN, Jassi Draper MD    HYDROcodone-acetaminophen 5-325 mg per tablet 1 tablet, 1 tablet, Oral, Q6H PRN, Jassi Draper MD    melatonin tablet 9 mg, 9 mg, Oral, Nightly PRN, Jassi Draper MD    ondansetron disintegrating tablet 8 mg, 8 mg, Oral, Q8H PRN, Jassi Draper MD    pantoprazole EC tablet 40 mg, 40 mg, Oral, Daily, Jassi Draper MD    sodium chloride 0.9% flush 3 mL, 3 mL, Intravenous, Q6H PRN, Jassi Draper MD    Current Outpatient Medications:     aspirin 81 MG Chew, Take 1 tablet (81 mg total) by mouth once daily., Disp: , Rfl: 0    atorvastatin (LIPITOR) 40 MG tablet, Take 1 tablet (40 mg total) by mouth once daily., Disp: 90 tablet, Rfl: 3    b complex vitamins capsule, Take 1 capsule by mouth once daily., Disp: , Rfl:     cholecalciferol, vitamin D3, 125 mcg (5,000 unit) Tab, Take 1 tablet by mouth once daily. , Disp: , Rfl:     clopidogrel (PLAVIX) 75 mg tablet, Take 1 tablet (75 mg total) by mouth once daily., Disp: 30 tablet, Rfl: 11    lactulose (CHRONULAC) 10 gram/15 mL solution, Take 10 g by mouth 2 (two) times daily as needed., Disp: , Rfl:     omeprazole (PRILOSEC) 20 MG capsule, Take 20 mg by mouth every morning. , Disp: , Rfl:     fluticasone-salmeterol 250-50 mcg/dose (ADVAIR) 250-50 mcg/dose diskus inhaler, Inhale 1 puff into the lungs 2 (two) times daily. Controller, Disp: , Rfl:     HYDROcodone-acetaminophen (NORCO) 5-325 mg per tablet, Take 1 tablet by mouth every 6 (six) hours as needed for Pain., Disp: 30 tablet, Rfl: 0    megestroL (MEGACE) 20 MG Tab, Take 40 mg by mouth 2 (two) times daily., Disp: , Rfl:     ondansetron (ZOFRAN) 8 MG tablet, Take 8 mg by mouth every 12 (twelve) hours as needed for Nausea.  , Disp: , Rfl:       Scheduled Meds:   aspirin  81 mg Oral Daily    atorvastatin  40 mg Oral Daily    cholecalciferol (vitamin D3)  1 tablet Oral Daily    clopidogreL  75 mg Oral Daily    pantoprazole  40 mg Oral Daily     Continuous Infusions:  PRN Meds:.acetaminophen, dextrose 50%, dextrose 50%, glucagon (human recombinant), glucose, glucose, HYDROcodone-acetaminophen, melatonin, ondansetron, sodium chloride 0.9%      Jassi Draper  [facility] Hospitalist  04/30/2020

## 2020-04-30 NOTE — ED PROVIDER NOTES
Encounter Date: 4/30/2020       History     Chief Complaint   Patient presents with    Cerebrovascular Accident     Patient presents after noticing of left-sided weakness around noon.  Patient was last known well at 11:00 a.m..  Home health noticed patient went to move her left side at noon so EMS was called.  Upon arrival to the ER patient now moving her left side.  Patient denies any complaints.  No change in vision.  No weakness.  No numbness or tingling.  No headache.  At the worst symptoms are moderate.        Review of patient's allergies indicates:   Allergen Reactions    Doxycycline monohydrate Other (See Comments)     dizzyness    Heparin     Heparin analogues      Past Medical History:   Diagnosis Date    Anemia, unspecified 12/3/2019    GERD (gastroesophageal reflux disease)     Hypertension     Normochromic normocytic anemia 12/3/2019     Past Surgical History:   Procedure Laterality Date    ESOPHAGOGASTRODUODENOSCOPY N/A 10/4/2019    Procedure: EGD (ESOPHAGOGASTRODUODENOSCOPY);  Surgeon: Dominick Cunningham III, MD;  Location: [facility] ENDO;  Service: Endoscopy;  Laterality: N/A;    HYSTERECTOMY      PERCUTANEOUS PINNING OF HIP Left 4/5/2020    Procedure: PINNING, HIP, PERCUTANEOUS;  Surgeon: Anibal Kidd MD;  Location: [facility] OR;  Service: Orthopedics;  Laterality: Left;     History reviewed. No pertinent family history.  Social History     Tobacco Use    Smoking status: Never Smoker   Substance Use Topics    Alcohol use: No    Drug use: No     Review of Systems   Neurological: Positive for weakness.   All other systems reviewed and are negative.      Physical Exam     Initial Vitals   BP Pulse Resp Temp SpO2   -- -- -- -- --      MAP       --         Physical Exam    Nursing note and vitals reviewed.  Constitutional: She appears well-developed and well-nourished.   HENT:   Head: Normocephalic and atraumatic.   Mouth/Throat: Oropharynx is clear and moist.   Eyes: EOM are normal.    Neck: Normal range of motion. Neck supple.   Cardiovascular: Normal rate, regular rhythm, normal heart sounds and intact distal pulses.   Pulmonary/Chest: Breath sounds normal. No respiratory distress.   Abdominal: Soft.   Musculoskeletal: Normal range of motion.   Neurological: She is alert and oriented to person, place, and time. She has normal strength. She displays normal reflexes. No cranial nerve deficit or sensory deficit.   Vision - Normal  Aphasia - Normal  Neglect - Normal  Pronator drift - Normal  Cerebellum - Normal  RUE strength - Normal  RLE strength - Normal  LUE strength - Normal  LLE strength - Normal   Skin: Skin is warm and dry. Capillary refill takes less than 2 seconds.   Psychiatric: She has a normal mood and affect. Her behavior is normal. Judgment and thought content normal.         ED Course   Procedures  Labs Reviewed   CBC W/ AUTO DIFFERENTIAL   COMPREHENSIVE METABOLIC PANEL   PROTIME-INR   TSH   LIPID PANEL   SARS-COV-2 RNA AMPLIFICATION, QUAL   POCT GLUCOSE MONITORING CONTINUOUS     EKG Readings: (Independently Interpreted)   EKG is normal sinus rhythm without acute ST changes       Imaging Results    None          Medical Decision Making:   ED Management:  Patient's left-sided weakness is resolved.  Patient's head CT is normal.  She received aspirin.  She will be admitted into the hospital for further evaluation and treatment including carotid ultrasound, echo.  I discussed the case with her daughter who lives in [place] who voiced understanding.  She remains clinically stable.                                 Clinical Impression:       ICD-10-CM ICD-9-CM   1. TIA (transient ischemic attack) G45.9 435.9                                Tico Padilla MD  04/30/20 1408       Tico Padilla MD  04/30/20 1408

## 2020-04-30 NOTE — ED NOTES
Bed: [facility]  Expected date:   Expected time:   Means of arrival:   Comments:  EMS- L SIDE WEAKNESS

## 2020-04-30 NOTE — PROGRESS NOTES
Pt remains free from injury this shift. Pt q4 neuro checks. Pt is very unsteady with ambulation. MRI pending at this time. Discussed plan of care with patient and she verbalizes understanding.

## 2020-05-01 ENCOUNTER — CLINICAL SUPPORT (OUTPATIENT)
Dept: CARDIOLOGY | Facility: HOSPITAL | Age: 85
DRG: 065 | End: 2020-05-01
Attending: INTERNAL MEDICINE
Payer: MEDICARE

## 2020-05-01 LAB
ALBUMIN SERPL BCP-MCNC: 3.4 G/DL (ref 3.5–5.2)
ALP SERPL-CCNC: 65 U/L (ref 55–135)
ALT SERPL W/O P-5'-P-CCNC: 12 U/L (ref 10–44)
AMMONIA PLAS-SCNC: 10 UMOL/L (ref 10–50)
ANION GAP SERPL CALC-SCNC: 8 MMOL/L (ref 8–16)
AORTIC ROOT ANNULUS: 1.88 CM
AORTIC VALVE CUSP SEPERATION: 0.9 CM
AST SERPL-CCNC: 14 U/L (ref 10–40)
AV INDEX (PROSTH): 0.23
AV MEAN GRADIENT: 52 MMHG
AV PEAK GRADIENT: 94 MMHG
AV VALVE AREA: 0.73 CM2
AV VELOCITY RATIO: 26.61
BASOPHILS # BLD AUTO: 0.02 K/UL (ref 0–0.2)
BASOPHILS NFR BLD: 0.3 % (ref 0–1.9)
BILIRUB SERPL-MCNC: 0.8 MG/DL (ref 0.1–1)
BSA FOR ECHO PROCEDURE: 1.37 M2
BUN SERPL-MCNC: 14 MG/DL (ref 8–23)
CALCIUM SERPL-MCNC: 9.4 MG/DL (ref 8.7–10.5)
CHLORIDE SERPL-SCNC: 110 MMOL/L (ref 95–110)
CHOLEST SERPL-MCNC: 159 MG/DL (ref 120–199)
CHOLEST/HDLC SERPL: 3.5 {RATIO} (ref 2–5)
CO2 SERPL-SCNC: 21 MMOL/L (ref 23–29)
CREAT SERPL-MCNC: 1.3 MG/DL (ref 0.5–1.4)
CV ECHO LV RWT: 0.5 CM
DIFFERENTIAL METHOD: ABNORMAL
DOP CALC AO PEAK VEL: 4.84 M/S
DOP CALC AO VTI: 108.43 CM
DOP CALC LVOT AREA: 3.2 CM2
DOP CALC LVOT DIAMETER: 2.01 CM
DOP CALC LVOT PEAK VEL: 128.77 M/S
DOP CALC LVOT STROKE VOLUME: 79.6 CM3
DOP CALCLVOT PEAK VEL VTI: 25.1 CM
E WAVE DECELERATION TIME: 269.35 MSEC
E/A RATIO: 0.69
E/E' RATIO: 11.8 M/S
ECHO LV POSTERIOR WALL: 0.88 CM (ref 0.6–1.1)
EOSINOPHIL # BLD AUTO: 0.1 K/UL (ref 0–0.5)
EOSINOPHIL NFR BLD: 1.1 % (ref 0–8)
ERYTHROCYTE [DISTWIDTH] IN BLOOD BY AUTOMATED COUNT: 14 % (ref 11.5–14.5)
EST. GFR  (AFRICAN AMERICAN): 42.9 ML/MIN/1.73 M^2
EST. GFR  (NON AFRICAN AMERICAN): 37.2 ML/MIN/1.73 M^2
ESTIMATED AVG GLUCOSE: 120 MG/DL (ref 68–131)
FOLATE SERPL-MCNC: >24.8 NG/ML (ref 4–24)
FRACTIONAL SHORTENING: 26 % (ref 28–44)
GLUCOSE SERPL-MCNC: 133 MG/DL (ref 70–110)
GLUCOSE SERPL-MCNC: 91 MG/DL (ref 70–110)
HBA1C MFR BLD HPLC: 5.8 % (ref 4.5–6.2)
HCT VFR BLD AUTO: 27.1 % (ref 37–48.5)
HDLC SERPL-MCNC: 45 MG/DL (ref 40–75)
HDLC SERPL: 28.3 % (ref 20–50)
HGB BLD-MCNC: 8.8 G/DL (ref 12–16)
IMM GRANULOCYTES # BLD AUTO: 0.08 K/UL (ref 0–0.04)
IMM GRANULOCYTES NFR BLD AUTO: 1 % (ref 0–0.5)
INTERVENTRICULAR SEPTUM: 0.94 CM (ref 0.6–1.1)
LDLC SERPL CALC-MCNC: 95 MG/DL (ref 63–159)
LEFT ATRIUM SIZE: 2.71 CM
LEFT INTERNAL DIMENSION IN SYSTOLE: 2.62 CM (ref 2.1–4)
LEFT VENTRICLE MASS INDEX: 65 G/M2
LEFT VENTRICULAR INTERNAL DIMENSION IN DIASTOLE: 3.54 CM (ref 3.5–6)
LEFT VENTRICULAR MASS: 91.81 G
LV LATERAL E/E' RATIO: 11.8 M/S
LV SEPTAL E/E' RATIO: 11.8 M/S
LYMPHOCYTES # BLD AUTO: 1.3 K/UL (ref 1–4.8)
LYMPHOCYTES NFR BLD: 15.7 % (ref 18–48)
MAGNESIUM SERPL-MCNC: 1.8 MG/DL (ref 1.6–2.6)
MCH RBC QN AUTO: 29.6 PG (ref 27–31)
MCHC RBC AUTO-ENTMCNC: 32.5 G/DL (ref 32–36)
MCV RBC AUTO: 91 FL (ref 82–98)
MONOCYTES # BLD AUTO: 1.6 K/UL (ref 0.3–1)
MONOCYTES NFR BLD: 19.6 % (ref 4–15)
MV PEAK A VEL: 0.86 M/S
MV PEAK E VEL: 0.59 M/S
NEUTROPHILS # BLD AUTO: 5 K/UL (ref 1.8–7.7)
NEUTROPHILS NFR BLD: 62.3 % (ref 38–73)
NONHDLC SERPL-MCNC: 114 MG/DL
NRBC BLD-RTO: 0 /100 WBC
PLATELET # BLD AUTO: 262 K/UL (ref 150–350)
PMV BLD AUTO: 10.5 FL (ref 9.2–12.9)
POTASSIUM SERPL-SCNC: 4.1 MMOL/L (ref 3.5–5.1)
PROT SERPL-MCNC: 6.6 G/DL (ref 6–8.4)
PV PEAK VELOCITY: 95.33 CM/S
RA PRESSURE: 3 MMHG
RBC # BLD AUTO: 2.97 M/UL (ref 4–5.4)
RIGHT VENTRICULAR END-DIASTOLIC DIMENSION: 156 CM
RPR SER QL: NORMAL
SODIUM SERPL-SCNC: 139 MMOL/L (ref 136–145)
TDI LATERAL: 0.05 M/S
TDI SEPTAL: 0.05 M/S
TDI: 0.05 M/S
TRIGL SERPL-MCNC: 95 MG/DL (ref 30–150)
VIT B12 SERPL-MCNC: 1376 PG/ML (ref 210–950)
WBC # BLD AUTO: 7.95 K/UL (ref 3.9–12.7)

## 2020-05-01 PROCEDURE — 83735 ASSAY OF MAGNESIUM: CPT

## 2020-05-01 PROCEDURE — 83036 HEMOGLOBIN GLYCOSYLATED A1C: CPT

## 2020-05-01 PROCEDURE — 97116 GAIT TRAINING THERAPY: CPT

## 2020-05-01 PROCEDURE — 80061 LIPID PANEL: CPT

## 2020-05-01 PROCEDURE — 82746 ASSAY OF FOLIC ACID SERUM: CPT

## 2020-05-01 PROCEDURE — 92610 EVALUATE SWALLOWING FUNCTION: CPT

## 2020-05-01 PROCEDURE — 21400001 HC TELEMETRY ROOM

## 2020-05-01 PROCEDURE — 82140 ASSAY OF AMMONIA: CPT

## 2020-05-01 PROCEDURE — 82607 VITAMIN B-12: CPT

## 2020-05-01 PROCEDURE — 86592 SYPHILIS TEST NON-TREP QUAL: CPT

## 2020-05-01 PROCEDURE — 84425 ASSAY OF VITAMIN B-1: CPT

## 2020-05-01 PROCEDURE — 84207 ASSAY OF VITAMIN B-6: CPT

## 2020-05-01 PROCEDURE — 97167 OT EVAL HIGH COMPLEX 60 MIN: CPT

## 2020-05-01 PROCEDURE — 97535 SELF CARE MNGMENT TRAINING: CPT

## 2020-05-01 PROCEDURE — 97530 THERAPEUTIC ACTIVITIES: CPT

## 2020-05-01 PROCEDURE — 85025 COMPLETE CBC W/AUTO DIFF WBC: CPT

## 2020-05-01 PROCEDURE — 25000003 PHARM REV CODE 250: Performed by: INTERNAL MEDICINE

## 2020-05-01 PROCEDURE — 93306 TTE W/DOPPLER COMPLETE: CPT

## 2020-05-01 PROCEDURE — 36415 COLL VENOUS BLD VENIPUNCTURE: CPT

## 2020-05-01 PROCEDURE — 80053 COMPREHEN METABOLIC PANEL: CPT

## 2020-05-01 PROCEDURE — 97162 PT EVAL MOD COMPLEX 30 MIN: CPT

## 2020-05-01 RX ORDER — CHOLECALCIFEROL (VITAMIN D3) 50 MCG
4000 TABLET ORAL DAILY
Status: DISCONTINUED | OUTPATIENT
Start: 2020-05-01 | End: 2020-05-04 | Stop reason: HOSPADM

## 2020-05-01 RX ORDER — AMLODIPINE BESYLATE 5 MG/1
10 TABLET ORAL DAILY
Status: DISCONTINUED | OUTPATIENT
Start: 2020-05-01 | End: 2020-05-04 | Stop reason: HOSPADM

## 2020-05-01 RX ORDER — CHOLECALCIFEROL (VITAMIN D3) 25 MCG
1000 TABLET ORAL DAILY
Status: DISCONTINUED | OUTPATIENT
Start: 2020-05-01 | End: 2020-05-04 | Stop reason: HOSPADM

## 2020-05-01 RX ADMIN — Medication 4000 UNITS: at 10:05

## 2020-05-01 RX ADMIN — AMLODIPINE BESYLATE 10 MG: 5 TABLET ORAL at 10:05

## 2020-05-01 RX ADMIN — CLOPIDOGREL BISULFATE 75 MG: 75 TABLET, FILM COATED ORAL at 10:05

## 2020-05-01 RX ADMIN — ASPIRIN 81 MG 81 MG: 81 TABLET ORAL at 10:05

## 2020-05-01 RX ADMIN — ATORVASTATIN CALCIUM 40 MG: 40 TABLET, FILM COATED ORAL at 10:05

## 2020-05-01 RX ADMIN — PANTOPRAZOLE SODIUM 40 MG: 40 TABLET, DELAYED RELEASE ORAL at 05:05

## 2020-05-01 RX ADMIN — Medication 1000 UNITS: at 10:05

## 2020-05-01 NOTE — PT/OT/SLP EVAL
Occupational Therapy   Evaluation    Name: Melania Beavers  MRN: [id number]  Admitting Diagnosis:  <principal problem not specified>      Recommendations:     Discharge Recommendations: rehabilitation facility  Discharge Equipment Recommendations:  (TBD)  Barriers to discharge:  Decreased caregiver support    Assessment:     Melania Beavers is a 86 y.o. female with a medical diagnosis of CVA.  She presents with significant left sided coordination deficits, left visual field cut, cognitive impairment, and poor sitting/standing balance affecting ADL function. Performance deficits affecting function: weakness, impaired endurance, impaired balance, impaired self care skills, impaired functional mobilty, gait instability, impaired cognition, impaired cardiopulmonary response to activity, impaired fine motor, abnormal tone, decreased upper extremity function, decreased lower extremity function, decreased coordination, impaired coordination, decreased safety awareness.      Rehab Prognosis: Good; patient would benefit from acute skilled OT services to address these deficits and reach maximum level of function.       Plan:     Patient to be seen 6 x/week to address the above listed problems via self-care/home management, therapeutic activities, therapeutic exercises, neuromuscular re-education, cognitive retraining  · Plan of Care Expires: 05/31/20  · Plan of Care Reviewed with: patient, daughter    Subjective     Chief Complaint: decreased ability to use left hand functionally  Patient/Family Comments/goals: to go to IP rehab    Occupational Profile:  Living Environment: Lives with daughter who works; has a sitter during the day while daughter is at work; sitter/daughter assists with ADLs (Min A); pt had a L hip fx last month but has been ambulating short distances at home with a RW since then  Previous level of function: Min A ADLs and functional mobility   Equipment Used at Home:  rollator, bedside commode, shower  chair  Assistance upon Discharge: daughter/sitter     Pain/Comfort:  · Pain Rating 1: 0/10  · Pain Rating Post-Intervention 1: 0/10    Objective:     Communicated with: RN prior to session.  Patient found supine with bed alarm, telemetry, peripheral IV upon OT entry to room.    General Precautions: Standard, fall   Orthopedic Precautions:N/A   Braces: N/A     Occupational Performance:    Bed Mobility:    · Patient completed Supine to Sit with moderate assistance    Functional Mobility/Transfers:  · Patient completed Sit <> Stand Transfer with moderate assistance with RW  · Patient completed squat-pivot transfer from EOB->bedside commode with Max A  · Patient completed squat-pivot transfer from bedside commode->bedside chair with Max A    Activities of Daily Living:  · Grooming: supervision seated hair combing  · Lower Body Dressing: total assistance    · Toileting: maximal assistance seated on bedside commode; min a for sitting balance during weight shift while performing hygiene     Cognitive/Visual Perceptual:  Cognitive/Psychosocial Skills:     -       Oriented to: Person and year only   -       Follows Commands/attention:Follows one-step commands  -       Communication: pt had word-finding difficulty at times   -       Memory: impaired  -       Safety awareness/insight to disability: impaired   -       Mood/Affect/Coping skills/emotional control: Appropriate to situation  Visual/Perceptual:      -L visual field cut      Physical Exam:  Balance:    -       poor sitting and standing balance  Dominant hand:    -       RUE  Upper Extremity Range of Motion:     -       Right Upper Extremity: WFL  -       Left Upper Extremity: WFL  Upper Extremity Strength:    -       Right Upper Extremity: 4/5  -       Left Upper Extremity: 3+/5   Strength:    -       Right Upper Extremity: WFL  -       Left Upper Extremity: WFL  Fine Motor Coordination:    -       Impaired  Left hand, finger to nose  , Left hand thumb/finger  opposition skills  , Left hand, manipulation of objects   and Left hand, graphomotor skills    Gross motor coordination:  Ataxia and impaired hand-eye coordination LUE    AMPAC 6 Click ADL:  AMPAC Total Score: 14    Treatment & Education:  OT role/POC  Education:    Patient left up in chair with all lines intact, call button in reach and bed alarm on and RN present     GOALS:   Multidisciplinary Problems     Occupational Therapy Goals     Not on file                History:     Past Medical History:   Diagnosis Date    Anemia, unspecified 12/3/2019    GERD (gastroesophageal reflux disease)     Hypertension     Normochromic normocytic anemia 12/3/2019       Past Surgical History:   Procedure Laterality Date    ESOPHAGOGASTRODUODENOSCOPY N/A 10/4/2019    Procedure: EGD (ESOPHAGOGASTRODUODENOSCOPY);  Surgeon: Dominick Cunningham III, MD;  Location: [facility];  Service: Endoscopy;  Laterality: N/A;    HYSTERECTOMY      PERCUTANEOUS PINNING OF HIP Left 4/5/2020    Procedure: PINNING, HIP, PERCUTANEOUS;  Surgeon: Anibal Kidd MD;  Location: [facility] OR;  Service: Orthopedics;  Laterality: Left;       Time Tracking:     OT Date of Treatment: 05/01/20  OT Start Time: 0931  OT Stop Time: 1009  OT Total Time (min): 38 min    Billable Minutes:Evaluation 15  Self Care/Home Management 23    Musa Ta, WES  5/1/2020

## 2020-05-01 NOTE — PHYSICIAN QUERY
PT Name: Melania Beavers  MR #: [id number]     Physician Query Form - Documentation Clarification      CDS/: Patricia Castillo               Contact information:[phone] or through secure epic messenger    This form is a permanent document in the medical record.     Query Date: May 1, 2020    By submitting this query, we are merely seeking further clarification of documentation. Please utilize your independent clinical judgment when addressing the question(s) below.    The Medical record reflects the following:    Supporting Clinical Findings Location in Medical Record   BMI 15.66  Weak appearing white female NAD   H/P   Megace 20mg BID     H/P home meds                                                                            Doctor, If you feel that the above findings are significant, is there an associated diagnosis that can be documented to better reflect the severity of the pt's nutritional status? Please include the severity if applicable.    Provider Use Only  malnourised                                                                                                                               [  ] Clinically Undetermined

## 2020-05-01 NOTE — PT/OT/SLP EVAL
Speech Language Pathology Evaluation  Bedside Swallow    Patient Name:  Melania Beavers   MRN:  [id number]  Admitting Diagnosis: <principal problem not specified>    Recommendations:                 General Recommendations:  Baseline cognitive linguistic deficits noted (SLP donaldo 10/2019). No acute deficits at this time. Recommend standardized, higher level evaluation at next level of care.   Diet recommendations:  Regular, Thin   Aspiration Precautions: Standard aspiration precautions   General Precautions: Standard, fall  Communication strategies:  none    History:     Past Medical History:   Diagnosis Date    Anemia, unspecified 12/3/2019    GERD (gastroesophageal reflux disease)     Hypertension     Normochromic normocytic anemia 12/3/2019       Past Surgical History:   Procedure Laterality Date    ESOPHAGOGASTRODUODENOSCOPY N/A 10/4/2019    Procedure: EGD (ESOPHAGOGASTRODUODENOSCOPY);  Surgeon: Dominick Cunningham III, MD;  Location: [facility] ENDO;  Service: Endoscopy;  Laterality: N/A;    HYSTERECTOMY      PERCUTANEOUS PINNING OF HIP Left 4/5/2020    Procedure: PINNING, HIP, PERCUTANEOUS;  Surgeon: Anibal Kidd MD;  Location: [facility] OR;  Service: Orthopedics;  Laterality: Left;       Social History: Patient lives with daughter.    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    Negative for intracranial hemorrhage.    White matter microangiopathy, as well as sequela of remote ischemia involving left frontal lobe and right occipital lobe.      Electronically signed by: Clarence Rao MD  Date:    04/30/2020  Time:    17:53             Narrative:    EXAMINATION:  MRI BRAIN WITHOUT CONTRAST    CLINICAL  HISTORY:  Stroke;    TECHNIQUE:  Multisequence, multiplanar imaging through the brain performed without IV contrast.    COMPARISON:  Noncontrast head CT same day    CT a head and neck same day    FINDINGS:  Diffusion-weighted imaging demonstrates a 10 x 5 mm ovoid focus of increased signal intensity along the lateral aspect of the right thalamus, with corresponding low signal intensity on the ADC map, consistent with true restricted diffusion.  No appreciable signal abnormality is seen in this region on T2/FLAIR imaging.  Gradient echo images show no evidence of intracranial hemorrhage.    No intracranial mass, midline shift, or mass effect.  Ventricles and sulci are normal in size.  Moderately advanced periventricular and deep white matter T2/FLAIR hyperintensity, compatible with microangiopathic change.  Cystic encephalomalacia involving the left frontal lobe corresponds to CT findings.  There is also a small area of gliosis involving the right occipital lobe.  Aforementioned findings likely reflects sequela of remote ischemia.    Major intracranial T2 flow voids are patent.  Cerebellar hemispheres and brainstem are unremarkable.  Visualized paranasal sinuses and mastoid air cells are clear, noting some metallic dental susceptibility artifact.  Orbits are unremarkable.  No bone marrow signal abnormality.  Pituitary gland is unremarkable.                               CTA Head and Neck (xpd) (Final result)  Result time 04/30/20 14:26:17    Final result by Anibal Duarte MD (04/30/20 14:26:17)                 Impression:      1. The intracranial vessels are patent.  2. Moderate atherosclerosis at the carotid siphons.  Small bilateral carotid bulb atherosclerotic plaques.      Electronically signed by: Anibal Duarte MD  Date:    04/30/2020  Time:    14:26             Narrative:      CMS MANDATED QUALITY DATA - CT RADIATION - 436    All CT scans at this facility utilize dose modulation, iterative reconstruction,  and/or weight based dosing when appropriate to reduce radiation dose to as low as reasonably achievable.    CMS MANDATED QUALITY DATA - CAROTID - 195    All measurements and percent stenosis described below were determined using NASCET criteria or criteria similar to NASCET, as defined by the Society of Radiologists in Ultrasound Consensus Conference, Radiology, 2003    EXAMINATION:  CTA HEAD AND NECK (XPD)    CLINICAL HISTORY:  Focal neuro deficit, new, fixed or worsening, <6 hours;    TECHNIQUE:  CT angiography of brain and neck with 100 mL Omnipaque 350. Maximum intensity projection coronal reformations were obtained at a separate workstation and stored in the patient's permanent medical record.    COMPARISON:  CT head 04/30/2020.    FINDINGS:  The SARKIS, MCA and PCA are patent.  The basilar artery is patent.  The vertebral arteries are patent.  The bilateral CCA are patent.  There is mild atherosclerosis at the bilateral carotid bulbs the ICA are patent bilaterally there is moderate atherosclerosis of the carotid siphons.  The venous sinuses are patent the jugular veins are patent.  No acute changes of the brain parenchyma identified.  Severe cervical spine degenerative changes noted.  No cervical neck soft tissue abnormality identified.  Biapical pleuroparenchymal thickening and scarring noted.                               X-Ray Chest AP Portable (Final result)  Result time 04/30/20 13:19:56    Final result by Anibal Duarte MD (04/30/20 13:19:07)                 Impression:      1. Hyperinflated lungs with prominence of the interstitium is consistent with emphysematous lung changes.  2. No acute airspace opacities identified.      Electronically signed by: Anibal Duarte MD  Date:    04/30/2020  Time:    13:19             Narrative:    EXAMINATION:  XR CHEST AP PORTABLE    CLINICAL HISTORY:  Stroke;    FINDINGS:  Portable chest with comparison chest x-ray 04/04/2020.  Normal cardiomediastinal silhouette.The  "lungs are hyperexpanded with prominence of the interstitium.  Biapical pleuroparenchymal thickening and scarring is unchanged.  No new airspace opacity identified.  Pulmonary vasculature is normal. No acute osseous abnormality.                               CT Head Without Contrast (Final result)  Result time 04/30/20 12:54:10    Final result by Anibal Duarte MD (04/30/20 12:54:10)                 Impression:      No acute intracranial abnormality.      Electronically signed by: Anibal Duarte MD  Date:    04/30/2020  Time:    12:54             Narrative:      CMS MANDATED QUALITY DATA - CT RADIATION - 436    All CT scans at this facility utilize dose modulation, iterative reconstruction, and/or weight based dosing when appropriate to reduce radiation dose to as low as reasonably achievable.    EXAMINATION:  CT HEAD WITHOUT CONTRAST    CLINICAL HISTORY:  Stroke;    TECHNIQUE:  Head CT without IV contrast.    COMPARISON:  None    FINDINGS:  Gray-white differentiation is maintained without hemorrhage, midline shift, or mass effect.The ventricles and cisterns are maintained.  Periventricular deep white matter hypoattenuation noted compatible with chronic small vessel ischemic disease left frontal and right occipital encephalomalacia noted.  Calvarium is intact. Visualized sinuses are clear.                                  Prior diet: unrestricted.    Occupation/hobbies/homemaking: dependent for home management tasks (medication and finances).    Subjective     "I've been doing good"  Patient goals: none stated  Daughter promotes disorientation with onset of symptoms; denies further impact on SLP domains      Pain/Comfort:  · Pain Rating 1: 0/10    Objective:       Cognitive Communication: Alert and oriented x4. Able to follow commands within unstructured context. Fluent and appropriate at the conversational level. Recalls recent medical events and procedures. Confrontation naming, 100% acc. Automatic speech, 100% " acc. Repetition progressing from word to sentence, 100% acc.     Oral Musculature Evaluation  · Oral Musculature: WFL  · Dentition: present and adequate  · Secretion Management: adequate  · Mucosal Quality: good  · Oral Labial Strength and Mobility: WFL  · Lingual Strength and Mobility: WFL  · Velar Elevation: (Did not visualize; no s/s of velar dysfunction)  · Volitional Cough: Present  · Volitional Swallow: Hyolaryngeal movement present to digital palpation  · Voice Prior to PO Intake: clear; no dysphonia   · Oral Musculature Comments: bilateral and equal sensation to forehead, cheek and jaw     Bedside Swallow Eval:   Consistencies Assessed:  · Thin liquids: ice chip x1, tsp x1 w/ bolus hold, 3oz via sequential cup sips  · Solids 1/4 cracker x2     Oral Phase:   · Pt with adequate oral containment and coordination across consistencies. Timely mastication, adequate bolus formation and oral clearance which resulted in no evidence of appreciable oral residue with solid consistencies.    Pharyngeal Phase:   · Adequate hyolaryngeal movement to digital palpation. Pt tolerated 3oz thin liquid via sequential sips with no overt s/s of aspiration or changes in vocal quality, passing 3oz water challenge. Therefore, risk of aspiration not indicated. Multiple swallows not observed across consistencies trialled.     Compensatory Strategies  · None       Assessment:     Melania Beavers is a 86 y.o. female with an SLP diagnosis of no overt s/s of oropharyngeal dysphagia. Baseline cognitive linguistic deficits noted per chart review (SLP 10/2019). Recommend higher level, standardized assessment of language and cognitive communication at next level of care.       Plan:     · Plan of Care reviewed with:  patient, daughter   · SLP Follow-Up:  No       Discharge recommendations:  (pending PT/OT)     Time Tracking:     SLP Treatment Date:   05/01/20  Speech Start Time:  1136  Speech Stop Time:  1151     Speech Total Time (min):  15  min    Billable Minutes: Eval Swallow and Oral Function 15    Daniel Figueredo CCC-SLP  05/01/2020

## 2020-05-01 NOTE — PLAN OF CARE
05/01/20 1349   Discharge Reassessment   Assessment Type Discharge Planning Assessment   Anticipated Discharge Disposition Rehab   Discharge Plan A Rehab   Discharge Plan B Rehab   DME Needed Upon Discharge  none   Patient choice form signed by patient/caregiver List with quality metrics by geographic area provided;List from CMS Compare;List from [facility]   Post-Acute Status   Post-Acute Authorization Other   Discharge Delays None known at this time     1349 Referral sent to [facility] per patient's daughter request. Request only send to [facility] which patient has been to before.     8422 Received notice from Cindy at [facility] informing  that patient is accepted pending medical clearance with a bed available for discharge to facility on Monday.

## 2020-05-01 NOTE — NURSING
Notified Dr. Leach regarding AM BP of 183/77; all other Vitals stable; pt non symptomatic, denies any s/sx of htn, and with no complaints at this time. MD states this is okay and to notify him if sys BP goes over 200.  WCTM.

## 2020-05-01 NOTE — PT/OT/SLP EVAL
"Physical Therapy Evaluation    Patient Name:  Melania Beavers   MRN:  [id number]    Recommendations:     Discharge Recommendations:  rehabilitation facility   Discharge Equipment Recommendations: (TBD)   Barriers to discharge: Decreased caregiver support    Assessment:     Melania Beavers is a 86 y.o. female admitted with left sided weakness with  medical diagnosis of small acute infarct of the  Right  Thalamus.  Pt lives at home with her daughter and has a sitter "all day."  Pt required supervision>Min A with ADL's and mobility with rollator PTA.She has L sided neglect.    She presents with the following impairments/functional limitations:  weakness, impaired endurance, impaired self care skills, gait instability, impaired functional mobilty, impaired balance, decreased coordination, decreased safety awareness . Pt would benefit from Inpatient Rehab services.    Rehab Prognosis: Fair; patient would benefit from acute skilled PT services to address these deficits and reach maximum level of function.    Recent Surgery: * No surgery found *      Plan:     During this hospitalization, patient to be seen daily to address the identified rehab impairments via gait training, therapeutic activities, therapeutic exercises, neuromuscular re-education and progress toward the following goals:    · Plan of Care Expires:       Subjective     Chief Complaint: weakness, tremors  Patient/Family Comments/goals:   Pain/Comfort:  ·      Patients cultural, spiritual, Gnosticist conflicts given the current situation:      Living Environment:  Pt lives in a one story house with  2 step entry and B railing  Prior to admission, patients level of function was Supervision> Min A.   Equipment used at home: rollator, bedside commode.  DME owned (not currently used): none.  Upon discharge, patient will have assistance from her family and sitter..    Objective:     Communicated with nurse prior to session.  Patient found up in chair with bed alarm, " telemetry  upon PT entry to room.    General Precautions: Standard, fall   Orthopedic Precautions:    Braces:       Exams:  · Cognitive Exam:  Patient is oriented to Person, Place and Time  · Gross Motor Coordination:   Dysmetria, tremors,   · RLE ROM: WFL  · RLE Strength: 4+/5   · LLE ROM: WFL  · LLE Strength: 4-/5    Functional Mobility:  ·   · Transfers:     · Sit to Stand:  moderate assistance with rolling walker  · Gait: 10 ft RW and Mod/Max A   · Balance: poor sitting and poor standing balance with lean to the left      Therapeutic Activities and Exercises:  T/f training sit<>stand with Mod A , standing balance training with verbal and tactile cueing for advancement of L LE. Gait of 10 ft RW and Mod/Max A. With poor control of L LE.    AM-PAC 6 CLICK MOBILITY  Total Score:      Patient left up in chair with call button in reach, chair alarm on and daughter was present present.    GOALS:   Multidisciplinary Problems     Physical Therapy Goals     Not on file                History:     Past Medical History:   Diagnosis Date    Anemia, unspecified 12/3/2019    GERD (gastroesophageal reflux disease)     Hypertension     Normochromic normocytic anemia 12/3/2019       Past Surgical History:   Procedure Laterality Date    ESOPHAGOGASTRODUODENOSCOPY N/A 10/4/2019    Procedure: EGD (ESOPHAGOGASTRODUODENOSCOPY);  Surgeon: Dominick Cunningham III, MD;  Location: [facility];  Service: Endoscopy;  Laterality: N/A;    HYSTERECTOMY      PERCUTANEOUS PINNING OF HIP Left 4/5/2020    Procedure: PINNING, HIP, PERCUTANEOUS;  Surgeon: Anibal Kidd MD;  Location: [facility] OR;  Service: Orthopedics;  Laterality: Left;       Time Tracking:     PT Received On: 05/01/20  PT Start Time: 1051     PT Stop Time: 1130  PT Total Time (min): 39 min     Billable Minutes: Evaluation 15 minutes, Gait Training 12 minutes  and Therapeutic Activity 12 minutes      Lenore Fernandez, PT  05/01/2020

## 2020-05-01 NOTE — CONSULTS
[facility]  Neurology  Consult Note    Patient Name: Melania Beavers  MRN: [id number]  Admission Date: 4/30/2020  Hospital Length of Stay: 0 days  Code Status: Full Code   Attending Provider: Jassi Draper MD   Consulting Provider: Mauro Ortega NP  Primary Care Physician: Luis Brown MD  Principal Problem:<principal problem not specified>    Consults  Subjective:     Chief Complaint:  Stroke     HPI from EMR: Melania Beavers is a 86 y.o. White female who  has a past medical history of Anemia, unspecified (12/3/2019), GERD (gastroesophageal reflux disease), Hypertension, and Normochromic normocytic anemia (12/3/2019).. The patient presented to [facility] on 4/30/2020 with a primary complaint of Cerebrovascular Accident     History was obtained from the patient and ER physician Sign-out. Patient was sent to the emergency by home health nurse when she noticed left sided weakness. Patient cant appreciate any difference on both sides. Reports history of prior episodes in the past. No change in vision, hearing, headache, fever, cough, congestion, runny nose, chest pain, shortness of breath, palpitations, abdominal pain, diarrhea, constipation, vomiting, dysuria, hematuria, joint pain and back pain.      In the emergency room, patient CBC was unremarkable. CMP was unremarkable. CT head was negative.   Decision to admit was taken and patient was informed about the plan of care    Neurological Note: 5/1 - Seen and examined with Dr. Jerry Quick. Patient's daughter at bedside who lives with and helps care for patient. Patient was deemed not tpa candidate upon ER arrival yesterday because presenting symptoms initially resolved in ER per ED note. Initial CT head negative acute. However, MRI today shows small acute right thalamic infarct. Chronic infarcts also seen left frontal lobe and right occipital lobe. Patient with very mild L sided weakness on exam today, greater in LUE and left  . Slight left visual field cut noted. She is AAO to person and time, not to place. PERRLA, follows commands. Speech intact. CTA negative for hemodynamically significant stenosis. Echo with bubble pending. Patient was on 81 mg ASA and Plavix at home due to old strokes. Of note, patient with recent L hip surgery Apr 5. Appears her dual anticoagulant therapy was restarted after surgery per EMR and per patient.     Past Medical History:   Diagnosis Date    Anemia, unspecified 12/3/2019    GERD (gastroesophageal reflux disease)     Hypertension     Normochromic normocytic anemia 12/3/2019       Past Surgical History:   Procedure Laterality Date    ESOPHAGOGASTRODUODENOSCOPY N/A 10/4/2019    Procedure: EGD (ESOPHAGOGASTRODUODENOSCOPY);  Surgeon: Dominick Cunningham III, MD;  Location: [facility] ENDO;  Service: Endoscopy;  Laterality: N/A;    HYSTERECTOMY      PERCUTANEOUS PINNING OF HIP Left 4/5/2020    Procedure: PINNING, HIP, PERCUTANEOUS;  Surgeon: Anibal Kidd MD;  Location: [facility] OR;  Service: Orthopedics;  Laterality: Left;       Review of patient's allergies indicates:   Allergen Reactions    Doxycycline monohydrate Other (See Comments)     dizzyness    Heparin     Heparin analogues        Current Neurological Medications: 81 mg ASA, 75 mg Plavix    No current facility-administered medications on file prior to encounter.      Current Outpatient Medications on File Prior to Encounter   Medication Sig    aspirin 81 MG Chew Take 1 tablet (81 mg total) by mouth once daily.    atorvastatin (LIPITOR) 40 MG tablet Take 1 tablet (40 mg total) by mouth once daily.    b complex vitamins capsule Take 1 capsule by mouth once daily.    cholecalciferol, vitamin D3, 125 mcg (5,000 unit) Tab Take 1 tablet by mouth once daily.     clopidogrel (PLAVIX) 75 mg tablet Take 1 tablet (75 mg total) by mouth once daily.    lactulose (CHRONULAC) 10 gram/15 mL solution Take 10 g by mouth 2 (two) times daily as needed.     omeprazole (PRILOSEC) 20 MG capsule Take 20 mg by mouth every morning.     fluticasone-salmeterol 250-50 mcg/dose (ADVAIR) 250-50 mcg/dose diskus inhaler Inhale 1 puff into the lungs 2 (two) times daily. Controller    HYDROcodone-acetaminophen (NORCO) 5-325 mg per tablet Take 1 tablet by mouth every 6 (six) hours as needed for Pain.    megestroL (MEGACE) 20 MG Tab Take 40 mg by mouth 2 (two) times daily.    ondansetron (ZOFRAN) 8 MG tablet Take 8 mg by mouth every 12 (twelve) hours as needed for Nausea.       Family History     None        Tobacco Use    Smoking status: Never Smoker   Substance and Sexual Activity    Alcohol use: No    Drug use: No    Sexual activity: Never     Review of Systems   Neurological: Positive for weakness. Negative for dizziness, facial asymmetry and headaches.   Psychiatric/Behavioral: Positive for confusion.   All other systems reviewed and are negative.    Objective:     Vital Signs (Most Recent):  Temp: 97.8 °F (36.6 °C) (05/01/20 0730)  Pulse: 86 (05/01/20 0730)  Resp: 20 (05/01/20 0730)  BP: (!) 183/77(nurse notified) (05/01/20 0730)  SpO2: 97 % (05/01/20 0730) Vital Signs (24h Range):  Temp:  [97.8 °F (36.6 °C)-98.3 °F (36.8 °C)] 97.8 °F (36.6 °C)  Pulse:  [73-88] 86  Resp:  [18-84] 20  SpO2:  [95 %-99 %] 97 %  BP: (169-222)/(72-93) 183/77     Weight: 39.2 kg (86 lb 6.7 oz)  Body mass index is 13.54 kg/m².    Physical Exam   Constitutional: She appears well-developed and well-nourished.   HENT:   Head: Normocephalic and atraumatic.   Eyes: Pupils are equal, round, and reactive to light. EOM are normal.   Neck: Normal range of motion.   Pulmonary/Chest: Effort normal.   Neurological: She is alert. GCS eye subscore is 4. GCS verbal subscore is 4. GCS motor subscore is 6.   Skin: Skin is warm and dry.   Psychiatric: She has a normal mood and affect. Her speech is normal and behavior is normal.       NEUROLOGICAL EXAMINATION:     MENTAL STATUS   Oriented to person.    Disoriented to place. Disoriented to city and area. Oriented to country.   Oriented to time. Oriented to year.   Follows 2 step commands.   Attention: normal. Concentration: normal.   Speech: speech is normal   Level of consciousness: alert  Able to name object. Able to repeat.     CRANIAL NERVES     CN II   Left visual field deficit: upper temporal and lower temporal quadrant(s)    CN III, IV, VI   Pupils are equal, round, and reactive to light.  Extraocular motions are normal.   Right pupil: Size: 3 mm. Shape: regular. Reactivity: brisk.   Left pupil: Size: 3 mm. Shape: regular. Reactivity: brisk.   Nystagmus: none     CN V   Facial sensation intact.     CN VII   Facial expression full, symmetric.     CN VIII   CN VIII normal.     MOTOR EXAM     Strength   Strength 5/5 except as noted.        LUE 4/5, left  strength 4/5    LLE 4/5.     SENSORY EXAM   Right arm light touch: normal  Left arm light touch: decreased from wrist  Right leg light touch: normal  Left leg light touch: normal    GAIT AND COORDINATION        Finger to nose normal on R, Dysmetria noted on L side       Significant Labs:   Recent Lab Results       05/01/20  0435   04/30/20  1432   04/30/20  1312   04/30/20  1308   04/30/20  1304        Albumin 3.4       3.6     Alkaline Phosphatase 65       63     ALT 12       13     Anion Gap 8       7     Appearance, UA   Clear           AST 14       16     Baso # 0.02       0.03     Basophil% 0.3       0.6     Bilirubin (UA)   Negative           BILIRUBIN TOTAL 0.8  Comment:  For infants and newborns, interpretation of results should be based  on gestational age, weight and in agreement with clinical  observations.  Premature Infant recommended reference ranges:  Up to 24 hours.............<8.0 mg/dL  Up to 48 hours............<12.0 mg/dL  3-5 days..................<15.0 mg/dL  6-29 days.................<15.0 mg/dL         0.8  Comment:  For infants and newborns, interpretation of results should be  based  on gestational age, weight and in agreement with clinical  observations.  Premature Infant recommended reference ranges:  Up to 24 hours.............<8.0 mg/dL  Up to 48 hours............<12.0 mg/dL  3-5 days..................<15.0 mg/dL  6-29 days.................<15.0 mg/dL       BUN, Bld 14       16     Calcium 9.4       9.6     Chloride 110       111     Cholesterol 159  Comment:  The National Cholesterol Education Program (NCEP) has set the  following guidelines (reference ranges) for Cholesterol:  Optimal.....................<200 mg/dL  Borderline High.............200-239 mg/dL  High........................> or = 240 mg/dL         178  Comment:  The National Cholesterol Education Program (NCEP) has set the  following guidelines (reference ranges) for Cholesterol:  Optimal.....................<200 mg/dL  Borderline High.............200-239 mg/dL  High........................> or = 240 mg/dL       CO2 21       22     Color, UA   Yellow           Creatinine 1.3       1.4     Differential Method Automated       Automated     eGFR if  42.9       39.2     eGFR if non  37.2  Comment:  Calculation used to obtain the estimated glomerular filtration  rate (eGFR) is the CKD-EPI equation.          34.0  Comment:  Calculation used to obtain the estimated glomerular filtration  rate (eGFR) is the CKD-EPI equation.        Eos # 0.1       0.1     Eosinophil% 1.1       1.9     Estimated Avg Glucose 120             Glucose 91       92     Glucose, UA   Negative           Gran # (ANC) 5.0       2.6     Gran% 62.3       54.8     HDL 45  Comment:  The National Cholesterol Education Program (NCEP) has set the  following guidelines (reference values) for HDL Cholesterol:  Low...............<40 mg/dL  Optimal...........>60 mg/dL         48  Comment:  The National Cholesterol Education Program (NCEP) has set the  following guidelines (reference values) for HDL Cholesterol:  Low...............<40  mg/dL  Optimal...........>60 mg/dL       Hdl/Cholesterol Ratio 28.3       27.0     Hematocrit 27.1       30.3     Hemoglobin 8.8       9.6     Hemoglobin A1C External 5.8  Comment:  According to ADA guidelines, hemoglobin A1C <7.0% represents  optimal control in non-pregnant diabetic patients.  Different  metrics may apply to specific populations.   Standards of Medical Care in Diabetes - 2016.  For the purpose of screening for the presence of diabetes:  <5.7%     Consistent with the absence of diabetes  5.7-6.4%  Consistent with increasing risk for diabetes   (prediabetes)  >or=6.5%  Consistent with diabetes  Currently no consensus exists for use of hemoglobin A1C  for diagnosis of diabetes for children.               Immature Grans (Abs) 0.08  Comment:  Mild elevation in immature granulocytes is non specific and   can be seen in a variety of conditions including stress response,   acute inflammation, trauma and pregnancy. Correlation with other   laboratory and clinical findings is essential.         0.04  Comment:  Mild elevation in immature granulocytes is non specific and   can be seen in a variety of conditions including stress response,   acute inflammation, trauma and pregnancy. Correlation with other   laboratory and clinical findings is essential.       Immature Granulocytes 1.0       0.8     INR         1.1  Comment:  Coumadin Therapy:  INR: 2.0-3.0 conventional anticoagulation  INR: 2.5-3.5 intensive anticoagulation       Ketones, UA   Negative           LDL Cholesterol External 95.0  Comment:  The National Cholesterol Education Program (NCEP) has set the  following guidelines (reference values) for LDL Cholesterol:  Optimal.......................<130 mg/dL  Borderline High...............130-159 mg/dL  High..........................160-189 mg/dL  Very High.....................>190 mg/dL         110.4  Comment:  The National Cholesterol Education Program (NCEP) has set the  following guidelines (reference  values) for LDL Cholesterol:  Optimal.......................<130 mg/dL  Borderline High...............130-159 mg/dL  High..........................160-189 mg/dL  Very High.....................>190 mg/dL       Leukocytes, UA   Negative           Lymph # 1.3       1.2     Lymph% 15.7       25.4     Magnesium 1.8             MCH 29.6       29.3     MCHC 32.5       31.7     MCV 91       92     Mono # 1.6       0.8     Mono% 19.6       16.5     MPV 10.5       10.4     NITRITE UA   Negative           Non-HDL Cholesterol 114  Comment:  Risk category and Non-HDL cholesterol goals:  Coronary heart disease (CHD)or equivalent (10-year risk of CHD >20%):  Non-HDL cholesterol goal     <130 mg/dL  Two or more CHD risk factors and 10-year risk of CHD <= 20%:  Non-HDL cholesterol goal     <160 mg/dL  0 to 1 CHD risk factor:  Non-HDL cholesterol goal     <190 mg/dL         130  Comment:  Risk category and Non-HDL cholesterol goals:  Coronary heart disease (CHD)or equivalent (10-year risk of CHD >20%):  Non-HDL cholesterol goal     <130 mg/dL  Two or more CHD risk factors and 10-year risk of CHD <= 20%:  Non-HDL cholesterol goal     <160 mg/dL  0 to 1 CHD risk factor:  Non-HDL cholesterol goal     <190 mg/dL       nRBC 0       0     Occult Blood UA   Negative           pH, UA   7.0           Platelets 262       249     POC Glucose       87       Potassium 4.1       4.9     PROTEIN TOTAL 6.6       6.9     Protein, UA   Trace  Comment:  Recommend a 24 hour urine protein or a urine   protein/creatinine ratio if globulin induced proteinuria is  clinically suspected.             PT         13.6     RBC 2.97       3.28     RDW 14.0       14.1     SARS-CoV-2 RNA, Amplification, Qual     Negative  Comment:  This test utilizes isothermal nucleic acid amplification   technology to detect the SARS-CoV-2 RdRp nucleic acid segment.   The analytical sensitivity (limit of detection) is 125 genome   equivalents/mL.   A POSITIVE result implies  infection with the SARS-CoV-2 virus;  the patient is presumed to be contagious.    A NEGATIVE result means that SARS-CoV-2 nucleic acids are not  present above the limit of detection. It does not rule out the   possibility of COVID-19 and should not be the sole basis for   treatment decisions. If COVID-19 is strongly suspected based on  clinical and exposure history, re-testing should be considered.   This test is only for use under the Food and Drug   Administration s Emergency Use Authorization (EUA).   Commercial kits are provided by [employer].   Performance characteristics of the EUA have been independently  verified by [facility] Department of  Pathology and Laboratory Medicine.   _________________________________________________________________  The ID NOW COVID-19 Letter of Authorization, along with the   authorized Fact Sheet for Healthcare Providers, the authorized Fact  Sheet for Patients, and authorized labeling are available on the FDA   website:  www.fda.gov/MedicalDevices/Safety/EmergencySituations/zmz317828.htm           Sodium 139       140     Specific [place], UA   1.020           Specimen UA   Urine, Clean Catch           Total Cholesterol/HDL Ratio 3.5       3.7     Triglycerides 95  Comment:  The National Cholesterol Education Program (NCEP) has set the  following guidelines (reference values) for triglycerides:  Normal......................<150 mg/dL  Borderline High.............150-199 mg/dL  High........................200-499 mg/dL         98  Comment:  The National Cholesterol Education Program (NCEP) has set the  following guidelines (reference values) for triglycerides:  Normal......................<150 mg/dL  Borderline High.............150-199 mg/dL  High........................200-499 mg/dL       TSH         1.650     UROBILINOGEN UA   Negative           WBC 7.95       4.80                          Significant Imaging: I have reviewed all pertinent imaging  results/findings within the past 24 hours.    Assessment and Plan:     Acute Right thalamic CVA  MRI confirmed acute stroke, chronic infarcts also seen  Continue DAPT with aspirin/plavix  PT/OT/ST eval and treat  Neuro checks q 4  Fall and safety precautions  CTA noted, no significant stenosis  Echo pending  LDL 95. Continue statin for goal less 70  A1C 5.8  Allow permissive hypertension for now  Will check encephalopathy labs for mild confusion on exam      Neurological work-up being completed at this time. Awaiting Echo results. If Echo negative, patient would be stable for discharge from neurological perspective. Mild confusion on exam but appears this is baseline. Daughter is concerned with not being able to care for patient at home at this time. Case management working on transfer to inpatient rehab.       Active Diagnoses:    Diagnosis Date Noted POA    TIA (transient ischemic attack) [G45.9] 04/30/2020 Yes      Problems Resolved During this Admission:       VTE Risk Mitigation (From admission, onward)         Ordered     Reason for no Mechanical VTE Prophylaxis  Once     Question:  Reasons:  Answer:  Physician Provided (leave comment)  Comment:  heparin allergic    04/30/20 1436     IP VTE HIGH RISK PATIENT  Once      04/30/20 1436                Thank you for your consult. I will follow-up with patient. Please contact us if you have any additional questions.    Mauro Ortega, ERIC  Neurology  [facility]    I, Dr. Jerry Quick, have personally seen and examined the patient with my advanced provider and agree with above. I personally did a focused exam, and reviewed all necessary clinical information. I discussed my management plan with my NP and agree with above. Stable. Will need therapies.

## 2020-05-01 NOTE — PROGRESS NOTES
"[facility] Medicine Progress Note  Patient Name: Melania Beavers MRN: [id number]   Patient Class: IP- Inpatient  Length of Stay: 0   Admission Date: 4/30/2020 12:32 PM Attending Physician: Jassi Draper MD   Primary Care Provider: Luis Brown MD Face-to-Face encounter date: 05/01/2020   Chief Complaint: Cerebrovascular Accident    Assessment & Plan:   Melania Beavers is a 86 y.o. female admitted for    1. Right thalamic infarct  2.  Hypertension  3. GERD  4. Debility    Plan  Asa, plavix and statin  PT/OT recc rehab  C/s s/w for rehab  Added amlodipine for uncontrolled hypertension    Discharge Planning:   S/w consulted for rehab    Subjective:    Interval History: Patient is doing fairly well. Denies any complaints this morning.     Review of Systems All other Review of Systems were found to be negative expect for that mentioned already in HPI.   Objective:   Physical Exam  BP (!) 183/77 (BP Location: Left arm, Patient Position: Lying) Comment: nurse notified  Pulse 86   Temp 97.8 °F (36.6 °C) (Oral)   Resp 20   Ht 5' 7" (1.702 m)   Wt 39.2 kg (86 lb 6.7 oz)   LMP  (LMP Unknown)   SpO2 97%   Breastfeeding? No   BMI 13.54 kg/m²   Vitals reviewed.    Constitutional: No distress.   HENT: Atraumatic.   Cardiovascular: Normal rate, regular rhythm and normal heart sounds.   Pulmonary/Chest: Effort normal. Clear to auscultation bilaterally. No wheezes.   Abdominal: Soft. Bowel sounds are normal. Exhibits no distension and no mass. No tenderness  Neurological: Alert.   Skin: Skin is warm and dry.     Following labs were Reviewed   Recent Labs   Lab 05/01/20  0435   WBC 7.95   HGB 8.8*   HCT 27.1*      CALCIUM 9.4   ALBUMIN 3.4*   PROT 6.6      K 4.1   CO2 21*      BUN 14   CREATININE 1.3   ALKPHOS 65   ALT 12   AST 14   BILITOT 0.8     No results found for: POCTGLUCOSE     All labs within the past 24 hours have been reviewed  Microbiology Results (last 7 days)     " ** No results found for the last 168 hours. **        MRI Brain Without Contrast   Final Result      Small acute ischemic infarct involving the right thalamus.      Negative for intracranial hemorrhage.      White matter microangiopathy, as well as sequela of remote ischemia involving left frontal lobe and right occipital lobe.         Electronically signed by: Clarence Rao MD   Date:    04/30/2020   Time:    17:53      CTA Head and Neck (xpd)   Final Result      1. The intracranial vessels are patent.   2. Moderate atherosclerosis at the carotid siphons.  Small bilateral carotid bulb atherosclerotic plaques.         Electronically signed by: Anibal Duarte MD   Date:    04/30/2020   Time:    14:26      X-Ray Chest AP Portable   ED Interpretation   No acute cardiopulmonary process      Final Result      1. Hyperinflated lungs with prominence of the interstitium is consistent with emphysematous lung changes.   2. No acute airspace opacities identified.         Electronically signed by: Anibal Duarte MD   Date:    04/30/2020   Time:    13:19      CT Head Without Contrast   Final Result      No acute intracranial abnormality.         Electronically signed by: Anibal Duarte MD   Date:    04/30/2020   Time:    12:54          Inpatient medications  Scheduled Meds:   amLODIPine  10 mg Oral Daily    aspirin  81 mg Oral Daily    atorvastatin  40 mg Oral Daily    cholecalciferol (vitamin D3)  4,000 Units Oral Daily    And    vitamin D  1,000 Units Oral Daily    clopidogreL  75 mg Oral Daily    pantoprazole  40 mg Oral Daily     Continuous Infusions:  PRN Meds:.acetaminophen, dextrose 50%, dextrose 50%, glucagon (human recombinant), glucose, glucose, HYDROcodone-acetaminophen, melatonin, ondansetron, sodium chloride 0.9%    Above encounter included review of the medical records, interviewing and examining the patient face-to-face, discussion with family and other health care providers, ordering and interpreting  lab/test results and formulating a plan of care.     Medical Decision Making:    [] Low Complexity  [x] Moderate Complexity  [] High Complexity    Jassi Draper  [facility] Hospitalist  05/01/2020

## 2020-05-01 NOTE — PLAN OF CARE
No overt s/s of oropharyngeal dysphagia. Baseline cognitive linguistic deficits noted per chart review (SLP 10/2019). Recommend higher level, standardized assessment of language and cognitive communication at next level of care.     Problem: SLP Goal  Goal: SLP Goal  Outcome: Ongoing, Progressing

## 2020-05-02 LAB
ANION GAP SERPL CALC-SCNC: 7 MMOL/L (ref 8–16)
BASOPHILS # BLD AUTO: 0.01 K/UL (ref 0–0.2)
BASOPHILS NFR BLD: 0.1 % (ref 0–1.9)
BUN SERPL-MCNC: 13 MG/DL (ref 8–23)
CALCIUM SERPL-MCNC: 9.3 MG/DL (ref 8.7–10.5)
CHLORIDE SERPL-SCNC: 110 MMOL/L (ref 95–110)
CO2 SERPL-SCNC: 23 MMOL/L (ref 23–29)
CREAT SERPL-MCNC: 1.3 MG/DL (ref 0.5–1.4)
DIFFERENTIAL METHOD: ABNORMAL
EOSINOPHIL # BLD AUTO: 0.1 K/UL (ref 0–0.5)
EOSINOPHIL NFR BLD: 1.5 % (ref 0–8)
ERYTHROCYTE [DISTWIDTH] IN BLOOD BY AUTOMATED COUNT: 14.1 % (ref 11.5–14.5)
EST. GFR  (AFRICAN AMERICAN): 42.9 ML/MIN/1.73 M^2
EST. GFR  (NON AFRICAN AMERICAN): 37.2 ML/MIN/1.73 M^2
GLUCOSE SERPL-MCNC: 100 MG/DL (ref 70–110)
HCT VFR BLD AUTO: 26.9 % (ref 37–48.5)
HGB BLD-MCNC: 8.5 G/DL (ref 12–16)
IMM GRANULOCYTES # BLD AUTO: 0.05 K/UL (ref 0–0.04)
IMM GRANULOCYTES NFR BLD AUTO: 0.7 % (ref 0–0.5)
LYMPHOCYTES # BLD AUTO: 1.4 K/UL (ref 1–4.8)
LYMPHOCYTES NFR BLD: 20.6 % (ref 18–48)
MAGNESIUM SERPL-MCNC: 1.9 MG/DL (ref 1.6–2.6)
MCH RBC QN AUTO: 29 PG (ref 27–31)
MCHC RBC AUTO-ENTMCNC: 31.6 G/DL (ref 32–36)
MCV RBC AUTO: 92 FL (ref 82–98)
MONOCYTES # BLD AUTO: 1.8 K/UL (ref 0.3–1)
MONOCYTES NFR BLD: 26.7 % (ref 4–15)
NEUTROPHILS # BLD AUTO: 3.4 K/UL (ref 1.8–7.7)
NEUTROPHILS NFR BLD: 50.4 % (ref 38–73)
NRBC BLD-RTO: 0 /100 WBC
PLATELET # BLD AUTO: 247 K/UL (ref 150–350)
PMV BLD AUTO: 10.6 FL (ref 9.2–12.9)
POTASSIUM SERPL-SCNC: 3.8 MMOL/L (ref 3.5–5.1)
RBC # BLD AUTO: 2.93 M/UL (ref 4–5.4)
SODIUM SERPL-SCNC: 140 MMOL/L (ref 136–145)
WBC # BLD AUTO: 6.74 K/UL (ref 3.9–12.7)

## 2020-05-02 PROCEDURE — 97535 SELF CARE MNGMENT TRAINING: CPT

## 2020-05-02 PROCEDURE — 97530 THERAPEUTIC ACTIVITIES: CPT

## 2020-05-02 PROCEDURE — 85025 COMPLETE CBC W/AUTO DIFF WBC: CPT

## 2020-05-02 PROCEDURE — 36415 COLL VENOUS BLD VENIPUNCTURE: CPT

## 2020-05-02 PROCEDURE — 25000003 PHARM REV CODE 250: Performed by: INTERNAL MEDICINE

## 2020-05-02 PROCEDURE — 83735 ASSAY OF MAGNESIUM: CPT

## 2020-05-02 PROCEDURE — 21400001 HC TELEMETRY ROOM

## 2020-05-02 PROCEDURE — 94761 N-INVAS EAR/PLS OXIMETRY MLT: CPT

## 2020-05-02 PROCEDURE — 80048 BASIC METABOLIC PNL TOTAL CA: CPT

## 2020-05-02 RX ORDER — METOPROLOL TARTRATE 25 MG/1
12.5 TABLET ORAL 2 TIMES DAILY
Status: DISCONTINUED | OUTPATIENT
Start: 2020-05-02 | End: 2020-05-04 | Stop reason: HOSPADM

## 2020-05-02 RX ADMIN — METOPROLOL TARTRATE 12.5 MG: 25 TABLET, FILM COATED ORAL at 09:05

## 2020-05-02 RX ADMIN — ASPIRIN 81 MG 81 MG: 81 TABLET ORAL at 09:05

## 2020-05-02 RX ADMIN — CLOPIDOGREL BISULFATE 75 MG: 75 TABLET, FILM COATED ORAL at 09:05

## 2020-05-02 RX ADMIN — Medication 4000 UNITS: at 09:05

## 2020-05-02 RX ADMIN — Medication 1000 UNITS: at 09:05

## 2020-05-02 RX ADMIN — ATORVASTATIN CALCIUM 40 MG: 40 TABLET, FILM COATED ORAL at 09:05

## 2020-05-02 RX ADMIN — PANTOPRAZOLE SODIUM 40 MG: 40 TABLET, DELAYED RELEASE ORAL at 05:05

## 2020-05-02 RX ADMIN — AMLODIPINE BESYLATE 10 MG: 5 TABLET ORAL at 09:05

## 2020-05-02 NOTE — PROGRESS NOTES
"On license of [facility] Medicine Progress Note  Patient Name: Melania Beavers MRN: [id number]   Patient Class: IP- Inpatient  Length of Stay: 1   Admission Date: 4/30/2020 12:32 PM Attending Physician: Jassi Draper MD   Primary Care Provider: Luis Brown MD Face-to-Face encounter date: 05/02/2020   Chief Complaint: Cerebrovascular Accident    Assessment & Plan:   Melania Beavers is a 86 y.o. female admitted for    1. Right thalamic infarct  2.  Hypertension  3. GERD  4. Debility    Plan  Asa, plavix and statin  PT/OT recc rehab  C/s s/w for rehab. Likely discharge on Monday  Added amlodipine for uncontrolled hypertension    Discharge Planning:   S/w consulted for rehab    Subjective:    Interval History: Patient is doing fairly well. Denies any complaints this morning.     Review of Systems All other Review of Systems were found to be negative expect for that mentioned already in HPI.   Objective:   Physical Exam  BP (!) 123/57 (BP Location: Right arm, Patient Position: Lying)   Pulse 73   Temp 98.8 °F (37.1 °C) (Oral)   Resp 19   Ht 5' 7" (1.702 m)   Wt 46.5 kg (102 lb 8.2 oz)   LMP  (LMP Unknown)   SpO2 96%   Breastfeeding? No   BMI 16.06 kg/m²   Vitals reviewed.    Constitutional: No distress.   HENT: Atraumatic.   Cardiovascular: Normal rate, regular rhythm and normal heart sounds.   Pulmonary/Chest: Effort normal. Clear to auscultation bilaterally. No wheezes.   Abdominal: Soft. Bowel sounds are normal. Exhibits no distension and no mass. No tenderness  Neurological: Alert.   Skin: Skin is warm and dry.     Following labs were Reviewed   Recent Labs   Lab 05/02/20  0334   WBC 6.74   HGB 8.5*   HCT 26.9*      CALCIUM 9.3      K 3.8   CO2 23      BUN 13   CREATININE 1.3     No results found for: POCTGLUCOSE     All labs within the past 24 hours have been reviewed  Microbiology Results (last 7 days)     ** No results found for the last 168 hours. **        MRI Brain " Without Contrast   Final Result      Small acute ischemic infarct involving the right thalamus.      Negative for intracranial hemorrhage.      White matter microangiopathy, as well as sequela of remote ischemia involving left frontal lobe and right occipital lobe.         Electronically signed by: Clarence Rao MD   Date:    04/30/2020   Time:    17:53      CTA Head and Neck (xpd)   Final Result      1. The intracranial vessels are patent.   2. Moderate atherosclerosis at the carotid siphons.  Small bilateral carotid bulb atherosclerotic plaques.         Electronically signed by: Anibal Duarte MD   Date:    04/30/2020   Time:    14:26      X-Ray Chest AP Portable   ED Interpretation   No acute cardiopulmonary process      Final Result      1. Hyperinflated lungs with prominence of the interstitium is consistent with emphysematous lung changes.   2. No acute airspace opacities identified.         Electronically signed by: Anibal Duarte MD   Date:    04/30/2020   Time:    13:19      CT Head Without Contrast   Final Result      No acute intracranial abnormality.         Electronically signed by: Anibal Duarte MD   Date:    04/30/2020   Time:    12:54          Inpatient medications  Scheduled Meds:   amLODIPine  10 mg Oral Daily    aspirin  81 mg Oral Daily    atorvastatin  40 mg Oral Daily    cholecalciferol (vitamin D3)  4,000 Units Oral Daily    And    vitamin D  1,000 Units Oral Daily    clopidogreL  75 mg Oral Daily    metoprolol tartrate  12.5 mg Oral BID    pantoprazole  40 mg Oral Daily     Continuous Infusions:  PRN Meds:.acetaminophen, dextrose 50%, dextrose 50%, glucagon (human recombinant), glucose, glucose, HYDROcodone-acetaminophen, melatonin, ondansetron, sodium chloride 0.9%    Above encounter included review of the medical records, interviewing and examining the patient face-to-face, discussion with family and other health care providers, ordering and interpreting lab/test results and  formulating a plan of care.     Medical Decision Making:    [x] Low Complexity  [] Moderate Complexity  [] High Complexity    Jassi Draper  [facility] Hospitalist  05/02/2020

## 2020-05-02 NOTE — PLAN OF CARE
Problem: Occupational Therapy Goal  Goal: Occupational Therapy Goal  Description  Goals to be met by: d/c     Patient will increase functional independence with ADLs by performing:    Feeding with Modified [place].  UE Dressing with Set-up Assistance.  LE Dressing with Minimal Assistance.  Grooming while seated with Modified [place].  Toileting from bedside commode with Minimal Assistance for hygiene and clothing management.   Toilet transfer to bedside commode with Minimal Assistance.     Outcome: Ongoing, Progressing

## 2020-05-02 NOTE — PT/OT/SLP PROGRESS
Occupational Therapy   Treatment    Name: Melania Beavers   MRN: [id number]  Admitting Diagnosis:  Acute CVA (cerebrovascular accident)       Recommendations:     Discharge Recommendations: rehabilitation facility  Discharge Equipment Recommendations:  (TBD)  Barriers to discharge:  Decreased caregiver support    Assessment:     Melania Beavers is a 86 y.o. female with a medical diagnosis of Acute CVA (cerebrovascular accident).  She presents with some improvement in GM coordination of LUE during ADL tasks.  Performed SPT to/from bedside commode with Mod A (poor balance/coordination limiting).  Toileting completed with Max A.  Cont to rec IP rehab at d/c.   Performance deficits affecting function are weakness, gait instability, decreased upper extremity function, impaired cardiopulmonary response to activity, impaired endurance, impaired balance, decreased lower extremity function, impaired coordination, impaired fine motor, decreased safety awareness, visual deficits, impaired self care skills, impaired cognition, impaired functional mobilty, decreased coordination.     Rehab Prognosis:  Good; patient would benefit from acute skilled OT services to address these deficits and reach maximum level of function.       Plan:     Patient to be seen 6 x/week to address the above listed problems via self-care/home management, therapeutic activities, therapeutic exercises, neuromuscular re-education, cognitive retraining  · Plan of Care Expires: 05/31/20  · Plan of Care Reviewed with: patient    Subjective     Pain/Comfort:  · Pain Rating 1: 0/10  · Pain Rating Post-Intervention 1: 0/10    Objective:     Communicated with: RN prior to session.  Patient found up in chair with telemetry, peripheral IV upon OT entry to room.    General Precautions: Standard, fall   Orthopedic Precautions:N/A   Braces: N/A     Occupational Performance:     Functional Mobility/Transfers:  · Patient completed Toilet Transfer from bedside chair to/from  bedside commode Stand Pivot technique with moderate assistance with  hand-held assist  · Functional Mobility: NT    Activities of Daily Living:  · Toileting: maximal assistance on bedside commode    Treatment & Education:  LUE GM coordination tasks completed at bedside table; pt able to reach, grasp, and drink from cup using LUE today (improved eye-hand coordination today vs yesterday)  FM coordination tasks completed at bedside table; pt with continued FM impairment in LUE     Patient left up in chair with all lines intact, call button in reach and chair alarm onEducation:      GOALS:   Multidisciplinary Problems     Occupational Therapy Goals        Problem: Occupational Therapy Goal    Goal Priority Disciplines Outcome Interventions   Occupational Therapy Goal     OT, PT/OT Ongoing, Progressing    Description:  Goals to be met by: d/c     Patient will increase functional independence with ADLs by performing:    Feeding with Modified [place].  UE Dressing with Set-up Assistance.  LE Dressing with Minimal Assistance.  Grooming while seated with Modified [place].  Toileting from bedside commode with Minimal Assistance for hygiene and clothing management.   Toilet transfer to bedside commode with Minimal Assistance.                      Time Tracking:     OT Date of Treatment: 05/02/20  OT Start Time: 1011  OT Stop Time: 1034  OT Total Time (min): 23 min    Billable Minutes:Self Care/Home Management 10  Therapeutic Activity 13    Musa Ta, OT  5/2/2020

## 2020-05-02 NOTE — PT/OT/SLP PROGRESS
Physical Therapy Treatment    Patient Name:  Melania Beavers   MRN:  [id number]    Recommendations:     Discharge Recommendations:  rehabilitation facility   Discharge Equipment Recommendations: (TBD)   Barriers to discharge: none    Assessment:     Melania Beavers is a 86 y.o. female admitted with a medical diagnosis of Acute CVA (cerebrovascular accident).  She presents with the following impairments/functional limitations:  weakness, impaired endurance, impaired functional mobilty, decreased safety awareness, impaired coordination, gait instability, decreased coordination, impaired balance, decreased lower extremity function.    Rehab Prognosis: Good; patient would benefit from acute skilled PT services to address these deficits and reach maximum level of function.    Recent Surgery: * No surgery found *      Plan:     During this hospitalization, patient to be seen daily to address the identified rehab impairments via gait training, therapeutic activities, therapeutic exercises, neuromuscular re-education and progress toward the following goals:    · Plan of Care Expires:       Subjective     Chief Complaint: weakness  Patient/Family Comments/goals: Return home /c Dtr  Pain/Comfort:  · Pain Rating 1: 0/10      Objective:     Communicated with nurse prior to session.  Patient found supine with telemetry, peripheral IV upon PT entry to room.     General Precautions: Standard, fall   Orthopedic Precautions:    Braces:       Functional Mobility:  · Bed Mobility:     · Supine to Sit: minimum assistance  · Transfers:     · Sit to Stand:  minimum assistance with no AD  · Bed to Chair: minimum assistance with  no AD  using  Stand Pivot  · Toilet Transfer: minimum assistance with  no AD and bedside commode  using  Stand Pivot      AM-PAC 6 CLICK MOBILITY  Turning over in bed (including adjusting bedclothes, sheets and blankets)?: 3  Sitting down on and standing up from a chair with arms (e.g., wheelchair, bedside commode,  etc.): 3  Moving from lying on back to sitting on the side of the bed?: 3  Moving to and from a bed to a chair (including a wheelchair)?: 3  Need to walk in hospital room?: 2  Climbing 3-5 steps with a railing?: 1  Basic Mobility Total Score: 15       Therapeutic Activities and Exercises:   PT edu pt on safety during t/f    Patient left up in chair with all lines intact, call button in reach and chair alarm on..    GOALS:   Multidisciplinary Problems     Physical Therapy Goals        Problem: Physical Therapy Goal    Goal Priority Disciplines Outcome Goal Variances Interventions   Physical Therapy Goal     PT, PT/OT      Description:  Goals to be met by: D/C    Patient will increase functional independence with mobility by performin. Supine to sit with MInimal Assistance  2. Sit to stand transfer with Minimal Assistance  3. Bed to chair transfer with Minimal Assistance using Rolling Walker  4. Gait  x 25-50 ft  feet with Minimal Assistance using Rolling Walker.                       Time Tracking:     PT Received On: 20  PT Start Time: 0910     PT Stop Time: 0940  PT Total Time (min): 30 min     Billable Minutes: Therapeutic Activity 30    Treatment Type: Treatment  PT/PTA: PT     PTA Visit Number: 0     Cindy Choudhary PT  2020

## 2020-05-03 LAB
ANION GAP SERPL CALC-SCNC: 9 MMOL/L (ref 8–16)
BASOPHILS # BLD AUTO: 0.01 K/UL (ref 0–0.2)
BASOPHILS NFR BLD: 0.2 % (ref 0–1.9)
BUN SERPL-MCNC: 13 MG/DL (ref 8–23)
CALCIUM SERPL-MCNC: 9.3 MG/DL (ref 8.7–10.5)
CHLORIDE SERPL-SCNC: 108 MMOL/L (ref 95–110)
CO2 SERPL-SCNC: 21 MMOL/L (ref 23–29)
CREAT SERPL-MCNC: 1.4 MG/DL (ref 0.5–1.4)
DIFFERENTIAL METHOD: ABNORMAL
EOSINOPHIL # BLD AUTO: 0.1 K/UL (ref 0–0.5)
EOSINOPHIL NFR BLD: 1.7 % (ref 0–8)
ERYTHROCYTE [DISTWIDTH] IN BLOOD BY AUTOMATED COUNT: 14.3 % (ref 11.5–14.5)
EST. GFR  (AFRICAN AMERICAN): 39.2 ML/MIN/1.73 M^2
EST. GFR  (NON AFRICAN AMERICAN): 34 ML/MIN/1.73 M^2
GLUCOSE SERPL-MCNC: 91 MG/DL (ref 70–110)
HCT VFR BLD AUTO: 27.2 % (ref 37–48.5)
HGB BLD-MCNC: 8.6 G/DL (ref 12–16)
IMM GRANULOCYTES # BLD AUTO: 0.08 K/UL (ref 0–0.04)
IMM GRANULOCYTES NFR BLD AUTO: 1.4 % (ref 0–0.5)
LYMPHOCYTES # BLD AUTO: 1.4 K/UL (ref 1–4.8)
LYMPHOCYTES NFR BLD: 23.4 % (ref 18–48)
MAGNESIUM SERPL-MCNC: 1.8 MG/DL (ref 1.6–2.6)
MCH RBC QN AUTO: 29.4 PG (ref 27–31)
MCHC RBC AUTO-ENTMCNC: 31.6 G/DL (ref 32–36)
MCV RBC AUTO: 93 FL (ref 82–98)
MONOCYTES # BLD AUTO: 1.5 K/UL (ref 0.3–1)
MONOCYTES NFR BLD: 25.5 % (ref 4–15)
NEUTROPHILS # BLD AUTO: 2.8 K/UL (ref 1.8–7.7)
NEUTROPHILS NFR BLD: 47.8 % (ref 38–73)
NRBC BLD-RTO: 0 /100 WBC
PLATELET # BLD AUTO: 257 K/UL (ref 150–350)
PMV BLD AUTO: 10.7 FL (ref 9.2–12.9)
POTASSIUM SERPL-SCNC: 3.8 MMOL/L (ref 3.5–5.1)
RBC # BLD AUTO: 2.93 M/UL (ref 4–5.4)
SODIUM SERPL-SCNC: 138 MMOL/L (ref 136–145)
WBC # BLD AUTO: 5.81 K/UL (ref 3.9–12.7)

## 2020-05-03 PROCEDURE — 80048 BASIC METABOLIC PNL TOTAL CA: CPT

## 2020-05-03 PROCEDURE — 94760 N-INVAS EAR/PLS OXIMETRY 1: CPT

## 2020-05-03 PROCEDURE — 97530 THERAPEUTIC ACTIVITIES: CPT | Mod: CQ

## 2020-05-03 PROCEDURE — 25000003 PHARM REV CODE 250: Performed by: INTERNAL MEDICINE

## 2020-05-03 PROCEDURE — 99900035 HC TECH TIME PER 15 MIN (STAT)

## 2020-05-03 PROCEDURE — 85025 COMPLETE CBC W/AUTO DIFF WBC: CPT

## 2020-05-03 PROCEDURE — 83735 ASSAY OF MAGNESIUM: CPT

## 2020-05-03 PROCEDURE — 21400001 HC TELEMETRY ROOM

## 2020-05-03 PROCEDURE — 36415 COLL VENOUS BLD VENIPUNCTURE: CPT

## 2020-05-03 RX ORDER — POLYETHYLENE GLYCOL 3350 17 G/17G
17 POWDER, FOR SOLUTION ORAL DAILY
Status: DISCONTINUED | OUTPATIENT
Start: 2020-05-04 | End: 2020-05-04 | Stop reason: HOSPADM

## 2020-05-03 RX ADMIN — PANTOPRAZOLE SODIUM 40 MG: 40 TABLET, DELAYED RELEASE ORAL at 05:05

## 2020-05-03 RX ADMIN — METOPROLOL TARTRATE 12.5 MG: 25 TABLET, FILM COATED ORAL at 08:05

## 2020-05-03 RX ADMIN — AMLODIPINE BESYLATE 10 MG: 5 TABLET ORAL at 09:05

## 2020-05-03 RX ADMIN — ATORVASTATIN CALCIUM 40 MG: 40 TABLET, FILM COATED ORAL at 09:05

## 2020-05-03 RX ADMIN — ASPIRIN 81 MG 81 MG: 81 TABLET ORAL at 09:05

## 2020-05-03 RX ADMIN — METOPROLOL TARTRATE 12.5 MG: 25 TABLET, FILM COATED ORAL at 09:05

## 2020-05-03 RX ADMIN — CLOPIDOGREL BISULFATE 75 MG: 75 TABLET, FILM COATED ORAL at 09:05

## 2020-05-03 NOTE — NURSING
Rested comfortably without c/o CP or SOB throughout shift. Demonstrated orthostatic htn when attempting to work with PT today. Was able to use bedside commode with assistance for all bathroom needs today.

## 2020-05-03 NOTE — PT/OT/SLP PROGRESS
Physical Therapy Treatment    Patient Name:  Melania Beavers   MRN:  [id number]    Recommendations:     Discharge Recommendations:  rehabilitation facility   Discharge Equipment Recommendations: (TBD at next level of care)   Barriers to discharge: None    Assessment:     Melania Beavers is a 86 y.o. female admitted with a medical diagnosis of Acute CVA (cerebrovascular accident).  She presents with the following impairments/functional limitations:  weakness, impaired endurance, impaired functional mobilty, decreased safety awareness, impaired coordination, gait instability, decreased coordination, impaired balance, decreased lower extremity function. Patient presents resting in bed upon PTA arrival. Agreeable to PT treatment. Pt limited during treatment today 2/2 positive symptomatic orthostatics. Pts BP noted to be (133/62) supine and (103/48) sitting EOB. Pt able to stand however unable to get reading 2/2 pt needing to sit quickly 2/2 feeling funny. BP unable to read. Pt assisted back to supine and BP noted to be (130/60). RN notified. Continue with PT and POC.    Rehab Prognosis: Good; patient would benefit from acute skilled PT services to address these deficits and reach maximum level of function.    Recent Surgery: * No surgery found *      Plan:     During this hospitalization, patient to be seen daily to address the identified rehab impairments via gait training, therapeutic activities, therapeutic exercises and progress toward the following goals:    · Plan of Care Expires:       Subjective     Chief Complaint: Feeling funny in standing  Patient/Family Comments/goals:   Pain/Comfort:  · Pain Rating 1: 0/10      Objective:     Communicated with RN prior to session.  Patient found HOB elevated with telemetry, bed alarm upon PT entry to room.     General Precautions: Standard, fall   Orthopedic Precautions:    Braces:       Functional Mobility:  · Bed Mobility:     · Scooting: maximal assistance  · Supine to Sit:  minimum assistance  · Sit to Supine: minimum assistance  · Transfers:     · Sit to Stand:  minimum assistance with rolling walker      AM-PAC 6 CLICK MOBILITY          Therapeutic Activities and Exercises:   bed mobility; sitting EOB for trunk control and midline orientation; sit <> stands    Patient left HOB elevated with all lines intact, call button in reach, bed alarm on and RN notified..    GOALS:   Multidisciplinary Problems     Physical Therapy Goals        Problem: Physical Therapy Goal    Goal Priority Disciplines Outcome Goal Variances Interventions   Physical Therapy Goal     PT, PT/OT Ongoing, Progressing     Description:  Goals to be met by: D/C    Patient will increase functional independence with mobility by performin. Supine to sit with MInimal Assistance  2. Sit to stand transfer with Minimal Assistance  3. Bed to chair transfer with Minimal Assistance using Rolling Walker  4. Gait  x 25-50 ft  feet with Minimal Assistance using Rolling Walker.                       Time Tracking:     PT Received On: 20  PT Start Time: 1025     PT Stop Time: 1040  PT Total Time (min): 15 min     Billable Minutes: Therapeutic Activity 15    Treatment Type: Treatment  PT/PTA: PTA     PTA Visit Number: Mike     Harika Krishnan PTA  2020

## 2020-05-03 NOTE — PLAN OF CARE
Problem: Adult Inpatient Plan of Care  Goal: Plan of Care Review  Outcome: Ongoing, Progressing  Flowsheets (Taken 5/3/2020 0619)  Plan of Care Reviewed With: patient     Problem: Fall Injury Risk  Goal: Absence of Fall and Fall-Related Injury  Outcome: Ongoing, Progressing     Problem: Adult Inpatient Plan of Care  Goal: Absence of Hospital-Acquired Illness or Injury  Outcome: Ongoing, Progressing     Problem: Adult Inpatient Plan of Care  Goal: Optimal Comfort and Wellbeing  Outcome: Ongoing, Progressing     Problem: Skin Injury Risk Increased  Goal: Skin Health and Integrity  Outcome: Ongoing, Progressing     Problem: Adjustment to Illness (Stroke, Ischemic/Transient Ischemic Attack)  Goal: Optimal Coping  Outcome: Ongoing, Progressing     Problem: Bowel Elimination Impaired (Stroke, Ischemic/Transient Ischemic Attack)  Goal: Effective Bowel Elimination  Outcome: Ongoing, Progressing     Problem: Cerebral Tissue Perfusion Risk (Stroke, Ischemic/Transient Ischemic Attack)  Goal: Optimal Cerebral Tissue Perfusion  Outcome: Ongoing, Progressing     Problem: Communication Impairment (Stroke, Ischemic/Transient Ischemic Attack)  Goal: Improved Communication Skills  Outcome: Ongoing, Progressing     Problem: Eating/Swallowing Impairment (Stroke, Ischemic/Transient Ischemic Attack)  Goal: Oral Intake without Aspiration  Outcome: Ongoing, Progressing     Problem: Functional Ability Impaired (Stroke, Ischemic/Transient Ischemic Attack)  Goal: Optimal Functional Ability  Outcome: Ongoing, Progressing     Problem: Hemodynamic Instability (Stroke, Ischemic/Transient Ischemic Attack)  Goal: Vital Signs Remain in Desired Range  Outcome: Ongoing, Progressing     Problem: Pain (Stroke, Ischemic/Transient Ischemic Attack)  Goal: Acceptable Pain Control  Outcome: Ongoing, Progressing     Problem: Sensorimotor Impairment (Stroke, Ischemic/Transient Ischemic Attack)  Goal: Improved Sensorimotor Function  Outcome: Ongoing,  Progressing     Problem: Urinary Elimination Impaired (Stroke, Ischemic/Transient Ischemic Attack)  Goal: Effective Urinary Elimination  Outcome: Ongoing, Progressing

## 2020-05-03 NOTE — PROGRESS NOTES
[facility]  Neurology  Consult Note    Patient Name: Melania Beavers  MRN: [id number]  Admission Date: 4/30/2020  Hospital Length of Stay: 2 days  Code Status: Full Code   Attending Provider: Jassi Draper MD   Consulting Provider: Mauro Ortega NP  Primary Care Physician: Luis Brown MD  Principal Problem:Acute CVA (cerebrovascular accident)    Consults  Subjective:     Chief Complaint:  Stroke     HPI from EMR: Melania Beavers is a 86 y.o. White female who  has a past medical history of Anemia, unspecified (12/3/2019), GERD (gastroesophageal reflux disease), Hypertension, and Normochromic normocytic anemia (12/3/2019).. The patient presented to [facility] on 4/30/2020 with a primary complaint of Cerebrovascular Accident     History was obtained from the patient and ER physician Sign-out. Patient was sent to the emergency by home health nurse when she noticed left sided weakness. Patient cant appreciate any difference on both sides. Reports history of prior episodes in the past. No change in vision, hearing, headache, fever, cough, congestion, runny nose, chest pain, shortness of breath, palpitations, abdominal pain, diarrhea, constipation, vomiting, dysuria, hematuria, joint pain and back pain.      In the emergency room, patient CBC was unremarkable. CMP was unremarkable. CT head was negative.   Decision to admit was taken and patient was informed about the plan of care    Neurological Note: 5/1 - Seen and examined with Dr. Jerry Quick. Patient's daughter at bedside who lives with and helps care for patient. Patient was deemed not tpa candidate upon ER arrival yesterday because presenting symptoms initially resolved in ER per ED note. Initial CT head negative acute. However, MRI today shows small acute right thalamic infarct. Chronic infarcts also seen left frontal lobe and right occipital lobe. Patient with very mild L sided weakness on exam today, greater in LUE and left  . Slight left visual field cut noted. She is AAO to person and time, not to place. PERRLA, follows commands. Speech intact. CTA negative for hemodynamically significant stenosis. Echo with bubble pending. Patient was on 81 mg ASA and Plavix at home due to old strokes. Of note, patient with recent L hip surgery Apr 5. Appears her dual anticoagulant therapy was restarted after surgery per EMR and per patient.     5/3: No events overnight. Daughter at bedside. Awaiting placement for inpatient rehab. Anticipating D/C tomorrow.     Past Medical History:   Diagnosis Date    Anemia, unspecified 12/3/2019    GERD (gastroesophageal reflux disease)     Hypertension     Normochromic normocytic anemia 12/3/2019       Past Surgical History:   Procedure Laterality Date    ESOPHAGOGASTRODUODENOSCOPY N/A 10/4/2019    Procedure: EGD (ESOPHAGOGASTRODUODENOSCOPY);  Surgeon: Dominick Cunningham III, MD;  Location: [facility] ENDO;  Service: Endoscopy;  Laterality: N/A;    HYSTERECTOMY      PERCUTANEOUS PINNING OF HIP Left 4/5/2020    Procedure: PINNING, HIP, PERCUTANEOUS;  Surgeon: Anibal Kidd MD;  Location: [facility] OR;  Service: Orthopedics;  Laterality: Left;       Review of patient's allergies indicates:   Allergen Reactions    Doxycycline monohydrate Other (See Comments)     dizzyness    Heparin     Heparin analogues        Current Neurological Medications: 81 mg ASA, 75 mg Plavix    No current facility-administered medications on file prior to encounter.      Current Outpatient Medications on File Prior to Encounter   Medication Sig    aspirin 81 MG Chew Take 1 tablet (81 mg total) by mouth once daily.    atorvastatin (LIPITOR) 40 MG tablet Take 1 tablet (40 mg total) by mouth once daily.    b complex vitamins capsule Take 1 capsule by mouth once daily.    cholecalciferol, vitamin D3, 125 mcg (5,000 unit) Tab Take 1 tablet by mouth once daily.     clopidogrel (PLAVIX) 75 mg tablet Take 1 tablet (75 mg total)  by mouth once daily.    lactulose (CHRONULAC) 10 gram/15 mL solution Take 10 g by mouth 2 (two) times daily as needed.    omeprazole (PRILOSEC) 20 MG capsule Take 20 mg by mouth every morning.     fluticasone-salmeterol 250-50 mcg/dose (ADVAIR) 250-50 mcg/dose diskus inhaler Inhale 1 puff into the lungs 2 (two) times daily. Controller    HYDROcodone-acetaminophen (NORCO) 5-325 mg per tablet Take 1 tablet by mouth every 6 (six) hours as needed for Pain.    megestroL (MEGACE) 20 MG Tab Take 40 mg by mouth 2 (two) times daily.    ondansetron (ZOFRAN) 8 MG tablet Take 8 mg by mouth every 12 (twelve) hours as needed for Nausea.       Family History     None        Tobacco Use    Smoking status: Never Smoker   Substance and Sexual Activity    Alcohol use: No    Drug use: No    Sexual activity: Never     Review of Systems   Neurological: Positive for weakness. Negative for dizziness, facial asymmetry and headaches.   Psychiatric/Behavioral: Positive for confusion.   All other systems reviewed and are negative.    Objective:     Vital Signs (Most Recent):  Temp: 98.7 °F (37.1 °C) (05/03/20 1128)  Pulse: 75 (05/03/20 1128)  Resp: 20 (05/03/20 1128)  BP: (!) 119/56 (05/03/20 1128)  SpO2: 97 % (05/03/20 1128) Vital Signs (24h Range):  Temp:  [98.5 °F (36.9 °C)-99 °F (37.2 °C)] 98.7 °F (37.1 °C)  Pulse:  [72-75] 75  Resp:  [19-20] 20  SpO2:  [94 %-97 %] 97 %  BP: (103-169)/(48-74) 119/56     Weight: 47.8 kg (105 lb 6.1 oz)  Body mass index is 16.5 kg/m².    Physical Exam   Constitutional: She appears well-developed and well-nourished.   HENT:   Head: Normocephalic and atraumatic.   Eyes: Pupils are equal, round, and reactive to light. EOM are normal.   Neck: Normal range of motion.   Pulmonary/Chest: Effort normal.   Neurological: She is alert. GCS eye subscore is 4. GCS verbal subscore is 4. GCS motor subscore is 6.   Skin: Skin is warm and dry.   Psychiatric: She has a normal mood and affect. Her speech is normal  and behavior is normal.       NEUROLOGICAL EXAMINATION:     MENTAL STATUS   Oriented to person.   Disoriented to place. Disoriented to city and area. Oriented to country.   Oriented to time. Oriented to year.   Follows 2 step commands.   Attention: normal. Concentration: normal.   Speech: speech is normal   Level of consciousness: alert  Able to name object. Able to repeat.     CRANIAL NERVES     CN II   Left visual field deficit: upper temporal and lower temporal quadrant(s)    CN III, IV, VI   Pupils are equal, round, and reactive to light.  Extraocular motions are normal.   Right pupil: Size: 3 mm. Shape: regular. Reactivity: brisk.   Left pupil: Size: 3 mm. Shape: regular. Reactivity: brisk.   Nystagmus: none     CN V   Facial sensation intact.     CN VII   Facial expression full, symmetric.     CN VIII   CN VIII normal.     MOTOR EXAM     Strength   Strength 5/5 except as noted.        LUE 4/5, left  strength 4/5    LLE 4/5.     SENSORY EXAM   Right arm light touch: normal  Left arm light touch: decreased from wrist  Right leg light touch: normal  Left leg light touch: normal    GAIT AND COORDINATION        Finger to nose normal on R, Dysmetria noted on L side       Significant Labs:   Recent Lab Results       05/03/20  0425        Anion Gap 9     Baso # 0.01     Basophil% 0.2     BUN, Bld 13     Calcium 9.3     Chloride 108     CO2 21     Creatinine 1.4     Differential Method Automated     eGFR if  39.2     eGFR if non  34.0  Comment:  Calculation used to obtain the estimated glomerular filtration  rate (eGFR) is the CKD-EPI equation.        Eos # 0.1     Eosinophil% 1.7     Glucose 91     Gran # (ANC) 2.8     Gran% 47.8     Hematocrit 27.2     Hemoglobin 8.6     Immature Grans (Abs) 0.08  Comment:  Mild elevation in immature granulocytes is non specific and   can be seen in a variety of conditions including stress response,   acute inflammation, trauma and pregnancy.  Correlation with other   laboratory and clinical findings is essential.       Immature Granulocytes 1.4     Lymph # 1.4     Lymph% 23.4     Magnesium 1.8     MCH 29.4     MCHC 31.6     MCV 93     Mono # 1.5     Mono% 25.5     MPV 10.7     nRBC 0     Platelets 257     Potassium 3.8     RBC 2.93     RDW 14.3     Sodium 138     WBC 5.81           Significant Imaging: I have reviewed all pertinent imaging results/findings within the past 24 hours.    Assessment and Plan:     Acute Right thalamic CVA  MRI confirmed acute stroke, chronic infarcts also seen  Continue DAPT with aspirin/plavix  PT/OT/ST eval and treat  Neuro checks q 4  Fall and safety precautions  CTA noted, no significant stenosis  Echo negative for shunt  LDL 95. Continue statin for goal less 70  A1C 5.8      Patient stable from neurological perspective. Agree with transfer to inpatient rehab facility. Continue patient on DAPT and statin therapy. Stroke education discussed with patient. Please have patient call [facility] at [phone] to schedule an appointment within one week of discharge from inpatient rehab. We will sign off. Call with any questions or concerns.       Active Diagnoses:    Diagnosis Date Noted POA    PRINCIPAL PROBLEM:  Acute CVA (cerebrovascular accident) [I63.9] 09/06/2019 Yes    TIA (transient ischemic attack) [G45.9] 04/30/2020 Yes      Problems Resolved During this Admission:       VTE Risk Mitigation (From admission, onward)         Ordered     Reason for no Mechanical VTE Prophylaxis  Once     Question:  Reasons:  Answer:  Physician Provided (leave comment)  Comment:  heparin allergic    04/30/20 1436     IP VTE HIGH RISK PATIENT  Once      04/30/20 1436                Thank you for your consult. I will sign off. Please contact us if you have any additional questions.    Mauro Ortega, ERIC  Neurology  [facility]    I, Dr. Jerry Quick, have personally seen and examined the patient with my  advanced provider and agree with above. I personally did a focused exam, and reviewed all necessary clinical information. I discussed my management plan with my NP and agree with above. F/u neuroclinic in one week. No driving.

## 2020-05-03 NOTE — PROGRESS NOTES
"[facility] Medicine Progress Note  Patient Name: Melania Beavers MRN: [id number]   Patient Class: IP- Inpatient  Length of Stay: 2   Admission Date: 4/30/2020 12:32 PM Attending Physician: Jassi Draper MD   Primary Care Provider: Luis Brown MD Face-to-Face encounter date: 05/03/2020   Chief Complaint: Cerebrovascular Accident    Assessment & Plan:   Melania Beavers is a 86 y.o. female admitted for    1. Right thalamic infarct  2.  Hypertension  3. GERD  4. Debility    Plan  Asa, plavix and statin  PT/OT recc rehab  C/s s/w for rehab. Likely discharge on Monday  Added amlodipine for uncontrolled hypertension    Discharge Planning:   S/w consulted for rehab    Subjective:    Interval History: Patient is doing fairly well. Denies any complaints this morning. No issues from the staff.    Review of Systems All other Review of Systems were found to be negative expect for that mentioned already in HPI.   Objective:   Physical Exam  BP (!) 146/67 (BP Location: Right arm, Patient Position: Lying)   Pulse 74   Temp 98.6 °F (37 °C) (Oral)   Resp 20   Ht 5' 7" (1.702 m)   Wt 47.8 kg (105 lb 6.1 oz)   LMP  (LMP Unknown)   SpO2 96%   Breastfeeding? No   BMI 16.50 kg/m²   Vitals reviewed.    Constitutional: No distress.   HENT: Atraumatic.   Cardiovascular: Normal rate, regular rhythm and normal heart sounds.   Pulmonary/Chest: Effort normal. Clear to auscultation bilaterally. No wheezes.   Abdominal: Soft. Bowel sounds are normal. Exhibits no distension and no mass. No tenderness  Neurological: Alert.   Skin: Skin is warm and dry.     Following labs were Reviewed   Recent Labs   Lab 05/03/20  0425   WBC 5.81   HGB 8.6*   HCT 27.2*      CALCIUM 9.3      K 3.8   CO2 21*      BUN 13   CREATININE 1.4     No results found for: POCTGLUCOSE     All labs within the past 24 hours have been reviewed  Microbiology Results (last 7 days)     ** No results found for the last 168 " hours. **        MRI Brain Without Contrast   Final Result      Small acute ischemic infarct involving the right thalamus.      Negative for intracranial hemorrhage.      White matter microangiopathy, as well as sequela of remote ischemia involving left frontal lobe and right occipital lobe.         Electronically signed by: Clarence Rao MD   Date:    04/30/2020   Time:    17:53      CTA Head and Neck (xpd)   Final Result      1. The intracranial vessels are patent.   2. Moderate atherosclerosis at the carotid siphons.  Small bilateral carotid bulb atherosclerotic plaques.         Electronically signed by: Anibal Duarte MD   Date:    04/30/2020   Time:    14:26      X-Ray Chest AP Portable   ED Interpretation   No acute cardiopulmonary process      Final Result      1. Hyperinflated lungs with prominence of the interstitium is consistent with emphysematous lung changes.   2. No acute airspace opacities identified.         Electronically signed by: Anibal Duarte MD   Date:    04/30/2020   Time:    13:19      CT Head Without Contrast   Final Result      No acute intracranial abnormality.         Electronically signed by: Anibal Duarte MD   Date:    04/30/2020   Time:    12:54          Inpatient medications  Scheduled Meds:   amLODIPine  10 mg Oral Daily    aspirin  81 mg Oral Daily    atorvastatin  40 mg Oral Daily    cholecalciferol (vitamin D3)  4,000 Units Oral Daily    And    vitamin D  1,000 Units Oral Daily    clopidogreL  75 mg Oral Daily    metoprolol tartrate  12.5 mg Oral BID    pantoprazole  40 mg Oral Daily     Continuous Infusions:  PRN Meds:.acetaminophen, dextrose 50%, dextrose 50%, glucagon (human recombinant), glucose, glucose, HYDROcodone-acetaminophen, melatonin, ondansetron, sodium chloride 0.9%    Above encounter included review of the medical records, interviewing and examining the patient face-to-face, discussion with family and other health care providers, ordering and  interpreting lab/test results and formulating a plan of care.     Medical Decision Making:    [x] Low Complexity  [] Moderate Complexity  [] High Complexity    Jassi Draper  [facility] Hospitalist  05/03/2020

## 2020-05-03 NOTE — PLAN OF CARE
Problem: Physical Therapy Goal  Goal: Physical Therapy Goal  Description  Goals to be met by: D/C    Patient will increase functional independence with mobility by performin. Supine to sit with MInimal Assistance  2. Sit to stand transfer with Minimal Assistance  3. Bed to chair transfer with Minimal Assistance using Rolling Walker  4. Gait  x 25-50 ft  feet with Minimal Assistance using Rolling Walker.      Outcome: Ongoing, Progressing

## 2020-05-04 VITALS
HEART RATE: 69 BPM | OXYGEN SATURATION: 96 % | TEMPERATURE: 98 F | BODY MASS INDEX: 15.92 KG/M2 | WEIGHT: 101.44 LBS | RESPIRATION RATE: 20 BRPM | HEIGHT: 67 IN | DIASTOLIC BLOOD PRESSURE: 60 MMHG | SYSTOLIC BLOOD PRESSURE: 134 MMHG

## 2020-05-04 LAB — VIT B1 BLD-SCNC: 121.4 NMOL/L (ref 66.5–200)

## 2020-05-04 PROCEDURE — 27000221 HC OXYGEN, UP TO 24 HOURS

## 2020-05-04 PROCEDURE — 94761 N-INVAS EAR/PLS OXIMETRY MLT: CPT

## 2020-05-04 PROCEDURE — 25000003 PHARM REV CODE 250: Performed by: INTERNAL MEDICINE

## 2020-05-04 RX ORDER — METOPROLOL TARTRATE 25 MG/1
12.5 TABLET, FILM COATED ORAL 2 TIMES DAILY
Qty: 30 TABLET | Refills: 11 | Status: SHIPPED | OUTPATIENT
Start: 2020-05-04 | End: 2021-05-04

## 2020-05-04 RX ORDER — CHOLECALCIFEROL (VITAMIN D3) 50 MCG
4000 TABLET ORAL DAILY
Qty: 30 TABLET | Refills: 0 | Status: SHIPPED | OUTPATIENT
Start: 2020-05-05 | End: 2020-05-11 | Stop reason: CLARIF

## 2020-05-04 RX ORDER — AMLODIPINE BESYLATE 10 MG/1
10 TABLET ORAL DAILY
Qty: 30 TABLET | Refills: 11 | Status: ON HOLD | OUTPATIENT
Start: 2020-05-05 | End: 2020-05-14 | Stop reason: HOSPADM

## 2020-05-04 RX ADMIN — METOPROLOL TARTRATE 12.5 MG: 25 TABLET, FILM COATED ORAL at 08:05

## 2020-05-04 RX ADMIN — PANTOPRAZOLE SODIUM 40 MG: 40 TABLET, DELAYED RELEASE ORAL at 06:05

## 2020-05-04 RX ADMIN — CLOPIDOGREL BISULFATE 75 MG: 75 TABLET, FILM COATED ORAL at 08:05

## 2020-05-04 RX ADMIN — ATORVASTATIN CALCIUM 40 MG: 40 TABLET, FILM COATED ORAL at 08:05

## 2020-05-04 RX ADMIN — ASPIRIN 81 MG 81 MG: 81 TABLET ORAL at 08:05

## 2020-05-04 RX ADMIN — Medication 1000 UNITS: at 08:05

## 2020-05-04 RX ADMIN — AMLODIPINE BESYLATE 10 MG: 5 TABLET ORAL at 08:05

## 2020-05-04 RX ADMIN — POLYETHYLENE GLYCOL 3350 17 GRAM ORAL POWDER PACKET 17 G: at 08:05

## 2020-05-04 RX ADMIN — Medication 4000 UNITS: at 08:05

## 2020-05-04 NOTE — PLAN OF CARE
05/03/20 2030   Patient Assessment/Suction   Level of Consciousness (AVPU) alert   Respiratory Effort Normal;Unlabored   Expansion/Accessory Muscles/Retractions no use of accessory muscles   All Lung Fields Breath Sounds clear   PRE-TX-O2   O2 Device (Oxygen Therapy) room air   SpO2 96 %   Pulse Oximetry Type Intermittent   $ Pulse Oximetry - Single Charge Pulse Oximetry - Single   Pulse 70   Resp 20

## 2020-05-04 NOTE — DISCHARGE INSTRUCTIONS
Diet:  Resume regular diet     Physical Activity:  Activity as tolerated     Instructions:  1. Take all medications as prescribed  2. Keep all follow-up appointments  3. Return to the hospital or call your primary care physicians if any worsening symptoms such as chest pain, shortness of breath, weakness in any extremities occur.        Follow-Up Appointments:  1. Please call your primary care physician to schedule an appointment in 1 week time.  2. Please call your neurologist to schedule an appointment in 1 week time.     Pending laboratory work/Tests to be performed/followed by the Primary care Physician: none     The patient was discharged in the care of her parents//wife/family/caregiver, with discharge instructions were reviewed in written and verbal form. All pertinent questions were discussed and prescriptions were provided. The importance of making follow up appointments and compliance of medications has been stressed repeatedly. The patient will follow up in 1 week or sooner as needed with the PCP, and the patient is on board with the plan. Upon discharge, patient needs to be on following medications.

## 2020-05-04 NOTE — DISCHARGE SUMMARY
[facility]  Discharge Summary  Patient Name: Melania Beavers MRN: [id number]   Patient Class: IP- Inpatient  Length of Stay: 3   Admission Date: 4/30/2020 12:32 PM Attending Physician: Jassi Draper MD   Primary Care Provider: Luis Brown MD Face-to-Face encounter date: 05/04/2020   Chief Complaint: Cerebrovascular Accident    Date of Discharge: 5/4/2020  Discharge Disposition: Rehab  Condition: Stable    Reason for Hospitalization   Primary Diagnosis: CVA    Secondary Diagnosis: Hypertension, GERD, Debility      Patient Active Problem List   Diagnosis    Dysphagia    Stroke    HTN (hypertension)    GERD (gastroesophageal reflux disease)    Acute CVA (cerebrovascular accident)    Esophageal stricture    Orthostatic hypotension    Anemia, unspecified    Normochromic normocytic anemia    Closed fracture of left hip    TIA (transient ischemic attack)       Brief History of Present Illness    Melania Beavers is a 86 y.o.  female who  has a past medical history of Anemia, unspecified (12/3/2019), GERD (gastroesophageal reflux disease), Hypertension, and Normochromic normocytic anemia (12/3/2019).. The patient presented to [facility] on 4/30/2020 with a primary complaint of Cerebrovascular Accident    For the full HPI please refer to the History & Physical from this admission.    Hospital Course By Problem with Pertinent Findings     This is a 86-year-old female admitted to the hospital for left sided weakness.  Patient was admitted for stroke workup. Neurology was consulted. Patient had CT head done that did not show signs of bleeding. MRI brain showed thalamic stroke. Further workup including CTA head ane neck did not show clinically significant stenosis, large vessel occlusion, or aneurysm. Echocardiography with bubble study did not reveal PFO or intraatrial septal defect or thrombus. Patient was observed on telemetry and no abnormal heart rhythm was noted.  "Patient was started on dual antiplatelet therapy with Aspirin and Plavix. Patient was also evaluated by Physical therapist, occupational therapist and Speech therapy and recommended Rehab. Patient will be will discharged there today.    The patient was discharged in the care of family  with discharge instructions were reviewed in written and verbal form. All pertinent questions were discussed and prescriptions were provided. The patient will follow up in 1 week or sooner as needed with the PCP and Neurologist as discussed, and the patient is on board with the plan.        Physical Exam  /60 (BP Location: Right arm, Patient Position: Lying)   Pulse 69   Temp 98.1 °F (36.7 °C) (Oral)   Resp 20   Ht 5' 7" (1.702 m)   Wt 46 kg (101 lb 6.6 oz)   LMP  (LMP Unknown)   SpO2 96%   Breastfeeding? No   BMI 15.88 kg/m²   Vitals reviewed.    Constitutional: No distress.   HENT: Atraumatic.   Cardiovascular: Normal rate, regular rhythm and normal heart sounds.   Pulmonary/Chest: Effort normal. Clear to auscultation bilaterally. No wheezes.   Abdominal: Soft. Bowel sounds are normal. Exhibits no distension and no mass. No tenderness  Neurological: Alert.   Skin: Skin is warm and dry.     Following labs were Reviewed   No results for input(s): WBC, HGB, HCT, PLT, GLUCOSE, CALCIUM, ALBUMIN, PROT, NA, K, CO2, CL, BUN, CREATININE, ALKPHOS, ALT, AST, BILITOT in the last 24 hours.  No results found for: POCTGLUCOSE     All labs within the past 24 hours have been reviewed    Microbiology Results (last 7 days)     ** No results found for the last 168 hours. **        MRI Brain Without Contrast   Final Result      Small acute ischemic infarct involving the right thalamus.      Negative for intracranial hemorrhage.      White matter microangiopathy, as well as sequela of remote ischemia involving left frontal lobe and right occipital lobe.         Electronically signed by: Clarence Rao MD   Date:    04/30/2020 "   Time:    17:53      CTA Head and Neck (xpd)   Final Result      1. The intracranial vessels are patent.   2. Moderate atherosclerosis at the carotid siphons.  Small bilateral carotid bulb atherosclerotic plaques.         Electronically signed by: Anibal Duarte MD   Date:    04/30/2020   Time:    14:26      X-Ray Chest AP Portable   ED Interpretation   No acute cardiopulmonary process      Final Result      1. Hyperinflated lungs with prominence of the interstitium is consistent with emphysematous lung changes.   2. No acute airspace opacities identified.         Electronically signed by: Anibal Duarte MD   Date:    04/30/2020   Time:    13:19      CT Head Without Contrast   Final Result      No acute intracranial abnormality.         Electronically signed by: Anibal Duarte MD   Date:    04/30/2020   Time:    12:54          No results found for this or any previous visit.      Consultants and Procedures   Consultants:  Neurology    Procedures:   None    Discharge Information:   Diet:  Resume regular diet    Physical Activity:  Activity as tolerated    Instructions:  1. Take all medications as prescribed  2. Keep all follow-up appointments  3. Return to the hospital or call your primary care physicians if any worsening symptoms such as chest pain, shortness of breath, weakness in any extremities occur.      Follow-Up Appointments:  1. Please call your primary care physician to schedule an appointment in 1 week time.  2. Please call your neurologist to schedule an appointment in 1 week time.    Pending laboratory work/Tests to be performed/followed by the Primary care Physician: none    The patient was discharged in the care of her parents//wife/family/caregiver, with discharge instructions were reviewed in written and verbal form. All pertinent questions were discussed and prescriptions were provided. The importance of making follow up appointments and compliance of medications has been stressed  repeatedly. The patient will follow up in 1 week or sooner as needed with the PCP, and the patient is on board with the plan. Upon discharge, patient needs to be on following medications.    Discharge Medications:     Medication List      START taking these medications    amLODIPine 10 MG tablet  Commonly known as:  NORVASC  Take 1 tablet (10 mg total) by mouth once daily.  Start taking on:  May 5, 2020     metoprolol tartrate 25 MG tablet  Commonly known as:  LOPRESSOR  Take 0.5 tablets (12.5 mg total) by mouth 2 (two) times daily.        CHANGE how you take these medications    * cholecalciferol (vitamin D3) 125 mcg (5,000 unit) Tab  What changed:  Another medication with the same name was added. Make sure you understand how and when to take each.     * cholecalciferol (vitamin D3) 50 mcg (2,000 unit) Tab  Commonly known as:  VITAMIN D3  Take 2 tablets (4,000 Units total) by mouth once daily.  Start taking on:  May 5, 2020  What changed:  You were already taking a medication with the same name, and this prescription was added. Make sure you understand how and when to take each.         * This list has 2 medication(s) that are the same as other medications prescribed for you. Read the directions carefully, and ask your doctor or other care provider to review them with you.            CONTINUE taking these medications    aspirin 81 MG Chew  Take 1 tablet (81 mg total) by mouth once daily.     atorvastatin 40 MG tablet  Commonly known as:  LIPITOR  Take 1 tablet (40 mg total) by mouth once daily.     b complex vitamins capsule     clopidogreL 75 mg tablet  Commonly known as:  PLAVIX  Take 1 tablet (75 mg total) by mouth once daily.     fluticasone-salmeterol 250-50 mcg/dose 250-50 mcg/dose diskus inhaler  Commonly known as:  ADVAIR     HYDROcodone-acetaminophen 5-325 mg per tablet  Commonly known as:  NORCO  Take 1 tablet by mouth every 6 (six) hours as needed for Pain.     lactulose 10 gram/15 mL solution  Commonly  known as:  CHRONULAC     megestroL 20 MG Tab  Commonly known as:  MEGACE     omeprazole 20 MG capsule  Commonly known as:  PRILOSEC     ondansetron 8 MG tablet  Commonly known as:  ZOFRAN           Where to Get Your Medications      These medications were sent to [id number] - [place], [place] - [address] AT [id number] [facility] [id number]    Hours:  24-hours Phone:  [phone]   · cholecalciferol (vitamin D3) 50 mcg (2,000 unit) Tab     Information about where to get these medications is not yet available    Ask your nurse or doctor about these medications  · amLODIPine 10 MG tablet  · metoprolol tartrate 25 MG tablet           I spent 40 minutes preparing the discharge including reviewing records from previous encounters, preparation of discharge summary, assessing and final examination of the patient, discharge medicine reconciliation, discussing plan of care, follow up and education and prescriptions.       Jassi Draper  [facility] Hospitalist  05/04/2020

## 2020-05-05 LAB — VIT B6 SERPL-MCNC: 42.9 UG/L (ref 2–32.8)

## 2020-05-05 NOTE — PT/OT/SLP DISCHARGE
Occupational Therapy Discharge Summary    Melania Beavers  MRN: [id number]   Principal Problem: Acute CVA (cerebrovascular accident)      Patient Discharged from acute Occupational Therapy on 5/4/2020.  Please refer to prior OT note dated 5/2/2020 for functional status.    Assessment:      Patient appropriate for care in another setting.    Objective:     GOALS:   Multidisciplinary Problems     Occupational Therapy Goals     Not on file          Multidisciplinary Problems (Resolved)        Problem: Occupational Therapy Goal    Goal Priority Disciplines Outcome Interventions   Occupational Therapy Goal   (Resolved)     OT, PT/OT Met    Description:  Goals to be met by: d/c     Patient will increase functional independence with ADLs by performing:    Feeding with Modified [place].  UE Dressing with Set-up Assistance.  LE Dressing with Minimal Assistance.  Grooming while seated with Modified [place].  Toileting from bedside commode with Minimal Assistance for hygiene and clothing management.   Toilet transfer to bedside commode with Minimal Assistance.                      Reasons for Discontinuation of Therapy Services  Transfer to alternate level of care.      Plan:     Patient Discharged to: Inpatient Rehab    Jamin Salas OT  5/5/2020

## 2020-05-11 ENCOUNTER — HOSPITAL ENCOUNTER (INPATIENT)
Facility: HOSPITAL | Age: 85
LOS: 3 days | Discharge: NURSING, REHAB OR LONG-TERM CARE | DRG: 064 | End: 2020-05-14
Attending: EMERGENCY MEDICINE | Admitting: FAMILY MEDICINE
Payer: MEDICARE

## 2020-05-11 ENCOUNTER — HOSPITAL ENCOUNTER (OUTPATIENT)
Dept: RADIOLOGY | Facility: HOSPITAL | Age: 85
Discharge: HOME | End: 2020-05-11
Attending: PHYSICAL MEDICINE & REHABILITATION
Payer: MEDICARE

## 2020-05-11 DIAGNOSIS — N18.30 CHRONIC KIDNEY DISEASE, STAGE III (MODERATE): ICD-10-CM

## 2020-05-11 DIAGNOSIS — I63.9 ACUTE CVA (CEREBROVASCULAR ACCIDENT): Primary | ICD-10-CM

## 2020-05-11 DIAGNOSIS — I63.9 STROKE: ICD-10-CM

## 2020-05-11 DIAGNOSIS — R10.9 ABDOMINAL PAIN: ICD-10-CM

## 2020-05-11 DIAGNOSIS — I63.9 CEREBROVASCULAR ACCIDENT (CVA), UNSPECIFIED MECHANISM: ICD-10-CM

## 2020-05-11 LAB
ALBUMIN SERPL BCP-MCNC: 3.9 G/DL (ref 3.5–5.2)
ALP SERPL-CCNC: 55 U/L (ref 55–135)
ALT SERPL W/O P-5'-P-CCNC: 12 U/L (ref 10–44)
ANION GAP SERPL CALC-SCNC: 6 MMOL/L (ref 8–16)
APTT PPP: 34.1 SEC (ref 23.6–33.3)
AST SERPL-CCNC: 17 U/L (ref 10–40)
BACTERIA #/AREA URNS HPF: NEGATIVE /HPF
BASOPHILS # BLD AUTO: 0.01 K/UL (ref 0–0.2)
BASOPHILS NFR BLD: 0.2 % (ref 0–1.9)
BILIRUB SERPL-MCNC: 0.7 MG/DL (ref 0.1–1)
BILIRUB UR QL STRIP: NEGATIVE
BUN SERPL-MCNC: 19 MG/DL (ref 8–23)
CALCIUM SERPL-MCNC: 9.4 MG/DL (ref 8.7–10.5)
CHLORIDE SERPL-SCNC: 108 MMOL/L (ref 95–110)
CLARITY UR: CLEAR
CO2 SERPL-SCNC: 21 MMOL/L (ref 23–29)
COLOR UR: COLORLESS
CREAT SERPL-MCNC: 1.4 MG/DL (ref 0.5–1.4)
DIFFERENTIAL METHOD: ABNORMAL
EOSINOPHIL # BLD AUTO: 0.1 K/UL (ref 0–0.5)
EOSINOPHIL NFR BLD: 1.5 % (ref 0–8)
ERYTHROCYTE [DISTWIDTH] IN BLOOD BY AUTOMATED COUNT: 14.7 % (ref 11.5–14.5)
EST. GFR  (AFRICAN AMERICAN): 39.2 ML/MIN/1.73 M^2
EST. GFR  (NON AFRICAN AMERICAN): 34 ML/MIN/1.73 M^2
GLUCOSE SERPL-MCNC: 89 MG/DL (ref 70–110)
GLUCOSE UR QL STRIP: NEGATIVE
HCT VFR BLD AUTO: 27.5 % (ref 37–48.5)
HGB BLD-MCNC: 8.7 G/DL (ref 12–16)
HGB UR QL STRIP: ABNORMAL
HYALINE CASTS #/AREA URNS LPF: 3 /LPF
IMM GRANULOCYTES # BLD AUTO: 0.05 K/UL (ref 0–0.04)
IMM GRANULOCYTES NFR BLD AUTO: 0.8 % (ref 0–0.5)
INR PPP: 1.2
KETONES UR QL STRIP: NEGATIVE
LEUKOCYTE ESTERASE UR QL STRIP: NEGATIVE
LIPASE SERPL-CCNC: 45 U/L (ref 4–60)
LYMPHOCYTES # BLD AUTO: 1.7 K/UL (ref 1–4.8)
LYMPHOCYTES NFR BLD: 26.7 % (ref 18–48)
MAGNESIUM SERPL-MCNC: 1.8 MG/DL (ref 1.6–2.6)
MCH RBC QN AUTO: 29.9 PG (ref 27–31)
MCHC RBC AUTO-ENTMCNC: 31.6 G/DL (ref 32–36)
MCV RBC AUTO: 95 FL (ref 82–98)
MICROSCOPIC COMMENT: ABNORMAL
MONOCYTES # BLD AUTO: 1.4 K/UL (ref 0.3–1)
MONOCYTES NFR BLD: 23.2 % (ref 4–15)
NEUTROPHILS # BLD AUTO: 2.9 K/UL (ref 1.8–7.7)
NEUTROPHILS NFR BLD: 47.6 % (ref 38–73)
NITRITE UR QL STRIP: NEGATIVE
NRBC BLD-RTO: 0 /100 WBC
PH UR STRIP: 8 [PH] (ref 5–8)
PLATELET # BLD AUTO: 233 K/UL (ref 150–350)
PMV BLD AUTO: 11.2 FL (ref 9.2–12.9)
POTASSIUM SERPL-SCNC: 4.8 MMOL/L (ref 3.5–5.1)
PROT SERPL-MCNC: 6.7 G/DL (ref 6–8.4)
PROT UR QL STRIP: NEGATIVE
PROTHROMBIN TIME: 14.2 SEC (ref 10.6–14.8)
RBC # BLD AUTO: 2.91 M/UL (ref 4–5.4)
RBC #/AREA URNS HPF: 8 /HPF (ref 0–4)
SARS-COV-2 RDRP RESP QL NAA+PROBE: NEGATIVE
SODIUM SERPL-SCNC: 135 MMOL/L (ref 136–145)
SP GR UR STRIP: 1.01 (ref 1–1.03)
SQUAMOUS #/AREA URNS HPF: 5 /HPF
URN SPEC COLLECT METH UR: ABNORMAL
UROBILINOGEN UR STRIP-ACNC: NEGATIVE EU/DL
WBC # BLD AUTO: 6.17 K/UL (ref 3.9–12.7)
WBC #/AREA URNS HPF: 1 /HPF (ref 0–5)

## 2020-05-11 PROCEDURE — 99285 EMERGENCY DEPT VISIT HI MDM: CPT | Mod: 25

## 2020-05-11 PROCEDURE — 70551 MRI BRAIN STEM W/O DYE: CPT | Mod: 26,,, | Performed by: RADIOLOGY

## 2020-05-11 PROCEDURE — 81001 URINALYSIS AUTO W/SCOPE: CPT

## 2020-05-11 PROCEDURE — 25000003 PHARM REV CODE 250: Performed by: NURSE PRACTITIONER

## 2020-05-11 PROCEDURE — 21400001 HC TELEMETRY ROOM

## 2020-05-11 PROCEDURE — 85610 PROTHROMBIN TIME: CPT

## 2020-05-11 PROCEDURE — 83735 ASSAY OF MAGNESIUM: CPT

## 2020-05-11 PROCEDURE — 83690 ASSAY OF LIPASE: CPT

## 2020-05-11 PROCEDURE — 93005 ELECTROCARDIOGRAM TRACING: CPT | Performed by: INTERNAL MEDICINE

## 2020-05-11 PROCEDURE — 74018 XR ABDOMEN AP 1 VIEW: ICD-10-PCS | Mod: 26,,, | Performed by: RADIOLOGY

## 2020-05-11 PROCEDURE — 74018 RADEX ABDOMEN 1 VIEW: CPT | Mod: 26,,, | Performed by: RADIOLOGY

## 2020-05-11 PROCEDURE — 71045 XR CHEST 1 VIEW: ICD-10-PCS | Mod: 26,,, | Performed by: RADIOLOGY

## 2020-05-11 PROCEDURE — U0002 COVID-19 LAB TEST NON-CDC: HCPCS

## 2020-05-11 PROCEDURE — 80053 COMPREHEN METABOLIC PANEL: CPT

## 2020-05-11 PROCEDURE — 70551 MRI BRAIN WITHOUT CONTRAST: ICD-10-PCS | Mod: 26,,, | Performed by: RADIOLOGY

## 2020-05-11 PROCEDURE — 71045 X-RAY EXAM CHEST 1 VIEW: CPT | Mod: 26,,, | Performed by: RADIOLOGY

## 2020-05-11 PROCEDURE — 85730 THROMBOPLASTIN TIME PARTIAL: CPT

## 2020-05-11 PROCEDURE — 85025 COMPLETE CBC W/AUTO DIFF WBC: CPT

## 2020-05-11 RX ORDER — LABETALOL HYDROCHLORIDE 5 MG/ML
10 INJECTION, SOLUTION INTRAVENOUS
Status: DISCONTINUED | OUTPATIENT
Start: 2020-05-11 | End: 2020-05-14 | Stop reason: HOSPADM

## 2020-05-11 RX ORDER — TALC
9 POWDER (GRAM) TOPICAL NIGHTLY PRN
COMMUNITY
End: 2020-07-25 | Stop reason: CLARIF

## 2020-05-11 RX ORDER — AMLODIPINE BESYLATE 5 MG/1
10 TABLET ORAL DAILY
Status: DISCONTINUED | OUTPATIENT
Start: 2020-05-11 | End: 2020-05-14

## 2020-05-11 RX ORDER — MAGNESIUM SULFATE HEPTAHYDRATE 40 MG/ML
2 INJECTION, SOLUTION INTRAVENOUS
Status: DISCONTINUED | OUTPATIENT
Start: 2020-05-11 | End: 2020-05-14 | Stop reason: HOSPADM

## 2020-05-11 RX ORDER — POTASSIUM CHLORIDE 20 MEQ/1
20 TABLET, EXTENDED RELEASE ORAL
Status: DISCONTINUED | OUTPATIENT
Start: 2020-05-11 | End: 2020-05-14 | Stop reason: HOSPADM

## 2020-05-11 RX ORDER — SODIUM CHLORIDE 0.9 % (FLUSH) 0.9 %
10 SYRINGE (ML) INJECTION
Status: DISCONTINUED | OUTPATIENT
Start: 2020-05-11 | End: 2020-05-14 | Stop reason: HOSPADM

## 2020-05-11 RX ORDER — POTASSIUM CHLORIDE 7.45 MG/ML
20 INJECTION INTRAVENOUS
Status: DISCONTINUED | OUTPATIENT
Start: 2020-05-11 | End: 2020-05-14 | Stop reason: HOSPADM

## 2020-05-11 RX ORDER — CALCIUM CHLORIDE IN 0.9 % NACL 1 G/100 ML
1 INTRAVENOUS SOLUTION, PIGGYBACK (ML) INTRAVENOUS
Status: DISCONTINUED | OUTPATIENT
Start: 2020-05-11 | End: 2020-05-14 | Stop reason: HOSPADM

## 2020-05-11 RX ORDER — POLYETHYLENE GLYCOL 3350 17 G/17G
17 POWDER, FOR SOLUTION ORAL DAILY
COMMUNITY

## 2020-05-11 RX ORDER — POTASSIUM CHLORIDE 7.45 MG/ML
40 INJECTION INTRAVENOUS
Status: DISCONTINUED | OUTPATIENT
Start: 2020-05-11 | End: 2020-05-14 | Stop reason: HOSPADM

## 2020-05-11 RX ORDER — LANOLIN ALCOHOL/MO/W.PET/CERES
800 CREAM (GRAM) TOPICAL
Status: DISCONTINUED | OUTPATIENT
Start: 2020-05-11 | End: 2020-05-14 | Stop reason: HOSPADM

## 2020-05-11 RX ORDER — MAGNESIUM SULFATE 1 G/100ML
1 INJECTION INTRAVENOUS
Status: DISCONTINUED | OUTPATIENT
Start: 2020-05-11 | End: 2020-05-14 | Stop reason: HOSPADM

## 2020-05-11 RX ORDER — NAPROXEN SODIUM 220 MG/1
81 TABLET, FILM COATED ORAL DAILY
Status: DISCONTINUED | OUTPATIENT
Start: 2020-05-12 | End: 2020-05-13

## 2020-05-11 RX ORDER — MAGNESIUM SULFATE HEPTAHYDRATE 40 MG/ML
4 INJECTION, SOLUTION INTRAVENOUS
Status: DISCONTINUED | OUTPATIENT
Start: 2020-05-11 | End: 2020-05-14 | Stop reason: HOSPADM

## 2020-05-11 RX ORDER — PANTOPRAZOLE SODIUM 40 MG/1
40 TABLET, DELAYED RELEASE ORAL DAILY
COMMUNITY
End: 2020-07-25 | Stop reason: CLARIF

## 2020-05-11 RX ORDER — HYDROCODONE BITARTRATE AND ACETAMINOPHEN 5; 325 MG/1; MG/1
1 TABLET ORAL EVERY 4 HOURS PRN
Status: DISCONTINUED | OUTPATIENT
Start: 2020-05-11 | End: 2020-05-14 | Stop reason: HOSPADM

## 2020-05-11 RX ORDER — ATORVASTATIN CALCIUM 40 MG/1
40 TABLET, FILM COATED ORAL DAILY
Status: DISCONTINUED | OUTPATIENT
Start: 2020-05-12 | End: 2020-05-14 | Stop reason: HOSPADM

## 2020-05-11 RX ORDER — ONDANSETRON 2 MG/ML
4 INJECTION INTRAMUSCULAR; INTRAVENOUS EVERY 8 HOURS PRN
Status: DISCONTINUED | OUTPATIENT
Start: 2020-05-11 | End: 2020-05-14 | Stop reason: HOSPADM

## 2020-05-11 RX ORDER — POTASSIUM CHLORIDE 20 MEQ/1
40 TABLET, EXTENDED RELEASE ORAL
Status: DISCONTINUED | OUTPATIENT
Start: 2020-05-11 | End: 2020-05-14 | Stop reason: HOSPADM

## 2020-05-11 RX ORDER — PANTOPRAZOLE SODIUM 40 MG/1
40 TABLET, DELAYED RELEASE ORAL DAILY
Status: DISCONTINUED | OUTPATIENT
Start: 2020-05-12 | End: 2020-05-14 | Stop reason: HOSPADM

## 2020-05-11 RX ORDER — CHOLECALCIFEROL (VITAMIN D3) 25 MCG
5000 TABLET ORAL DAILY
Status: DISCONTINUED | OUTPATIENT
Start: 2020-05-12 | End: 2020-05-14 | Stop reason: HOSPADM

## 2020-05-11 RX ORDER — POLYETHYLENE GLYCOL 3350 17 G/17G
17 POWDER, FOR SOLUTION ORAL DAILY
Status: DISCONTINUED | OUTPATIENT
Start: 2020-05-12 | End: 2020-05-14 | Stop reason: HOSPADM

## 2020-05-11 RX ORDER — ONDANSETRON 4 MG/1
4 TABLET, ORALLY DISINTEGRATING ORAL EVERY 8 HOURS PRN
COMMUNITY
End: 2020-07-25 | Stop reason: CLARIF

## 2020-05-11 RX ORDER — CLOPIDOGREL BISULFATE 75 MG/1
75 TABLET ORAL DAILY
Status: DISCONTINUED | OUTPATIENT
Start: 2020-05-12 | End: 2020-05-14 | Stop reason: HOSPADM

## 2020-05-11 RX ORDER — DEXTROMETHORPHAN HYDROBROMIDE, GUAIFENESIN 5; 100 MG/5ML; MG/5ML
650 LIQUID ORAL EVERY 6 HOURS PRN
COMMUNITY

## 2020-05-11 RX ORDER — TALC
9 POWDER (GRAM) TOPICAL NIGHTLY PRN
Status: DISCONTINUED | OUTPATIENT
Start: 2020-05-11 | End: 2020-05-14 | Stop reason: HOSPADM

## 2020-05-11 RX ORDER — METOPROLOL TARTRATE 25 MG/1
12.5 TABLET ORAL 2 TIMES DAILY
Status: DISCONTINUED | OUTPATIENT
Start: 2020-05-11 | End: 2020-05-14 | Stop reason: HOSPADM

## 2020-05-11 RX ORDER — HYDROCODONE BITARTRATE AND ACETAMINOPHEN 5; 325 MG/1; MG/1
1 TABLET ORAL EVERY 4 HOURS PRN
COMMUNITY
End: 2020-07-25 | Stop reason: CLARIF

## 2020-05-11 RX ADMIN — METOPROLOL TARTRATE 12.5 MG: 25 TABLET, FILM COATED ORAL at 10:05

## 2020-05-11 RX ADMIN — AMLODIPINE BESYLATE 10 MG: 5 TABLET ORAL at 10:05

## 2020-05-12 ENCOUNTER — ANESTHESIA (OUTPATIENT)
Dept: CARDIOLOGY | Facility: HOSPITAL | Age: 85
DRG: 064 | End: 2020-05-12
Payer: MEDICARE

## 2020-05-12 ENCOUNTER — ANESTHESIA EVENT (OUTPATIENT)
Dept: CARDIOLOGY | Facility: HOSPITAL | Age: 85
DRG: 064 | End: 2020-05-12
Payer: MEDICARE

## 2020-05-12 PROBLEM — E43 SEVERE MALNUTRITION: Status: ACTIVE | Noted: 2020-05-12

## 2020-05-12 PROBLEM — N18.30 CHRONIC KIDNEY DISEASE, STAGE III (MODERATE): Status: ACTIVE | Noted: 2020-05-12

## 2020-05-12 LAB
ALBUMIN SERPL BCP-MCNC: 3.5 G/DL (ref 3.5–5.2)
ALP SERPL-CCNC: 51 U/L (ref 55–135)
ALT SERPL W/O P-5'-P-CCNC: 11 U/L (ref 10–44)
ANION GAP SERPL CALC-SCNC: 8 MMOL/L (ref 8–16)
ANION GAP SERPL CALC-SCNC: 9 MMOL/L (ref 8–16)
AST SERPL-CCNC: 17 U/L (ref 10–40)
BASOPHILS # BLD AUTO: 0.02 K/UL (ref 0–0.2)
BASOPHILS NFR BLD: 0.4 % (ref 0–1.9)
BILIRUB SERPL-MCNC: 0.8 MG/DL (ref 0.1–1)
BUN SERPL-MCNC: 16 MG/DL (ref 8–23)
BUN SERPL-MCNC: 19 MG/DL (ref 8–23)
CALCIUM SERPL-MCNC: 8.9 MG/DL (ref 8.7–10.5)
CALCIUM SERPL-MCNC: 9.4 MG/DL (ref 8.7–10.5)
CHLORIDE SERPL-SCNC: 106 MMOL/L (ref 95–110)
CHLORIDE SERPL-SCNC: 111 MMOL/L (ref 95–110)
CO2 SERPL-SCNC: 19 MMOL/L (ref 23–29)
CO2 SERPL-SCNC: 19 MMOL/L (ref 23–29)
CREAT SERPL-MCNC: 1.4 MG/DL (ref 0.5–1.4)
CREAT SERPL-MCNC: 1.6 MG/DL (ref 0.5–1.4)
DIFFERENTIAL METHOD: ABNORMAL
EOSINOPHIL # BLD AUTO: 0.1 K/UL (ref 0–0.5)
EOSINOPHIL NFR BLD: 1.3 % (ref 0–8)
ERYTHROCYTE [DISTWIDTH] IN BLOOD BY AUTOMATED COUNT: 14.7 % (ref 11.5–14.5)
ERYTHROCYTE [DISTWIDTH] IN BLOOD BY AUTOMATED COUNT: 15 % (ref 11.5–14.5)
EST. GFR  (AFRICAN AMERICAN): 33.4 ML/MIN/1.73 M^2
EST. GFR  (AFRICAN AMERICAN): 39.2 ML/MIN/1.73 M^2
EST. GFR  (NON AFRICAN AMERICAN): 29 ML/MIN/1.73 M^2
EST. GFR  (NON AFRICAN AMERICAN): 34 ML/MIN/1.73 M^2
GLUCOSE SERPL-MCNC: 115 MG/DL (ref 70–110)
GLUCOSE SERPL-MCNC: 82 MG/DL (ref 70–110)
HCT VFR BLD AUTO: 24.3 % (ref 37–48.5)
HCT VFR BLD AUTO: 27 % (ref 37–48.5)
HGB BLD-MCNC: 8 G/DL (ref 12–16)
HGB BLD-MCNC: 8.4 G/DL (ref 12–16)
IMM GRANULOCYTES # BLD AUTO: 0.04 K/UL (ref 0–0.04)
IMM GRANULOCYTES NFR BLD AUTO: 0.7 % (ref 0–0.5)
INR PPP: 1.2
LYMPHOCYTES # BLD AUTO: 1.4 K/UL (ref 1–4.8)
LYMPHOCYTES NFR BLD: 26.2 % (ref 18–48)
MAGNESIUM SERPL-MCNC: 1.8 MG/DL (ref 1.6–2.6)
MCH RBC QN AUTO: 29.6 PG (ref 27–31)
MCH RBC QN AUTO: 30.4 PG (ref 27–31)
MCHC RBC AUTO-ENTMCNC: 31.1 G/DL (ref 32–36)
MCHC RBC AUTO-ENTMCNC: 32.9 G/DL (ref 32–36)
MCV RBC AUTO: 92 FL (ref 82–98)
MCV RBC AUTO: 95 FL (ref 82–98)
MONOCYTES # BLD AUTO: 1.2 K/UL (ref 0.3–1)
MONOCYTES NFR BLD: 23 % (ref 4–15)
NEUTROPHILS # BLD AUTO: 2.6 K/UL (ref 1.8–7.7)
NEUTROPHILS NFR BLD: 48.4 % (ref 38–73)
NRBC BLD-RTO: 0 /100 WBC
PHOSPHATE SERPL-MCNC: 3.5 MG/DL (ref 2.7–4.5)
PLATELET # BLD AUTO: 212 K/UL (ref 150–350)
PLATELET # BLD AUTO: 225 K/UL (ref 150–350)
PMV BLD AUTO: 10.9 FL (ref 9.2–12.9)
PMV BLD AUTO: 11.1 FL (ref 9.2–12.9)
POTASSIUM SERPL-SCNC: 4.4 MMOL/L (ref 3.5–5.1)
POTASSIUM SERPL-SCNC: 4.4 MMOL/L (ref 3.5–5.1)
PROT SERPL-MCNC: 6 G/DL (ref 6–8.4)
PROTHROMBIN TIME: 14.2 SEC (ref 10.6–14.8)
RBC # BLD AUTO: 2.63 M/UL (ref 4–5.4)
RBC # BLD AUTO: 2.84 M/UL (ref 4–5.4)
SODIUM SERPL-SCNC: 133 MMOL/L (ref 136–145)
SODIUM SERPL-SCNC: 139 MMOL/L (ref 136–145)
WBC # BLD AUTO: 5.39 K/UL (ref 3.9–12.7)
WBC # BLD AUTO: 7.05 K/UL (ref 3.9–12.7)

## 2020-05-12 PROCEDURE — 36415 COLL VENOUS BLD VENIPUNCTURE: CPT

## 2020-05-12 PROCEDURE — 85610 PROTHROMBIN TIME: CPT

## 2020-05-12 PROCEDURE — 80048 BASIC METABOLIC PNL TOTAL CA: CPT

## 2020-05-12 PROCEDURE — 85027 COMPLETE CBC AUTOMATED: CPT

## 2020-05-12 PROCEDURE — 97162 PT EVAL MOD COMPLEX 30 MIN: CPT

## 2020-05-12 PROCEDURE — 21400001 HC TELEMETRY ROOM

## 2020-05-12 PROCEDURE — 25000003 PHARM REV CODE 250: Performed by: INTERNAL MEDICINE

## 2020-05-12 PROCEDURE — 83735 ASSAY OF MAGNESIUM: CPT

## 2020-05-12 PROCEDURE — 84100 ASSAY OF PHOSPHORUS: CPT

## 2020-05-12 PROCEDURE — 97530 THERAPEUTIC ACTIVITIES: CPT

## 2020-05-12 PROCEDURE — 97166 OT EVAL MOD COMPLEX 45 MIN: CPT

## 2020-05-12 PROCEDURE — 92610 EVALUATE SWALLOWING FUNCTION: CPT

## 2020-05-12 PROCEDURE — 25000003 PHARM REV CODE 250: Performed by: NURSE PRACTITIONER

## 2020-05-12 PROCEDURE — 92523 SPEECH SOUND LANG COMPREHEN: CPT

## 2020-05-12 PROCEDURE — 85025 COMPLETE CBC W/AUTO DIFF WBC: CPT

## 2020-05-12 PROCEDURE — 97535 SELF CARE MNGMENT TRAINING: CPT

## 2020-05-12 PROCEDURE — 80053 COMPREHEN METABOLIC PANEL: CPT

## 2020-05-12 RX ORDER — SODIUM CHLORIDE 9 MG/ML
INJECTION, SOLUTION INTRAVENOUS ONCE
Status: COMPLETED | OUTPATIENT
Start: 2020-05-12 | End: 2020-05-12

## 2020-05-12 RX ORDER — SODIUM CHLORIDE 0.9 % (FLUSH) 0.9 %
10 SYRINGE (ML) INJECTION
Status: DISCONTINUED | OUTPATIENT
Start: 2020-05-12 | End: 2020-05-14 | Stop reason: HOSPADM

## 2020-05-12 RX ADMIN — PANTOPRAZOLE SODIUM 40 MG: 40 TABLET, DELAYED RELEASE ORAL at 06:05

## 2020-05-12 RX ADMIN — AMLODIPINE BESYLATE 10 MG: 5 TABLET ORAL at 01:05

## 2020-05-12 RX ADMIN — HYDROCODONE BITARTRATE AND ACETAMINOPHEN 1 TABLET: 5; 325 TABLET ORAL at 02:05

## 2020-05-12 RX ADMIN — METOPROLOL TARTRATE 12.5 MG: 25 TABLET, FILM COATED ORAL at 09:05

## 2020-05-12 RX ADMIN — ATORVASTATIN CALCIUM 40 MG: 40 TABLET, FILM COATED ORAL at 01:05

## 2020-05-12 RX ADMIN — ASPIRIN 81 MG 81 MG: 81 TABLET ORAL at 09:05

## 2020-05-12 RX ADMIN — SODIUM CHLORIDE 1000 ML: 0.9 INJECTION, SOLUTION INTRAVENOUS at 09:05

## 2020-05-12 NOTE — ED NOTES
IVANNA Carrillo requested myself as a chaperone for an in and out catheter. Pt requested female RN. Gabrielle RN, Neno, RN, and Jamee RN at bedside to assist. Pt became combative and aggressive.

## 2020-05-12 NOTE — NURSING
Notified Dr. Young and [facility] [place] regarding AVELINO scheduled with Dr. WILSON House for AM of 5/13/2020

## 2020-05-12 NOTE — PT/OT/SLP EVAL
Physical Therapy Evaluation    Patient Name:  Melania Beavers   MRN:  [id number]    Recommendations:     Discharge Recommendations:  rehabilitation facility   Discharge Equipment Recommendations: (TBD)   Barriers to discharge: Decreased caregiver support    Assessment:     Melania Beavers is a 86 y.o. female admitted with a medical diagnosis of Stroke. , R occipital lobe infarct.  Pt was receiving rehab at [facility] in [place] after CVA 4/30/2020 She presents with the following impairments/functional limitations:  weakness, impaired endurance, gait instability, impaired balance, visual deficits, impaired cognition, impaired coordination .  Pt had difficulty following some simple commands and has impaired visual acuity due to occipital lobe infarct. Recommend return to Rehab .  Per daughter's report, pt ambulated 60 ft RW and Min A at facility the day before admit to [facility].    Rehab Prognosis: Fair; patient would benefit from acute skilled PT services to address these deficits and reach maximum level of function.    Recent Surgery: * No surgery found *      Plan:     During this hospitalization, patient to be seen daily to address the identified rehab impairments via gait training, therapeutic activities, therapeutic exercises and progress toward the following goals:    · Plan of Care Expires:       Subjective     Chief Complaint: decreased vision  Patient/Family Comments/goals: Rehab  Pain/Comfort:  ·      Patients cultural, spiritual, Confucianist conflicts given the current situation:      Living Environment:  Pt lives with her daughter in a one story house with 2 step entry and railing  Prior to admission, patients level of function was Minimal Assistant.  Equipment used at home: rollator, bedside commode, shower chair.  DME owned (not currently used): wheelchair.  Upon discharge, patient will have assistance from facility and later  daughter upon return home.    Objective:     Communicated with nurse prior to  session.  Patient found supine with bed alarm, telemetry, peripheral IV  upon PT entry to room.    General Precautions: Standard, fall   Orthopedic Precautions:    Braces:       Exams:  · Cognitive Exam:  Patient is oriented to Person, Place, Time and Situation.  Pt did require extra time to  formulate response to questions  · RLE ROM: WFL  · RLE Strength: WFL  · LLE ROM: WFL  · LLE Strength: WFL    Functional Mobility:  · Bed Mobility:     · Supine to Sit: moderate assistance  · Sit to Supine: moderate assistance  · Transfers:     · Sit to Stand:  moderate assistance with hand-held assist  · Gait: 10 ft RW and Mod A x2  · Balance: good sitting balance , poor standing balance      Therapeutic Activities and Exercises:   t/f training, gait training with verbal and tactile cueing for increased trunk extension as  pt had severe trunk  flexion    AM-PAC 6 CLICK MOBILITY  Total Score:11     Patient left supine with all lines intact, call button in reach and bed alarm on.  Daughter was present in the room.    GOALS:   Multidisciplinary Problems     Physical Therapy Goals        Problem: Physical Therapy Goal    Goal Priority Disciplines Outcome Goal Variances Interventions   Physical Therapy Goal     PT, PT/OT      Description:  Goals to be met by: D/C    Patient will increase functional independence with mobility by performin. Supine to sit with MInimal Assistance  2. Sit to stand transfer with Minimal Assistance  3. Gait  x 25 ft  feet with Minimal Assistance using Rolling Walker.                       History:     Past Medical History:   Diagnosis Date    Anemia, unspecified 12/3/2019    GERD (gastroesophageal reflux disease)     Hypertension     Normochromic normocytic anemia 12/3/2019       Past Surgical History:   Procedure Laterality Date    ESOPHAGOGASTRODUODENOSCOPY N/A 10/4/2019    Procedure: EGD (ESOPHAGOGASTRODUODENOSCOPY);  Surgeon: Dominick Cunningham III, MD;  Location: [facility];  Service:  Endoscopy;  Laterality: N/A;    HYSTERECTOMY      PERCUTANEOUS PINNING OF HIP Left 4/5/2020    Procedure: PINNING, HIP, PERCUTANEOUS;  Surgeon: Anibal Kidd MD;  Location: [facility];  Service: Orthopedics;  Laterality: Left;       Time Tracking:     PT Received On: 05/12/20  PT Start Time: 1050     PT Stop Time: 1110  PT Total Time (min): 20 min     Billable Minutes: Evaluation 10 minutes and Therapeutic Activity 10 minutes      Lenore Fernandez, PT  05/12/2020

## 2020-05-12 NOTE — PLAN OF CARE
Plan of care reviewed with pt along with medications and fall precautions. Stroke admission and having q4hr neuro checks.  Problem: Adult Inpatient Plan of Care  Goal: Plan of Care Review  Outcome: Ongoing, Progressing  Goal: Patient-Specific Goal (Individualization)  Outcome: Ongoing, Progressing  Goal: Absence of Hospital-Acquired Illness or Injury  Outcome: Ongoing, Progressing  Goal: Optimal Comfort and Wellbeing  Outcome: Ongoing, Progressing  Goal: Readiness for Transition of Care  Outcome: Ongoing, Progressing  Goal: Rounds/Family Conference  Outcome: Ongoing, Progressing     Problem: Infection  Goal: Infection Symptom Resolution  Outcome: Ongoing, Progressing     Problem: Fall Injury Risk  Goal: Absence of Fall and Fall-Related Injury  Outcome: Ongoing, Progressing

## 2020-05-12 NOTE — ASSESSMENT & PLAN NOTE
Admit to cardiology B   Neurochecks/NIH per protocol  Consult Dr. Lobo hussein ERMWALESKA - rec'd MRA which they have ordered  Continue DAPT  Continue statin  PT/OT/ST  These are recurrent; revisit code status with daughters in AM if they do not call back tonight

## 2020-05-12 NOTE — PROGRESS NOTES
Instructed by Tati, Nurse Practitioner for Dr. Diamond to keep pt strict NPO, including meds until speech eval performed and until we know the plan from GI. Relayed this to Dr. Gibbons; Dr. Gibbons states that pt can eat once speech eval is complete and we aware of the diet that is recommended by speech therapist; Meds can also be given at once diet ordered, and pt will be NPO after midnight.   Speech therapist performed eval and states that pt is to have regular solids, thin liquids, and assistance with tray set up.  Relayed this to pt and pt's daughter-Reyna; will carry out this order

## 2020-05-12 NOTE — CONSULTS
GASTROENTEROLOGY INPATIENT CONSULT NOTE  Patient Name: Melania Beavers  Patient MRN: [id number]  Patient : 1933    Admit Date: 2020  Service date: 2020    Reason for Consult: GI evaluation for possible AVELINO    PCP: Luis Brown MD    Chief Complaint   Patient presents with    Abnormal Ct Scan     [facility] of abnormal ct done this am. no new symptom complaint.       HPI: Patient is a 86 y.o. female with PMHx 86 y.o. female with PMHx  HTN (ASA/plavix), COTY, HLD, esophageal stricture that presented for evaluation of confusion. Acute onset, intermittent, progressive and recent imaging with possible CVA.   Currently improved and eating lunch. No dysphagia endorsed by patient now or in past weeks.      CHART REVIEW:   EGD 10/'19 - redundant esophageal tissue w/ small HH and ring dilated up ot 54  EGD ' ([facility]) - tortuous esophagus dilated up to 52 Fr    Past Medical History:  Past Medical History:   Diagnosis Date    Anemia, unspecified 12/3/2019    GERD (gastroesophageal reflux disease)     Hypertension     Normochromic normocytic anemia 12/3/2019        Past Surgical History:  Past Surgical History:   Procedure Laterality Date    ESOPHAGOGASTRODUODENOSCOPY N/A 10/4/2019    Procedure: EGD (ESOPHAGOGASTRODUODENOSCOPY);  Surgeon: Dominick Cunningham III, MD;  Location: [facility];  Service: Endoscopy;  Laterality: N/A;    HYSTERECTOMY      PERCUTANEOUS PINNING OF HIP Left 2020    Procedure: PINNING, HIP, PERCUTANEOUS;  Surgeon: Anibal Kidd MD;  Location: [facility] OR;  Service: Orthopedics;  Laterality: Left;        Home Medications:  Medications Prior to Admission   Medication Sig Dispense Refill Last Dose    acetaminophen (TYLENOL) 650 MG TbSR Take 650 mg by mouth every 6 (six) hours as needed.   2020 at 12:51    aspirin 81 MG Chew Take 1 tablet (81 mg total) by mouth once daily.  0 2020 at 09:32    atorvastatin (LIPITOR) 40 MG tablet Take  1 tablet (40 mg total) by mouth once daily. 90 tablet 3 5/11/2020 at 09:32    CHOLECALCIFEROL, VITAMIN D3, ORAL Take 5,000 Units by mouth once daily.   5/11/2020 at 09:32    clopidogrel (PLAVIX) 75 mg tablet Take 1 tablet (75 mg total) by mouth once daily. 30 tablet 11 5/11/2020 at 09:32    HYDROcodone-acetaminophen (NORCO) 5-325 mg per tablet Take 1 tablet by mouth every 4 (four) hours as needed for Pain.   5/10/2020 at 15:16    megestroL (MEGACE) 20 MG Tab Take 40 mg by mouth 2 (two) times daily.   5/11/2020 at 09:32    melatonin (MELATIN) 3 mg tablet Take 9 mg by mouth nightly as needed for Insomnia.   5/10/2020 at 21:57    metoprolol tartrate (LOPRESSOR) 25 MG tablet Take 0.5 tablets (12.5 mg total) by mouth 2 (two) times daily. 30 tablet 11 5/11/2020 at 09:32    pantoprazole (PROTONIX) 40 MG tablet Take 40 mg by mouth once daily.   5/11/2020 at 09:32    polyethylene glycol (GLYCOLAX) 17 gram/dose powder Take 17 g by mouth once daily.   5/11/2020 at 09:32    amLODIPine (NORVASC) 10 MG tablet Take 1 tablet (10 mg total) by mouth once daily. 30 tablet 11     b complex vitamins capsule Take 1 capsule by mouth once daily.   4/30/2020 at 10:00    ondansetron (ZOFRAN-ODT) 4 MG TbDL Take 4 mg by mouth every 8 (eight) hours as needed.   Unknown at Unknown time       Inpatient Medications:   amLODIPine  10 mg Oral Daily    aspirin  81 mg Oral Daily    atorvastatin  40 mg Oral Daily    clopidogreL  75 mg Oral Daily    metoprolol tartrate  12.5 mg Oral BID    pantoprazole  40 mg Oral Daily    polyethylene glycol  17 g Oral Daily    vitamin D  5,000 Units Oral Daily     calcium chloride IVPB, calcium chloride IVPB, calcium chloride IVPB, HYDROcodone-acetaminophen, labetalol, magnesium oxide, magnesium sulfate IVPB, magnesium sulfate IVPB, magnesium sulfate IVPB, magnesium sulfate IVPB, melatonin, ondansetron, potassium chloride in water, potassium chloride in water, potassium chloride in water, potassium  "chloride in water, potassium chloride, potassium chloride, potassium chloride, potassium chloride, sodium chloride 0.9%, sodium phosphate IVPB, sodium phosphate IVPB, sodium phosphate IVPB, sodium phosphate IVPB, sodium phosphate IVPB    Review of patient's allergies indicates:   Allergen Reactions    Doxycycline monohydrate Other (See Comments)     dizzyness    Heparin     Heparin analogues        Social History:   Social History     Occupational History    Not on file   Tobacco Use    Smoking status: Never Smoker   Substance and Sexual Activity    Alcohol use: No    Drug use: No    Sexual activity: Never       Family History:   History reviewed. No pertinent family history.    Review of Systems:  A 10 point review of systems was performed and was normal, except as mentioned in the HPI, including constitutional, HEENT, heme, lymph, cardiovascular, respiratory, gastrointestinal, genitourinary, neurologic, endocrine, psychiatric and musculoskeletal.      OBJECTIVE:    Physical Exam:  24 Hour Vital Sign Ranges: Temp:  [98.3 °F (36.8 °C)-98.7 °F (37.1 °C)] 98.3 °F (36.8 °C)  Pulse:  [70-83] 70  Resp:  [16-28] 16  SpO2:  [95 %-100 %] 97 %  BP: (122-153)/(62-96) 142/65  Most recent vitals: BP (!) 142/65 (BP Location: Right arm, Patient Position: Lying)   Pulse 70   Temp 98.3 °F (36.8 °C) (Oral)   Resp 16   Ht 5' 7" (1.702 m)   Wt 42.6 kg (93 lb 14.7 oz)   LMP  (LMP Unknown)   SpO2 97%   Breastfeeding? No   BMI 14.71 kg/m²    GEN: well-developed, well-nourished, awake and alert, non-toxic appearing elderly WF  HEENT: PERRL, sclera anicteric, oral mucosa pink and moist without lesion  NECK: trachea midline; Good ROM  CV: regular rate and rhythm, no murmurs or gallops  RESP: clear to auscultation bilaterally, no wheezes, rhonci or rales  ABD: soft, non-tender, non-distended, normal bowel sounds  EXT: no swelling or edema, 1+ pulses distally  SKIN: no rashes or jaundice  PSYCH: normal affect    Labs: "   Recent Labs     05/11/20 1904 05/12/20  0551   WBC 6.17 5.39   MCV 95 95    212     Recent Labs     05/11/20  1904 05/12/20  0551   * 139   K 4.8 4.4    111*   CO2 21* 19*   BUN 19 16   GLU 89 82     No results for input(s): ALB in the last 72 hours.    Invalid input(s): ALKP, SGOT, SGPT, TBIL, DBIL, TPRO  Recent Labs     05/11/20  1904   INR 1.2         Radiology Review:  US Carotid Bilateral   Final Result      X-Ray Chest 1 View   Final Result      Hyperexpanded lungs compatible with COPD with no confluent infiltrates      Stable pleural thickening in the left apex         Electronically signed by: Trinidad Lazaro MD   Date:    05/11/2020   Time:    18:10      CT Renal Stone Study ABD Pelvis WO   Final Result      Multiple nonobstructing right renal stones.      Atrophic left kidney with 7 mm non-obstructing stone      Cholelithiasis      Moderate vascular calcification      Colonic diverticulosis.         Electronically signed by: Trinidad Lazaro MD   Date:    05/11/2020   Time:    18:06      MRA Brain without contrast    (Results Pending)         IMPRESSION / RECOMMENDATIONS:  86 y.o. female with PMHx 86 y.o. female with PMHx  HTN (ASA/plavix), COTY, HLD, esophageal stricture that presented for evaluation of confusion w/ suspected acute CVA. In regards to swallowing, no issues currently per patient. Continue to work with speech to ensure no concerns. In regards to needing repeat AVELINO, dilated up ot 54Fr 6 months ago without any current symptoms and OK to proceed w/ AVELINO    -Conservative from GI perspective  -Ok to proceed w/ AVELINO: If assistance needed passing probe, feel free to call  -If develops significant dysphagia in future, consider EGD / Dil     Thank you for this consult.    Dominick Cunningham III  5/12/2020  1:04 PM

## 2020-05-12 NOTE — ED PROVIDER NOTES
Encounter Date: 5/11/2020       History     Chief Complaint   Patient presents with    Abnormal Ct Scan     [facility] ct done this am. no new symptom complaint.     HPI   87yo F with h/o HTN and recent CVA who presents with new CVA findings and flank pain. The pt was being treated at stroke rehab for a CVA that occurred on 5/4. Today she received an MRI that demonstrates previous ischemic cva as well as regions of new ischemia. She has no numbness or weakness, no change in speech pattern, no aphasia, no vision changes. She tells me that she has been compliant with ASA and plavix at the stroke rehab center. She also complains of right flank pain that is intermittent over the last few days. She has been diagnosed with nephrolithiasis in the past, and has had this pain before. No fevers, no n/v/d, no bloody stools.     Review of patient's allergies indicates:   Allergen Reactions    Doxycycline monohydrate Other (See Comments)     dizzyness    Heparin     Heparin analogues      Past Medical History:   Diagnosis Date    Anemia, unspecified 12/3/2019    GERD (gastroesophageal reflux disease)     Hypertension     Normochromic normocytic anemia 12/3/2019     Past Surgical History:   Procedure Laterality Date    ESOPHAGOGASTRODUODENOSCOPY N/A 10/4/2019    Procedure: EGD (ESOPHAGOGASTRODUODENOSCOPY);  Surgeon: Dominick Cunningham III, MD;  Location: [facility];  Service: Endoscopy;  Laterality: N/A;    HYSTERECTOMY      PERCUTANEOUS PINNING OF HIP Left 4/5/2020    Procedure: PINNING, HIP, PERCUTANEOUS;  Surgeon: Anibal Kidd MD;  Location: [facility] OR;  Service: Orthopedics;  Laterality: Left;     No family history on file.  Social History     Tobacco Use    Smoking status: Never Smoker   Substance Use Topics    Alcohol use: No    Drug use: No     Review of Systems   Constitutional: Negative for fever.   HENT: Negative for sore throat.    Respiratory: Negative for  shortness of breath.    Cardiovascular: Negative for chest pain.   Gastrointestinal: Negative for nausea.   Genitourinary: Positive for flank pain. Negative for dysuria.   Musculoskeletal: Negative for back pain.   Skin: Negative for rash.   Neurological: Negative for weakness.   Hematological: Does not bruise/bleed easily.       Physical Exam     Initial Vitals   BP Pulse Resp Temp SpO2   05/11/20 1738 05/11/20 1735 05/11/20 1735 -- 05/11/20 1735   (!) 122/96 83 16  100 %      MAP       --                Physical Exam    Constitutional: She appears well-developed and well-nourished. She is not diaphoretic. No distress.   HENT:   Head: Normocephalic and atraumatic.   Eyes: EOM are normal. Pupils are equal, round, and reactive to light. Right eye exhibits no discharge. Left eye exhibits no discharge.   Neck: Normal range of motion. Neck supple.   Cardiovascular: Normal rate, regular rhythm and normal heart sounds. Exam reveals no gallop and no friction rub.    No murmur heard.  Pulmonary/Chest: Breath sounds normal. No respiratory distress. She has no wheezes. She has no rhonchi. She has no rales.   Abdominal: Soft. She exhibits no distension. There is no tenderness. There is no rebound and no guarding.   Mild right CVA tenderness to percussion. There is no abdominal tenderness to palpation.   Musculoskeletal: She exhibits no edema or tenderness.   Neurological: She is alert and oriented to person, place, and time.   CN II-XII in tact. Strength 5/5 in the b/l UE and b/l LE. Sensation to light touch in tact in b/l LE and UE. No dysarthria or aphasia.   Skin: Skin is warm and dry.   Psychiatric: She has a normal mood and affect. Thought content normal.         ED Course   Procedures  Labs Reviewed   COMPREHENSIVE METABOLIC PANEL   CBC W/ AUTO DIFFERENTIAL   PROTIME-INR   APTT   LIPASE   MAGNESIUM   URINALYSIS, REFLEX TO URINE CULTURE   SARS-COV-2 RNA AMPLIFICATION, QUAL          Imaging Results          X-Ray Chest 1  View (Final result)  Result time 05/11/20 18:10:35    Final result by Trinidad Lazaro MD (05/11/20 18:10:35)                 Impression:      Hyperexpanded lungs compatible with COPD with no confluent infiltrates    Stable pleural thickening in the left apex      Electronically signed by: Trinidad Lazaro MD  Date:    05/11/2020  Time:    18:10             Narrative:    EXAMINATION:  XR CHEST 1 VIEW    CLINICAL HISTORY:  unspecified abdominal pain    TECHNIQUE:  The cardiomediastinal silhouette is normal in size.  There are no acute osseous abnormalities.    FINDINGS:  Portable chest x-ray at 17:50 hours is compared to prior study at 15:20 hours    The lungs are hyperexpanded.  There are no infiltrates or pleural effusions.  The cardiomediastinal silhouette is normal in size.  There is stable apical pleural thickening on the left.                               CT Renal Stone Study ABD Pelvis WO (Final result)  Result time 05/11/20 18:06:10    Final result by Trinidad Lazaro MD (05/11/20 18:06:10)                 Impression:      Multiple nonobstructing right renal stones.    Atrophic left kidney with 7 mm non-obstructing stone    Cholelithiasis    Moderate vascular calcification    Colonic diverticulosis.      Electronically signed by: Trinidad Lazaro MD  Date:    05/11/2020  Time:    18:06             Narrative:      CMS MANDATED QUALITY DATA - CT RADIATION - 436    All CT scans at this facility utilize dose modulation, iterative reconstruction, and/or weight based dosing when appropriate to reduce radiation dose to as low as reasonably achievable.    EXAMINATION:  CT RENAL STONE STUDY ABD PELVIS WO    CLINICAL HISTORY:  Flank pain, stone disease suspected;    TECHNIQUE:  CT abdomen and pelvis without IV or oral contrast. Study is tailored for detection of urinary tract calculi and evaluation of solid organs, hollow viscera, and vascular structures is limited.    COMPARISON:  None    FINDINGS:  CT  Abdomen:    The lung bases are clear.    The liver, spleen, and pancreas have a normal noncontrast appearance.    There are multiple gallstones.  There is no biliary duct dilatation.    There are calcifications within the adrenal glands bilaterally.  The left kidney is atrophic.  There is a 7 mm nonobstructing left renal stone    There are multiple right renal stones without hydronephrosis.  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    There are no thick-walled or dilated bowel loops.    There is diffuse vascular calcification of the aorta.  The aorta is ectatic.    CT Pelvis:    There are no thick-walled or dilated bowel loops.  There is colonic diverticulosis without diverticulitis.  The bladder is normal.  The patient has had a hysterectomy.    There are 3 threaded screws within the left femoral neck.  There is scoliosis of the lumbar spine.                                MDM  85yo F with evidence of new CVA on MRI and right flank pain  DDx includes nephrolithiasis, cholecystitis, pyelonephritis, diverticulitis, pancreatitis, SBO  MRI today does reveal evidence of new CVA since her last MRI on 4/30 despite being on asa/plavix. Her VS are stable and her physician exam is unremarkable for new neurologic deficits here. Given her history of nephrolithiasis and low eGFR, we initiated w/u of flank pain with a CT renal study that does show nonobstructing renal stones. Labs show stable Cr of 1.4 and stable anemia of 8.7. Pending UA. I discussed the case with neurology, Dr. Diamond, who would like the patient admitted for stroke w/u given the findings. I have consulted [facility] for admission.    Lino Sinha MD  Resident, PGY-3  5/11/2020 8:03 PM                    Attending Attestation:   Physician Attestation Statement for Resident:  As the supervising MD   Physician Attestation Statement: I have personally seen and examined this patient.   I agree with the above history. -:   As the supervising MD I agree with the above PE.     As the supervising MD I agree with the above treatment, course, plan, and disposition.                                  Clinical Impression:       ICD-10-CM ICD-9-CM   1. Cerebrovascular accident (CVA), unspecified mechanism I63.9 434.91   2. Abdominal pain R10.9 789.00                                Lino Sinha MD  Resident  05/11/20 2003       Tico Robertson MD  05/11/20 [id number]

## 2020-05-12 NOTE — PT/OT/SLP EVAL
Occupational Therapy   Evaluation    Name: Melania Beavers  MRN: [id number]  Admitting Diagnosis:  Stroke      Recommendations:     Discharge Recommendations: rehabilitation facility  Discharge Equipment Recommendations:  other (see comments)(TBD)  Barriers to discharge:  None    Assessment:     Melania Beavers is a 86 y.o. female with a medical diagnosis of Stroke.  She presents with deficits from CVA in April 2020. Patient d/c from [facility] and was participating in Inpatient Rehab prior to this admission. Performance deficits affecting function: weakness, impaired endurance, impaired self care skills, impaired functional mobilty, gait instability, impaired balance, visual deficits, decreased upper extremity function.      Rehab Prognosis: Fair; patient would benefit from acute skilled OT services to address these deficits and reach maximum level of function.       Plan:     Patient to be seen 6 x/week to address the above listed problems via self-care/home management, therapeutic activities, therapeutic exercises  · Plan of Care Expires: 06/12/20  · Plan of Care Reviewed with: patient, daughter    Subjective     Chief Complaint: None  Patient/Family Comments/goals: to increase functional mobility and ADL independence    Occupational Profile:  Living Environment: Lives at home with daughter who works, but has a sitter during the day.   Previous level of function: was making positive progress with inpatient rehab. Able to ambulate short distances and was using left UE to increase ADL performance.  Roles and Routines: limited homemaker  Equipment Used at Home:  shower chair, bedside commode, rollator  Assistance upon Discharge: daughter and sitter.    Pain/Comfort:  · Pain Rating 1: 0/10  · Pain Rating Post-Intervention 1: 0/10    Patients cultural, spiritual, Rastafarian conflicts given the current situation: no    Objective:     Communicated with: nurse prior to session.  Patient found HOB elevated with telemetry, peripheral  IV upon OT entry to room.    General Precautions: Standard, fall   Orthopedic Precautions:N/A   Braces: N/A     Occupational Performance:    Bed Mobility:    · Patient completed Scooting/Bridging with minimum assistance  · Patient completed Supine to Sit with minimum assistance  · Patient completed Sit to Supine with minimum assistance   · Performed unsupported sitting EOB with contact guard assistance    Functional Mobility/Transfers:  · Patient completed Sit <> Stand Transfer with minimum assistance  with  hand-held assist and rolling walker     Activities of Daily Living:  · Grooming: contact guard assistance to wash face  · Lower Body Dressing: minimum assistance to don/doff socks sitting EOB.    Cognitive/Visual Perceptual:  Cognitive/Psychosocial Skills:     -       Oriented to: Person and Place   -       Follows Commands/attention:Follows one-step commands  -       Communication: some word finding difficulties.  -       Memory: Impaired STM  -       Safety awareness/insight to disability: impaired   -       Mood/Affect/Coping skills/emotional control: Cooperative and Pleasant  Visual/Perceptual:      -left visual field cut     Physical Exam:  Balance:    -       Sitting: Contact Guard, Standing: Minimal Assist  Upper Extremity Range of Motion:     -       Right Upper Extremity: WFL  -       Left Upper Extremity: WFL  Upper Extremity Strength:    -       Right Upper Extremity: 3+/5  -       Left Upper Extremity: 3+/5   Strength:    -       Right Upper Extremity: fair  -       Left Upper Extremity: fair  Fine Motor Coordination:    -       Intact    AMPAC 6 Click ADL:  AMPAC Total Score: 19    Treatment & Education:  Patient and daughter educated on purpose of Occupational Therapy and the importance of getting OOB.  Education:    Patient left HOB elevated with all lines intact, call button in reach and daughter present    GOALS:   Multidisciplinary Problems     Occupational Therapy Goals        Problem:  Occupational Therapy Goal    Goal Priority Disciplines Outcome Interventions   Occupational Therapy Goal     OT, PT/OT     Description:  Goals to be met by: discharge     Patient will increase functional independence with ADLs by performing:    UE Dressing with Supervision.  LE Dressing with Supervision.  Grooming while seated with Supervision.  Toileting from bedside commode with Supervision for hygiene and clothing management.   Toilet transfer to bedside commode with Supervision.  Patient will attend to ADL activity on left side with minimal visual cues.                      History:     Past Medical History:   Diagnosis Date    Anemia, unspecified 12/3/2019    GERD (gastroesophageal reflux disease)     Hypertension     Normochromic normocytic anemia 12/3/2019       Past Surgical History:   Procedure Laterality Date    ESOPHAGOGASTRODUODENOSCOPY N/A 10/4/2019    Procedure: EGD (ESOPHAGOGASTRODUODENOSCOPY);  Surgeon: Dominick Cunningham III, MD;  Location: [facility];  Service: Endoscopy;  Laterality: N/A;    HYSTERECTOMY      PERCUTANEOUS PINNING OF HIP Left 4/5/2020    Procedure: PINNING, HIP, PERCUTANEOUS;  Surgeon: Anibal Kidd MD;  Location: [facility] OR;  Service: Orthopedics;  Laterality: Left;       Time Tracking:     OT Date of Treatment:    OT Start Time: 1057  OT Stop Time: 1120  OT Total Time (min): 23 min    Billable Minutes:Evaluation 10  Self Care/Home Management 13    Jamin Salas OT  5/12/2020

## 2020-05-12 NOTE — SUBJECTIVE & OBJECTIVE
Past Medical History:   Diagnosis Date    Anemia, unspecified 12/3/2019    GERD (gastroesophageal reflux disease)     Hypertension     Normochromic normocytic anemia 12/3/2019       Past Surgical History:   Procedure Laterality Date    ESOPHAGOGASTRODUODENOSCOPY N/A 10/4/2019    Procedure: EGD (ESOPHAGOGASTRODUODENOSCOPY);  Surgeon: Dominick Cunningham III, MD;  Location: [facility] ENDO;  Service: Endoscopy;  Laterality: N/A;    HYSTERECTOMY      PERCUTANEOUS PINNING OF HIP Left 4/5/2020    Procedure: PINNING, HIP, PERCUTANEOUS;  Surgeon: Anibal Kidd MD;  Location: [facility] OR;  Service: Orthopedics;  Laterality: Left;       Review of patient's allergies indicates:   Allergen Reactions    Doxycycline monohydrate Other (See Comments)     dizzyness    Heparin     Heparin analogues        Current Facility-Administered Medications on File Prior to Encounter   Medication    [DISCONTINUED] acetaminophen tablet    [DISCONTINUED] aspirin chewable tablet    [DISCONTINUED] atorvastatin tablet    [DISCONTINUED] cholecalciferol (vitamin D3) 125 mcg (5,000 unit) tablet    [DISCONTINUED] clopidogreL tablet    [DISCONTINUED] HYDROcodone-acetaminophen 5-325 mg per tablet    [DISCONTINUED] megestroL tablet    [DISCONTINUED] melatonin tablet    [DISCONTINUED] metoprolol tartrate (LOPRESSOR) tablet    [DISCONTINUED] ondansetron disintegrating tablet    [DISCONTINUED] pantoprazole EC tablet    [DISCONTINUED] polyethylene glycol packet     Current Outpatient Medications on File Prior to Encounter   Medication Sig    acetaminophen (TYLENOL) 650 MG TbSR Take 650 mg by mouth every 6 (six) hours as needed.    aspirin 81 MG Chew Take 1 tablet (81 mg total) by mouth once daily.    atorvastatin (LIPITOR) 40 MG tablet Take 1 tablet (40 mg total) by mouth once daily.    CHOLECALCIFEROL, VITAMIN D3, ORAL Take 5,000 Units by mouth once daily.    clopidogrel (PLAVIX) 75 mg tablet Take 1 tablet (75 mg total) by mouth  once daily.    HYDROcodone-acetaminophen (NORCO) 5-325 mg per tablet Take 1 tablet by mouth every 4 (four) hours as needed for Pain.    megestroL (MEGACE) 20 MG Tab Take 40 mg by mouth 2 (two) times daily.    melatonin (MELATIN) 3 mg tablet Take 9 mg by mouth nightly as needed for Insomnia.    metoprolol tartrate (LOPRESSOR) 25 MG tablet Take 0.5 tablets (12.5 mg total) by mouth 2 (two) times daily.    pantoprazole (PROTONIX) 40 MG tablet Take 40 mg by mouth once daily.    polyethylene glycol (GLYCOLAX) 17 gram/dose powder Take 17 g by mouth once daily.    amLODIPine (NORVASC) 10 MG tablet Take 1 tablet (10 mg total) by mouth once daily.    b complex vitamins capsule Take 1 capsule by mouth once daily.    ondansetron (ZOFRAN-ODT) 4 MG TbDL Take 4 mg by mouth every 8 (eight) hours as needed.    [DISCONTINUED] cholecalciferol, vitamin D3, (VITAMIN D3) 50 mcg (2,000 unit) Tab Take 2 tablets (4,000 Units total) by mouth once daily.    [DISCONTINUED] cholecalciferol, vitamin D3, 125 mcg (5,000 unit) Tab Take 1 tablet by mouth once daily.     [DISCONTINUED] fluticasone-salmeterol 250-50 mcg/dose (ADVAIR) 250-50 mcg/dose diskus inhaler Inhale 1 puff into the lungs 2 (two) times daily. Controller    [DISCONTINUED] HYDROcodone-acetaminophen (NORCO) 5-325 mg per tablet Take 1 tablet by mouth every 6 (six) hours as needed for Pain. (Patient taking differently: Take 1 tablet by mouth every 4 (four) hours as needed for Pain. )    [DISCONTINUED] lactulose (CHRONULAC) 10 gram/15 mL solution Take 10 g by mouth 2 (two) times daily as needed.    [DISCONTINUED] omeprazole (PRILOSEC) 20 MG capsule Take 20 mg by mouth every morning.     [DISCONTINUED] ondansetron (ZOFRAN) 8 MG tablet Take 8 mg by mouth every 12 (twelve) hours as needed for Nausea.      Family History     None        Tobacco Use    Smoking status: Never Smoker   Substance and Sexual Activity    Alcohol use: No    Drug use: No    Sexual activity: Never      Review of Systems   Unable to perform ROS: Dementia   Respiratory: Negative for shortness of breath.    Cardiovascular: Negative for chest pain.   Neurological: Negative for dizziness, weakness and numbness.     Objective:     Vital Signs (Most Recent):  Temp: 98.5 °F (36.9 °C) (05/11/20 2100)  Pulse: 75 (05/11/20 2100)  Resp: 16 (05/11/20 2100)  BP: (!) 149/65 (05/11/20 2100)  SpO2: 96 % (05/11/20 2100) Vital Signs (24h Range):  Temp:  [98.5 °F (36.9 °C)] 98.5 °F (36.9 °C)  Pulse:  [75-83] 75  Resp:  [16-28] 16  SpO2:  [96 %-100 %] 96 %  BP: (122-149)/(63-96) 149/65     Weight: 45.4 kg (100 lb)  Body mass index is 15.66 kg/m².    Physical Exam   Constitutional: She appears well-developed and well-nourished.   HENT:   Head: Normocephalic and atraumatic.   Eyes: Pupils are equal, round, and reactive to light. EOM are normal.   Neck: Normal range of motion. Neck supple.   Cardiovascular: Normal rate, regular rhythm and intact distal pulses.   Murmur heard.  Pulmonary/Chest: Effort normal and breath sounds normal. No stridor. No respiratory distress. She has no wheezes.   Abdominal: Soft. Bowel sounds are normal. She exhibits no distension. There is no tenderness.   Genitourinary: Vagina normal.   Musculoskeletal: Normal range of motion.   Neurological: She is alert.   Oriented to person and year  Will selectively participate in neuro exam  Left facial droop      Skin: Skin is warm and dry. Capillary refill takes less than 2 seconds.   Psychiatric:   Selectively participates  Gets angry when I pull covers back to do exam   Nursing note and vitals reviewed.        CRANIAL NERVES     CN III, IV, VI   Pupils are equal, round, and reactive to light.  Extraocular motions are normal.        Significant Labs:   CBC:   Recent Labs   Lab 05/11/20 1904   WBC 6.17   HGB 8.7*   HCT 27.5*        CMP:   Recent Labs   Lab 05/11/20 1904   *   K 4.8      CO2 21*   GLU 89   BUN 19   CREATININE 1.4   CALCIUM 9.4    PROT 6.7   ALBUMIN 3.9   BILITOT 0.7   ALKPHOS 55   AST 17   ALT 12   ANIONGAP 6*   EGFRNONAA 34.0*       Significant Imaging: EKG: I have reviewed all pertinent results/findings within the past 24 hours and my personal findings are: NSR no acute ischemic changes

## 2020-05-12 NOTE — HPI
Melania Beavers is a 86 y.o.  female who has a past medical history of Anemia, unspecified (12/3/2019), GERD (gastroesophageal reflux disease), Hypertension, CVA (8/2019 and 4/30/2020),  Hip fracture (3/2020) and Normochromic normocytic anemia (12/3/2019). The patient presented to [facility] on 4/30/2020 with a primary complaint of Cerebrovascular Accident [facility]. She was discharged on 5/4/2020 following a thalamic stroke. Further workup during her previous admission including CTA head ane neck did not show clinically significant stenosis, large vessel occlusion, or aneurysm. Echocardiography with bubble study did not reveal PFO or intraatrial septal defect or thrombus. Today, she had an MRI in the ED which showed new or recent nonhemorrhagic infarctions throughout the right occipital lobe which have occurred since the prior MRI dated 04/30/2020. She is a poor historian and AAOx2(person and year). She tries to tell me her location, but is unable to verbalize it. She denies chills, pain, weakness, numbness, N/V/D, dizziness, or LOC. Her daughter Kaitlin was called and she states her sister Reyna and her have been discussing a DNR, but doesn't feel comfortable officially making her a DNR until she talks to her again. She reports she's been progressively more confused since her first stroke requiring sitters at home. Daughter states she's been aggressive as well, slapping a sitter. Daughter states she would have never done that prior to her first stroke. Her questions if she has underlying dementia, stating her PCP told her she may have beginning stages. She selectively participates in my neuro exam. Will do activity with her rt hand, but not left or her legs. She is upset because she is cold. Extra blanket provided.     
Quality 431: Preventive Care And Screening: Unhealthy Alcohol Use - Screening: Patient screened for unhealthy alcohol use using a single question and scores less than 2 times per year
Quality 128: Preventive Care And Screening: Body Mass Index (Bmi) Screening And Follow-Up Plan: BMI is documented within normal parameters and no follow-up plan is required.
Quality 130: Documentation Of Current Medications In The Medical Record: Current Medications Documented
Quality 402: Tobacco Use And Help With Quitting Among Adolescents: Patient screened for tobacco and never smoked
Detail Level: Detailed

## 2020-05-12 NOTE — H&P
[facility]  History & Physical    DOS: 05/11/2020  8:37 PM    Patient Name: Melania Beavers  MRN: [id number]  Admission Date: 5/11/2020  Attending Physician: Dr. Carmichael  Primary Care Provider: Luis Brown MD         Patient information was obtained from patient, relative(s) and ER records.     Subjective:     Principal Problem:Stroke    Chief Complaint:   Chief Complaint   Patient presents with    Abnormal Ct Scan     [facility] ct done this am. no new symptom complaint.        HPI: Melania Beavers is a 86 y.o.  female who has a past medical history of Anemia, unspecified (12/3/2019), GERD (gastroesophageal reflux disease), Hypertension, CVA (8/2019 and 4/30/2020),  Hip fracture (3/2020) and Normochromic normocytic anemia (12/3/2019). The patient presented to [facility] on 4/30/2020 with a primary complaint of Cerebrovascular Accident following [facility]. She was discharged on 5/4/2020 following a thalamic stroke. Further workup during her previous admission including CTA head ane neck did not show clinically significant stenosis, large vessel occlusion, or aneurysm. Echocardiography with bubble study did not reveal PFO or intraatrial septal defect or thrombus. Today, she had an MRI in the ED which showed new or recent nonhemorrhagic infarctions throughout the right occipital lobe which have occurred since the prior MRI dated 04/30/2020. She is a poor historian and AAOx2(person and year). She tries to tell me her location, but is unable to verbalize it. She denies chills, pain, weakness, numbness, N/V/D, dizziness, or LOC. Her daughter Kaitlin was called and she states her sister Reyna and her have been discussing a DNR, but doesn't feel comfortable officially making her a DNR until she talks to her again. She reports she's been progressively more confused since her first stroke requiring sitters at  home. Daughter states she's been aggressive as well, slapping a sitter. Daughter states she would have never done that prior to her first stroke. Her questions if she has underlying dementia, stating her PCP told her she may have beginning stages. She selectively participates in my neuro exam. Will do activity with her rt hand, but not left or her legs. She is upset because she is cold. Extra blanket provided.       Past Medical History:   Diagnosis Date    Anemia, unspecified 12/3/2019    GERD (gastroesophageal reflux disease)     Hypertension     Normochromic normocytic anemia 12/3/2019       Past Surgical History:   Procedure Laterality Date    ESOPHAGOGASTRODUODENOSCOPY N/A 10/4/2019    Procedure: EGD (ESOPHAGOGASTRODUODENOSCOPY);  Surgeon: Dominick Cunningham III, MD;  Location: [facility] ENDO;  Service: Endoscopy;  Laterality: N/A;    HYSTERECTOMY      PERCUTANEOUS PINNING OF HIP Left 4/5/2020    Procedure: PINNING, HIP, PERCUTANEOUS;  Surgeon: Anibal Kidd MD;  Location: [facility] OR;  Service: Orthopedics;  Laterality: Left;       Review of patient's allergies indicates:   Allergen Reactions    Doxycycline monohydrate Other (See Comments)     dizzyness    Heparin     Heparin analogues        Current Facility-Administered Medications on File Prior to Encounter   Medication    [DISCONTINUED] acetaminophen tablet    [DISCONTINUED] aspirin chewable tablet    [DISCONTINUED] atorvastatin tablet    [DISCONTINUED] cholecalciferol (vitamin D3) 125 mcg (5,000 unit) tablet    [DISCONTINUED] clopidogreL tablet    [DISCONTINUED] HYDROcodone-acetaminophen 5-325 mg per tablet    [DISCONTINUED] megestroL tablet    [DISCONTINUED] melatonin tablet    [DISCONTINUED] metoprolol tartrate (LOPRESSOR) tablet    [DISCONTINUED] ondansetron disintegrating tablet    [DISCONTINUED] pantoprazole EC tablet    [DISCONTINUED] polyethylene glycol packet     Current Outpatient Medications on File Prior to Encounter    Medication Sig    acetaminophen (TYLENOL) 650 MG TbSR Take 650 mg by mouth every 6 (six) hours as needed.    aspirin 81 MG Chew Take 1 tablet (81 mg total) by mouth once daily.    atorvastatin (LIPITOR) 40 MG tablet Take 1 tablet (40 mg total) by mouth once daily.    CHOLECALCIFEROL, VITAMIN D3, ORAL Take 5,000 Units by mouth once daily.    clopidogrel (PLAVIX) 75 mg tablet Take 1 tablet (75 mg total) by mouth once daily.    HYDROcodone-acetaminophen (NORCO) 5-325 mg per tablet Take 1 tablet by mouth every 4 (four) hours as needed for Pain.    megestroL (MEGACE) 20 MG Tab Take 40 mg by mouth 2 (two) times daily.    melatonin (MELATIN) 3 mg tablet Take 9 mg by mouth nightly as needed for Insomnia.    metoprolol tartrate (LOPRESSOR) 25 MG tablet Take 0.5 tablets (12.5 mg total) by mouth 2 (two) times daily.    pantoprazole (PROTONIX) 40 MG tablet Take 40 mg by mouth once daily.    polyethylene glycol (GLYCOLAX) 17 gram/dose powder Take 17 g by mouth once daily.    amLODIPine (NORVASC) 10 MG tablet Take 1 tablet (10 mg total) by mouth once daily.    b complex vitamins capsule Take 1 capsule by mouth once daily.    ondansetron (ZOFRAN-ODT) 4 MG TbDL Take 4 mg by mouth every 8 (eight) hours as needed.    [DISCONTINUED] cholecalciferol, vitamin D3, (VITAMIN D3) 50 mcg (2,000 unit) Tab Take 2 tablets (4,000 Units total) by mouth once daily.    [DISCONTINUED] cholecalciferol, vitamin D3, 125 mcg (5,000 unit) Tab Take 1 tablet by mouth once daily.     [DISCONTINUED] fluticasone-salmeterol 250-50 mcg/dose (ADVAIR) 250-50 mcg/dose diskus inhaler Inhale 1 puff into the lungs 2 (two) times daily. Controller    [DISCONTINUED] HYDROcodone-acetaminophen (NORCO) 5-325 mg per tablet Take 1 tablet by mouth every 6 (six) hours as needed for Pain. (Patient taking differently: Take 1 tablet by mouth every 4 (four) hours as needed for Pain. )    [DISCONTINUED] lactulose (CHRONULAC) 10 gram/15 mL solution Take 10 g by  mouth 2 (two) times daily as needed.    [DISCONTINUED] omeprazole (PRILOSEC) 20 MG capsule Take 20 mg by mouth every morning.     [DISCONTINUED] ondansetron (ZOFRAN) 8 MG tablet Take 8 mg by mouth every 12 (twelve) hours as needed for Nausea.      Family History     None        Tobacco Use    Smoking status: Never Smoker   Substance and Sexual Activity    Alcohol use: No    Drug use: No    Sexual activity: Never     Review of Systems   Unable to perform ROS: Dementia   Respiratory: Negative for shortness of breath.    Cardiovascular: Negative for chest pain.   Neurological: Negative for dizziness, weakness and numbness.     Objective:     Vital Signs (Most Recent):  Temp: 98.5 °F (36.9 °C) (05/11/20 2100)  Pulse: 75 (05/11/20 2100)  Resp: 16 (05/11/20 2100)  BP: (!) 149/65 (05/11/20 2100)  SpO2: 96 % (05/11/20 2100) Vital Signs (24h Range):  Temp:  [98.5 °F (36.9 °C)] 98.5 °F (36.9 °C)  Pulse:  [75-83] 75  Resp:  [16-28] 16  SpO2:  [96 %-100 %] 96 %  BP: (122-149)/(63-96) 149/65     Weight: 45.4 kg (100 lb)  Body mass index is 15.66 kg/m².    Physical Exam   Constitutional: She appears well-developed and well-nourished.   HENT:   Head: Normocephalic and atraumatic.   Eyes: Pupils are equal, round, and reactive to light. EOM are normal.   Neck: Normal range of motion. Neck supple.   Cardiovascular: Normal rate, regular rhythm and intact distal pulses.   Murmur heard.  Pulmonary/Chest: Effort normal and breath sounds normal. No stridor. No respiratory distress. She has no wheezes.   Abdominal: Soft. Bowel sounds are normal. She exhibits no distension. There is no tenderness.   Genitourinary: Vagina normal.   Musculoskeletal: Normal range of motion.   Neurological: She is alert.   Oriented to person and year  Will selectively participate in neuro exam  Left facial droop      Skin: Skin is warm and dry. Capillary refill takes less than 2 seconds.   Psychiatric:   Selectively participates  Gets angry when I pull  covers back to do exam   Nursing note and vitals reviewed.        CRANIAL NERVES     CN III, IV, VI   Pupils are equal, round, and reactive to light.  Extraocular motions are normal.        Significant Labs:   CBC:   Recent Labs   Lab 05/11/20 1904   WBC 6.17   HGB 8.7*   HCT 27.5*        CMP:   Recent Labs   Lab 05/11/20 1904   *   K 4.8      CO2 21*   GLU 89   BUN 19   CREATININE 1.4   CALCIUM 9.4   PROT 6.7   ALBUMIN 3.9   BILITOT 0.7   ALKPHOS 55   AST 17   ALT 12   ANIONGAP 6*   EGFRNONAA 34.0*       Significant Imaging: EKG: I have reviewed all pertinent results/findings within the past 24 hours and my personal findings are: NSR no acute ischemic changes    Assessment/Plan:     * Stroke  Admit to cardiology B   Neurochecks/NIH per protocol  Consult Dr. Lobo hussein ERMD - rec'd MRA which they have ordered  Continue DAPT  Continue statin  PT/OT/ST  These are recurrent; revisit code status with daughters in AM if they do not call back tonight      Anemia, unspecified  Stable; no active bleeding  She did require a transfusion after her  Hip surgery   monitor      HTN (hypertension)  Allowing for permissive htn given cva  Monitor   Continue home meds        VTE Risk Mitigation (From admission, onward)         Ordered     Reason for No Pharmacological VTE Prophylaxis  Once     Question:  Reasons:  Answer:  Physician Provided (leave comment)  Comment:  DAPT    05/11/20 2036     IP VTE HIGH RISK PATIENT  Once      05/11/20 2036     Place sequential compression device  Until discontinued      05/11/20 2036                   Christina Gamble NP  Department of Hospital Medicine   Former[facility]

## 2020-05-12 NOTE — PLAN OF CARE
Problem: Malnutrition  Goal: Improved Nutritional Intake  Outcome: Ongoing, Progressing  Intervention: Promote and Optimize Oral Intake  Flowsheets (Taken 5/12/2020 1033)  Oral Nutrition Promotion: calorie dense liquids provided   Recommend consulting SLP to evaluate swallowing capabilities.  Advance diet when medically appropriate per MD, RD to monitor # of days NPO  RD suspects severe malnutrition. RD noted clinical characteristics that support a diagnosis of malnutrition. Patient with a 17.78% weight loss (20 lb) in 7 months-Severe, 68.8% IBW, and BMI of 14.7.

## 2020-05-12 NOTE — CONSULTS
"Formerly [facility], later [facility]  Adult Nutrition   Consult Note (Initial Assessment)     SUMMARY     Recommendations/Interventions:  1. Recommend consulting SLP to evaluate swallowing capabilities.  2. Advance diet when medically appropriate per SYLWIA WALSH to monitor # of days NPO  · RD suspects severe malnutrition. RD noted clinical characteristics that support a diagnosis of malnutrition. Patient with a 17.78% weight loss (20 lb) in 7 months-Severe, 68.8% IBW, and BMI of 14.7. For patient and clinician safety, physical contact (palpation) was not utilized during assessment due to departmental decision to stop performing NFPE during the COVID-19 pandemic.    Goals:  1. Diet to advance and patient to meet at least 75% of estimated needs via PO intake of meals and supplements.  2. Diagnosis of Malnutrition added to patients problem list if MD agrees.  Nutrition Goal Status: new    Dietitian Rounds Brief:  · Seen 2' consult RE assess dietary needs. Stroke work-up. Awaiting neurology consult and results. Patient with a 17.78% weight loss (20 lb) in 7 months-Severe. Potential barrier to maintaining IBW: patient showing signs of early dementia. Will need meal assistance. Will add nutritional supplement once diet advances-Ensure Enlive. Will continue to monitor intake, labs, and plan of care.    Reason for Assessment  Reason For Assessment: consult  Diagnosis: stroke/CVA  Relevant Medical History: anemia, GERD, HTN  Interdisciplinary Rounds: attended    Nutrition Risk Screen  Nutrition Risk Screen: no indicators present    Nutrition/Diet History  Food Allergies: NKFA  Factors Affecting Nutritional Intake: NPO    Anthropometrics  Temp: 98.4 °F (36.9 °C)  Height Method: Stated  Height: 5' 7" (170.2 cm)  Height (inches): 67 in  Weight Method: Bed Scale  Weight: 42.6 kg (93 lb 14.7 oz)  Weight (lb): 93.92 lb  Ideal Body Weight (IBW), Female: 135 lb  % Ideal Body Weight, Female (lb): 69.57 %  % Ideal Body Weight Malnutrition: less than " 70% -severe deficit(68.8%)  BMI (Calculated): 14.7  BMI Grade: less than 16 protein-energy malnutrition grade III  Weight Loss: unintentional  Usual Body Weight (UBW), k.5 kg  Weight Change Amount: 20 lb  % Usual Body Weight: 82.89  % Weight Change From Usual Weight: -17.28 %     Weight History:  Wt Readings from Last 10 Encounters:   20 42.6 kg (93 lb 14.7 oz)   20 46 kg (101 lb 6.6 oz)   20 44.5 kg (98 lb)   20 44.7 kg (98 lb 8.7 oz)   10/05/19 48.2 kg (106 lb 4.2 oz)   19 51.5 kg (113 lb 8.6 oz)   19 44.9 kg (99 lb)   17 54.9 kg (121 lb 0.5 oz)     Lab/Procedures/Meds: Pertinent Labs Reviewed  Clinical Chemistry:  Recent Labs   Lab 20  1904 20  0551   * 139   K 4.8 4.4    111*   CO2 21* 19*   GLU 89 82   BUN 19 16   CREATININE 1.4 1.4   CALCIUM 9.4 9.4   PROT 6.7 6.0   ALBUMIN 3.9 3.5   BILITOT 0.7 0.8   ALKPHOS 55 51*   AST 17 17   ALT 12 11   ANIONGAP 6* 9   ESTGFRAFRICA 39.2* 39.2*   EGFRNONAA 34.0* 34.0*   MG 1.8 1.8   PHOS  --  3.5   LIPASE 45  --      CBC:   Recent Labs   Lab 20  0551   WBC 5.39   RBC 2.84*   HGB 8.4*   HCT 27.0*      MCV 95   MCH 29.6   MCHC 31.1*     Medications: Pertinent Medications reviewed  Scheduled Meds:   amLODIPine  10 mg Oral Daily    aspirin  81 mg Oral Daily    atorvastatin  40 mg Oral Daily    clopidogreL  75 mg Oral Daily    metoprolol tartrate  12.5 mg Oral BID    pantoprazole  40 mg Oral Daily    polyethylene glycol  17 g Oral Daily    vitamin D  5,000 Units Oral Daily     Continuous Infusions:  PRN Meds:.calcium chloride IVPB, calcium chloride IVPB, calcium chloride IVPB, HYDROcodone-acetaminophen, labetalol, magnesium oxide, magnesium sulfate IVPB, magnesium sulfate IVPB, magnesium sulfate IVPB, magnesium sulfate IVPB, melatonin, ondansetron, potassium chloride in water, potassium chloride in water, potassium chloride in water, potassium chloride in water, potassium chloride,  potassium chloride, potassium chloride, potassium chloride, sodium chloride 0.9%, sodium phosphate IVPB, sodium phosphate IVPB, sodium phosphate IVPB, sodium phosphate IVPB, sodium phosphate IVPB    Estimated/Assessed Needs    Weight Used For Calorie Calculations: 42.6 kg (93 lb 14.7 oz)  Energy Calorie Requirements (kcal): 1545-7226 kcals/day (40-45 kcals/kg)  Energy Need Method: Kcal/kg  Protein Requirements: 64-85 g/day (1.5-2.0 g/kg)  Weight Used For Protein Calculations: 42.6 kg (93 lb 14.7 oz)  Fluid Requirements (mL): 9805-5451 mL/day (1 mL/kcal)  Estimated Fluid Requirement Method: RDA Method    Nutrition Prescription Ordered    Current Diet Order: NPO    Evaluation of Received Nutrient/Fluid Intake    Energy Calories Required: not meeting needs  Protein Required: not meeting needs  Fluid Required: meeting needs  Tolerance: (NPO)  % Intake of Estimated Energy Needs: 0%  % Meal Intake: NPO  No intake or output data in the 24 hours ending 05/12/20 1027   Nutrition Risk    Level of Risk/Frequency of Follow-up: high   Monitor and Evaluation    Food and Nutrient Intake: energy intake, food and beverage intake  Food and Nutrient Adminstration: diet order  Physical Activity and Function: nutrition-related ADLs and IADLs, factors affecting access to physical activity  Anthropometric Measurements: weight, weight change  Biochemical Data, Medical Tests and Procedures: electrolyte and renal panel, inflammatory profile, lipid profile, gastrointestinal profile, glucose/endocrine profile  Nutrition-Focused Physical Findings: overall appearance     Nutrition Follow-Up    RD Follow-up?: Yes  Tati Elizondo RD 05/12/2020 10:33 AM

## 2020-05-12 NOTE — CONSULTS
[facility]  Neurology  Consult Note    Patient Name: Melania Beavers  MRN: [id number]  Admission Date: 5/11/2020  Hospital Length of Stay: 1 days  Code Status: Full Code   Attending Provider: Rikki Gibbons MD   Consulting Provider: Анна Hartman NP  Primary Care Physician: Luis Brown MD  Principal Problem:Stroke    Inpatient consult to neurology  Consult performed by: Анна Hartman NP  Consult ordered by: Christina Gamble NP        Subjective:     Chief Complaint:    Chief Complaint   Patient presents with    Abnormal Ct Scan     [facility] of abnormal ct done this am. no new symptom complaint.        HPI: Melania Beavers is a 86 y.o.  female who has a past medical history of Anemia, unspecified (12/3/2019), GERD (gastroesophageal reflux disease), Hypertension, CVA (8/2019 and 4/30/2020),  Hip fracture (3/2020) and Normochromic normocytic anemia (12/3/2019). The patient presented to [facility] on 4/30/2020 with a primary complaint of Cerebrovascular Accident following a CT at [facility] today. She was discharged on 5/4/2020 following a thalamic stroke. Further workup during her previous admission including CTA head ane neck did not show clinically significant stenosis, large vessel occlusion, or aneurysm. Echocardiography with bubble study did not reveal PFO or intraatrial septal defect or thrombus. Today, she had an MRI in the ED which showed new or recent nonhemorrhagic infarctions throughout the right occipital lobe which have occurred since the prior MRI dated 04/30/2020. She is a poor historian and AAOx2(person and year). She tries to tell me her location, but is unable to verbalize it. She denies chills, pain, weakness, numbness, N/V/D, dizziness, or LOC. Her daughter Kaitlin was called and she states her sister Reyna and her have been discussing a DNR, but doesn't feel comfortable officially making her a DNR until she talks to her  again. She reports she's been progressively more confused since her first stroke requiring sitters at home. Daughter states she's been aggressive as well, slapping a sitter. Daughter states she would have never done that prior to her first stroke. Her questions if she has underlying dementia, stating her PCP told her she may have beginning stages. She selectively participates in my neuro exam. Will do activity with her rt hand, but not left or her legs. She is upset because she is cold. Extra blanket provided.    Neurological Consult Note: Patient seen and examined with Dr. iDamond. Patient daughter at bedside. Discussed plan of care. Patient is a 86 year old female with a PMHx: Anemia,GERD, Hypertension, CVA (8/2019 and 4/30/2020),  Hip fracture. The patient [facility] 4/30/2020 with a primary complaint of Cerebrovascular Accident. Patient is Alert oriented to person, place disoriented to situation. Patient unable to recall or has a delayed response to common/familiar objects such as thumb and cup. Patient states that she doesn't recall the reason for going to have the MRI done. She denies chills, pain, weakness, numbness, N/V/D, dizziness, or LOC.Patient cooperative with exam follows commands. Mild aphasia noted.  Tremors noted bilaterally greater in right had,. 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 Stroke work up includes: CUS No hemodynamically significant stenosis MRA Brain Pending, Mri Brain w/o contrast small nonhemorrhagic infarction in the lateral right thalamus.  There are however new or recent nonhemorrhagic infarctions throughout the right occipital lobe which have occurred since the prior MRI dated 04/30/2020.TTE 60 % EF Normal Saline (bubble) No PFO. Will consult GI for Esphgous dialtaion, recommend cardiology consult for AVELINO.  Will continue to follow.     Past Medical History:   Diagnosis Date    Anemia, unspecified  12/3/2019    GERD (gastroesophageal reflux disease)     Hypertension     Normochromic normocytic anemia 12/3/2019       Past Surgical History:   Procedure Laterality Date    ESOPHAGOGASTRODUODENOSCOPY N/A 10/4/2019    Procedure: EGD (ESOPHAGOGASTRODUODENOSCOPY);  Surgeon: Dominick Cunningham III, MD;  Location: [facility] ENDO;  Service: Endoscopy;  Laterality: N/A;    HYSTERECTOMY      PERCUTANEOUS PINNING OF HIP Left 4/5/2020    Procedure: PINNING, HIP, PERCUTANEOUS;  Surgeon: Anibal Kidd MD;  Location: [facility] OR;  Service: Orthopedics;  Laterality: Left;       Review of patient's allergies indicates:   Allergen Reactions    Doxycycline monohydrate Other (See Comments)     dizzyness    Heparin     Heparin analogues        Current Neurological Medications:   No current facility-administered medications on file prior to encounter.      Current Outpatient Medications on File Prior to Encounter   Medication Sig Dispense Refill    acetaminophen (TYLENOL) 650 MG TbSR Take 650 mg by mouth every 6 (six) hours as needed.      aspirin 81 MG Chew Take 1 tablet (81 mg total) by mouth once daily.  0    atorvastatin (LIPITOR) 40 MG tablet Take 1 tablet (40 mg total) by mouth once daily. 90 tablet 3    CHOLECALCIFEROL, VITAMIN D3, ORAL Take 5,000 Units by mouth once daily.      clopidogrel (PLAVIX) 75 mg tablet Take 1 tablet (75 mg total) by mouth once daily. 30 tablet 11    HYDROcodone-acetaminophen (NORCO) 5-325 mg per tablet Take 1 tablet by mouth every 4 (four) hours as needed for Pain.      megestroL (MEGACE) 20 MG Tab Take 40 mg by mouth 2 (two) times daily.      melatonin (MELATIN) 3 mg tablet Take 9 mg by mouth nightly as needed for Insomnia.      metoprolol tartrate (LOPRESSOR) 25 MG tablet Take 0.5 tablets (12.5 mg total) by mouth 2 (two) times daily. 30 tablet 11    pantoprazole (PROTONIX) 40 MG tablet Take 40 mg by mouth once daily.      polyethylene glycol (GLYCOLAX) 17 gram/dose powder Take  17 g by mouth once daily.      amLODIPine (NORVASC) 10 MG tablet Take 1 tablet (10 mg total) by mouth once daily. 30 tablet 11    b complex vitamins capsule Take 1 capsule by mouth once daily.      ondansetron (ZOFRAN-ODT) 4 MG TbDL Take 4 mg by mouth every 8 (eight) hours as needed.           Current Facility-Administered Medications on File Prior to Encounter   Medication    [DISCONTINUED] acetaminophen tablet    [DISCONTINUED] aspirin chewable tablet    [DISCONTINUED] atorvastatin tablet    [DISCONTINUED] cholecalciferol (vitamin D3) 125 mcg (5,000 unit) tablet    [DISCONTINUED] clopidogreL tablet    [DISCONTINUED] HYDROcodone-acetaminophen 5-325 mg per tablet    [DISCONTINUED] megestroL tablet    [DISCONTINUED] melatonin tablet    [DISCONTINUED] metoprolol tartrate (LOPRESSOR) tablet    [DISCONTINUED] ondansetron disintegrating tablet    [DISCONTINUED] pantoprazole EC tablet    [DISCONTINUED] polyethylene glycol packet     Current Outpatient Medications on File Prior to Encounter   Medication Sig    acetaminophen (TYLENOL) 650 MG TbSR Take 650 mg by mouth every 6 (six) hours as needed.    aspirin 81 MG Chew Take 1 tablet (81 mg total) by mouth once daily.    atorvastatin (LIPITOR) 40 MG tablet Take 1 tablet (40 mg total) by mouth once daily.    CHOLECALCIFEROL, VITAMIN D3, ORAL Take 5,000 Units by mouth once daily.    clopidogrel (PLAVIX) 75 mg tablet Take 1 tablet (75 mg total) by mouth once daily.    HYDROcodone-acetaminophen (NORCO) 5-325 mg per tablet Take 1 tablet by mouth every 4 (four) hours as needed for Pain.    megestroL (MEGACE) 20 MG Tab Take 40 mg by mouth 2 (two) times daily.    melatonin (MELATIN) 3 mg tablet Take 9 mg by mouth nightly as needed for Insomnia.    metoprolol tartrate (LOPRESSOR) 25 MG tablet Take 0.5 tablets (12.5 mg total) by mouth 2 (two) times daily.    pantoprazole (PROTONIX) 40 MG tablet Take 40 mg by mouth once daily.    polyethylene glycol (GLYCOLAX)  17 gram/dose powder Take 17 g by mouth once daily.    amLODIPine (NORVASC) 10 MG tablet Take 1 tablet (10 mg total) by mouth once daily.    b complex vitamins capsule Take 1 capsule by mouth once daily.    ondansetron (ZOFRAN-ODT) 4 MG TbDL Take 4 mg by mouth every 8 (eight) hours as needed.      Family History     None        Tobacco Use    Smoking status: Never Smoker   Substance and Sexual Activity    Alcohol use: No    Drug use: No    Sexual activity: Never     Review of Systems   Unable to perform ROS: Age (poor historian )   Constitutional: Positive for appetite change.        Poor nutrition    HENT: Negative.    Eyes: Positive for visual disturbance.        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    Gastrointestinal: Negative.         Swallowing difficulties    Endocrine: Negative.    Genitourinary: Negative.    Musculoskeletal: Positive for gait problem.   Skin: Negative.    Neurological: Positive for speech difficulty and weakness. Negative for dizziness, tremors, seizures, syncope, facial asymmetry, light-headedness, numbness and headaches.        Swallowing difficulties    Hematological: Negative.    Psychiatric/Behavioral: Positive for confusion.     Objective:     Vital Signs (Most Recent):  Temp: 98.4 °F (36.9 °C) (05/12/20 0731)  Pulse: 74 (05/12/20 0731)  Resp: 16 (05/12/20 0731)  BP: (!) 140/63 (05/12/20 0731)  SpO2: 97 % (05/12/20 0731) Vital Signs (24h Range):  Temp:  [98.4 °F (36.9 °C)-98.7 °F (37.1 °C)] 98.4 °F (36.9 °C)  Pulse:  [71-83] 74  Resp:  [16-28] 16  SpO2:  [95 %-100 %] 97 %  BP: (122-153)/(62-96) 140/63     Weight: 42.6 kg (93 lb 14.7 oz)  Body mass index is 14.71 kg/m².    Physical Exam   Constitutional: She appears well-developed.   HENT:   Head: Normocephalic and atraumatic.   Eyes: Pupils are equal, round, and reactive to light. Conjunctivae, EOM and lids are normal. Right conjunctiva has no hemorrhage. Right eye exhibits  normal extraocular motion. Left eye exhibits normal extraocular motion. Right pupil is round and reactive. Left pupil is round and reactive. Pupils are equal.   Neck: Normal range of motion. Neck supple.   Cardiovascular: Normal rate and regular rhythm.   Pulmonary/Chest: Effort normal and breath sounds normal.   Abdominal: Soft. Bowel sounds are normal.   Musculoskeletal: Normal range of motion.   Neurological: She is alert. She has normal reflexes. No sensory deficit. She has a normal Finger-Nose-Finger Test. GCS eye subscore is 4 - spontaneous. GCS verbal subscore is 4 - confused. GCS motor subscore is 5 - localizes pain.   Oriented to Person and place, disoriented to time and situation    Skin: Skin is warm and dry. Capillary refill takes less than 2 seconds. There is pallor.   Skin thin fragile    Psychiatric: Judgment normal. Her speech is delayed. She is slowed. Cognition and memory are impaired.       NEUROLOGICAL EXAMINATION:     MENTAL STATUS   Oriented to person.   Oriented to place.   Oriented to time.   Registration: recalls 1 of 3 objects.   Attention: decreased. Concentration: decreased.   Level of consciousness: alert  Knowledge: poor and inconsistent with education.   Unable to name object: she named 2 objects and named one wrong and the other item a cup with great delay         aphsaic  Noted      CRANIAL NERVES     CN II   Visual fields full to confrontation.     CN III, IV, VI   Pupils are equal, round, and reactive to light.  Extraocular motions are normal.   Pupils: equal  Right pupil: Size: 2 mm. Shape: regular. Reactivity: brisk.   Left pupil: Size: 2 mm. Shape: regular. Reactivity: brisk.     CN V   Facial sensation intact.     CN IX, X   CN IX normal.     CN XI   CN XI normal.     CN XII   CN XII normal.     MOTOR EXAM   Muscle bulk: decreased  Overall muscle tone: decreased  Right arm tone: normal  Left arm tone: normal  Right arm pronator drift: absent  Left arm pronator drift:  absent  Right leg tone: normal  Left leg tone: normal    SENSORY EXAM   Light touch normal.   Vibration normal.   Proprioception normal.     GAIT AND COORDINATION      Coordination   Finger to nose coordination: normal    Tremor   Resting tremor: present  Intention tremor: absent       NEELAM gait at this time exam conducted with patient lying in bed    NIH Stroke Scale:    Level of Consciousness: 0 - alert  LOC Questions: 1 - answers one correctly  LOC Commands: 1 - performs one correctly  Best Gaze: 0 - normal  Visual: 0 - no visual loss  Facial Palsy: 0 - normal  Motor Left Arm: 0 - no drift  Motor Right Arm: 0 - no drift  Motor Left Le - no drift  Motor Right Le - no drift  Limb Ataxia: 0 - absent  Sensory: 0 - normal  Best Language: 1 - mild to moderate aphasia  Dysarthria: 0 - normal articulation  Extinction and Inattention: 0 - no neglect  NIH Stroke Scale Total: 3  Primo Coma Scale:  Best Eye Response: 4 - spontaneous  Best Motor Response: 5 - localizes pain  Best Verbal Response: 4 - confused  Primo Coma Scale Total: 13          Significant Labs:   Hemoglobin A1c:   Recent Labs   Lab 20  0435   HGBA1C 5.8     BMP:   Recent Labs   Lab 20  0551   GLU 89 82   * 139   K 4.8 4.4    111*   CO2 21* 19*   BUN 19 16   CREATININE 1.4 1.4   CALCIUM 9.4 9.4   MG 1.8 1.8     CBC:   Recent Labs   Lab 20  0551   WBC 6.17 5.39   HGB 8.7* 8.4*   HCT 27.5* 27.0*    212     CMP:   Recent Labs   Lab 20  0551   GLU 89 82   * 139   K 4.8 4.4    111*   CO2 21* 19*   BUN 19 16   CREATININE 1.4 1.4   CALCIUM 9.4 9.4   MG 1.8 1.8   PROT 6.7 6.0   ALBUMIN 3.9 3.5   BILITOT 0.7 0.8   ALKPHOS 55 51*   AST 17 17   ALT 12 11   ANIONGAP 6* 9   EGFRNONAA 34.0* 34.0*     CSF Culture: No results for input(s): CSFCULTURE in the last 48 hours.  Inflammatory Markers: No results for input(s): SEDRATE, CRP, PROCAL in the last 48  hours.  Urine Culture: No results for input(s): LABURIN in the last 48 hours.  Lab Results   Component Value Date    TSH 1.650 04/30/2020     BMP  Lab Results   Component Value Date     05/12/2020    K 4.4 05/12/2020     (H) 05/12/2020    CO2 19 (L) 05/12/2020    BUN 16 05/12/2020    CREATININE 1.4 05/12/2020    CALCIUM 9.4 05/12/2020    ANIONGAP 9 05/12/2020    ESTGFRAFRICA 39.2 (A) 05/12/2020    EGFRNONAA 34.0 (A) 05/12/2020     Lab Results   Component Value Date    HGBA1C 5.8 05/01/2020     Lab Results   Component Value Date    LDLCALC 95.0 05/01/2020       Significant Imaging:   CUS      Narrative     EXAM DESCRIPTION:    US CAROTID BILATERAL    CLINICAL HISTORY:    86 years, Female, stroke    TECHNIQUE: Grayscale and color Doppler ultrasound examination of the carotid and vertebral artery systems bilaterally. Maximum peak systolic velocity (PSV) / end diastolic velocity (EDV) measurements were obtained.      COMPARISON:  None.    FINDINGS:    RIGHT: Small to moderate atherosclerotic plaque.  Common carotid velocity is 80 cm/second.  Peak ICA velocity is 104 cm/second.  Normal IC/CC ratio.  Vertebral artery is patient and antegrade.    LEFT:  Small to moderate atherosclerotic plaque.  Common carotid velocity is 68 cm/second.  Peak ICA velocity is 85 cm/second.  Normal IC/CC ratio.  Vertebral artery is patient and antegrade.    IMPRESSION: No hemodynamically significant stenosis.    Antegrade flow in the vertebral arteries.    Electronically signed by:  Crys Feliciano MD  5/11/2020 9:50 PM CDT Workstation: [id number]            Specimen Collected: 05/11/20 20:49 Last Resulted: 05/11/20 20:14 Order Details View Encounter Lab and Collection Details Routing Result History              MRA Brain pending     MRI BRAIN WITHOUT CONTRAST    CLINICAL HISTORY:  altered mental status; .    COMPARISON:  Brain MRI dated 04/30/2020    FINDINGS:  Intracranial contents:The study is motion degraded and sequences were  repeated.  The prior study demonstrated a small focus of acute nonhemorrhagic infarction in the lateral right thalamus and adjacent internal capsule.  Today study is also abnormal.  There are more regions of true restricted diffusion in the right occipital lobe in addition to the right thalamic lesion consistent with interval development of nonhemorrhagic infarctions in the posterior right occipital lobe.  There is no hemorrhage.  Otherwise, there is stable encephalomalacia in the inferior right occipital lobe as well as in the left frontal lobe.  There is generalized volume loss with moderate nonspecific white matter change.  There is no hydrocephalus or midline shift.  There is no abnormal extra-axial fluid collection.  The basilar cisterns are open.  Flow voids indicating patency are present in the major vessels at the base of the brain.  There are chronic infarctions in the cerebellar hemispheres bilaterally.    Extracranial contents, calvarium, soft tissues:Baseline marrow signal is normal.  Artifact somewhat limits evaluation of the inferior facial structures and maxillary sinuses.  Otherwise, the paranasal sinuses and mastoid air cells are clear.  The nasal septum is deviated to the left.      Impression       1.  Redemonstrated is a small nonhemorrhagic infarction in the lateral right thalamus.  There are however new or recent nonhemorrhagic infarctions throughout the right occipital lobe which have occurred since the prior MRI dated 04/30/2020.    2.  There is stable volume loss and nonspecific white matter change.    3.  There is stable focal encephalomalacia and gliosis in the right occipital lobe and left frontal lobe.    4.  There are chronic infarctions in the cerebellar hemispheres bilaterally.    This report was flagged in Epic as abnormal.      Electronically signed by: Charan Greenberg MD  Date: 05/11/2020  Time: 15:34             Last Resulted: 05/11/20 15:47 Order Details View Encounter Lab and  Collection Details Routing Result History - Result Edited            External Result Report     External Result Report     TTE   · Concentric left ventricular remodeling.  · Normal left ventricular systolic function. The estimated ejection fraction is 60%.  · Normal LV diastolic function.  · Normal right ventricular systolic function.  · Mild left atrial enlargement.  · No interatrial septal defect present.  · Mild aortic regurgitation.  · Severe aortic valve stenosis.  · Aortic valve area is 0.73 cm2; peak velocity is 4.84 m/s; mean gradient is 52 mmHg.  · Mild mitral regurgitation.  · Mild tricuspid regurgitation.         Assessment and Plan:  86 year old female with impression:    1. Acute small nonhemorrhagic Right Occipital Stroke    Stroke work up includes:   -CUS No hemodynamically significant stenosis  - MRA Brain Pending   -Mri Brain w/o contrast small nonhemorrhagic infarction in the lateral right thalamus.There are however new or recent nonhemorrhagic infarctions throughout the right occipital lobe which have occurred since the prior MRI dated 04/30/2020.  -TTE 60 % EF Normal no PFO note  -Consult GI for Esophagus dilation  -consult ST to evaluate and treat   -Recommend AVELINO post Esophagus dilation to complete stroke work up  -PT/OT/ST to evaluate at treat  -Neuro checks q 4 hours    -High intensity statins for secondary stroke prevention and hyperlipidemia  -Risk factor modification: HTN, HLD  -PT/OT/ST      2. History of CVA  Stroke prevention measures    3. Anemia  -Managed per IM    4. HTN  Allow for permissive HTN 24-48 hours  -Management per internal medicine       5. Hyperlipidemia  -High intensity statin for secondary stroke prevention  -Life style modification; LDL goal <70 Patient (95 LDL)      6. Chronic Kidney disease   Managed per IM     7. Esophagus stricture/diffculty swallowing   -Consult GI for Esophagus dilation  -consult ST to evaluate and treat   -Recommend AVELINO post Esophagus dilation  to complete stroke work up  Patient to [facility] at [phone] within 2 weeks from discharge.    Stroke education was provided including stroke risk factors modification and any acute neurological changes including weakness, confusion, visual changes to come straight to the ER.     Active Diagnoses:    Diagnosis Date Noted POA    PRINCIPAL PROBLEM:  Stroke [I63.9] 09/05/2019 Yes    Anemia, unspecified [D64.9] 12/03/2019 Yes    HTN (hypertension) [I10] 09/05/2019 Yes      Problems Resolved During this Admission:       VTE Risk Mitigation (From admission, onward)         Ordered     Reason for No Pharmacological VTE Prophylaxis  Once     Question:  Reasons:  Answer:  Physician Provided (leave comment)  Comment:  DAPT    05/11/20 2036     IP VTE HIGH RISK PATIENT  Once      05/11/20 2036     Place sequential compression device  Until discontinued      05/11/20 2036                Thank you for your consult. I will follow-up with patient. Please contact us if you have any additional questions.    Анна Hartman NP  Neurology  [facility]

## 2020-05-12 NOTE — PROGRESS NOTES
[facility] Medicine  Progress Note    Patient name: Melania Beavers  MRN: [id number]  Admit Date: 5/11/2020   LOS: 1 day     SUBJECTIVE:     Principal problem: Acute CVA (cerebrovascular accident)    Interval History:  86-year-old female with prior history of CVA who was recently discharged last week following a right thalamic stroke brought to the ED from rehabilitation facility secondary to abnormal imaging.  MRI brain yesterday revealed new right occipital lobe infarctions.    Patient is alert and oriented x2 during my encounter. She denies focal weakness, sensory deficit, paresthesias, recent vision change or difficulty swallowing.  Family reports gradual decline in her cognitive status.     Scheduled Meds:   amLODIPine  10 mg Oral Daily    aspirin  81 mg Oral Daily    atorvastatin  40 mg Oral Daily    clopidogreL  75 mg Oral Daily    metoprolol tartrate  12.5 mg Oral BID    pantoprazole  40 mg Oral Daily    polyethylene glycol  17 g Oral Daily    vitamin D  5,000 Units Oral Daily     Continuous Infusions:  PRN Meds:calcium chloride IVPB, calcium chloride IVPB, calcium chloride IVPB, HYDROcodone-acetaminophen, labetalol, magnesium oxide, magnesium sulfate IVPB, magnesium sulfate IVPB, magnesium sulfate IVPB, magnesium sulfate IVPB, melatonin, ondansetron, potassium chloride in water, potassium chloride in water, potassium chloride in water, potassium chloride in water, potassium chloride, potassium chloride, potassium chloride, potassium chloride, sodium chloride 0.9%, sodium phosphate IVPB, sodium phosphate IVPB, sodium phosphate IVPB, sodium phosphate IVPB, sodium phosphate IVPB    Review of patient's allergies indicates:   Allergen Reactions    Doxycycline monohydrate Other (See Comments)     dizzyness    Heparin     Heparin analogues        Review of Systems: As per interval history    OBJECTIVE:     Vital Signs (Most Recent)  Temp: 98.3 °F (36.8 °C) (05/12/20 1122)  Pulse: 70  (05/12/20 1122)  Resp: 16 (05/12/20 1122)  BP: (!) 142/65 (05/12/20 1122)  SpO2: 97 % (05/12/20 1122)    Vital Signs Range (Last 24H):  Temp:  [98.3 °F (36.8 °C)-98.7 °F (37.1 °C)]   Pulse:  [70-83]   Resp:  [16-28]   BP: (122-153)/(62-96)   SpO2:  [95 %-100 %]     I & O (Last 24H):No intake or output data in the 24 hours ending 05/12/20 1431    Physical Exam:  General: Patient in no acute distress. Appears as stated age.   Eyes: No conjunctival injection. No scleral icterus.  ENT: Hearing grossly intact. No discharge from ears. No nasal discharge.   Neck: Supple, trachea midline. No JVD  CVS: RRR. No LE edema BL  Lungs: CTA BL, no wheezing or crackles. Good breath sounds. No accessory muscle use. No acute respiratory distress  Abdomen:  Soft, nontender and nondistended.  No organomegaly  Neuro: AOx2. Speech is clear. CN 2-12 are grossly intact. Moves all extremities. Follows commands  Skin:  No rash or erythema noted  MSK: No deformity. Muscle wasting noted     Laboratory:  CBC:   Recent Labs   Lab 05/12/20  0551   WBC 5.39   RBC 2.84*   HGB 8.4*   HCT 27.0*      MCV 95   MCH 29.6   MCHC 31.1*     CMP:   Recent Labs   Lab 05/12/20  0551   GLU 82   CALCIUM 9.4   ALBUMIN 3.5   PROT 6.0      K 4.4   CO2 19*   *   BUN 16   CREATININE 1.4   ALKPHOS 51*   ALT 11   AST 17   BILITOT 0.8     Diagnostic Results:  MRI: Reviewed      EXAM:  MRI BRAIN WITHOUT CONTRAST    CLINICAL HISTORY:  altered mental status; .    COMPARISON:  Brain MRI dated 04/30/2020    FINDINGS:  Intracranial contents:The study is motion degraded and sequences were repeated.  The prior study demonstrated a small focus of acute nonhemorrhagic infarction in the lateral right thalamus and adjacent internal capsule.  Today study is also abnormal.  There are more regions of true restricted diffusion in the right occipital lobe in addition to the right thalamic lesion consistent with interval development of nonhemorrhagic infarctions in the  posterior right occipital lobe.  There is no hemorrhage.  Otherwise, there is stable encephalomalacia in the inferior right occipital lobe as well as in the left frontal lobe.  There is generalized volume loss with moderate nonspecific white matter change.  There is no hydrocephalus or midline shift.  There is no abnormal extra-axial fluid collection.  The basilar cisterns are open.  Flow voids indicating patency are present in the major vessels at the base of the brain.  There are chronic infarctions in the cerebellar hemispheres bilaterally.    Extracranial contents, calvarium, soft tissues:Baseline marrow signal is normal.  Artifact somewhat limits evaluation of the inferior facial structures and maxillary sinuses.  Otherwise, the paranasal sinuses and mastoid air cells are clear.  The nasal septum is deviated to the left.      Impression       1.  Redemonstrated is a small nonhemorrhagic infarction in the lateral right thalamus.  There are however new or recent nonhemorrhagic infarctions throughout the right occipital lobe which have occurred since the prior MRI dated 04/30/2020.    2.  There is stable volume loss and nonspecific white matter change.    3.  There is stable focal encephalomalacia and gliosis in the right occipital lobe and left frontal lobe.    4.  There are chronic infarctions in the cerebellar hemispheres bilaterally.    This report was flagged in Epic as abnormal.      Electronically signed by: Charan Greenberg MD  Date: 05/11/2020  Time: 15:34              ASSESSMENT/PLAN:       Active Hospital Problems    Diagnosis  POA    *Acute CVA (cerebrovascular accident) [I63.9]  Yes    Chronic kidney disease, stage III (moderate) [N18.3]  Yes    Severe malnutrition [E43]  Yes    Anemia, unspecified [D64.9]  Yes    Stroke [I63.9]  Yes    HTN (hypertension) [I10]  Yes      Resolved Hospital Problems   No resolved problems to display.       Plan:   Recurrent CVAs - ?embolic phenomenon   Compliant  with aspirin and clopidogrel - pt came from rehab facility   Continue dual antiplatelet therapy and statin   Neurology following - recommend AVELINO  GI consulted due to hx of esophageal strictures needing dilation in the past; no objection to proceed with AVELINO  AVELINO ordered; NPO from midnight   Dietitian following for severe malnutrition   PT/OT/SLP following     VTE Risk Mitigation (From admission, onward)         Ordered     Reason for No Pharmacological VTE Prophylaxis  Once     Question:  Reasons:  Answer:  Physician Provided (leave comment)  Comment:  DAPT    05/11/20 2036     IP VTE HIGH RISK PATIENT  Once      05/11/20 2036     Place sequential compression device  Until discontinued      05/11/20 2036                  Department Hospital Medicine  [facility]  Rikki Gibbons MD  Date of service: 05/12/2020

## 2020-05-13 ENCOUNTER — CLINICAL SUPPORT (OUTPATIENT)
Dept: CARDIOLOGY | Facility: HOSPITAL | Age: 85
DRG: 064 | End: 2020-05-13
Attending: INTERNAL MEDICINE
Payer: MEDICARE

## 2020-05-13 PROBLEM — I35.0 AORTIC VALVE STENOSIS, MODERATE: Status: ACTIVE | Noted: 2020-05-13

## 2020-05-13 LAB
ALBUMIN SERPL BCP-MCNC: 3.6 G/DL (ref 3.5–5.2)
ALP SERPL-CCNC: 58 U/L (ref 55–135)
ALT SERPL W/O P-5'-P-CCNC: 12 U/L (ref 10–44)
ANION GAP SERPL CALC-SCNC: 7 MMOL/L (ref 8–16)
AST SERPL-CCNC: 19 U/L (ref 10–40)
AV INDEX (PROSTH): 0.19
AV MEAN GRADIENT: 38 MMHG
AV PEAK GRADIENT: 63 MMHG
AV VALVE AREA: 0.66 CM2
AV VELOCITY RATIO: 19.53
BASOPHILS # BLD AUTO: 0.01 K/UL (ref 0–0.2)
BASOPHILS NFR BLD: 0.2 % (ref 0–1.9)
BILIRUB SERPL-MCNC: 0.7 MG/DL (ref 0.1–1)
BSA FOR ECHO PROCEDURE: 1.41 M2
BUN SERPL-MCNC: 16 MG/DL (ref 8–23)
CALCIUM SERPL-MCNC: 9.1 MG/DL (ref 8.7–10.5)
CHLORIDE SERPL-SCNC: 111 MMOL/L (ref 95–110)
CO2 SERPL-SCNC: 20 MMOL/L (ref 23–29)
CREAT SERPL-MCNC: 1.5 MG/DL (ref 0.5–1.4)
DIFFERENTIAL METHOD: ABNORMAL
DOP CALC AO PEAK VEL: 3.98 M/S
DOP CALC AO VTI: 80.6 CM
DOP CALC LVOT AREA: 3.4 CM2
DOP CALC LVOT DIAMETER: 2.09 CM
DOP CALC LVOT PEAK VEL: 77.73 M/S
DOP CALC LVOT STROKE VOLUME: 52.84 CM3
DOP CALCLVOT PEAK VEL VTI: 15.41 CM
EOSINOPHIL # BLD AUTO: 0.1 K/UL (ref 0–0.5)
EOSINOPHIL NFR BLD: 1.4 % (ref 0–8)
ERYTHROCYTE [DISTWIDTH] IN BLOOD BY AUTOMATED COUNT: 15 % (ref 11.5–14.5)
EST. GFR  (AFRICAN AMERICAN): 36.1 ML/MIN/1.73 M^2
EST. GFR  (NON AFRICAN AMERICAN): 31.3 ML/MIN/1.73 M^2
GLUCOSE SERPL-MCNC: 96 MG/DL (ref 70–110)
HCT VFR BLD AUTO: 26.9 % (ref 37–48.5)
HGB BLD-MCNC: 8.7 G/DL (ref 12–16)
IMM GRANULOCYTES # BLD AUTO: 0.05 K/UL (ref 0–0.04)
IMM GRANULOCYTES NFR BLD AUTO: 0.9 % (ref 0–0.5)
LYMPHOCYTES # BLD AUTO: 1.3 K/UL (ref 1–4.8)
LYMPHOCYTES NFR BLD: 22.7 % (ref 18–48)
MCH RBC QN AUTO: 30.2 PG (ref 27–31)
MCHC RBC AUTO-ENTMCNC: 32.3 G/DL (ref 32–36)
MCV RBC AUTO: 93 FL (ref 82–98)
MONOCYTES # BLD AUTO: 1.4 K/UL (ref 0.3–1)
MONOCYTES NFR BLD: 23.4 % (ref 4–15)
NEUTROPHILS # BLD AUTO: 3 K/UL (ref 1.8–7.7)
NEUTROPHILS NFR BLD: 51.4 % (ref 38–73)
NRBC BLD-RTO: 0 /100 WBC
PLATELET # BLD AUTO: 211 K/UL (ref 150–350)
PMV BLD AUTO: 10.9 FL (ref 9.2–12.9)
POTASSIUM SERPL-SCNC: 4.2 MMOL/L (ref 3.5–5.1)
PROT SERPL-MCNC: 6.3 G/DL (ref 6–8.4)
RBC # BLD AUTO: 2.88 M/UL (ref 4–5.4)
SODIUM SERPL-SCNC: 138 MMOL/L (ref 136–145)
WBC # BLD AUTO: 5.86 K/UL (ref 3.9–12.7)

## 2020-05-13 PROCEDURE — 27000284 HC CANNULA NASAL: Performed by: ANESTHESIOLOGY

## 2020-05-13 PROCEDURE — 80053 COMPREHEN METABOLIC PANEL: CPT

## 2020-05-13 PROCEDURE — 25000003 PHARM REV CODE 250: Performed by: NURSE PRACTITIONER

## 2020-05-13 PROCEDURE — 97535 SELF CARE MNGMENT TRAINING: CPT

## 2020-05-13 PROCEDURE — 37000009 HC ANESTHESIA EA ADD 15 MINS

## 2020-05-13 PROCEDURE — 63600175 PHARM REV CODE 636 W HCPCS: Performed by: NURSE ANESTHETIST, CERTIFIED REGISTERED

## 2020-05-13 PROCEDURE — 25000003 PHARM REV CODE 250: Performed by: NURSE ANESTHETIST, CERTIFIED REGISTERED

## 2020-05-13 PROCEDURE — 25000003 PHARM REV CODE 250: Performed by: PSYCHIATRY & NEUROLOGY

## 2020-05-13 PROCEDURE — 36415 COLL VENOUS BLD VENIPUNCTURE: CPT

## 2020-05-13 PROCEDURE — 27000654 HC CATH IV JELCO: Performed by: ANESTHESIOLOGY

## 2020-05-13 PROCEDURE — 93321 DOPPLER ECHO F-UP/LMTD STD: CPT

## 2020-05-13 PROCEDURE — 85025 COMPLETE CBC W/AUTO DIFF WBC: CPT

## 2020-05-13 PROCEDURE — 37000008 HC ANESTHESIA 1ST 15 MINUTES

## 2020-05-13 PROCEDURE — 21400001 HC TELEMETRY ROOM

## 2020-05-13 PROCEDURE — 97530 THERAPEUTIC ACTIVITIES: CPT

## 2020-05-13 RX ORDER — LORAZEPAM 1 MG/1
1 TABLET ORAL ONCE
Status: DISCONTINUED | OUTPATIENT
Start: 2020-05-13 | End: 2020-05-13

## 2020-05-13 RX ORDER — SODIUM CHLORIDE 9 MG/ML
INJECTION, SOLUTION INTRAVENOUS CONTINUOUS PRN
Status: DISCONTINUED | OUTPATIENT
Start: 2020-05-13 | End: 2020-05-13

## 2020-05-13 RX ORDER — FLUMAZENIL 0.1 MG/ML
INJECTION INTRAVENOUS
Status: DISPENSED
Start: 2020-05-13 | End: 2020-05-13

## 2020-05-13 RX ORDER — LORAZEPAM 1 MG/1
1 TABLET ORAL
Status: ACTIVE | OUTPATIENT
Start: 2020-05-13 | End: 2020-05-13

## 2020-05-13 RX ORDER — PROPOFOL 10 MG/ML
VIAL (ML) INTRAVENOUS
Status: DISCONTINUED | OUTPATIENT
Start: 2020-05-13 | End: 2020-05-13

## 2020-05-13 RX ORDER — NALOXONE HCL 0.4 MG/ML
VIAL (ML) INJECTION
Status: DISPENSED
Start: 2020-05-13 | End: 2020-05-13

## 2020-05-13 RX ORDER — LORAZEPAM 1 MG/1
1 TABLET ORAL
Status: DISCONTINUED | OUTPATIENT
Start: 2020-05-13 | End: 2020-05-14 | Stop reason: HOSPADM

## 2020-05-13 RX ORDER — EPINEPHRINE 0.1 MG/ML
INJECTION INTRAVENOUS
Status: DISPENSED
Start: 2020-05-13 | End: 2020-05-13

## 2020-05-13 RX ORDER — ASPIRIN 325 MG
325 TABLET ORAL DAILY
Status: DISCONTINUED | OUTPATIENT
Start: 2020-05-14 | End: 2020-05-14 | Stop reason: HOSPADM

## 2020-05-13 RX ORDER — ATROPINE SULFATE 0.1 MG/ML
INJECTION INTRAVENOUS
Status: DISPENSED
Start: 2020-05-13 | End: 2020-05-13

## 2020-05-13 RX ADMIN — PANTOPRAZOLE SODIUM 40 MG: 40 TABLET, DELAYED RELEASE ORAL at 05:05

## 2020-05-13 RX ADMIN — ASPIRIN 81 MG 81 MG: 81 TABLET ORAL at 01:05

## 2020-05-13 RX ADMIN — METOPROLOL TARTRATE 12.5 MG: 25 TABLET, FILM COATED ORAL at 08:05

## 2020-05-13 RX ADMIN — CLOPIDOGREL BISULFATE 75 MG: 75 TABLET, FILM COATED ORAL at 07:05

## 2020-05-13 RX ADMIN — METOPROLOL TARTRATE 12.5 MG: 25 TABLET, FILM COATED ORAL at 09:05

## 2020-05-13 RX ADMIN — LORAZEPAM 1 MG: 1 TABLET ORAL at 05:05

## 2020-05-13 RX ADMIN — AMLODIPINE BESYLATE 10 MG: 5 TABLET ORAL at 01:05

## 2020-05-13 RX ADMIN — ATORVASTATIN CALCIUM 40 MG: 40 TABLET, FILM COATED ORAL at 07:05

## 2020-05-13 RX ADMIN — PROPOFOL 100 MG: 10 INJECTION, EMULSION INTRAVENOUS at 10:05

## 2020-05-13 RX ADMIN — POLYETHYLENE GLYCOL 3350 17 G: 17 POWDER, FOR SOLUTION ORAL at 01:05

## 2020-05-13 RX ADMIN — SODIUM CHLORIDE: 0.9 INJECTION, SOLUTION INTRAVENOUS at 09:05

## 2020-05-13 NOTE — NURSING
Notified Dr. Malin that the pt has not had a brain scan since admission with stroke and that the MRI recommends a CTA for better quality images and less time spent in scan due to the fact that the pt moved a lot during MRI yesterday. Dr. Malin reviewed the chart and states that is more a question for neuro and that he will call them in the AM.

## 2020-05-13 NOTE — PT/OT/SLP PROGRESS
"Physical Therapy      Patient Name:  Melania Baevers   MRN:  [id number]    Patient not seen today secondary to Patient unwilling to participate despite encouragement from PTA and RN (Patient states, "Oh not right now, I can't, not tonight". RN aware.). Will follow-up 05/14/20.    Harika Krishnan PTA    "

## 2020-05-13 NOTE — ANESTHESIA PREPROCEDURE EVALUATION
05/12/2020  Melania Beavers is a 86 y.o., female.    Pre-op Assessment    I have reviewed the Patient Summary Reports.     I have reviewed the Nursing Notes.   I have reviewed the Medications.     Review of Systems  Anesthesia Hx:  Denies Family Hx of Anesthesia complications.   Denies Personal Hx of Anesthesia complications.   Social:  Non-Smoker, No Alcohol Use    Hematology/Oncology:         -- Anemia:   Cardiovascular:   Hypertension Valvular problems/Murmurs, AS ECG has been reviewed. Orthostatic hypotension.  5/1/20 echo: severe AS, mild MR, TR, AR; 60% EF   Renal/:   Chronic Renal Disease CKD Stage 3   Hepatic/GI:   GERD Esophageal stricture   Neurological:   TIA, CVA (Dysphagia and confusion this admit) Neuromuscular Disease,      Patient Active Problem List   Diagnosis    Dysphagia    Stroke    HTN (hypertension)    GERD (gastroesophageal reflux disease)    Acute CVA (cerebrovascular accident)    Esophageal stricture    Orthostatic hypotension    Anemia, unspecified    Normochromic normocytic anemia    Closed fracture of left hip    TIA (transient ischemic attack)    Chronic kidney disease, stage III (moderate)    Severe malnutrition       Past Surgical History:   Procedure Laterality Date    ESOPHAGOGASTRODUODENOSCOPY N/A 10/4/2019    Procedure: EGD (ESOPHAGOGASTRODUODENOSCOPY);  Surgeon: Dominick Cunningham III, MD;  Location: [facility] ENDO;  Service: Endoscopy;  Laterality: N/A;    HYSTERECTOMY      PERCUTANEOUS PINNING OF HIP Left 4/5/2020    Procedure: PINNING, HIP, PERCUTANEOUS;  Surgeon: Anibal Kidd MD;  Location: [facility] OR;  Service: Orthopedics;  Laterality: Left;        Tobacco Use:  The patient  reports that she has never smoked. She does not have any smokeless tobacco history on file.     Results for orders placed or performed during the hospital encounter of  05/11/20   EKG 12-lead    Collection Time: 05/11/20  6:17 PM    Narrative    Test Reason : R10.9,    Vent. Rate : 084 BPM     Atrial Rate : 084 BPM     P-R Int : 138 ms          QRS Dur : 080 ms      QT Int : 364 ms       P-R-T Axes : 073 044 073 degrees     QTc Int : 430 ms    Normal sinus rhythm  Normal ECG  When compared with ECG of 30-APR-2020 12:58,  No significant change was found    Referred By: [email or web address]   SELF           Confirmed By:              Lab Results   Component Value Date    WBC 5.39 05/12/2020    HGB 8.4 (L) 05/12/2020    HCT 27.0 (L) 05/12/2020    MCV 95 05/12/2020     05/12/2020     BMP  Lab Results   Component Value Date     05/12/2020    K 4.4 05/12/2020     (H) 05/12/2020    CO2 19 (L) 05/12/2020    BUN 16 05/12/2020    CREATININE 1.4 05/12/2020    CALCIUM 9.4 05/12/2020    ANIONGAP 9 05/12/2020    ESTGFRAFRICA 39.2 (A) 05/12/2020    EGFRNONAA 34.0 (A) 05/12/2020           Physical Exam  General:  Malnutrition    Airway/Jaw/Neck:  Airway Findings: Mouth Opening: Normal Tongue: Normal  General Airway Assessment: Adult  Mallampati: II  TM Distance: Normal, at least 6 cm  Jaw/Neck Findings:  Neck ROM: Normal ROM      Dental:  Dental Findings: In tact, Upper front caps   Chest/Lungs:  Chest/Lungs Findings: Clear to auscultation, Normal Respiratory Rate     Heart/Vascular:  Heart Findings: Rate: Normal  Rhythm: Regular Rhythm  Sounds: Normal  Heart Murmur  Systolic        Mental Status:  Mental Status Findings:  Confusion         Anesthesia Plan  Type of Anesthesia, risks & benefits discussed:  Anesthesia Type:  MAC  Patient's Preference: MAC  Intra-op Monitoring Plan: standard ASA monitors  Intra-op Monitoring Plan Comments:   Post Op Pain Control Plan: per primary service following discharge from PACU  Post Op Pain Control Plan Comments:   Induction:    Beta Blocker:  Patient is on a Beta-Blocker and has received one dose within the past 24 hours (No further documentation  required).       Informed Consent: Patient representative understands risks and agrees with Anesthesia plan.  Questions answered. Anesthesia consent signed with patient representative.  ASA Score: 4     Day of Surgery Review of History & Physical:    H&P update referred to the surgeon.     Anesthesia Plan Notes: MAC with propofol         Ready For Surgery From Anesthesia Perspective.

## 2020-05-13 NOTE — ANESTHESIA POSTPROCEDURE EVALUATION
Anesthesia Post Evaluation    Patient: Melania Beavers    Procedure(s) Performed: Procedure(s) (LRB):  Transesophageal echo (AVELINO) intra-procedure log documentation (N/A)    Final Anesthesia Type: MAC    Patient location during evaluation: floor  Patient participation: Yes- Able to Participate  Level of consciousness: awake and alert  Post-procedure vital signs: reviewed and stable  Pain management: adequate  Airway patency: patent    PONV status at discharge: No PONV  Anesthetic complications: no      Cardiovascular status: stable  Respiratory status: unassisted  Hydration status: euvolemic  Follow-up not needed.          Vitals Value Taken Time   /67 5/13/2020 11:02 AM   Temp 36.7 °C (98.1 °F) 5/13/2020  7:20 AM   Pulse 85 5/13/2020 11:04 AM   Resp 24 5/13/2020 11:04 AM   SpO2 98 % 5/13/2020 11:04 AM   Vitals shown include unvalidated device data.      No case tracking events are documented in the log.      Pain/Rickey Score: Pain Rating Prior to Med Admin: 9 (5/12/2020  2:34 AM)  Pain Rating Post Med Admin: 0 (5/12/2020  3:32 AM)

## 2020-05-13 NOTE — PLAN OF CARE
Problem: Occupational Therapy Goal  Goal: Occupational Therapy Goal  Description  Goals to be met by: discharge     Patient will increase functional independence with ADLs by performing:    UE Dressing with Supervision.  LE Dressing with Supervision.  Grooming while seated with Supervision.  Toileting from bedside commode with Supervision for hygiene and clothing management.   Toilet transfer to bedside commode with Supervision.  Patient will attend to ADL activity on left side with minimal visual cues.     Outcome: Ongoing, Progressing

## 2020-05-13 NOTE — CONSULTS
[facility]  Department of Cardiology  Consult Note      Patient name: Melania Beavers  MRN: [id number]  Today's Date: 05/13/2020  Admit Date: 5/11/2020                          Consult Requested By: Rikki Gibbons MD    SUBJECTIVE     Principal Problem: Acute CVA (cerebrovascular accident)    Reason for Consult:  Recurrent stroke  History of Present Illness:  86-year-old lady with complex medical problems and recent CVA was discharged from rehab facility in [place] had developed progressive weakness a repeat CT that showed abnormality and referred for further evaluation.  Patient has prior history of stroke hypertension GERD and hip fracture and normocytic anemia of chronic disease.  Patient is well known to me from previous office follow-up  She was previously seen [facility] on 04/30/2020 and diagnosed with acute CVA subsequently transferred to rehab facility.  A follow-up MRI showed are a recent onset of nonhemorrhagic infarct throughout the right occipital lobe which was not evident on previous study.  Consult called to evaluate from cardiac standpoint as well as transesophageal echocardiography.  Patient is a poor historian at distended and the denies having chest discomfort arm neck or jaw pain or shortness of breath.    Per admit physicians history and assessment  HPI: Melania Beavers is a 86 y.o.  female who has a past medical history of Anemia, unspecified (12/3/2019), GERD (gastroesophageal reflux disease), Hypertension, CVA (8/2019 and 4/30/2020),  Hip fracture (3/2020) and Normochromic normocytic anemia (12/3/2019). The patient presented to [facility] on 4/30/2020 with a primary complaint of Cerebrovascular Accident following a CT at [facility] today. She was discharged on 5/4/2020 following a thalamic stroke. Further workup during her previous admission including CTA head ane neck did not show clinically significant stenosis, large vessel occlusion, or aneurysm.  Echocardiography with bubble study did not reveal PFO or intraatrial septal defect or thrombus. Today, she had an MRI in the ED which showed new or recent nonhemorrhagic infarctions throughout the right occipital lobe which have occurred since the prior MRI dated 04/30/2020. She is a poor historian and AAOx2(person and year). She tries to tell me her location, but is unable to verbalize it. She denies chills, pain, weakness, numbness, N/V/D, dizziness, or LOC. Her daughter Kaitlin was called and she states her sister Reyna and her have been discussing a DNR, but doesn't feel comfortable officially making her a DNR until she talks to her again. She reports she's been progressively more confused since her first stroke requiring sitters at home. Daughter states she's been aggressive as well, slapping a sitter. Daughter states she would have never done that prior to her first stroke. Her questions if she has underlying dementia, stating her PCP told her she may have beginning stages. She selectively participates in my neuro exam. Will do activity with her rt hand, but not left or her legs. She is upset because she is cold. Extra blanket provided.       Review of patient's allergies indicates:   Allergen Reactions    Doxycycline monohydrate Other (See Comments)     dizzyness    Heparin     Heparin analogues        Past Medical History:   Diagnosis Date    Anemia, unspecified 12/3/2019    GERD (gastroesophageal reflux disease)     Hypertension     Normochromic normocytic anemia 12/3/2019     Past Surgical History:   Procedure Laterality Date    ESOPHAGOGASTRODUODENOSCOPY N/A 10/4/2019    Procedure: EGD (ESOPHAGOGASTRODUODENOSCOPY);  Surgeon: Dominick Cunningham III, MD;  Location: [facility] ENDO;  Service: Endoscopy;  Laterality: N/A;    HYSTERECTOMY      PERCUTANEOUS PINNING OF HIP Left 4/5/2020    Procedure: PINNING, HIP, PERCUTANEOUS;  Surgeon: Anibal Kidd MD;  Location: [facility] OR;  Service: Orthopedics;   Laterality: Left;     Social History     Tobacco Use    Smoking status: Never Smoker   Substance Use Topics    Alcohol use: No    Drug use: No        REVIEW OF SYSTEMS  Constitutional: Negative for chills, generalized fatigue is noted no fever.    Eyes: No double vision, No blurred vision  Neuro: No headaches, No dizziness  Respiratory: Negative for cough, shortness of breath and wheezing.    Cardiovascular: Negative for chest pain. Negative for palpitations and leg swelling.   Gastrointestinal:  She feels like she has lot of secretions in her mouth but Negative for abdominal pain, No melena, diarrhea, nausea and vomiting.   Genitourinary: Negative for dysuria and frequency, Negative for hematuria  Skin: Negative for bruising, Negative for edema or discoloration noted.   Endocrine: Negative for polyphagia, Negative for heat intolerance, Negative for cold intolerance  Psychiatric: Negative for depression, Negative for anxiety, Negative for memory loss  Musculoskeletal: Negative for neck pain, Negative for muscle weakness, Negative for back pain     OBJECTIVE     Vital Signs (Most Recent)  Temp: 98.1 °F (36.7 °C) (05/13/20 0720)  Pulse: 80 (05/13/20 0720)  Resp: 16 (05/13/20 0720)  BP: 132/63 (05/13/20 0720)  SpO2: 98 % (05/13/20 0720)    I & O (Last 24H):No intake or output data in the 24 hours ending 05/13/20 1006    WEIGHTS LAST 4 READINGS  Wt Readings from Last 4 Encounters:   05/13/20 42 kg (92 lb 9.5 oz)   05/04/20 46 kg (101 lb 6.6 oz)   04/23/20 44.5 kg (98 lb)   04/08/20 44.7 kg (98 lb 8.7 oz)       PHYSICAL EXAM  GENERAL:  Thinly  built, well nourished, well-developed in no apparent distress alert and oriented.   HEENT: Normocephalic. Pupils normal and conjunctivae normal.  Mucous membranes normal, no cyanosis or icterus, trachea central,no pallor or icterus is noted..   NECK: No JVD. No bruit..   THYROID: Thyroid not enlarged. No nodules present..   CARDIAC: Regular rate and rhythm. S1 is normal.S2 is  normal.No gallops, clicks or murmurs noted at this time.  CHEST ANATOMY: normal.   LUNGS: Clear to auscultation. No wheezing or rhonchi..    ABDOMEN: Soft no masses or organomegaly.  No abdomen pulsations or bruits.  Normal bowel sounds. No pulsations and no masses felt, No guarding or rebound.   URINARY: No pimentel catheter   EXTREMITIES: No cyanosis, clubbing or edema noted at this time., no calf tenderness bilaterally.   PERIPHERAL VASCULAR SYSTEM: Good palpable distal pulses.   CENTRAL NERVOUS SYSTEM: No focal motor or sensory deficits noted.   SKIN: Skin without lesions, moist, well perfused.   MUSCLE STRENGTH & TONE:  No gross muscular weakness is noted.  Was limited exam        Home Medications:  No current facility-administered medications on file prior to encounter.      Current Outpatient Medications on File Prior to Encounter   Medication Sig Dispense Refill    acetaminophen (TYLENOL) 650 MG TbSR Take 650 mg by mouth every 6 (six) hours as needed.      aspirin 81 MG Chew Take 1 tablet (81 mg total) by mouth once daily.  0    atorvastatin (LIPITOR) 40 MG tablet Take 1 tablet (40 mg total) by mouth once daily. 90 tablet 3    CHOLECALCIFEROL, VITAMIN D3, ORAL Take 5,000 Units by mouth once daily.      clopidogrel (PLAVIX) 75 mg tablet Take 1 tablet (75 mg total) by mouth once daily. 30 tablet 11    HYDROcodone-acetaminophen (NORCO) 5-325 mg per tablet Take 1 tablet by mouth every 4 (four) hours as needed for Pain.      megestroL (MEGACE) 20 MG Tab Take 40 mg by mouth 2 (two) times daily.      melatonin (MELATIN) 3 mg tablet Take 9 mg by mouth nightly as needed for Insomnia.      metoprolol tartrate (LOPRESSOR) 25 MG tablet Take 0.5 tablets (12.5 mg total) by mouth 2 (two) times daily. 30 tablet 11    pantoprazole (PROTONIX) 40 MG tablet Take 40 mg by mouth once daily.      polyethylene glycol (GLYCOLAX) 17 gram/dose powder Take 17 g by mouth once daily.      amLODIPine (NORVASC) 10 MG tablet Take 1  tablet (10 mg total) by mouth once daily. 30 tablet 11    b complex vitamins capsule Take 1 capsule by mouth once daily.      ondansetron (ZOFRAN-ODT) 4 MG TbDL Take 4 mg by mouth every 8 (eight) hours as needed.       Scheduled Meds:   amLODIPine  10 mg Oral Daily    aspirin  81 mg Oral Daily    atorvastatin  40 mg Oral Daily    atropine        clopidogreL  75 mg Oral Daily    EPINEPHrine        flumazeniL        metoprolol tartrate  12.5 mg Oral BID    naloxone        pantoprazole  40 mg Oral Daily    polyethylene glycol  17 g Oral Daily    vitamin D  5,000 Units Oral Daily     Continuous Infusions:  PRN Meds:calcium chloride IVPB, calcium chloride IVPB, calcium chloride IVPB, HYDROcodone-acetaminophen, labetalol, magnesium oxide, magnesium sulfate IVPB, magnesium sulfate IVPB, magnesium sulfate IVPB, magnesium sulfate IVPB, melatonin, ondansetron, potassium chloride in water, potassium chloride in water, potassium chloride in water, potassium chloride in water, potassium chloride, potassium chloride, potassium chloride, potassium chloride, sodium chloride 0.9%, sodium chloride 0.9%, sodium phosphate IVPB, sodium phosphate IVPB, sodium phosphate IVPB, sodium phosphate IVPB, sodium phosphate IVPB    LABS AND DIAGNOSTICS     CBC LAST 4 DAYS  Recent Labs   Lab 05/11/20  1904 05/12/20  0551 05/12/20  2035 05/13/20  0628   WBC 6.17 5.39 7.05 5.86   RBC 2.91* 2.84* 2.63* 2.88*   HGB 8.7* 8.4* 8.0* 8.7*   HCT 27.5* 27.0* 24.3* 26.9*   MCV 95 95 92 93   MCH 29.9 29.6 30.4 30.2   MCHC 31.6* 31.1* 32.9 32.3   RDW 14.7* 14.7* 15.0* 15.0*    212 225 211   MPV 11.2 11.1 10.9 10.9   GRAN 47.6  2.9 48.4  2.6  --  51.4  3.0   LYMPH 26.7  1.7 26.2  1.4  --  22.7  1.3   MONO 23.2*  1.4* 23.0*  1.2*  --  23.4*  1.4*   BASO 0.01 0.02  --  0.01   NRBC 0 0  --  0       COAGULATION LAST 4 DAYS  Recent Labs   Lab 05/11/20 1904 05/12/20 2035   LABPT 14.2 14.2   INR 1.2 1.2   APTT 34.1*  --        CHEMISTRY  LAST 4 DAYS  Recent Labs   Lab 05/11/20  1904 05/12/20  0551 05/12/20 2035 05/13/20  0628   * 139 133* 138   K 4.8 4.4 4.4 4.2    111* 106 111*   CO2 21* 19* 19* 20*   ANIONGAP 6* 9 8 7*   BUN 19 16 19 16   CREATININE 1.4 1.4 1.6* 1.5*   GLU 89 82 115* 96   CALCIUM 9.4 9.4 8.9 9.1   MG 1.8 1.8  --   --    ALBUMIN 3.9 3.5  --  3.6   PROT 6.7 6.0  --  6.3   ALKPHOS 55 51*  --  58   ALT 12 11  --  12   AST 17 17  --  19   BILITOT 0.7 0.8  --  0.7       CARDIAC PROFILE LAST 4 DAYS  No results for input(s): BNP, CPK, CPKMB, LDH, TROPONINI in the last 168 hours.    ENDOCRINE LAST 4 DAYS  No results for input(s): TSH, PROCAL in the last 168 hours.    LAST ARTERIAL BLOOD GAS  ABG  No results for input(s): PH, PO2, PCO2, HCO3, BE in the last 168 hours.    LAST 7 DAYS MICROBIOLOGY   Microbiology Results (last 7 days)     ** No results found for the last 168 hours. **          LAST 24 HOUR IMAGING INTERPRETATIONS  No results found in the last 24 hours.    ECHOCARDIOGRAM RESULTS (last 10)  Results for orders placed during the hospital encounter of 04/30/20   Echo Color Flow Doppler? Yes; Bubble Contrast? Yes    Narrative · Concentric left ventricular remodeling.  · Normal left ventricular systolic function. The estimated ejection   fraction is 60%.  · Normal LV diastolic function.  · Normal right ventricular systolic function.  · Mild left atrial enlargement.  · No interatrial septal defect present.  · Mild aortic regurgitation.  · Severe aortic valve stenosis.  · Aortic valve area is 0.73 cm2; peak velocity is 4.84 m/s; mean gradient   is 52 mmHg.  · Mild mitral regurgitation.  · Mild tricuspid regurgitation.      Results for orders placed during the hospital encounter of 10/01/19   Echo Color Flow Doppler? Yes; Bubble Contrast? No    Narrative Limited echo done to assess ROSALBA appendage:  Appropriate views were not shown for ROSALBA appendage assessment.    Transesophageal echo (AVELINO)    Narrative Attempted to perform a  AVELINO. I was not able to advance the probe. I would   suggest consulting GI for an EGD and then determine further if AVELINO could   be performed. Discussed with Dr Manriquez.   Results for orders placed during the hospital encounter of 09/05/19   Echo Color Flow Doppler? Yes; Bubble Contrast? Yes    Narrative · Normal left ventricular systolic function. The estimated ejection   fraction is 60%  · Grade I (mild) left ventricular diastolic dysfunction consistent with   impaired relaxation.  · No wall motion abnormalities.  · Normal left atrial pressure.  · Normal right ventricular systolic function.  · Mild aortic regurgitation.  · Moderate-to-severe aortic valve stenosis.  · Aortic valve area is 0.99 cm2; peak velocity is 3.47 m/s; mean gradient   is 32 mmHg.  · Mild mitral regurgitation.  · Normal central venous pressure (3 mm Hg).          CURRENT/PREVIOUS VISIT EKG  Results for orders placed or performed during the hospital encounter of 05/11/20   EKG 12-lead    Collection Time: 05/11/20  6:17 PM    Narrative    Test Reason : R10.9,    Vent. Rate : 084 BPM     Atrial Rate : 084 BPM     P-R Int : 138 ms          QRS Dur : 080 ms      QT Int : 364 ms       P-R-T Axes : 073 044 073 degrees     QTc Int : 430 ms    Normal sinus rhythm  Normal ECG  When compared with ECG of 30-APR-2020 12:58,  No significant change was found    Referred By: [email or web address]   SELF           Confirmed By:                 ASSESSMENT/PLAN:     Active Hospital Problems    Diagnosis    *Acute CVA (cerebrovascular accident)    Chronic kidney disease, stage III (moderate)    Severe malnutrition    Anemia, unspecified    Stroke    HTN (hypertension)       Assessment & Plan:     Acute CVA  2.  Essential hypertension control  3.  History of recent stroke  4.  History of normocytic anemia  5.  Generalized mild nutrition  6.  Chronic kidney disease stage 3  7.  Moderately significant aortic valve stenosis the valve area 1.0 cm2.    RECOMMENDATIONS:  WILL  PROCEED WITH TRANSESOPHAGEAL ECHOCARDIOGRAPHY FOR MORE DEFINITE DIAGNOSIS.  Transesophageal echocardiography shows evidence of a significant high-risk valve stenosis with densely calcified morphology and has atheromatous changes along the aorta.  Left atrial appendage was well visualized no intracavity records to suggest thrombus seen.  No evidence of intracardiac shunt by agitated saline contrast study.  So from a cardiac perspective continue on amlodipine 10, aspirin 81, Plavix 75 mg daily and metoprolol tartrate 12.5 mg daily.  With a recent stroke is not an ideal time to intervene on this aortic valve this may be re-explored at later time when the stroke is well healed.  The goal at this time is to maintain the blood pressure control and continue physical therapy.    Thank you for the consultation            [facility]  Department of Cardiology  Fran House MD  Date of service: 05/13/2020

## 2020-05-13 NOTE — PLAN OF CARE
05/13/20 1642   Discharge Reassessment   Assessment Type Discharge Planning Reassessment   Anticipated Discharge Disposition Rehab   Provided patient/caregiver education on the expected discharge date and the discharge plan Yes   DME Needed Upon Discharge  none   Post-Acute Status   Post-Acute Authorization Other  ( Rehab)   Other Status See Comments  ([facility])   Discharge Delays None known at this time     Call placed to patient's daughter, Reyna discussing discharge plan tomorrow. Choice form completed and ok with patient returning to [facility] to continue therapy. CM following.

## 2020-05-13 NOTE — PROGRESS NOTES
[facility]  Neurology  Progress Note    Patient Name: Melania Beavers  MRN: [id number]  Admission Date: 5/11/2020  Hospital Length of Stay: 2 days  Code Status: Full Code   Attending Provider: Rikki Gibbons MD  Primary Care Physician: Luis Brown MD   Principal Problem:Acute CVA (cerebrovascular accident)    Subjective:        HPI: Melania Beavers is a 86 y.o.  female who has a past medical history of Anemia, unspecified (12/3/2019), GERD (gastroesophageal reflux disease), Hypertension, CVA (8/2019 and 4/30/2020),  Hip fracture (3/2020) and Normochromic normocytic anemia (12/3/2019). The patient presented to [facility] on 4/30/2020 with a primary complaint of Cerebrovascular Accident following a CT at [facility] today. She was discharged on 5/4/2020 following a thalamic stroke. Further workup during her previous admission including CTA head ane neck did not show clinically significant stenosis, large vessel occlusion, or aneurysm. Echocardiography with bubble study did not reveal PFO or intraatrial septal defect or thrombus. Today, she had an MRI in the ED which showed new or recent nonhemorrhagic infarctions throughout the right occipital lobe which have occurred since the prior MRI dated 04/30/2020. She is a poor historian and AAOx2(person and year). She tries to tell me her location, but is unable to verbalize it. She denies chills, pain, weakness, numbness, N/V/D, dizziness, or LOC. Her daughter Kaitlin was called and she states her sister Reyna and her have been discussing a DNR, but doesn't feel comfortable officially making her a DNR until she talks to her again. She reports she's been progressively more confused since her first stroke requiring sitters at home. Daughter states she's been aggressive as well, slapping a sitter. Daughter states she would have never done that prior to her first stroke. Her questions if she has underlying dementia, stating her PCP told  her she may have beginning stages. She selectively participates in my neuro exam. Will do activity with her rt hand, but not left or her legs. She is upset because she is cold. Extra blanket provided.     Neurological Consult Note: Patient seen and examined with Dr. Diamond. Patient daughter at bedside. Discussed plan of care. Patient is a 86 year old female with a PMHx: Anemia,GERD, Hypertension, CVA (8/2019 and 4/30/2020),  Hip fracture. The patient presented to [facility] on 4/30/2020 with a primary complaint of Cerebrovascular Accident. Patient is Alert oriented to person, place disoriented to situation. Patient unable to recall or has a delayed response to common/familiar objects such as thumb and cup. Patient states that she doesn't recall the reason for going to have the MRI done. She denies chills, pain, weakness, numbness, N/V/D, dizziness, or LOC.Patient cooperative with exam follows commands. Mild aphasia noted.  Tremors noted bilaterally greater in right had,. 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 Stroke work up includes: CUS No hemodynamically significant stenosis MRA Brain Pending, Mri Brain w/o contrast small nonhemorrhagic infarction in the lateral right thalamus.  There are however new or recent nonhemorrhagic infarctions throughout the right occipital lobe which have occurred since the prior MRI dated 04/30/2020.TTE 60 % EF Normal Saline (bubble) No PFO. Will consult GI for Esphgous dialtaion, recommend cardiology consult for AVELINO.  Will continue to follow.       Neurological Interval Note: Patient seen and examined discussed plan of care with Dr. Diamond. Patient is Awake Alert oriented to person and place. Disorientation to month and year. Patient is pleasant calm and cooperative. No significant change noted on exam. Patient AVELINO noted normal exam finding noted EF 65% no PFO or ASD noted. MRA Brain pending. Patient has no definite  evidence for cardio-embolic source at this point. The patient need to follow up with cardiology outpatient for cardiac event monitoring. Recommend increasing aspirin 325 daily and continue Plavix 75 mg po daily. Will continue to follow.   Current Neurological Medications:     Current Facility-Administered Medications   Medication Dose Route Frequency Provider Last Rate Last Dose    amLODIPine tablet 10 mg  10 mg Oral Daily Christina Gamble NP   10 mg at 05/12/20 1320    aspirin chewable tablet 81 mg  81 mg Oral Daily Christina Gamble NP   81 mg at 05/12/20 0900    atorvastatin tablet 40 mg  40 mg Oral Daily Christina Gamble NP   40 mg at 05/12/20 1319    calcium chloride 1g in sodium chloride 0.9% 100mL (ready to mix system)  1 g Intravenous PRN Christina Gamble NP        calcium chloride 1g in sodium chloride 0.9% 100mL (ready to mix system)  1 g Intravenous PRN Christina Gamble NP        calcium chloride 1g in sodium chloride 0.9% 100mL (ready to mix system)  1 g Intravenous PRN Christina Gamble NP        clopidogreL tablet 75 mg  75 mg Oral Daily Christina Gamble NP   Stopped at 05/12/20 0900    HYDROcodone-acetaminophen 5-325 mg per tablet 1 tablet  1 tablet Oral Q4H PRN Christina Gamble NP   1 tablet at 05/12/20 0234    labetaloL injection 10 mg  10 mg Intravenous Q15 Min PRN Christina Gamble NP        magnesium oxide tablet 800 mg  800 mg Oral PRN Christina Gamble NP        magnesium sulfate 2g in water 50mL IVPB (premix)  2 g Intravenous PRN Christina Gamble NP        magnesium sulfate 2g in water 50mL IVPB (premix)  4 g Intravenous PRN Christina Gamble NP        magnesium sulfate 2g in water 50mL IVPB (premix)  2 g Intravenous PRN Christina Gamble NP        magnesium sulfate in dextrose IVPB (premix) 1 g  1 g Intravenous PRN Christina Gamble NP        melatonin tablet 9 mg  9 mg Oral Nightly PRN Christina Gamble NP        metoprolol tartrate (LOPRESSOR) split tablet 12.5 mg  12.5 mg Oral BID  Christina Gamble NP   12.5 mg at 05/12/20 2129    ondansetron injection 4 mg  4 mg Intravenous Q8H PRN Christina Gamble NP        pantoprazole EC tablet 40 mg  40 mg Oral Daily Christina Gamble, NP   40 mg at 05/13/20 0553    polyethylene glycol packet 17 g  17 g Oral Daily Christina Gamble, NP        potassium chloride 10 mEq in 100 mL IVPB  20 mEq Intravenous PRN Christina Gamble, NP        potassium chloride 10 mEq in 100 mL IVPB  40 mEq Intravenous PRN Christina Gamble, NP        potassium chloride 10 mEq in 100 mL IVPB  20 mEq Intravenous PRN Christina Gamble, NP        potassium chloride 10 mEq in 100 mL IVPB  40 mEq Intravenous PRN Christina Gamble, NP        potassium chloride SA CR tablet 20 mEq  20 mEq Oral PRN Christina Gamble, NP        potassium chloride SA CR tablet 20 mEq  20 mEq Oral PRN Christina Gamble, NP        potassium chloride SA CR tablet 40 mEq  40 mEq Oral PRN Christina Gamble, NP        potassium chloride SA CR tablet 40 mEq  40 mEq Oral PRN Christina Gamble, NP        sodium chloride 0.9% flush 10 mL  10 mL Intravenous PRN Christina Gamble, NP        sodium chloride 0.9% flush 10 mL  10 mL Intravenous PRN Fran House MD        sodium phosphate 15 mmol in dextrose 5 % 250 mL IVPB  15 mmol Intravenous PRN Christina Gamble NP        sodium phosphate 15 mmol in dextrose 5 % 250 mL IVPB  15 mmol Intravenous PRN Christina Gamble NP        sodium phosphate 20.01 mmol in dextrose 5 % 250 mL IVPB  20.01 mmol Intravenous PRN Christina Gamble, NP        sodium phosphate 20.01 mmol in dextrose 5 % 250 mL IVPB  20.01 mmol Intravenous PRN Christina Gamble, NP        sodium phosphate 30 mmol in dextrose 5 % 250 mL IVPB  30 mmol Intravenous PRN Christina Gamble, NP        vitamin D 1000 units tablet 5,000 Units  5,000 Units Oral Daily Christina Gamble NP           Review of Systems   Unable to perform ROS: Age (poor historian )   Constitutional:        Poor nutrition    HENT: Negative.    Eyes:  Positive for visual disturbance.        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    Gastrointestinal: Negative.         Swallowing difficulties    Endocrine: Negative.    Genitourinary: Negative.    Musculoskeletal: Positive for gait problem.   Skin: Negative.    Neurological: Positive for speech difficulty and weakness. Negative for dizziness, tremors, seizures, syncope, facial asymmetry, light-headedness, numbness and headaches.   Hematological: Negative.    Psychiatric/Behavioral: Positive for confusion.     Objective:     Vital Signs (Most Recent):  Temp: 98.1 °F (36.7 °C) (05/13/20 0720)  Pulse: 80 (05/13/20 0720)  Resp: 16 (05/13/20 0720)  BP: 132/63 (05/13/20 0720)  SpO2: 98 % (05/13/20 0720) Vital Signs (24h Range):  Temp:  [98 °F (36.7 °C)-99 °F (37.2 °C)] 98.1 °F (36.7 °C)  Pulse:  [70-80] 80  Resp:  [16-18] 16  SpO2:  [91 %-98 %] 98 %  BP: (105-176)/(52-72) 132/63     Weight: 42 kg (92 lb 9.5 oz)  Body mass index is 14.5 kg/m².    Physical Exam   Constitutional: She appears well-developed.   HENT:   Head: Normocephalic and atraumatic.   Eyes: Pupils are equal, round, and reactive to light. Conjunctivae, EOM and lids are normal. Right conjunctiva has no hemorrhage. Right eye exhibits normal extraocular motion. Left eye exhibits normal extraocular motion. Right pupil is round and reactive. Left pupil is round and reactive. Pupils are equal.   Neck: Normal range of motion. Neck supple.   Cardiovascular: Normal rate and regular rhythm.   Pulmonary/Chest: Effort normal and breath sounds normal.   Abdominal: Soft. Bowel sounds are normal.   Musculoskeletal: Normal range of motion.   Neurological: She is alert. She has normal reflexes. No sensory deficit. She has a normal Finger-Nose-Finger Test. GCS eye subscore is 4 - spontaneous. GCS verbal subscore is 4 - confused. GCS motor subscore is 5 - localizes pain.   Oriented to Person and place, disoriented to time and  situation    Skin: Skin is warm and dry. Capillary refill takes less than 2 seconds. There is pallor.   Skin thin fragile    Psychiatric: Judgment normal. Her speech is delayed. She is slowed. Cognition and memory are impaired.       NEUROLOGICAL EXAMINATION:     MENTAL STATUS   Oriented to person.   Oriented to place.   Oriented to time. Disoriented to year, month and date.   Registration: recalls 2 of 3 objects.   Attention: decreased. Concentration: decreased.   Level of consciousness: alert  Knowledge: poor and inconsistent with education.   Unable to name object: she named 2 objects and named one wrong and the other item a cup with great delay         aphsaic  Noted      CRANIAL NERVES     CN II   Visual fields full to confrontation.     CN III, IV, VI   Pupils are equal, round, and reactive to light.  Extraocular motions are normal.   Pupils: equal  Right pupil: Size: 2 mm. Shape: regular. Reactivity: brisk.   Left pupil: Size: 2 mm. Shape: regular. Reactivity: brisk.     CN V   Facial sensation intact.     CN IX, X   CN IX normal.     CN XI   CN XI normal.     CN XII   CN XII normal.     MOTOR EXAM   Muscle bulk: decreased  Overall muscle tone: decreased  Right arm tone: normal  Left arm tone: normal  Right arm pronator drift: absent  Left arm pronator drift: absent  Right leg tone: normal  Left leg tone: normal    SENSORY EXAM   Light touch normal.   Vibration normal.   Proprioception normal.     GAIT AND COORDINATION      Coordination   Finger to nose coordination: normal    Tremor   Resting tremor: present  Intention tremor: absent       NEELAM gait at this time exam conducted with patient lying in bed      NIH Stroke Scale:     Level of Consciousness: 0 - alert  LOC Questions: 1 - answers one correctly  LOC Commands: 1 - performs one correctly  Best Gaze: 0 - normal  Visual: 0 - no visual loss  Facial Palsy: 0 - normal  Motor Left Arm: 0 - no drift  Motor Right Arm: 0 - no drift  Motor Left Le - no  drift  Motor Right Le - no drift  Limb Ataxia: 0 - absent  Sensory: 0 - normal  Best Language: 1 - mild to moderate aphasia  Dysarthria: 0 - normal articulation  Extinction and Inattention: 0 - no neglect  NIH Stroke Scale Total: 3  Primo Coma Scale:  Best Eye Response: 4 - spontaneous  Best Motor Response: 5 - localizes pain  Best Verbal Response: 4 - confused  Primo Coma Scale Total: 13  Significant Labs:  Lab Results   Component Value Date    TSH 1.650 2020     Lab Results   Component Value Date    CHOL 159 2020    CHOL 178 2020    CHOL 187 2019     Lab Results   Component Value Date    HDL 45 2020    HDL 48 2020    HDL 62 2019     Lab Results   Component Value Date    LDLCALC 95.0 2020    LDLCALC 110.4 2020    LDLCALC 109.4 2019     Lab Results   Component Value Date    TRIG 95 2020    TRIG 98 2020    TRIG 78 2019     Lab Results   Component Value Date    CHOLHDL 28.3 2020    CHOLHDL 27.0 2020    CHOLHDL 33.2 2019     BMP  Lab Results   Component Value Date     2020    K 4.2 2020     (H) 2020    CO2 20 (L) 2020    BUN 16 2020    CREATININE 1.5 (H) 2020    CALCIUM 9.1 2020    ANIONGAP 7 (L) 2020    ESTGFRAFRICA 36.1 (A) 2020    EGFRNONAA 31.3 (A) 2020     Lab Results   Component Value Date    WBC 5.86 2020    HGB 8.7 (L) 2020    HCT 26.9 (L) 2020    MCV 93 2020     2020         Significant Imaging:   CUS            Narrative       EXAM DESCRIPTION:    US CAROTID BILATERAL    CLINICAL HISTORY:    86 years, Female, stroke    TECHNIQUE: Grayscale and color Doppler ultrasound examination of the carotid and vertebral artery systems bilaterally. Maximum peak systolic velocity (PSV) / end diastolic velocity (EDV) measurements were obtained.      COMPARISON:  None.    FINDINGS:    RIGHT: Small to moderate  atherosclerotic plaque.  Common carotid velocity is 80 cm/second.  Peak ICA velocity is 104 cm/second.  Normal IC/CC ratio.  Vertebral artery is patient and antegrade.    LEFT:  Small to moderate atherosclerotic plaque.  Common carotid velocity is 68 cm/second.  Peak ICA velocity is 85 cm/second.  Normal IC/CC ratio.  Vertebral artery is patient and antegrade.    IMPRESSION: No hemodynamically significant stenosis.    Antegrade flow in the vertebral arteries.    Electronically signed by:  Crys Feliciano MD  5/11/2020 9:50 PM CDT Workstation: [id number]              Specimen Collected: 05/11/20 20:49 Last Resulted: 05/11/20 20:14 Order Details View Encounter Lab and Collection Details Routing Result History                MRA Brain pending           MRI BRAIN WITHOUT CONTRAST    CLINICAL HISTORY:  altered mental status; .    COMPARISON:  Brain MRI dated 04/30/2020    FINDINGS:  Intracranial contents:The study is motion degraded and sequences were repeated.  The prior study demonstrated a small focus of acute nonhemorrhagic infarction in the lateral right thalamus and adjacent internal capsule.  Today study is also abnormal.  There are more regions of true restricted diffusion in the right occipital lobe in addition to the right thalamic lesion consistent with interval development of nonhemorrhagic infarctions in the posterior right occipital lobe.  There is no hemorrhage.  Otherwise, there is stable encephalomalacia in the inferior right occipital lobe as well as in the left frontal lobe.  There is generalized volume loss with moderate nonspecific white matter change.  There is no hydrocephalus or midline shift.  There is no abnormal extra-axial fluid collection.  The basilar cisterns are open.  Flow voids indicating patency are present in the major vessels at the base of the brain.  There are chronic infarctions in the cerebellar hemispheres bilaterally.    Extracranial contents, calvarium, soft tissues:Baseline  marrow signal is normal.  Artifact somewhat limits evaluation of the inferior facial structures and maxillary sinuses.  Otherwise, the paranasal sinuses and mastoid air cells are clear.  The nasal septum is deviated to the left.       Impression         1.  Redemonstrated is a small nonhemorrhagic infarction in the lateral right thalamus.  There are however new or recent nonhemorrhagic infarctions throughout the right occipital lobe which have occurred since the prior MRI dated 04/30/2020.    2.  There is stable volume loss and nonspecific white matter change.    3.  There is stable focal encephalomalacia and gliosis in the right occipital lobe and left frontal lobe.    4.  There are chronic infarctions in the cerebellar hemispheres bilaterally.    This report was flagged in Epic as abnormal.      Electronically signed by: Charan Greenberg MD  Date: 05/11/2020  Time: 15:34                Last Resulted: 05/11/20 15:47 Order Details View Encounter Lab and Collection Details Routing Result History - Result Edited             External Result Report      External Result Report      TTE   · Concentric left ventricular remodeling.  · Normal left ventricular systolic function. The estimated ejection fraction is 60%.  · Normal LV diastolic function.  · Normal right ventricular systolic function.  · Mild left atrial enlargement.  · No interatrial septal defect present.  · Mild aortic regurgitation.  · Severe aortic valve stenosis.  · Aortic valve area is 0.73 cm2; peak velocity is 4.84 m/s; mean gradient is 52 mmHg.  · Mild mitral regurgitation.  · Mild tricuspid regurgitation.      AVELINO    ·  Normal left ventricular systolic function. The estimated ejection fraction is 65%.  · Normal LV diastolic function.  · No wall motion abnormalities.  · Normal right ventricular systolic function.  · Mild left atrial enlargement.  · No interatrial septal defect present.  · No ASD or PFO closure device in interatrial septum.  · Normal  appearing left atrial appendage. No thrombus is present in the appendage. ROSALBA occluder is absent.  · Mild-to-moderate aortic regurgitation.  · Severe aortic valve stenosis.  · Aortic valve area is 0.66 cm2; peak velocity is 3.98 m/s; mean gradient is 38 mmHg.  · Mild mitral sclerosis.  · Mild mitral regurgitation.  · Mild tricuspid regurgitation.  · Grade 1 plaque present in the ascending aorta and descending aorta.  Agitated saline contrast study did not show any right-to-left shunt  Assessment and Plan:  86 year old female with impression:     1. Acute small nonhemorrhagic Right Occipital Stroke    Stroke work up includes:   -CUS No hemodynamically significant stenosis  - MRA Brain Pending   -Mri Brain w/o contrast small nonhemorrhagic infarction in the lateral right thalamus.There are however new or recent nonhemorrhagic infarctions throughout the right occipital lobe which have occurred since the prior MRI dated 04/30/2020.  -TTE 60 % EF Normal no PFO note  -AVELINO 65 % EF Normal no PFO or ASD noted recommend follow up with Cardiology outpatient for cardiac event monitoring   -Consult GI for Esophagus dilation  -consult ST to evaluate and treat   -Recommend AVELINO post Esophagus dilation to complete stroke work up  -PT/OT/ST to evaluate at treat  -Neuro checks q 4 hours    -High intensity statins for secondary stroke prevention and hyperlipidemia  -Risk factor modification: HTN, HLD  -PT/OT/ST      2. History of CVA  -Increase ASA 81 mg to 325 mg daily and continue Plavi  Stroke prevention measures     3. Anemia  -Managed per IM     4. HTN  Allow for permissive HTN 24-48 hours  -Management per internal medicine        5. Hyperlipidemia  -High intensity statin for secondary stroke prevention  -Life style modification; LDL goal <70 Patient (95 LDL)      6. Chronic Kidney disease   Managed per IM      7. Esophagus stricture/diffculty swallowing   -Consult GI for Esophagus dilation  -consult ST to evaluate and treat    -AVELINO post Esophagus dilation to complete stroke work up    Patient AVELINO noted normal exam finding noted EF 65% no PFO or ASD noted. MRA Brain pending. Patient has no definite evidence for cardio-embolic source at this point. The patient need to follow up with cardiology outpatient for cardiac event monitoring. Recommend increasing aspirin 325 daily and continue Plavix 75 mg po daily. Will continue to follow up on MRA test results       Patient to follow up with [facility] at [phone] within 2 weeks from discharge.     Stroke education was provided including stroke risk factors modification and any acute neurological changes including weakness, confusion, visual changes to come straight to the ER.         Active Diagnoses:    Diagnosis Date Noted POA    PRINCIPAL PROBLEM:  Acute CVA (cerebrovascular accident) [I63.9] 09/06/2019 Yes    Chronic kidney disease, stage III (moderate) [N18.3] 05/12/2020 Yes    Severe malnutrition [E43] 05/12/2020 Yes    Anemia, unspecified [D64.9] 12/03/2019 Yes    Stroke [I63.9] 09/05/2019 Yes    HTN (hypertension) [I10] 09/05/2019 Yes      Problems Resolved During this Admission:       VTE Risk Mitigation (From admission, onward)         Ordered     Reason for No Pharmacological VTE Prophylaxis  Once     Question:  Reasons:  Answer:  Physician Provided (leave comment)  Comment:  DAPT    05/11/20 2036     IP VTE HIGH RISK PATIENT  Once      05/11/20 2036     Place sequential compression device  Until discontinued      05/11/20 2036                Анна Hartman NP  Neurology  [facility]

## 2020-05-13 NOTE — PLAN OF CARE
Plan of care reviewed with pt along with medications administered and fall precautions. Explained process of AVELINO with pt and daughter.

## 2020-05-13 NOTE — PLAN OF CARE
05/13/20 1008   Discharge Assessment   Assessment Type Discharge Planning Assessment   Facility Arrived From: [facility]    Discharge Plan A Rehab   Discharge Plan B Rehab   DME Needed Upon Discharge  none   Patient/Family in Agreement with Plan yes

## 2020-05-13 NOTE — TRANSFER OF CARE
"Anesthesia Transfer of Care Note    Patient: Melania Beavers    Procedure(s) Performed: Procedure(s) (LRB):  Transesophageal echo (AVELINO) intra-procedure log documentation (N/A)    Patient location: Telemetry/Step Down Unit    Anesthesia Type: MAC    Transport from OR: Transported from OR on 2-3 L/min O2 by NC with adequate spontaneous ventilation    Post pain: adequate analgesia    Post assessment: no apparent anesthetic complications    Post vital signs: stable    Level of consciousness: awake    Nausea/Vomiting: no nausea/vomiting    Complications: none    Transfer of care protocol was followed      Last vitals:   Visit Vitals  /63   Pulse 80   Temp 36.7 °C (98.1 °F) (Oral)   Resp 16   Ht 5' 7" (1.702 m)   Wt 42 kg (92 lb 9.5 oz)   LMP  (LMP Unknown)   SpO2 98%   Breastfeeding? No   BMI 14.50 kg/m²     "

## 2020-05-13 NOTE — PROGRESS NOTES
"[facility]  Adult Nutrition   Progress Note (Follow-Up)    SUMMARY     Recommendations/Interventions:    Recommendation/Intervention: 1. Speech recommended regular diet with thin liquids.  2.  Advance diet at tolerated and medically appropriate 3. RD to monitor # days NPO  Goals: 1. Diet to advance and pt to meet at leaast 75% of estiamted nutritional needs via po intake of meals and supplements   Nutrition Goal Status: goal not met    Dietitian Rounds Brief:    Spoke with nurse regarding NPO and advancing diet     Reason for Assessment  Reason For Assessment: RD follow-up  Diagnosis: stroke/CVA  Relevant Medical History: Anemia, GERD, HTN   Interdisciplinary Rounds: attended    Nutrition Risk Screen  Nutrition Risk Screen: no indicators present      Nutrition/Diet History  Patient Reported Diet/Restrictions/Preferences: general  Spiritual, Cultural Beliefs, Latter-day Practices, Values that Affect Care: no  Food Allergies: NKFA  Factors Affecting Nutritional Intake: NPO    Anthropometrics  Temp: 98.1 °F (36.7 °C)  Height Method: Stated  Height: 5' 7" (170.2 cm)  Height (inches): 67 in  Weight Method: Bed Scale  Weight: 42 kg (92 lb 9.5 oz)  Weight (lb): 92.59 lb  Ideal Body Weight (IBW), Female: 135 lb  % Ideal Body Weight, Female (lb): 68.59 %  % Ideal Body Weight Malnutrition: less than 70% -severe deficit(68.8%)  BMI (Calculated): 14.5  BMI Grade: less than 16 protein-energy malnutrition grade III  Weight Loss: unintentional  Usual Body Weight (UBW), k.5 kg  Weight Change Amount: 20 lb  % Usual Body Weight: 82.89  % Weight Change From Usual Weight: -17.28 %       Weight History:  Wt Readings from Last 10 Encounters:   20 42 kg (92 lb 9.5 oz)   20 46 kg (101 lb 6.6 oz)   20 44.5 kg (98 lb)   20 44.7 kg (98 lb 8.7 oz)   10/05/19 48.2 kg (106 lb 4.2 oz)   19 51.5 kg (113 lb 8.6 oz)   19 44.9 kg (99 lb)   17 54.9 kg (121 lb 0.5 oz) "   }  Lab/Procedures/Meds: Pertinent Labs Reviewed  Clinical Chemistry:  Recent Labs   Lab 05/11/20  1904 05/12/20  0551 05/13/20  0628   * 139 138   K 4.8 4.4 4.2    111* 111*   CO2 21* 19* 20*   GLU 89 82 96   BUN 19 16 16   CREATININE 1.4 1.4 1.5*   CALCIUM 9.4 9.4 9.1   PROT 6.7 6.0 6.3   ALBUMIN 3.9 3.5 3.6   BILITOT 0.7 0.8 0.7   ALKPHOS 55 51* 58   AST 17 17 19   ALT 12 11 12   ANIONGAP 6* 9 7*   ESTGFRAFRICA 39.2* 39.2* 36.1*   EGFRNONAA 34.0* 34.0* 31.3*   MG 1.8 1.8  --    PHOS  --  3.5  --    LIPASE 45  --   --     < > = values in this interval not displayed.     CBC:   Recent Labs   Lab 05/13/20  0628   WBC 5.86   RBC 2.88*   HGB 8.7*   HCT 26.9*      MCV 93   MCH 30.2   MCHC 32.3       Medications: Pertinent Medications reviewed  Scheduled Meds:   amLODIPine  10 mg Oral Daily    aspirin  81 mg Oral Daily    atorvastatin  40 mg Oral Daily    clopidogreL  75 mg Oral Daily    metoprolol tartrate  12.5 mg Oral BID    pantoprazole  40 mg Oral Daily    polyethylene glycol  17 g Oral Daily    vitamin D  5,000 Units Oral Daily     Continuous Infusions:  PRN Meds:.calcium chloride IVPB, calcium chloride IVPB, calcium chloride IVPB, HYDROcodone-acetaminophen, labetalol, magnesium oxide, magnesium sulfate IVPB, magnesium sulfate IVPB, magnesium sulfate IVPB, magnesium sulfate IVPB, melatonin, ondansetron, potassium chloride in water, potassium chloride in water, potassium chloride in water, potassium chloride in water, potassium chloride, potassium chloride, potassium chloride, potassium chloride, sodium chloride 0.9%, sodium chloride 0.9%, sodium phosphate IVPB, sodium phosphate IVPB, sodium phosphate IVPB, sodium phosphate IVPB, sodium phosphate IVPB    Estimated/Assessed Needs    Weight Used For Calorie Calculations: 42.6 kg (93 lb 14.7 oz)  Energy Calorie Requirements (kcal): 0842-4371 calories (40-45 calories/kg BW)   Energy Need Method: Kcal/kg  Protein Requirements: 64-85 g/day  (1.5-2.0 g/kg BW)   Weight Used For Protein Calculations: 42.6 kg (93 lb 14.7 oz)  Fluid Requirements (mL): 1704 ml  Estimated Fluid Requirement Method: RDA Method    Nutrition Prescription Ordered    Current Diet Order: NPO for procedure    Evaluation of Received Nutrient/Fluid Intake    Energy Calories Required: meeting needs  Protein Required: meeting needs  Fluid Required: meeting needs  % Intake of Estimated Energy Needs: 0%  % Meal Intake: NPO  No intake or output data in the 24 hours ending 05/13/20 0923   Nutrition Risk    Level of Risk/Frequency of Follow-up: high   Monitor and Evaluation    Food and Nutrient Intake: food and beverage intake, enteral nutrition intake  Food and Nutrient Adminstration: diet order  Knowledge/Beliefs/Attitudes: food and nutrition knowledge/skill  Physical Activity and Function: nutrition-related ADLs and IADLs, factors affecting access to physical activity  Anthropometric Measurements: weight, weight change  Biochemical Data, Medical Tests and Procedures: electrolyte and renal panel, gastrointestinal profile, inflammatory profile, lipid profile  Nutrition-Focused Physical Findings: overall appearance     Nutrition Follow-Up    RD Follow-up?: Yes     Claudia Luo RD 5/13/20

## 2020-05-13 NOTE — PT/OT/SLP PROGRESS
Occupational Therapy   Treatment    Name: Melania Beavers  MRN: [id number]  Admitting Diagnosis:  Acute CVA (cerebrovascular accident)  Day of Surgery    Recommendations:     Discharge Recommendations: rehabilitation facility  Discharge Equipment Recommendations:  other (see comments)(TBD)  Barriers to discharge:  None    Assessment:     Melania Beavers is a 86 y.o. female with a medical diagnosis of Acute CVA (cerebrovascular accident).  She presents with improving medical acuity and functional mobility. Patient participated in bed mobility, self care activity and visual tracking therapeutic activity. Performance deficits affecting function are weakness, impaired endurance, impaired self care skills, impaired functional mobilty, gait instability, impaired balance, visual deficits.     Rehab Prognosis:  Fair; patient would benefit from acute skilled OT services to address these deficits and reach maximum level of function.       Plan:     Patient to be seen 6 x/week to address the above listed problems via self-care/home management, therapeutic activities, therapeutic exercises  · Plan of Care Expires: 06/12/20  · Plan of Care Reviewed with: patient    Subjective     Pain/Comfort:  · Pain Rating 1: 0/10  · Pain Rating Post-Intervention 1: 0/10    Objective:     Communicated with: nurse prior to session.  Patient found HOB elevated with peripheral IV, telemetry upon OT entry to room.    General Precautions: Standard, fall   Orthopedic Precautions:N/A   Braces: N/A     Occupational Performance:     Bed Mobility:    · Patient completed Scooting/Bridging with contact guard assistance  · Patient completed Supine to Sit with contact guard assistance  · Patient completed Sit to Supine with contact guard assistance   · Performed unsupported sitting EOB with stand by assistance    Therapeutic Visual Activity:  · Performed visual scanning activity while sitting unsupported EOB using items on the tray table. Patient required  moderate verbal/tactile cues for scanning for items on left side of tray table.    Activities of Daily Living:  · Grooming: contact guard assistance to wash face sitting EOB.    [facility] 6 Click ADL: 20    Treatment & Education:  Patient able to follow 1 step commands for ADL activity with minimal to moderate verbal/tactile cues for visual attention.    Patient left HOB elevated with all lines intact, call button in reach and bed alarm onEducation:      GOALS:   Multidisciplinary Problems     Occupational Therapy Goals        Problem: Occupational Therapy Goal    Goal Priority Disciplines Outcome Interventions   Occupational Therapy Goal     OT, PT/OT Ongoing, Progressing    Description:  Goals to be met by: discharge     Patient will increase functional independence with ADLs by performing:    UE Dressing with Supervision.  LE Dressing with Supervision.  Grooming while seated with Supervision.  Toileting from bedside commode with Supervision for hygiene and clothing management.   Toilet transfer to bedside commode with Supervision.  Patient will attend to ADL activity on left side with minimal visual cues.                      Time Tracking:     OT Date of Treatment: 05/13/20  OT Start Time: 1319  OT Stop Time: 1342  OT Total Time (min): 23 min    Billable Minutes:Self Care/Home Management 10  Therapeutic Activity 13    Jamin Salas OT  5/13/2020

## 2020-05-14 VITALS
HEIGHT: 67 IN | TEMPERATURE: 98 F | DIASTOLIC BLOOD PRESSURE: 68 MMHG | HEART RATE: 74 BPM | WEIGHT: 88.19 LBS | OXYGEN SATURATION: 98 % | SYSTOLIC BLOOD PRESSURE: 114 MMHG | BODY MASS INDEX: 13.84 KG/M2 | RESPIRATION RATE: 16 BRPM

## 2020-05-14 PROCEDURE — 25000003 PHARM REV CODE 250: Performed by: NURSE PRACTITIONER

## 2020-05-14 PROCEDURE — 97530 THERAPEUTIC ACTIVITIES: CPT

## 2020-05-14 PROCEDURE — 97535 SELF CARE MNGMENT TRAINING: CPT

## 2020-05-14 RX ORDER — ASPIRIN 325 MG
325 TABLET ORAL DAILY
Refills: 0
Start: 2020-05-15 | End: 2021-05-15

## 2020-05-14 RX ORDER — AMLODIPINE BESYLATE 5 MG/1
5 TABLET ORAL DAILY
Qty: 30 TABLET | Refills: 11
Start: 2020-05-15 | End: 2020-07-25 | Stop reason: CLARIF

## 2020-05-14 RX ORDER — AMLODIPINE BESYLATE 5 MG/1
5 TABLET ORAL DAILY
Status: DISCONTINUED | OUTPATIENT
Start: 2020-05-15 | End: 2020-05-14 | Stop reason: HOSPADM

## 2020-05-14 RX ADMIN — ASPIRIN 325 MG ORAL TABLET 325 MG: 325 PILL ORAL at 10:05

## 2020-05-14 RX ADMIN — HYDROCODONE BITARTRATE AND ACETAMINOPHEN 1 TABLET: 5; 325 TABLET ORAL at 07:05

## 2020-05-14 RX ADMIN — CHOLECALCIFEROL (VITAMIN D3) 25 MCG (1,000 UNIT) TABLET 5000 UNITS: at 09:05

## 2020-05-14 RX ADMIN — METOPROLOL TARTRATE 12.5 MG: 25 TABLET, FILM COATED ORAL at 10:05

## 2020-05-14 RX ADMIN — POLYETHYLENE GLYCOL 3350 17 G: 17 POWDER, FOR SOLUTION ORAL at 09:05

## 2020-05-14 RX ADMIN — ATORVASTATIN CALCIUM 40 MG: 40 TABLET, FILM COATED ORAL at 10:05

## 2020-05-14 RX ADMIN — AMLODIPINE BESYLATE 10 MG: 5 TABLET ORAL at 10:05

## 2020-05-14 RX ADMIN — CLOPIDOGREL BISULFATE 75 MG: 75 TABLET, FILM COATED ORAL at 10:05

## 2020-05-14 NOTE — HOSPITAL COURSE
Patient is a 86-year-old  female with recent CVA who was discharged 8 days ago was sent to the ED from rehabilitation facility after imaging revealed new right occipital lobe infarctions.  Recently she was diagnosed right thalamus stroke.  Patient was evaluated by Neurology underwent extensive workup.  AVELINO with no evidence of intracardiac thrombus.  MRA of the brain was attempted however the patient could not complete it.  On the day of discharge patient suffered a fall; hurting left face. CT head negative for acute intracranial bleed or acute fracture but revealed left facial soft tissue injury.  Given her recurrent strokes she has been advised to increase aspirin to 325 mg daily and continue clopidogrel.  She has been deemed medically stable to be discharged back to rehabilitation facility.  Plan of care discussed with the daughter at the bedside.    Return precautions provided.      Physical exam on the day of discharge:  General: Patient in mild distress due to pain   HEENT: Left lateral periorbital bruising and swelling   CVS: RRR. No LE edema BL. Murmur audible   Lungs: CTA BL, no wheezing or crackles. Good breath sounds. No accessory muscle use. No acute respiratory distress  Abdomen:  Soft, nontender and nondistended.  No organomegaly  Neuro: AOx2. Speech is clear. CN 2-12 are grossly intact. Moves all extremities. Follows commands

## 2020-05-14 NOTE — PROGRESS NOTES
[facility]  Neurology  Progress Note    Patient Name: Melania Beavers  MRN: [id number]  Admission Date: 5/11/2020  Hospital Length of Stay: 3 days  Code Status: Full Code   Attending Provider: Rikki Gibbons MD  Primary Care Physician: Luis Brown MD   Principal Problem:Acute CVA (cerebrovascular accident)    Subjective:        HPI: Melania Beavers is a 86 y.o.  female who has a past medical history of Anemia, unspecified (12/3/2019), GERD (gastroesophageal reflux disease), Hypertension, CVA (8/2019 and 4/30/2020),  Hip fracture (3/2020) and Normochromic normocytic anemia (12/3/2019). The patient presented to [facility] on 4/30/2020 with a primary complaint of Cerebrovascular Accident following a CT [facility]. She was discharged on 5/4/2020 following a thalamic stroke. Further workup during her previous admission including CTA head ane neck did not show clinically significant stenosis, large vessel occlusion, or aneurysm. Echocardiography with bubble study did not reveal PFO or intraatrial septal defect or thrombus. Today, she had an MRI in the ED which showed new or recent nonhemorrhagic infarctions throughout the right occipital lobe which have occurred since the prior MRI dated 04/30/2020. She is a poor historian and AAOx2(person and year). She tries to tell me her location, but is unable to verbalize it. She denies chills, pain, weakness, numbness, N/V/D, dizziness, or LOC. Her daughter Kaitlin was called and she states her sister Reyna and her have been discussing a DNR, but doesn't feel comfortable officially making her a DNR until she talks to her again. She reports she's been progressively more confused since her first stroke requiring sitters at home. Daughter states she's been aggressive as well, slapping a sitter. Daughter states she would have never done that prior to her first stroke. Her questions if she has underlying dementia, stating her PCP told  her she may have beginning stages. She selectively participates in my neuro exam. Will do activity with her rt hand, but not left or her legs. She is upset because she is cold. Extra blanket provided.     Neurological Consult Note: Patient seen and examined with Dr. Diamond. Patient daughter at bedside. Discussed plan of care. Patient is a 86 year old female with a PMHx: Anemia,GERD, Hypertension, CVA (8/2019 and 4/30/2020),  Hip fracture. The patient presented to [facility] on 4/30/2020 with a primary complaint of Cerebrovascular Accident. Patient is Alert oriented to person, place disoriented to situation. Patient unable to recall or has a delayed response to common/familiar objects such as thumb and cup. Patient states that she doesn't recall the reason for going to have the MRI done. She denies chills, pain, weakness, numbness, N/V/D, dizziness, or LOC.Patient cooperative with exam follows commands. Mild aphasia noted.  Tremors noted bilaterally greater in right had,. 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 Stroke work up includes: CUS No hemodynamically significant stenosis MRA Brain Pending, Mri Brain w/o contrast small nonhemorrhagic infarction in the lateral right thalamus.  There are however new or recent nonhemorrhagic infarctions throughout the right occipital lobe which have occurred since the prior MRI dated 04/30/2020.TTE 60 % EF Normal Saline (bubble) No PFO. Will consult GI for Esphgous dialtaion, recommend cardiology consult for AVELINO.  Will continue to follow.       Neurological Interval Note: Patient seen and examined discussed plan of care with Dr. Diamond. Patient is Awake Alert oriented to person and place. Disorientation persist. Patient is pleasant calm and cooperative. Patient daughter at bedside. Patient fell last night and noted to have bruising and contusion to left side of face and surrounding left eye and left shoulder. CT  of head w/o contrast showed no intcraninal bleeding or edema soft tissue issue injury. Patient also had xray of left shoulder no acute osseous abnormality noted.  an was completed  Patient AVELINO noted normal exam finding noted EF 65% no PFO or ASD noted. MRA Brain unable to complete patient unable to tolerate test at this time will plan to to CTA of Head and neck outpatient when renal function improves. Patient has no definite evidence for cardio-embolic source at this point. The patient need to follow up with cardiology outpatient for cardiac event monitoring. Continue aspirin 325 daily and continue Plavix 75 mg po daily. Recommend Inpatient Rehab.  Will continue to follow.   Current Neurological Medications:     Current Facility-Administered Medications   Medication Dose Route Frequency Provider Last Rate Last Dose    amLODIPine tablet 10 mg  10 mg Oral Daily Christina Gamble NP   10 mg at 05/14/20 1022    aspirin tablet 325 mg  325 mg Oral Daily Анна Hartman NP   325 mg at 05/14/20 1021    atorvastatin tablet 40 mg  40 mg Oral Daily Christina Gamble NP   40 mg at 05/14/20 1022    calcium chloride 1g in sodium chloride 0.9% 100mL (ready to mix system)  1 g Intravenous PRN Christina Gamble NP        calcium chloride 1g in sodium chloride 0.9% 100mL (ready to mix system)  1 g Intravenous PRN Christina Gamble NP        calcium chloride 1g in sodium chloride 0.9% 100mL (ready to mix system)  1 g Intravenous PRN Christina Gamble NP        clopidogreL tablet 75 mg  75 mg Oral Daily Christina Gamble NP   75 mg at 05/14/20 1022    HYDROcodone-acetaminophen 5-325 mg per tablet 1 tablet  1 tablet Oral Q4H PRN Christina Gamble NP   1 tablet at 05/14/20 0711    labetaloL injection 10 mg  10 mg Intravenous Q15 Min PRN Christina Gamble NP        LORazepam tablet 1 mg  1 mg Oral On Call Procedure Lino Diamond MD   1 mg at 05/13/20 1716    magnesium oxide tablet 800 mg  800 mg Oral PRN Christina Gamble NP         magnesium sulfate 2g in water 50mL IVPB (premix)  2 g Intravenous PRN Christina Gamble NP        magnesium sulfate 2g in water 50mL IVPB (premix)  4 g Intravenous PRN Christina Gamble NP        magnesium sulfate 2g in water 50mL IVPB (premix)  2 g Intravenous PRN Christina Gamble NP        magnesium sulfate in dextrose IVPB (premix) 1 g  1 g Intravenous PRN Christina Gamble NP        melatonin tablet 9 mg  9 mg Oral Nightly PRN Christina Gamble NP        metoprolol tartrate (LOPRESSOR) split tablet 12.5 mg  12.5 mg Oral BID Crhistina Gamble NP   12.5 mg at 05/14/20 1022    ondansetron injection 4 mg  4 mg Intravenous Q8H PRN Christina Gamble NP        pantoprazole EC tablet 40 mg  40 mg Oral Daily Christina Gamble NP   40 mg at 05/13/20 0553    polyethylene glycol packet 17 g  17 g Oral Daily Christina Gamble NP   17 g at 05/13/20 1347    potassium chloride 10 mEq in 100 mL IVPB  20 mEq Intravenous PRN Christina Gamble, NP        potassium chloride 10 mEq in 100 mL IVPB  40 mEq Intravenous PRN Christina Gamble NP        potassium chloride 10 mEq in 100 mL IVPB  20 mEq Intravenous PRN Christina Gamble NP        potassium chloride 10 mEq in 100 mL IVPB  40 mEq Intravenous PRN Christina Gamble NP        potassium chloride SA CR tablet 20 mEq  20 mEq Oral PRN Christina Gamble NP        potassium chloride SA CR tablet 20 mEq  20 mEq Oral PRN Christina Gamble, NP        potassium chloride SA CR tablet 40 mEq  40 mEq Oral PRN Christina Gamble NP        potassium chloride SA CR tablet 40 mEq  40 mEq Oral PRN Christina Gamble NP        sodium chloride 0.9% flush 10 mL  10 mL Intravenous PRN Christina Gamble, NP        sodium chloride 0.9% flush 10 mL  10 mL Intravenous PRN Fran House MD        sodium phosphate 15 mmol in dextrose 5 % 250 mL IVPB  15 mmol Intravenous PRN Christina Gamble NP        sodium phosphate 15 mmol in dextrose 5 % 250 mL IVPB  15 mmol Intravenous PRN Christina Gamble, NP         sodium phosphate 20.01 mmol in dextrose 5 % 250 mL IVPB  20.01 mmol Intravenous PRN Christina Gamble NP        sodium phosphate 20.01 mmol in dextrose 5 % 250 mL IVPB  20.01 mmol Intravenous PRN Christina Gamble NP        sodium phosphate 30 mmol in dextrose 5 % 250 mL IVPB  30 mmol Intravenous PRN Christina Gamble NP        vitamin D 1000 units tablet 5,000 Units  5,000 Units Oral Daily Christina Gamble NP           Review of Systems   Unable to perform ROS: Age (poor historian )   Constitutional:        Poor nutrition    HENT: Negative.    Eyes: Positive for visual disturbance.        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    Gastrointestinal: Negative.         Swallowing difficulties    Endocrine: Negative.    Genitourinary: Negative.    Musculoskeletal: Positive for gait problem.   Skin: Negative.    Neurological: Positive for speech difficulty and weakness. Negative for dizziness, tremors, seizures, syncope, facial asymmetry, light-headedness, numbness and headaches.   Hematological: Negative.    Psychiatric/Behavioral: Positive for confusion.     Objective:     Vital Signs (Most Recent):  Temp: 97.3 °F (36.3 °C) (05/14/20 0744)  Pulse: 73 (05/14/20 0744)  Resp: 16 (05/14/20 0744)  BP: (!) 125/53 (05/14/20 0744)  SpO2: 100 % (05/14/20 0744) Vital Signs (24h Range):  Temp:  [97.3 °F (36.3 °C)-98.8 °F (37.1 °C)] 97.3 °F (36.3 °C)  Pulse:  [] 73  Resp:  [15-18] 16  SpO2:  [90 %-100 %] 100 %  BP: (125-151)/(53-93) 125/53     Weight: 40 kg (88 lb 2.9 oz)  Body mass index is 13.81 kg/m².    Physical Exam   Constitutional: She appears well-developed.   HENT:   Head: Normocephalic and atraumatic.   Eyes: Pupils are equal, round, and reactive to light. Conjunctivae, EOM and lids are normal. Right conjunctiva has no hemorrhage. Right eye exhibits normal extraocular motion. Left eye exhibits normal extraocular motion. Right pupil is round and reactive. Left pupil  is round and reactive. Pupils are equal.   Neck: Normal range of motion. Neck supple.   Cardiovascular: Normal rate and regular rhythm.   Pulmonary/Chest: Effort normal and breath sounds normal.   Abdominal: Soft. Bowel sounds are normal.   Musculoskeletal: Normal range of motion.   Neurological: She is alert. She has normal reflexes. No sensory deficit. She has a normal Finger-Nose-Finger Test. GCS eye subscore is 4 - spontaneous. GCS verbal subscore is 4 - confused. GCS motor subscore is 5 - localizes pain.   Oriented to Person and place, disoriented to time and situation    Skin: Skin is warm and dry. Capillary refill takes less than 2 seconds. There is pallor.   Skin thin fragile    Psychiatric: Judgment normal. Her speech is delayed. She is slowed. Cognition and memory are impaired.       NEUROLOGICAL EXAMINATION:     MENTAL STATUS   Oriented to person.   Oriented to place.   Oriented to time. Disoriented to year, month and date.   Registration: recalls 2 of 3 objects.   Attention: decreased. Concentration: decreased.   Level of consciousness: alert  Knowledge: poor and inconsistent with education.   Unable to name object: she named 2 objects and named one wrong and the other item a cup with great delay         aphsaic  Noted      CRANIAL NERVES     CN II   Visual fields full to confrontation.     CN III, IV, VI   Pupils are equal, round, and reactive to light.  Extraocular motions are normal.   Pupils: equal  Right pupil: Size: 2 mm. Shape: regular. Reactivity: brisk.   Left pupil: Size: 2 mm. Shape: regular. Reactivity: brisk.     CN V   Facial sensation intact.     CN IX, X   CN IX normal.     CN XI   CN XI normal.     CN XII   CN XII normal.     MOTOR EXAM   Muscle bulk: decreased  Overall muscle tone: decreased  Right arm tone: normal  Left arm tone: normal  Right arm pronator drift: absent  Left arm pronator drift: absent  Right leg tone: normal  Left leg tone: normal    SENSORY EXAM   Light touch normal.    Vibration normal.   Proprioception normal.     GAIT AND COORDINATION      Coordination   Finger to nose coordination: normal    Tremor   Resting tremor: present  Intention tremor: absent       NEELAM gait at this time exam conducted with patient lying in bed      NIH Stroke Scale:     Level of Consciousness: 0 - alert  LOC Questions: 1 - answers one correctly  LOC Commands: 1 - performs one correctly  Best Gaze: 0 - normal  Visual: 0 - no visual loss  Facial Palsy: 0 - normal  Motor Left Arm: 0 - no drift  Motor Right Arm: 0 - no drift  Motor Left Le - no drift  Motor Right Le - no drift  Limb Ataxia: 0 - absent  Sensory: 0 - normal  Best Language: 1 - mild to moderate aphasia  Dysarthria: 0 - normal articulation  Extinction and Inattention: 0 - no neglect  NIH Stroke Scale Total: 3  [place] Coma Scale:  Best Eye Response: 4 - spontaneous  Best Motor Response: 5 - localizes pain  Best Verbal Response: 4 - confused  [place] Coma Scale Total: 13  Significant Labs:  Lab Results   Component Value Date    TSH 1.650 2020     Lab Results   Component Value Date    CHOL 159 2020    CHOL 178 2020    CHOL 187 2019     Lab Results   Component Value Date    HDL 45 2020    HDL 48 2020    HDL 62 2019     Lab Results   Component Value Date    LDLCALC 95.0 2020    LDLCALC 110.4 2020    LDLCALC 109.4 2019     Lab Results   Component Value Date    TRIG 95 2020    TRIG 98 2020    TRIG 78 2019     Lab Results   Component Value Date    CHOLHDL 28.3 2020    CHOLHDL 27.0 2020    CHOLHDL 33.2 2019     BMP  Lab Results   Component Value Date     2020    K 4.2 2020     (H) 2020    CO2 20 (L) 2020    BUN 16 2020    CREATININE 1.5 (H) 2020    CALCIUM 9.1 2020    ANIONGAP 7 (L) 2020    ESTGFRAFRICA 36.1 (A) 2020    EGFRNONAA 31.3 (A) 2020     Lab Results   Component Value  Date    WBC 5.86 05/13/2020    HGB 8.7 (L) 05/13/2020    HCT 26.9 (L) 05/13/2020    MCV 93 05/13/2020     05/13/2020         Significant Imaging:   CT of head w/o contrast 5/14  Narrative     CMS MANDATED QUALITY DATA - CT RADIATION 436. CT scans at this  facility utilize dose modulation, iterative reconstruction, and/or  weight based dosing when appropriate to reduce radiation dose to as  low as reasonably achievable.    PROCEDURE:    CT HEAD WITHOUT CONTRAST  dated  5/14/2020 6:49 AM    CLINICAL HISTORY:   Female 86 years of age.   Head trauma, ataxia    TECHNIQUE: CT images were acquired from the foramen magnum to the  vertex. Intravenous contrast was not administered.    COMPARISON:  Head CT April 30, 2020    FINDINGS:    There is no intracranial hemorrhage, extra-axial fluid collection or  edema. Patchy white matter low-attenuation of chronic small vessel  disease. There are small areas of encephalomalacia within the left  frontal and right occipital lobes. These findings are unchanged  compared with the prior study.    There is left facial soft tissue thickening with hematoma and  laceration over the left zygoma. There is no acute fracture. Paranasal  sinuses and mastoid air cells are clear.    IMPRESSION:      1. No intracranial bleed or edema.  2. Left facial soft tissue injury. No acute fracture.  3. Left frontal and right occipital encephalomalacia compatible with  chronic small infarctions.    Electronically Signed by Sophie Anton on 5/14/2020 7:11 AM       XR left shoulder 5/14  HISTORY: Trauma and shoulder pain.    The bones are demineralized. No acute fracture, dislocation or osseous  destruction is observed. There are mild arthritic changes at the  acromioclavicular joint. No focal soft tissue abnormality is  identified.    Incidental observation is made of pulmonary interstitial prominence  and pleural thickening near the left pulmonary apex.    IMPRESSION:    Osteopenia.    No acute osseous  abnormality.    Incidental pulmonary findings as described.    Electronically Signed by Paulino Orellana M.D. on 5/14/2020 9:16 AM  CUS            Narrative       EXAM DESCRIPTION:    US CAROTID BILATERAL    CLINICAL HISTORY:    86 years, Female, stroke    TECHNIQUE: Grayscale and color Doppler ultrasound examination of the carotid and vertebral artery systems bilaterally. Maximum peak systolic velocity (PSV) / end diastolic velocity (EDV) measurements were obtained.      COMPARISON:  None.    FINDINGS:    RIGHT: Small to moderate atherosclerotic plaque.  Common carotid velocity is 80 cm/second.  Peak ICA velocity is 104 cm/second.  Normal IC/CC ratio.  Vertebral artery is patient and antegrade.    LEFT:  Small to moderate atherosclerotic plaque.  Common carotid velocity is 68 cm/second.  Peak ICA velocity is 85 cm/second.  Normal IC/CC ratio.  Vertebral artery is patient and antegrade.    IMPRESSION: No hemodynamically significant stenosis.    Antegrade flow in the vertebral arteries.    Electronically signed by:  Crys Feliciano MD  5/11/2020 9:50 PM CDT Workstation: [id number]              Specimen Collected: 05/11/20 20:49 Last Resulted: 05/11/20 20:14 Order Details View Encounter Lab and Collection Details Routing Result History                MRA Brain pending           MRI BRAIN WITHOUT CONTRAST    CLINICAL HISTORY:  altered mental status; .    COMPARISON:  Brain MRI dated 04/30/2020    FINDINGS:  Intracranial contents:The study is motion degraded and sequences were repeated.  The prior study demonstrated a small focus of acute nonhemorrhagic infarction in the lateral right thalamus and adjacent internal capsule.  Today study is also abnormal.  There are more regions of true restricted diffusion in the right occipital lobe in addition to the right thalamic lesion consistent with interval development of nonhemorrhagic infarctions in the posterior right occipital lobe.  There is no hemorrhage.  Otherwise, there is  stable encephalomalacia in the inferior right occipital lobe as well as in the left frontal lobe.  There is generalized volume loss with moderate nonspecific white matter change.  There is no hydrocephalus or midline shift.  There is no abnormal extra-axial fluid collection.  The basilar cisterns are open.  Flow voids indicating patency are present in the major vessels at the base of the brain.  There are chronic infarctions in the cerebellar hemispheres bilaterally.    Extracranial contents, calvarium, soft tissues:Baseline marrow signal is normal.  Artifact somewhat limits evaluation of the inferior facial structures and maxillary sinuses.  Otherwise, the paranasal sinuses and mastoid air cells are clear.  The nasal septum is deviated to the left.       Impression         1.  Redemonstrated is a small nonhemorrhagic infarction in the lateral right thalamus.  There are however new or recent nonhemorrhagic infarctions throughout the right occipital lobe which have occurred since the prior MRI dated 04/30/2020.    2.  There is stable volume loss and nonspecific white matter change.    3.  There is stable focal encephalomalacia and gliosis in the right occipital lobe and left frontal lobe.    4.  There are chronic infarctions in the cerebellar hemispheres bilaterally.    This report was flagged in Epic as abnormal.      Electronically signed by: Charan Greenberg MD  Date: 05/11/2020  Time: 15:34                Last Resulted: 05/11/20 15:47 Order Details View Encounter Lab and Collection Details Routing Result History - Result Edited             External Result Report      External Result Report      TTE   · Concentric left ventricular remodeling.  · Normal left ventricular systolic function. The estimated ejection fraction is 60%.  · Normal LV diastolic function.  · Normal right ventricular systolic function.  · Mild left atrial enlargement.  · No interatrial septal defect present.  · Mild aortic  regurgitation.  · Severe aortic valve stenosis.  · Aortic valve area is 0.73 cm2; peak velocity is 4.84 m/s; mean gradient is 52 mmHg.  · Mild mitral regurgitation.  · Mild tricuspid regurgitation.      AVELINO    ·  Normal left ventricular systolic function. The estimated ejection fraction is 65%.  · Normal LV diastolic function.  · No wall motion abnormalities.  · Normal right ventricular systolic function.  · Mild left atrial enlargement.  · No interatrial septal defect present.  · No ASD or PFO closure device in interatrial septum.  · Normal appearing left atrial appendage. No thrombus is present in the appendage. ROSALBA occluder is absent.  · Mild-to-moderate aortic regurgitation.  · Severe aortic valve stenosis.  · Aortic valve area is 0.66 cm2; peak velocity is 3.98 m/s; mean gradient is 38 mmHg.  · Mild mitral sclerosis.  · Mild mitral regurgitation.  · Mild tricuspid regurgitation.  · Grade 1 plaque present in the ascending aorta and descending aorta.  Agitated saline contrast study did not show any right-to-left shunt  Assessment and Plan:  86 year old female with impression:     1. Acute small nonhemorrhagic Right Occipital Stroke    Stroke work up includes:   -CUS No hemodynamically significant stenosis  - MRA Brain Pending   -Mri Brain w/o contrast small nonhemorrhagic infarction in the lateral right thalamus.There are however new or recent nonhemorrhagic infarctions throughout the right occipital lobe which have occurred since the prior MRI dated 04/30/2020.  -TTE 60 % EF Normal no PFO note  -AVELINO 65 % EF Normal no PFO or ASD noted recommend follow up with Cardiology outpatient for cardiac event monitoring   -Consult GI for Esophagus dilation  -consult ST to evaluate and treat   -Recommend AVELINO post Esophagus dilation to complete stroke work up  -PT/OT/ST to evaluate at treat  -Neuro checks q 4 hours    -High intensity statins for secondary stroke prevention and hyperlipidemia  -Risk factor modification: HTN,  HLD  -PT/OT/ST     2. History of CVA  -Increase ASA 81 mg to 325 mg daily and continue Plavi  Stroke prevention measures     3. Anemia  -Managed per IM     4. HTN  Allow for permissive HTN 24-48 hours  -Management per internal medicine        5. Hyperlipidemia  -High intensity statin for secondary stroke prevention  -Life style modification; LDL goal <70 Patient (95 LDL)      6. Chronic Kidney disease   Managed per IM      7. Esophagus stricture/diffculty swallowing   -Consult GI for Esophagus dilation  -consult ST to evaluate and treat   -AVELINO post Esophagus dilation to complete stroke work up       Status post fall  -PT/OT/ST  -CT of head Negative 5/14  XR of left shoulder negative for fracture 5/14 noted      5/13 Patient AVELINO noted normal exam finding noted EF 65% no PFO or ASD noted. MRA Brain pending. Patient has no definite evidence for cardio-embolic source at this point. The patient need to follow up with cardiology outpatient for cardiac event monitoring. Recommend increasing aspirin 325 daily and continue Plavix 75 mg po daily. Will continue to follow up on MRA test results       5/14 Patient is Awake Alert oriented to person and place. Disorientation persist. Patient is pleasant calm and cooperative. Patient daughter at bedside. Patient fell last night and noted to have bruising and contusion to left side of face and surrounding left eye and left shoulder. CT of head w/o contrast showed no intcraninal bleeding or edema soft tissue issue injury. Patient also had xray of left shoulder no acute osseous abnormality noted.  an was completed  Patient AVELINO noted normal exam finding noted EF 65% no PFO or ASD noted. MRA Brain unable to complete patient unable to tolerate test at this time will plan to to CTA of Head and neck outpatient when renal function improves. Patient has no definite evidence for cardio-embolic source at this point. The patient need to follow up with cardiology outpatient for cardiac event  monitoring. Recommend continuing aspirin 325 daily and continue Plavix 75 mg po daily. Recommend Inpatient rehab for this patient for PT/OT/ST to evaluate and treat.     [phone] 2 weeks from discharge.     Stroke education was provided including stroke risk factors modification and any acute neurological changes including weakness, confusion, visual changes to come straight to the ER.         Active Diagnoses:    Diagnosis Date Noted POA    PRINCIPAL PROBLEM:  Acute CVA (cerebrovascular accident) [I63.9] 09/06/2019 Yes    Aortic valve stenosis, moderate [I35.0] 05/13/2020 Yes    Chronic kidney disease, stage III (moderate) [N18.3] 05/12/2020 Yes    Severe malnutrition [E43] 05/12/2020 Yes    Anemia, unspecified [D64.9] 12/03/2019 Yes    Stroke [I63.9] 09/05/2019 Yes    HTN (hypertension) [I10] 09/05/2019 Yes      Problems Resolved During this Admission:       VTE Risk Mitigation (From admission, onward)         Ordered     Reason for No Pharmacological VTE Prophylaxis  Once     Question:  Reasons:  Answer:  Physician Provided (leave comment)  Comment:  DAPT    05/11/20 2036     IP VTE HIGH RISK PATIENT  Once      05/11/20 2036     Place sequential compression device  Until discontinued      05/11/20 2036                Анна Hartman NP  Neurology  [facility]

## 2020-05-14 NOTE — NURSING
Rounding at 0630, found pt on floor between bed and bathroom. Left eye lac noted clean bandage applied and bruise to left shoulder. Pt assisted back to bed. Vitals taken. Dr. Malin notified. Stat CT of head ordered and completed.

## 2020-05-14 NOTE — PLAN OF CARE
Problem: Malnutrition  Goal: Improved Nutritional Intake  Outcome: Ongoing, Progressing  Intervention: Promote and Optimize Oral Intake  Flowsheets (Taken 5/14/2020 0247)  Oral Nutrition Promotion: calorie dense liquids provided  Added Ensure Enlive TID to aid in meeting estimated needs.  Encourage intake of meals and supplements due to poor PO intake and severe malnutrition.

## 2020-05-14 NOTE — PT/OT/SLP PROGRESS
Physical Therapy      Patient Name:  Melania Beavers   MRN:  [id number]    Patient not seen today secondary to (hold per RN as patient fell this AM and is awaiting CT scan). Will follow-up 05/15/20.    Harika Krishnan PTA

## 2020-05-14 NOTE — DISCHARGE SUMMARY
[facility] Medicine  Discharge Summary      Patient Name: Melania Beavers  MRN: [id number]  Admission Date: 5/11/2020  Hospital Length of Stay: 3 days  Discharge Date and Time:  05/14/2020 5:27 PM  Attending Physician: Rikki Gibbons MD   Discharging Provider: Rikki Gibbons MD  Primary Care Provider: Luis Brown MD      HPI:   Melania Beavers is a 86 y.o.  female who has a past medical history of Anemia, unspecified (12/3/2019), GERD (gastroesophageal reflux disease), Hypertension, CVA (8/2019 and 4/30/2020),  Hip fracture (3/2020) and Normochromic normocytic anemia (12/3/2019). The patient presented to [facility] on 4/30/2020 with a primary complaint of Cerebrovascular Accident following a [facility]. She was discharged on 5/4/2020 following a thalamic stroke. Further workup during her previous admission including CTA head ane neck did not show clinically significant stenosis, large vessel occlusion, or aneurysm. Echocardiography with bubble study did not reveal PFO or intraatrial septal defect or thrombus. Today, she had an MRI in the ED which showed new or recent nonhemorrhagic infarctions throughout the right occipital lobe which have occurred since the prior MRI dated 04/30/2020. She is a poor historian and AAOx2(person and year). She tries to tell me her location, but is unable to verbalize it. She denies chills, pain, weakness, numbness, N/V/D, dizziness, or LOC. Her daughter Kaitlin was called and she states her sister Reyna and her have been discussing a DNR, but doesn't feel comfortable officially making her a DNR until she talks to her again. She reports she's been progressively more confused since her first stroke requiring sitters at home. Daughter states she's been aggressive as well, slapping a sitter. Daughter states she would have never done that prior to her first stroke. Her questions if she has underlying dementia, stating her  PCP told her she may have beginning stages. She selectively participates in my neuro exam. Will do activity with her rt hand, but not left or her legs. She is upset because she is cold. Extra blanket provided.       Procedure(s) (LRB):  Transesophageal echo (AVELINO) intra-procedure log documentation (N/A)      Hospital Course:   Patient is a 86-year-old  female with recent CVA who was discharged 8 days ago was sent to the ED from rehabilitation facility after imaging revealed new right occipital lobe infarctions.  Recently she was diagnosed right thalamus stroke.  Patient was evaluated by Neurology underwent extensive workup.  AVELINO with no evidence of intracardiac thrombus.  MRA of the brain was attempted however the patient could not complete it.  On the day of discharge patient suffered a fall; hurting left face. CT head negative for acute intracranial bleed or acute fracture but revealed left facial soft tissue injury.  Given her recurrent strokes she has been advised to increase aspirin to 325 mg daily and continue clopidogrel.  She has been deemed medically stable to be discharged back to rehabilitation facility.  Plan of care discussed with the daughter at the bedside.    Return precautions provided.      Physical exam on the day of discharge:  General: Patient in mild distress due to pain   HEENT: Left lateral periorbital bruising and swelling   CVS: RRR. No LE edema BL. Murmur audible   Lungs: CTA BL, no wheezing or crackles. Good breath sounds. No accessory muscle use. No acute respiratory distress  Abdomen:  Soft, nontender and nondistended.  No organomegaly  Neuro: AOx2. Speech is clear. CN 2-12 are grossly intact. Moves all extremities. Follows commands     Consults:   Consults (From admission, onward)        Status Ordering Provider     Inpatient consult to Cardiology  Once     Provider:  Charbel House MD    Completed SHAE GOINS     Inpatient consult to Gastroenterology  Once      Provider:  Dominick Cunningham III, MD    Completed SHAE GOINS     Inpatient consult to Gastroenterology  Once     Provider:  Sandra Sullivan MD    Acknowledged KASHIF JONES     Inpatient consult to neurology  Once     Provider:  Lino Diamond MD    Completed NILES CHA     Inpatient consult to Registered Dietitian/Nutritionist  Once     Provider:  (Not yet assigned)    Completed NILES CHA     IP consult to case management/social work  Once     Provider:  (Not yet assigned)    Completed NILES CHA          No new Assessment & Plan notes have been filed under this hospital service since the last note was generated.  Service: Hospital Medicine    Final Active Diagnoses:    Diagnosis Date Noted POA    PRINCIPAL PROBLEM:  Acute CVA (cerebrovascular accident) [I63.9] 09/06/2019 Yes    Aortic valve stenosis, moderate [I35.0] 05/13/2020 Yes    Chronic kidney disease, stage III (moderate) [N18.3] 05/12/2020 Yes    Severe malnutrition [E43] 05/12/2020 Yes    Anemia, unspecified [D64.9] 12/03/2019 Yes    Stroke [I63.9] 09/05/2019 Yes    HTN (hypertension) [I10] 09/05/2019 Yes      Problems Resolved During this Admission:       Discharged Condition: stable    Disposition: Rehab Facility    Follow Up:  Follow-up Information     Luis Brown MD In 2 weeks.    Specialty:  Family Medicine  Why:  As needed  Contact information:  [address] [place] [id number]  [phone]             Lino Diamond MD In 2 weeks.    Specialty:  Neurology  Why:  Neurology - Stroke follow-up   Contact information:  [address] [facility] [address]  [phone]                 Patient Instructions:      Diet Cardiac     Notify your health care provider if you experience any of the following:  temperature >100.4     Notify your health care provider if you experience any of the following:  persistent nausea and vomiting or diarrhea     Notify your health care provider if you experience any of  the following:  persistent dizziness, light-headedness, or visual disturbances     Activity as tolerated       Significant Diagnostic Studies: Labs:   CMP   Recent Labs   Lab 05/12/20 2035 05/13/20  0628   * 138   K 4.4 4.2    111*   CO2 19* 20*   * 96   BUN 19 16   CREATININE 1.6* 1.5*   CALCIUM 8.9 9.1   PROT  --  6.3   ALBUMIN  --  3.6   BILITOT  --  0.7   ALKPHOS  --  58   AST  --  19   ALT  --  12   ANIONGAP 8 7*   ESTGFRAFRICA 33.4* 36.1*   EGFRNONAA 29.0* 31.3*    and CBC   Recent Labs   Lab 05/12/20 2035 05/13/20  0628   WBC 7.05 5.86   HGB 8.0* 8.7*   HCT 24.3* 26.9*    211     Radiology: X-Ray: CXR: X-Ray Chest 1 View (CXR):   Results for orders placed or performed during the hospital encounter of 05/11/20   X-Ray Chest 1 View    Narrative    EXAMINATION:  XR CHEST 1 VIEW    CLINICAL HISTORY:  unspecified abdominal pain    TECHNIQUE:  The cardiomediastinal silhouette is normal in size.  There are no acute osseous abnormalities.    FINDINGS:  Portable chest x-ray at 17:50 hours is compared to prior study at 15:20 hours    The lungs are hyperexpanded.  There are no infiltrates or pleural effusions.  The cardiomediastinal silhouette is normal in size.  There is stable apical pleural thickening on the left.      Impression    Hyperexpanded lungs compatible with COPD with no confluent infiltrates    Stable pleural thickening in the left apex      Electronically signed by: Trinidad Lazaro MD  Date:    05/11/2020  Time:    18:10     CT scan: Head     CT Head Without Contrast [[id number]] Collected: 05/14/20 0649   Order Status: Completed Updated: 05/14/20 0713   Narrative:     CMS MANDATED QUALITY DATA - CT RADIATION 436. CT scans at this  facility utilize dose modulation, iterative reconstruction, and/or  weight based dosing when appropriate to reduce radiation dose to as  low as reasonably achievable.    PROCEDURE:    CT HEAD WITHOUT CONTRAST  dated  5/14/2020 6:49 AM    CLINICAL  HISTORY:   Female 86 years of age.   Head trauma, ataxia    TECHNIQUE: CT images were acquired from the foramen magnum to the  vertex. Intravenous contrast was not administered.    COMPARISON:  Head CT April 30, 2020    FINDINGS:    There is no intracranial hemorrhage, extra-axial fluid collection or  edema. Patchy white matter low-attenuation of chronic small vessel  disease. There are small areas of encephalomalacia within the left  frontal and right occipital lobes. These findings are unchanged  compared with the prior study.    There is left facial soft tissue thickening with hematoma and  laceration over the left zygoma. There is no acute fracture. Paranasal  sinuses and mastoid air cells are clear.    IMPRESSION:      1. No intracranial bleed or edema.  2. Left facial soft tissue injury. No acute fracture.  3. Left frontal and right occipital encephalomalacia compatible with  chronic small infarctions.    Electronically Signed by Sophie Anton on 5/14/2020 7:11 AM         Cardiac Graphics: Echocardiogram: AVELINO     Reading physician: Fran House MD Ordering physician: Fran House MD Study date: 5/13/20   Reason for Exam   Priority: Routine   Dx: Stroke [I63.9 (ICD-10-CM)]   Comments:    Staff     Performing Sonographer   Felicita Ryder    Vitals     Height Weight BMI (Calculated) BSA (Calculated - sq m) BP             Conclusion     · Normal left ventricular systolic function. The estimated ejection fraction is 65%.  · Normal LV diastolic function.  · No wall motion abnormalities.  · Normal right ventricular systolic function.  · Mild left atrial enlargement.  · No interatrial septal defect present.  · No ASD or PFO closure device in interatrial septum.  · Normal appearing left atrial appendage. No thrombus is present in the appendage. ROSALBA occluder is absent.  · Mild-to-moderate aortic regurgitation.  · Severe aortic valve stenosis.  · Aortic valve area is 0.66 cm2; peak velocity is 3.98 m/s; mean  gradient is 38 mmHg.  · Mild mitral sclerosis.  · Mild mitral regurgitation.  · Mild tricuspid regurgitation.  · Grade 1 plaque present in the ascending aorta and descending aorta.  · Agitated saline contrast study did not show any right-to-left shunt       Medications:  Reconciled Home Medications:      Medication List      START taking these medications    aspirin 325 MG tablet  Take 1 tablet (325 mg total) by mouth once daily.  Start taking on:  May 15, 2020  Replaces:  aspirin 81 MG Chew        CHANGE how you take these medications    amLODIPine 5 MG tablet  Commonly known as:  NORVASC  Take 1 tablet (5 mg total) by mouth once daily.  Start taking on:  May 15, 2020  What changed:    · medication strength  · how much to take        CONTINUE taking these medications    acetaminophen 650 MG Tbsr  Commonly known as:  TYLENOL  Take 650 mg by mouth every 6 (six) hours as needed.     atorvastatin 40 MG tablet  Commonly known as:  LIPITOR  Take 1 tablet (40 mg total) by mouth once daily.     b complex vitamins capsule  Take 1 capsule by mouth once daily.     CHOLECALCIFEROL (VITAMIN D3) ORAL  Take 5,000 Units by mouth once daily.     clopidogreL 75 mg tablet  Commonly known as:  PLAVIX  Take 1 tablet (75 mg total) by mouth once daily.     HYDROcodone-acetaminophen 5-325 mg per tablet  Commonly known as:  NORCO  Take 1 tablet by mouth every 4 (four) hours as needed for Pain.     megestroL 20 MG Tab  Commonly known as:  MEGACE  Take 40 mg by mouth 2 (two) times daily.     melatonin 3 mg tablet  Commonly known as:  MELATIN  Take 9 mg by mouth nightly as needed for Insomnia.     metoprolol tartrate 25 MG tablet  Commonly known as:  LOPRESSOR  Take 0.5 tablets (12.5 mg total) by mouth 2 (two) times daily.     ondansetron 4 MG Tbdl  Commonly known as:  ZOFRAN-ODT  Take 4 mg by mouth every 8 (eight) hours as needed.     pantoprazole 40 MG tablet  Commonly known as:  PROTONIX  Take 40 mg by mouth once daily.     polyethylene  glycol 17 gram/dose powder  Commonly known as:  GLYCOLAX  Take 17 g by mouth once daily.        STOP taking these medications    aspirin 81 MG Chew  Replaced by:  aspirin 325 MG tablet            Indwelling Lines/Drains at time of discharge:   Lines/Drains/Airways     Airway                 Airway - Non-Surgical 05/13/20 0959 Nasal Cannula 1 day                Time spent on the discharge of patient: 35 minutes  Patient was seen and examined on the date of discharge and determined to be suitable for discharge.         Rikki Gibbons MD  Department of Hospital Medicine  Formerly [facility], [facility]

## 2020-05-14 NOTE — PROGRESS NOTES
[facility]  Department of Cardiology  Progress Note    Patient name: Melania Beavers  MRN: [id number]  Today's Date: 05/14/2020  Admit Date: 5/11/2020    SUBJECTIVE     Principal Problem: Acute CVA (cerebrovascular accident)    Interval History:  Reportedly patient had a fall at shift change this morning sustained facial injury in the left periorbital area.  There is swelling and ecchymosis at this time    Review of patient's allergies indicates:   Allergen Reactions    Doxycycline monohydrate Other (See Comments)     dizzyness    Heparin     Heparin analogues        REVIEW OF SYSTEMS    No recent fever, cough chills or congestion  No blood in the urine or stool  No myalgias  No recent arm, neck, or jaw pain  No lightheadedness or dizziness  No Double vision, blurry, vision or headache   No chest pain reported.  OBJECTIVE     Vital Signs (Most Recent)  Temp: 97.5 °F (36.4 °C) (05/14/20 1146)  Pulse: 74 (05/14/20 1146)  Resp: 16 (05/14/20 1146)  BP: (!) 114/59 (05/14/20 1146)  SpO2: 99 % (05/14/20 1146)    I & O (Last 24H):    Intake/Output Summary (Last 24 hours) at 5/14/2020 1227  Last data filed at 5/14/2020 0400  Gross per 24 hour   Intake 200 ml   Output --   Net 200 ml       WEIGHTS LAST 4 READINGS  Wt Readings from Last 4 Encounters:   05/14/20 40 kg (88 lb 2.9 oz)   05/04/20 46 kg (101 lb 6.6 oz)   04/23/20 44.5 kg (98 lb)   04/08/20 44.7 kg (98 lb 8.7 oz)       PHYSICAL EXAM  Constitutional:  Thinly built elderly female  HEENT is notable for contusion on the left periorbital region with ecchymosis in the upper eyelid.  Neck: no carotid bruit, no JVD  Lungs:  Diminished breath sounds but mostly clear anteriorly.  Chest Wall: no tenderness  Heart: regular rate and rhythm, diminished S2 and systolic murmurs present  Abdomen: soft, non-tender; bowel sounds normal; no masses,  no organomegaly  Extremities: Extremities normal, no edema left shoulder has mild bruising.  Neuro: AAO X 3    Scheduled  Meds:   amLODIPine  10 mg Oral Daily    aspirin  325 mg Oral Daily    atorvastatin  40 mg Oral Daily    clopidogreL  75 mg Oral Daily    metoprolol tartrate  12.5 mg Oral BID    pantoprazole  40 mg Oral Daily    polyethylene glycol  17 g Oral Daily    vitamin D  5,000 Units Oral Daily     Continuous Infusions:  PRN Meds:calcium chloride IVPB, calcium chloride IVPB, calcium chloride IVPB, HYDROcodone-acetaminophen, labetalol, LORazepam, magnesium oxide, magnesium sulfate IVPB, magnesium sulfate IVPB, magnesium sulfate IVPB, magnesium sulfate IVPB, melatonin, ondansetron, potassium chloride in water, potassium chloride in water, potassium chloride in water, potassium chloride in water, potassium chloride, potassium chloride, potassium chloride, potassium chloride, sodium chloride 0.9%, sodium chloride 0.9%, sodium phosphate IVPB, sodium phosphate IVPB, sodium phosphate IVPB, sodium phosphate IVPB, sodium phosphate IVPB    LABS AND DIAGNOSTICS     CBC LAST 4 DAYS  Recent Labs   Lab 05/11/20 1904 05/12/20 0551 05/12/20 2035 05/13/20  0628   WBC 6.17 5.39 7.05 5.86   RBC 2.91* 2.84* 2.63* 2.88*   HGB 8.7* 8.4* 8.0* 8.7*   HCT 27.5* 27.0* 24.3* 26.9*   MCV 95 95 92 93   MCH 29.9 29.6 30.4 30.2   MCHC 31.6* 31.1* 32.9 32.3   RDW 14.7* 14.7* 15.0* 15.0*    212 225 211   MPV 11.2 11.1 10.9 10.9   GRAN 47.6  2.9 48.4  2.6  --  51.4  3.0   LYMPH 26.7  1.7 26.2  1.4  --  22.7  1.3   MONO 23.2*  1.4* 23.0*  1.2*  --  23.4*  1.4*   BASO 0.01 0.02  --  0.01   NRBC 0 0  --  0       COAGULATION LAST 4 DAYS  Recent Labs   Lab 05/11/20 1904 05/12/20 2035   LABPT 14.2 14.2   INR 1.2 1.2   APTT 34.1*  --        CHEMISTRY LAST 4 DAYS  Recent Labs   Lab 05/11/20  1904 05/12/20  0551 05/12/20 2035 05/13/20  0628   * 139 133* 138   K 4.8 4.4 4.4 4.2    111* 106 111*   CO2 21* 19* 19* 20*   ANIONGAP 6* 9 8 7*   BUN 19 16 19 16   CREATININE 1.4 1.4 1.6* 1.5*   GLU 89 82 115* 96   CALCIUM 9.4 9.4 8.9  9.1   MG 1.8 1.8  --   --    ALBUMIN 3.9 3.5  --  3.6   PROT 6.7 6.0  --  6.3   ALKPHOS 55 51*  --  58   ALT 12 11  --  12   AST 17 17  --  19   BILITOT 0.7 0.8  --  0.7       CARDIAC PROFILE LAST 4 DAYS  No results for input(s): BNP, CPK, CPKMB, LDH, TROPONINI in the last 168 hours.    ENDOCRINE LAST 4 DAYS  No results for input(s): TSH, PROCAL in the last 168 hours.    LAST ARTERIAL BLOOD GAS  ABG  No results for input(s): PH, PO2, PCO2, HCO3, BE in the last 168 hours.    LAST 7 DAYS MICROBIOLOGY   Microbiology Results (last 7 days)     ** No results found for the last 168 hours. **          LAST 24 HOUR IMAGING INTERPRETATIONS  No results found in the last 24 hours.    LASTECHO  Results for orders placed during the hospital encounter of 04/30/20   Echo Color Flow Doppler? Yes; Bubble Contrast? Yes    Narrative · Concentric left ventricular remodeling.  · Normal left ventricular systolic function. The estimated ejection   fraction is 60%.  · Normal LV diastolic function.  · Normal right ventricular systolic function.  · Mild left atrial enlargement.  · No interatrial septal defect present.  · Mild aortic regurgitation.  · Severe aortic valve stenosis.  · Aortic valve area is 0.73 cm2; peak velocity is 4.84 m/s; mean gradient   is 52 mmHg.  · Mild mitral regurgitation.  · Mild tricuspid regurgitation.          CURRENT/PREVIOUS VISIT EKG  Results for orders placed or performed during the hospital encounter of 05/11/20   EKG 12-lead    Collection Time: 05/11/20  6:17 PM    Narrative    Test Reason : R10.9,    Vent. Rate : 084 BPM     Atrial Rate : 084 BPM     P-R Int : 138 ms          QRS Dur : 080 ms      QT Int : 364 ms       P-R-T Axes : 073 044 073 degrees     QTc Int : 430 ms    Normal sinus rhythm  Normal ECG  When compared with ECG of 30-APR-2020 12:58,  No significant change was found  Confirmed by Fran House MD ([id number]) on 5/13/2020 7:40:50 PM    Referred By: [email or web address]   SELF           Confirmed  By:Fran House MD       VENTILATOR SETTINGS       ASSESSMENT/PLAN:     Active Hospital Problems    Diagnosis    *Acute CVA (cerebrovascular accident)    Aortic valve stenosis, moderate    Chronic kidney disease, stage III (moderate)    Severe malnutrition    Anemia, unspecified    Stroke    HTN (hypertension)       Assessment & Plan:   Status post fall and contusion to the left periorbital area  2.  Recurrent CVA  3.  Chronic kidney disease and 4.  Severe non nutrition  4.  Arterial hypertension  5.  Chronic anemia  RECOMMENDATIONS:    local wound care at this time.  Cut back on amlodipine to 5 mg daily for the time being.  This can be adjusted when the patient is more ambulatory and has higher blood pressures.  Physical therapy and continued monitoring and fall precautions thank you for the consultation        [facility]  Department of Cardiology  Fran House MD  Date of service: 05/14/2020

## 2020-05-14 NOTE — PT/OT/SLP PROGRESS
Occupational Therapy   Treatment    Name: Melania Beavers  MRN: [id number]  Admitting Diagnosis:  Acute CVA (cerebrovascular accident)  1 Day Post-Op    Recommendations:     Discharge Recommendations: rehabilitation facility  Discharge Equipment Recommendations:  other (see comments)(TBD)  Barriers to discharge:  None    Assessment:     Melania Beavers is a 86 y.o. female with a medical diagnosis of Acute CVA (cerebrovascular accident).  She presents with general weakness from prior stroke to include left visual field cut. Performance deficits affecting function are weakness, impaired endurance, impaired self care skills, impaired functional mobilty, gait instability, visual deficits, impaired balance, decreased safety awareness.     Rehab Prognosis:  Fair; patient would benefit from acute skilled OT services to address these deficits and reach maximum level of function.       Plan:     Patient to be seen 6 x/week to address the above listed problems via self-care/home management, therapeutic activities, therapeutic exercises  · Plan of Care Expires: 06/12/20  · Plan of Care Reviewed with: patient, family    Subjective     Pain/Comfort:  · Pain Rating 1: 0/10  · Pain Rating Post-Intervention 1: 0/10    Objective:     Communicated with: nurse prior to session.  Patient found HOB elevated with bed alarm, peripheral IV, telemetry upon OT entry to room.    General Precautions: Standard, fall   Orthopedic Precautions:N/A   Braces: N/A     Occupational Performance:     Bed Mobility:    · Patient completed Scooting/Bridging with minimum assistance  · Patient completed Supine to Sit with minimum assistance  · Patient completed Sit to Supine with minimum assistance     Functional Mobility/Transfers:  · Patient completed Sit <> Stand Transfer with minimum assistance  with  rolling walker     Activities of Daily Living:  · Grooming: minimum assistance to wash face sitting EOB.   · BUE AAROM within full range with minimal  assistance.      [facility] 6 Click ADL: 19    Treatment & Education:  Patient demonstrated full AAROM of left UE and at prior level of function for left shoulder and vision s/p fall in hospital room earlier today.     Patient left HOB elevated with all lines intact, call button in reach, bed alarm on and family presentEducation:      GOALS:   Multidisciplinary Problems     Occupational Therapy Goals        Problem: Occupational Therapy Goal    Goal Priority Disciplines Outcome Interventions   Occupational Therapy Goal     OT, PT/OT Ongoing, Progressing    Description:  Goals to be met by: discharge     Patient will increase functional independence with ADLs by performing:    UE Dressing with Supervision.  LE Dressing with Supervision.  Grooming while seated with Supervision.  Toileting from bedside commode with Supervision for hygiene and clothing management.   Toilet transfer to bedside commode with Supervision.  Patient will attend to ADL activity on left side with minimal visual cues.                      Time Tracking:     OT Date of Treatment: 05/14/20  OT Start Time: 1320  OT Stop Time: 1343  OT Total Time (min): 23 min    Billable Minutes:Self Care/Home Management 13  Therapeutic Activity 10    Jamin Salas OT  5/14/2020

## 2020-05-14 NOTE — PROGRESS NOTES
"[facility]  Adult Nutrition   Progress Note (Follow-Up)    SUMMARY     Recommendations/Interventions:    Recommendation/Intervention: 1. Continue current diet as tolerated, encourage intake. 2. Added Ensure Enlive TID to aid in meeting estimated needs. 3. Encourage intake of meals and supplements due to poor PO intake and severe malnutrition.  Goals: 1. Patient to meet at least 75% of esitmated needs via PO intake of meals and supplements.  Nutrition Goal Status: new    Dietitian Rounds Brief:  · Seen 2' f/u. Diet just advanced last night.Poor PO intake. Severe malnutrition (BMI 13.8). New weight noted- 2.6 kg weight loss in 2 days? Patient would benefit from PPN if here for extended LOS. Added Ensure Enlive. Possible discharge today. Per CM notes.  · Per RN: "Rounding at 0630, found pt on floor between bed and bathroom. Left eye lac noted clean bandage applied and bruise to left shoulder. Pt assisted back to bed. Vitals taken. Dr. Malin notified. Stat CT of head ordered and completed. "  Reason for Assessment  Reason For Assessment: RD follow-up  Diagnosis: stroke/CVA  Relevant Medical History: anemia, GERD, HTN  Interdisciplinary Rounds: attended    Nutrition Risk Screen  Nutrition Risk Screen: no indicators present    Nutrition/Diet History  Patient Reported Diet/Restrictions/Preferences: general  Spiritual, Cultural Beliefs, Moravian Practices, Values that Affect Care: no  Food Allergies: NKFA  Factors Affecting Nutritional Intake: decreased appetite    Anthropometrics  Temp: 97.3 °F (36.3 °C)  Height Method: Stated  Height: 5' 7" (170.2 cm)  Height (inches): 67 in  Weight Method: Bed Scale  Weight: 40 kg (88 lb 2.9 oz)  Weight (lb): 88.18 lb  Ideal Body Weight (IBW), Female: 135 lb  % Ideal Body Weight, Female (lb): 68.59 %  % Ideal Body Weight Malnutrition: less than 70% -severe deficit(68.8%)  BMI (Calculated): 13.8  BMI Grade: less than 16 protein-energy malnutrition grade III  Weight Loss: " unintentional  Usual Body Weight (UBW), k.5 kg  Weight Change Amount: 20 lb  % Usual Body Weight: 82.89  % Weight Change From Usual Weight: -17.28 %     Weight History:  Wt Readings from Last 10 Encounters:   20 40 kg (88 lb 2.9 oz)   20 46 kg (101 lb 6.6 oz)   20 44.5 kg (98 lb)   20 44.7 kg (98 lb 8.7 oz)   10/05/19 48.2 kg (106 lb 4.2 oz)   19 51.5 kg (113 lb 8.6 oz)   19 44.9 kg (99 lb)   17 54.9 kg (121 lb 0.5 oz)     Lab/Procedures/Meds: Pertinent Labs Reviewed  Clinical Chemistry:  Recent Labs   Lab 20  1904 20  0551 20  0628   * 139 138   K 4.8 4.4 4.2    111* 111*   CO2 21* 19* 20*   GLU 89 82 96   BUN 19 16 16   CREATININE 1.4 1.4 1.5*   CALCIUM 9.4 9.4 9.1   PROT 6.7 6.0 6.3   ALBUMIN 3.9 3.5 3.6   BILITOT 0.7 0.8 0.7   ALKPHOS 55 51* 58   AST 17 17 19   ALT 12 11 12   ANIONGAP 6* 9 7*   ESTGFRAFRICA 39.2* 39.2* 36.1*   EGFRNONAA 34.0* 34.0* 31.3*   MG 1.8 1.8  --    PHOS  --  3.5  --    LIPASE 45  --   --     < > = values in this interval not displayed.     CBC:   Recent Labs   Lab 20  0628   WBC 5.86   RBC 2.88*   HGB 8.7*   HCT 26.9*      MCV 93   MCH 30.2   MCHC 32.3     Medications: Pertinent Medications reviewed  Scheduled Meds:   amLODIPine  10 mg Oral Daily    aspirin  325 mg Oral Daily    atorvastatin  40 mg Oral Daily    clopidogreL  75 mg Oral Daily    metoprolol tartrate  12.5 mg Oral BID    pantoprazole  40 mg Oral Daily    polyethylene glycol  17 g Oral Daily    vitamin D  5,000 Units Oral Daily     Continuous Infusions:  PRN Meds:.calcium chloride IVPB, calcium chloride IVPB, calcium chloride IVPB, HYDROcodone-acetaminophen, labetalol, LORazepam, magnesium oxide, magnesium sulfate IVPB, magnesium sulfate IVPB, magnesium sulfate IVPB, magnesium sulfate IVPB, melatonin, ondansetron, potassium chloride in water, potassium chloride in water, potassium chloride in water, potassium chloride in water,  potassium chloride, potassium chloride, potassium chloride, potassium chloride, sodium chloride 0.9%, sodium chloride 0.9%, sodium phosphate IVPB, sodium phosphate IVPB, sodium phosphate IVPB, sodium phosphate IVPB, sodium phosphate IVPB    Estimated/Assessed Needs    Weight Used For Calorie Calculations: 42.6 kg (93 lb 14.7 oz)  Energy Calorie Requirements (kcal): 9928-7318 kcals/day (40-45 kcals/kg)  Energy Need Method: Kcal/kg  Protein Requirements: 64-85 g/day (1.5-2.0 g/kg )  Weight Used For Protein Calculations: 42.6 kg (93 lb 14.7 oz)  Fluid Requirements (mL): 7858-3262 mL/day (1 mL/kcal)  Estimated Fluid Requirement Method: RDA Method    Nutrition Prescription Ordered    Current Diet Order: Cardiac Diet     Evaluation of Received Nutrient/Fluid Intake    Energy Calories Required: not meeting needs  Protein Required: not meeting needs  Fluid Required: meeting needs  Tolerance: tolerating  % Intake of Estimated Energy Needs: 0 - 25 %  % Meal Intake: 0 - 25 %    Intake/Output Summary (Last 24 hours) at 5/14/2020 0836  Last data filed at 5/14/2020 0400  Gross per 24 hour   Intake 400 ml   Output --   Net 400 ml      Nutrition Risk    Level of Risk/Frequency of Follow-up: high   Monitor and Evaluation    Food and Nutrient Intake: energy intake, food and beverage intake  Food and Nutrient Adminstration: diet order  Knowledge/Beliefs/Attitudes: food and nutrition knowledge/skill  Physical Activity and Function: nutrition-related ADLs and IADLs, factors affecting access to physical activity  Anthropometric Measurements: weight, weight change  Biochemical Data, Medical Tests and Procedures: electrolyte and renal panel, inflammatory profile, gastrointestinal profile, lipid profile, glucose/endocrine profile  Nutrition-Focused Physical Findings: overall appearance     Nutrition Follow-Up    RD Follow-up?: Yes  Tati Elizondo RD 05/14/2020 8:45 AM

## 2020-05-15 NOTE — NURSING
Pt iv removed per Parul Cifuenteser. Report called but not given per IVANNA Tyler. Will call again. Instructions given to family to read and will revisit to answer any questions however the daughter left before I could get back with her. She understands she will be given instructions prior to the next d/c from the SNF. Transportation here and pt safely in van via  at this time. Amparo Wang    Report called to Ivanna Castro with all questions addressed and answered. Amparo Wang

## 2020-05-15 NOTE — PT/OT/SLP DISCHARGE
Occupational Therapy Discharge Summary    Melania Beavers  MRN: [id number]   Principal Problem: Acute CVA (cerebrovascular accident)      Patient Discharged from acute Occupational Therapy on 5/14/2020.  Please refer to prior OT note dated 5/14/2020 for functional status.    Assessment:      Patient appropriate for care in another setting.    Objective:     GOALS:   Multidisciplinary Problems     Occupational Therapy Goals     Not on file          Multidisciplinary Problems (Resolved)        Problem: Occupational Therapy Goal    Goal Priority Disciplines Outcome Interventions   Occupational Therapy Goal   (Resolved)     OT, PT/OT Met    Description:  Goals to be met by: discharge     Patient will increase functional independence with ADLs by performing:    UE Dressing with Supervision.  LE Dressing with Supervision.  Grooming while seated with Supervision.  Toileting from bedside commode with Supervision for hygiene and clothing management.   Toilet transfer to bedside commode with Supervision.  Patient will attend to ADL activity on left side with minimal visual cues.                      Reasons for Discontinuation of Therapy Services  Transfer to alternate level of care.      Plan:     Patient Discharged to: Inpatient Rehab    Jamin Salas OT  5/15/2020

## 2020-05-18 DIAGNOSIS — S72.002D CLOSED FRACTURE OF LEFT HIP WITH ROUTINE HEALING, SUBSEQUENT ENCOUNTER: Primary | ICD-10-CM

## 2020-07-25 ENCOUNTER — HOSPITAL ENCOUNTER (EMERGENCY)
Facility: HOSPITAL | Age: 85
Discharge: HOME | End: 2020-07-25
Attending: EMERGENCY MEDICINE
Payer: MEDICARE

## 2020-07-25 VITALS
DIASTOLIC BLOOD PRESSURE: 66 MMHG | BODY MASS INDEX: 13.81 KG/M2 | SYSTOLIC BLOOD PRESSURE: 158 MMHG | TEMPERATURE: 98 F | HEIGHT: 67 IN | HEART RATE: 75 BPM | OXYGEN SATURATION: 97 % | WEIGHT: 88 LBS | RESPIRATION RATE: 23 BRPM

## 2020-07-25 DIAGNOSIS — I10 HYPERTENSION: ICD-10-CM

## 2020-07-25 DIAGNOSIS — M79.605 LEFT LEG PAIN: Primary | ICD-10-CM

## 2020-07-25 LAB
ALBUMIN SERPL BCP-MCNC: 3.8 G/DL (ref 3.5–5.2)
ALP SERPL-CCNC: 62 U/L (ref 55–135)
ALT SERPL W/O P-5'-P-CCNC: 8 U/L (ref 10–44)
ANION GAP SERPL CALC-SCNC: 9 MMOL/L (ref 8–16)
APTT PPP: 30.1 SEC (ref 23.6–33.3)
AST SERPL-CCNC: 12 U/L (ref 10–40)
BASOPHILS # BLD AUTO: 0.01 K/UL (ref 0–0.2)
BASOPHILS NFR BLD: 0.2 % (ref 0–1.9)
BILIRUB SERPL-MCNC: 0.8 MG/DL (ref 0.1–1)
BUN SERPL-MCNC: 20 MG/DL (ref 8–23)
CALCIUM SERPL-MCNC: 9.6 MG/DL (ref 8.7–10.5)
CHLORIDE SERPL-SCNC: 106 MMOL/L (ref 95–110)
CO2 SERPL-SCNC: 24 MMOL/L (ref 23–29)
CREAT SERPL-MCNC: 1.5 MG/DL (ref 0.5–1.4)
DIFFERENTIAL METHOD: ABNORMAL
EOSINOPHIL # BLD AUTO: 0.1 K/UL (ref 0–0.5)
EOSINOPHIL NFR BLD: 0.9 % (ref 0–8)
ERYTHROCYTE [DISTWIDTH] IN BLOOD BY AUTOMATED COUNT: 13.4 % (ref 11.5–14.5)
EST. GFR  (AFRICAN AMERICAN): 36.1 ML/MIN/1.73 M^2
EST. GFR  (NON AFRICAN AMERICAN): 31.3 ML/MIN/1.73 M^2
GLUCOSE SERPL-MCNC: 92 MG/DL (ref 70–110)
HCT VFR BLD AUTO: 29.1 % (ref 37–48.5)
HGB BLD-MCNC: 9.3 G/DL (ref 12–16)
IMM GRANULOCYTES # BLD AUTO: 0.11 K/UL (ref 0–0.04)
IMM GRANULOCYTES NFR BLD AUTO: 2 % (ref 0–0.5)
INR PPP: 1.1
LYMPHOCYTES # BLD AUTO: 0.9 K/UL (ref 1–4.8)
LYMPHOCYTES NFR BLD: 15.9 % (ref 18–48)
MAGNESIUM SERPL-MCNC: 2 MG/DL (ref 1.6–2.6)
MCH RBC QN AUTO: 31 PG (ref 27–31)
MCHC RBC AUTO-ENTMCNC: 32 G/DL (ref 32–36)
MCV RBC AUTO: 97 FL (ref 82–98)
MONOCYTES # BLD AUTO: 0.9 K/UL (ref 0.3–1)
MONOCYTES NFR BLD: 17.2 % (ref 4–15)
NEUTROPHILS # BLD AUTO: 3.5 K/UL (ref 1.8–7.7)
NEUTROPHILS NFR BLD: 63.8 % (ref 38–73)
NRBC BLD-RTO: 0 /100 WBC
PLATELET # BLD AUTO: 188 K/UL (ref 150–350)
PMV BLD AUTO: 10.3 FL (ref 9.2–12.9)
POTASSIUM SERPL-SCNC: 4.9 MMOL/L (ref 3.5–5.1)
PROT SERPL-MCNC: 7 G/DL (ref 6–8.4)
PROTHROMBIN TIME: 13.4 SEC (ref 10.6–14.8)
RBC # BLD AUTO: 3 M/UL (ref 4–5.4)
SODIUM SERPL-SCNC: 139 MMOL/L (ref 136–145)
WBC # BLD AUTO: 5.47 K/UL (ref 3.9–12.7)

## 2020-07-25 PROCEDURE — 36415 COLL VENOUS BLD VENIPUNCTURE: CPT

## 2020-07-25 PROCEDURE — 85730 THROMBOPLASTIN TIME PARTIAL: CPT

## 2020-07-25 PROCEDURE — 85025 COMPLETE CBC W/AUTO DIFF WBC: CPT

## 2020-07-25 PROCEDURE — 99285 EMERGENCY DEPT VISIT HI MDM: CPT | Mod: 25

## 2020-07-25 PROCEDURE — 80053 COMPREHEN METABOLIC PANEL: CPT

## 2020-07-25 PROCEDURE — 93005 ELECTROCARDIOGRAM TRACING: CPT | Performed by: INTERNAL MEDICINE

## 2020-07-25 PROCEDURE — 85610 PROTHROMBIN TIME: CPT

## 2020-07-25 PROCEDURE — 83735 ASSAY OF MAGNESIUM: CPT

## 2020-07-25 RX ORDER — TRAZODONE HYDROCHLORIDE 100 MG/1
100 TABLET ORAL NIGHTLY
COMMUNITY

## 2020-07-25 RX ORDER — OMEPRAZOLE 20 MG/1
20 CAPSULE, DELAYED RELEASE ORAL DAILY
COMMUNITY

## 2020-07-25 RX ORDER — BETAMETHASONE DIPROPIONATE 0.5 MG/G
1 CREAM TOPICAL DAILY
COMMUNITY

## 2020-07-25 RX ORDER — MEMANTINE HYDROCHLORIDE 5 MG-10 MG
1 KIT ORAL SEE ADMIN INSTRUCTIONS
COMMUNITY

## 2020-07-25 RX ORDER — AMLODIPINE BESYLATE 5 MG/1
10 TABLET ORAL DAILY
Qty: 30 TABLET | Refills: 0 | Status: SHIPPED | OUTPATIENT
Start: 2020-07-25 | End: 2021-07-25

## 2020-07-25 RX ORDER — DIPHENHYDRAMINE HCL 25 MG
12.5 TABLET ORAL NIGHTLY PRN
COMMUNITY

## 2020-07-25 NOTE — ED NOTES
Bed rails are up and call light is within patient reach. FAMILY AT BS.  NAD. ABLE TO STRAIGHTEN LEGS SPONTANEOUSLY.

## 2020-07-25 NOTE — ED PROVIDER NOTES
Encounter Date: 7/25/2020       History     Chief Complaint   Patient presents with    Hypertension     L knee and L femur pain with HTN.       Left lower extremity pain with associated high blood pressure here via EMS reported onset this morning per EMS and family patient has a history of dementia and is at her reported baseline mentation she did have a  fall many weeks ago but no significant pain initially until today family noted bruising to the lower leg is concerned that she may have an occult fracture        Review of patient's allergies indicates:   Allergen Reactions    Doxycycline monohydrate Other (See Comments)     dizzyness    Heparin     Heparin analogues      Past Medical History:   Diagnosis Date    Anemia, unspecified 12/3/2019    GERD (gastroesophageal reflux disease)     Hypertension     Normochromic normocytic anemia 12/3/2019     Past Surgical History:   Procedure Laterality Date    ESOPHAGOGASTRODUODENOSCOPY N/A 10/4/2019    Procedure: EGD (ESOPHAGOGASTRODUODENOSCOPY);  Surgeon: Dominick Cunningham III, MD;  Location: [facility] ENDO;  Service: Endoscopy;  Laterality: N/A;    HYSTERECTOMY      PERCUTANEOUS PINNING OF HIP Left 4/5/2020    Procedure: PINNING, HIP, PERCUTANEOUS;  Surgeon: Anibal Kidd MD;  Location: [facility] OR;  Service: Orthopedics;  Laterality: Left;     No family history on file.  Social History     Tobacco Use    Smoking status: Never Smoker   Substance Use Topics    Alcohol use: No    Drug use: No     Review of Systems   Unable to perform ROS: Dementia       Physical Exam     Initial Vitals [07/25/20 1056]   BP Pulse Resp Temp SpO2   (!) 201/88 79 (!) 24 98.4 °F (36.9 °C) (!) 94 %      MAP       --         Physical Exam    Constitutional: She appears well-developed.   Thin frail   HENT:   Head: Normocephalic.   Right Ear: External ear normal.   Left Ear: External ear normal.   Mouth/Throat: Oropharynx is clear and moist.   Healing ecchymosis to the left temple  the zygoma TMs clear   Eyes: Conjunctivae and EOM are normal. Pupils are equal, round, and reactive to light.   Neck: Normal range of motion. Neck supple.   Cardiovascular: Normal rate, regular rhythm, normal heart sounds and intact distal pulses.   Pulmonary/Chest: Breath sounds normal. No respiratory distress. She has no wheezes.   Abdominal: Soft. Bowel sounds are normal. She exhibits no distension. There is no abdominal tenderness.   Musculoskeletal:      Comments: Pain with palpation of the left thigh leg held in flexion resistant to extension with associated pain no palpable bony deformities 2+ pulses throughout all 4 extremities   Neurological:   Patient's eyes open response to verbal and physical stimulus not oriented secondary to dementia history   Skin: Skin is warm and dry. Capillary refill takes less than 2 seconds.   Ecchymosis to the left lower extremity         ED Course   Procedures  Labs Reviewed   APTT   CBC W/ AUTO DIFFERENTIAL   COMPREHENSIVE METABOLIC PANEL   MAGNESIUM   PROTIME-INR   URINALYSIS, REFLEX TO URINE CULTURE        ECG Results          EKG 12-lead (In process)  Result time 07/25/20 11:33:31    In process by Interface, Lab In [facility] (07/25/20 11:33:31)                 Narrative:    Test Reason : I10,    Vent. Rate : 079 BPM     Atrial Rate : 079 BPM     P-R Int : 144 ms          QRS Dur : 080 ms      QT Int : 382 ms       P-R-T Axes : 073 056 081 degrees     QTc Int : 438 ms    Normal sinus rhythm  Normal ECG  When compared with ECG of 11-MAY-2020 18:17,  No significant change was found    Referred By: [email or web address]   SELF           Confirmed By:                             Imaging Results          X-Ray Chest AP Portable (Final result)  Result time 07/25/20 11:49:05    Final result by Carlos Szymanski MD (07/25/20 11:49:05)                 Narrative:    Chest single view    CLINICAL DATA: Hypertension    FINDINGS: AP view is compared to May 11.    Heart size is normal. The mediastinum  is unremarkable. The lungs are  hyperinflated. No acute infiltrate or pleural effusion is identified.  Asymmetrically prominent apical pleural thickening on the left is  unchanged when compared to an older radiograph from September 2019 and  therefore presumably benign.    No acute osseous abnormality is identified.    IMPRESSION:  1. Chronic findings as above. No acute abnormalities.    Electronically Signed by Himanshu Szymanski M.D. on 7/25/2020 11:54 AM                             X-Ray Femur Ap/Lat Left (Final result)  Result time 07/25/20 11:49:04    Final result by Carlos Szymanski MD (07/25/20 11:49:04)                 Narrative:    Left femur    CLINICAL DATA: Left leg pain    FINDINGS: AP and lateral images are limited to the femur are negative  for fracture. No osseous destructive lesion is identified. Alignment  at the knee and hip is normal. Soft tissues are unremarkable.    IMPRESSION:  1. No acute radiographic abnormalities.    Electronically Signed by Himanshu Szymanski M.D. on 7/25/2020 11:53 AM                             X-Ray Pelvis Routine AP (Final result)  Result time 07/25/20 11:48:04    Final result by Carlos Szymanski MD (07/25/20 11:48:04)                 Narrative:    Pelvis 2 views    CLINICAL DATA: Left leg pain    FINDINGS: 2 views are submitted. There is no radiographic evidence of  acute fracture or dislocation. There has been previous fixation of  left femoral neck fracture. No osseous destructive lesion is  identified.    Multilevel degenerative disc disease is noted in the lower lumbar  spine. Soft tissues are unremarkable.    IMPRESSION:  1. Chronic findings as noted above. No acute radiographic  abnormalities.    Electronically Signed by Himanshu Szymanski M.D. on 7/25/2020 11:51 AM                             X-Ray Hip 2 or 3 views Left (Final result)  Result time 07/25/20 11:48:04    Final result by Carlos Szymanski MD (07/25/20 11:48:04)                 Narrative:    Left hip 2  views    CLINICAL DATA: Left hip pain    FINDINGS: 2 views are negative for acute fracture or dislocation.  There has been previous fixation of left femoral neck fracture, with 3  orthopedic pins traversing the femoral neck. Soft tissues are  unremarkable.    IMPRESSION:  1. Postoperative changes as above. No acute findings.    Electronically Signed by Himanshu Szymanski M.D. on 7/25/2020 11:52 AM                               Medical Decision Making:   ED Management:  Patient's radiographs of her lower extremity revealed no evidence of acute fracture dislocation laboratory evaluation is at baseline patient's blood pressure did improve in the emergency department but still remains somewhat high she had been on amlodipine until her pressures became low this was recently stopped will provide family with a prescription for amlodipine sugar persist pressures remain elevated they should restarting her blood pressure medication recommend a follow-up with her primary care physician on Monday return to ER for any problems                                 Clinical Impression:       ICD-10-CM ICD-9-CM   1. Left leg pain  M79.605 729.5   2. Hypertension  I10 401.9                                Guy Navarrete MD  07/25/20 1402

## 2020-07-25 NOTE — ED NOTES
"AIRBORNE AND DROPLET ISOLATION AT ROOM ARRIVAL. MASK IN PLACE.  PRIVATE ROOM. EVEN AND NON LABORED RESPIRATIONS.  AIRWAY CLEAR.  PULSES REGULAR.  < 3" CAPILLARY REFILL. SKIN WDI.  MAEW.  NON DISTENDED ABDOMEN. ALERT, ORIENTED AND AMBULATORY. NAD AT THIS TIME.  CALL LIGHT IN REACH.  FACIAL L BRUISING-HEALING.  RFA BRUISING. L TAN BRUISING.  EMACIATED BUT CLEAN APPEARANCE.    "

## 2020-08-18 ENCOUNTER — LAB VISIT (OUTPATIENT)
Dept: LAB | Facility: HOSPITAL | Age: 85
End: 2020-08-18
Attending: INTERNAL MEDICINE
Payer: MEDICARE

## 2020-08-18 DIAGNOSIS — U07.1 INFECTION DUE TO 2019-NCOV: Primary | ICD-10-CM

## 2020-08-18 PROCEDURE — U0003 INFECTIOUS AGENT DETECTION BY NUCLEIC ACID (DNA OR RNA); SEVERE ACUTE RESPIRATORY SYNDROME CORONAVIRUS 2 (SARS-COV-2) (CORONAVIRUS DISEASE [COVID-19]), AMPLIFIED PROBE TECHNIQUE, MAKING USE OF HIGH THROUGHPUT TECHNOLOGIES AS DESCRIBED BY CMS-2020-01-R: HCPCS

## 2020-08-19 DIAGNOSIS — U07.1 COVID-19 VIRUS DETECTED: ICD-10-CM

## 2020-08-19 LAB — SARS-COV-2 RNA RESP QL NAA+PROBE: DETECTED

## 2021-03-08 NOTE — PT/OT/SLP EVAL
Physical Therapy Evaluation    Patient Name:  Melania Beavers   MRN:  [id number]    Recommendations:     Discharge Recommendations:  nursing facility, skilled   Discharge Equipment Recommendations: none   Barriers to discharge: None    Assessment:     Melania Beavers is a 86 y.o. female admitted with a medical diagnosis of Closed fracture of left hip.  She presents with the following impairments/functional limitations:  weakness, impaired endurance, impaired self care skills, impaired functional mobilty, gait instability, pain, decreased lower extremity function, orthopedic precautions . Pt was alert and oriented but confused.  Presently requires assistance x2 for functional mobility. Was able to take 4 side steps with RW and Mod A. Very painful L hip.    Rehab Prognosis: guarded; patient would benefit from acute skilled PT services to address these deficits and reach maximum level of function.    Recent Surgery: Procedure(s) (LRB):  PINNING, HIP, PERCUTANEOUS (Left) 1 Day Post-Op    Plan:     During this hospitalization, patient to be seen daily to address the identified rehab impairments via gait training, therapeutic activities, therapeutic exercises and progress toward the following goals:    · Plan of Care Expires:  05/06/20    Subjective     Chief Complaint: L hip pain  Patient/Family Comments/goals:   Pain/Comfort:  · Pain Rating 1: (Did not quantify)  · Location - Side 1: Left  · Location 1: hip  · Pain Addressed 1: Pre-medicate for activity, Reposition    Patients cultural, spiritual, Nondenominational conflicts given the current situation:      Living Environment:  Pt lives with her daughter  In a one story house with 4 entry steps. Pt has a sitter as pt is never left at home alone.  Prior to admission, patient required assistance  For ADLs and gait with rollator.   Equipment used at home: rollator, bedside commode, shower chair.  DME owned (not currently used): none.  Upon discharge, patient will have assistance from  daughter and sitter.    Objective:     Communicated with nurse prior to session.  Patient found supine with bed alarm, blood pressure cuff, telemetry  upon PT entry to room.    General Precautions: Standard, fall   Orthopedic Precautions:LLE weight bearing as tolerated   Braces:       Exams:  · Cognitive Exam:  Patient is oriented to Person, Place, Time and Situation  · RLE ROM: WFL  · RLE Strength: WFL  · LLE ROM: decreasede ROM 2/2 fracture of femoral head  · LLE Strength: WFL    Functional Mobility:  · Bed Mobility:     · Supine to Sit: maximal assistance and of 2 persons  · Transfers:     · Sit to Stand:  maximal assistance with rolling walker  · Gait: 4 side steps with RW and Mod A       Therapeutic Activities and Exercises:  T/f'ed EOB with Max A x2 with slow movements  with care taken for advancement of L LE, Side steps with RW and Mod A with flexed trunk posture    AM-PAC 6 CLICK MOBILITY  Total Score:      Patient left supine with call button in reach and bed alarm on.    GOALS:   Multidisciplinary Problems     Physical Therapy Goals        Problem: Physical Therapy Goal    Goal Priority Disciplines Outcome Goal Variances Interventions   Physical Therapy Goal     PT, PT/OT      Description:  Goals to be met by: D/C  Patient will increase functional independence with mobility by performin. Supine to sit with Maximum Assistance  2. Sit to stand transfer with Moderate Assistance  3. Bed to chair transfer with Moderate Assistance using Rolling Walker  4. Gait  x 20 feet with Moderate Assistance using Rolling Walker.                       History:     Past Medical History:   Diagnosis Date    Anemia, unspecified 12/3/2019    GERD (gastroesophageal reflux disease)     Hypertension     Normochromic normocytic anemia 12/3/2019       Past Surgical History:   Procedure Laterality Date    ESOPHAGOGASTRODUODENOSCOPY N/A 10/4/2019    Procedure: EGD (ESOPHAGOGASTRODUODENOSCOPY);  Surgeon: Dominick Cunningham  MD RENATE;  [facility]: [facility];  Service: Endoscopy;  Laterality: N/A;    HYSTERECTOMY         Time Tracking:     PT Received On: 04/06/20  PT Start Time: 1000     PT Stop Time: 1030  PT Total Time (min): 30 min     Billable Minutes: Evaluation 6 minutes and Therapeutic Activity 24 minutes      Lenore Fernandez, PT  04/06/2020   12M with fever x 7 days- visited pediatrician x 2 - given steroids and abx with no resolution. Covid test out pt neg. Reports fever/cough/congestion/myalgias. concern for pna. obtained xr- noted to have right middle lobe cavitary lesion with fluid level. Started on empiric abx/fluids- treated for sepsis. Obtained CT to further define the lesion. Labs reviewed by me, values noted to be within normal limits. Case discussed with peds- admitted for abx therapy.

## 2021-03-15 NOTE — PT/OT/SLP EVAL
Group Therapy Documentation    PATIENT'S NAME: Portillo Queen  MRN:   [id number]  :   1995  ACCT. NUMBER: [id number]  DATE OF SERVICE: 3/15/21  START TIME:  9:00 AM  END TIME: 11:00 AM  FACILITATOR(S): Lata Hooker LADC  TOPIC: BEH Group Therapy  Number of patients attending the group:  6  Group Length:  2 Hours    Group Therapy Type: Recovery strategies    Summary of Group / Topics Discussed:    Sober coping skills and Disease of addiction      Group Attendance:  Attended group session    Patient's response to the group topic/interactions:  cooperative with task    Patient appeared to be Engaged.        Client specific details:  Portillo provided supportive feedback during peer assignment presentations. Portillo participated in a discussion on anxiety and grounding skills application.    Speech Language Pathology Evaluation  Cognitive/Bedside Swallow    Patient Name:  Melania Beavers   MRN:  [id number]  Admitting Diagnosis: Stroke    Recommendations:      General Recommendations:  Baseline cognitive linguistic deficits noted (SLP eval 10/2019). No acute deficits at this time. Recommend resumption of POC per SLP at [facility]   Diet recommendations:  Regular, Thin   Aspiration Precautions: Standard aspiration precautions; assistance with setup of tray   General Precautions: Standard,    Communication strategies:  Simplification of information; repetition as needed     History:     Past Medical History:   Diagnosis Date    Anemia, unspecified 12/3/2019    GERD (gastroesophageal reflux disease)     Hypertension     Normochromic normocytic anemia 12/3/2019       Past Surgical History:   Procedure Laterality Date    ESOPHAGOGASTRODUODENOSCOPY N/A 10/4/2019    Procedure: EGD (ESOPHAGOGASTRODUODENOSCOPY);  Surgeon: Dominick Cunningham III, MD;  Location: [facility] ENDO;  Service: Endoscopy;  Laterality: N/A;    HYSTERECTOMY      PERCUTANEOUS PINNING OF HIP Left 4/5/2020    Procedure: PINNING, HIP, PERCUTANEOUS;  Surgeon: Anibal Kidd MD;  Location: [facility] OR;  Service: Orthopedics;  Laterality: Left;       Prior Intubation HX:  none    Modified Barium Swallow: none    Imaging Results          US Carotid Bilateral (Final result)  Result time 05/11/20 20:14:00    Final result by Crys Feliciano MD (05/11/20 20:14:00)                 Narrative:    EXAM DESCRIPTION:    US CAROTID BILATERAL    CLINICAL HISTORY:    86 years, Female, stroke    TECHNIQUE: Grayscale and color Doppler ultrasound examination of the carotid and vertebral artery systems bilaterally. Maximum peak systolic velocity (PSV) / end diastolic velocity (EDV) measurements were obtained.      COMPARISON:  None.    FINDINGS:    RIGHT: Small to moderate atherosclerotic plaque.  Common carotid velocity is 80 cm/second.  Peak ICA velocity is 104  cm/second.  Normal IC/CC ratio.  Vertebral artery is patient and antegrade.    LEFT:  Small to moderate atherosclerotic plaque.  Common carotid velocity is 68 cm/second.  Peak ICA velocity is 85 cm/second.  Normal IC/CC ratio.  Vertebral artery is patient and antegrade.    IMPRESSION: No hemodynamically significant stenosis.    Antegrade flow in the vertebral arteries.    Electronically signed by:  Crys Feliciano MD  5/11/2020 9:50 PM CDT Workstation: [id number]                             X-Ray Chest 1 View (Final result)  Result time 05/11/20 18:10:35    Final result by Trinidad Lazaro MD (05/11/20 18:10:35)                 Impression:      Hyperexpanded lungs compatible with COPD with no confluent infiltrates    Stable pleural thickening in the left apex      Electronically signed by: Trinidad Lazaro MD  Date:    05/11/2020  Time:    18:10             Narrative:    EXAMINATION:  XR CHEST 1 VIEW    CLINICAL HISTORY:  unspecified abdominal pain    TECHNIQUE:  The cardiomediastinal silhouette is normal in size.  There are no acute osseous abnormalities.    FINDINGS:  Portable chest x-ray at 17:50 hours is compared to prior study at 15:20 hours    The lungs are hyperexpanded.  There are no infiltrates or pleural effusions.  The cardiomediastinal silhouette is normal in size.  There is stable apical pleural thickening on the left.                               CT Renal Stone Study ABD Pelvis WO (Final result)  Result time 05/11/20 18:06:10    Final result by Trinidad Lazaro MD (05/11/20 18:06:10)                 Impression:      Multiple nonobstructing right renal stones.    Atrophic left kidney with 7 mm non-obstructing stone    Cholelithiasis    Moderate vascular calcification    Colonic diverticulosis.      Electronically signed by: Trinidad Lazaro MD  Date:    05/11/2020  Time:    18:06             Narrative:      CMS MANDATED QUALITY DATA - CT RADIATION - 436    All CT scans at this facility utilize dose  "modulation, iterative reconstruction, and/or weight based dosing when appropriate to reduce radiation dose to as low as reasonably achievable.    EXAMINATION:  CT RENAL STONE STUDY ABD PELVIS WO    CLINICAL HISTORY:  Flank pain, stone disease suspected;    TECHNIQUE:  CT abdomen and pelvis without IV or oral contrast. Study is tailored for detection of urinary tract calculi and evaluation of solid organs, hollow viscera, and vascular structures is limited.    COMPARISON:  None    FINDINGS:  CT Abdomen:    The lung bases are clear.    The liver, spleen, and pancreas have a normal noncontrast appearance.    There are multiple gallstones.  There is no biliary duct dilatation.    There are calcifications within the adrenal glands bilaterally.  The left kidney is atrophic.  There is a 7 mm nonobstructing left renal stone    There are multiple right renal stones without hydronephrosis.  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    There are no thick-walled or dilated bowel loops.    There is diffuse vascular calcification of the aorta.  The aorta is ectatic.    CT Pelvis:    There are no thick-walled or dilated bowel loops.  There is colonic diverticulosis without diverticulitis.  The bladder is normal.  The patient has had a hysterectomy.    There are 3 threaded screws within the left femoral neck.  There is scoliosis of the lumbar spine.                                  Prior diet: unrestricted.   Occupation/hobbies/homemaking: dependent for home management tasks (medication and finances)..    Subjective     "that sounds good"  Patient goals: none stated      Pain/Comfort:  · Pain Rating 1: 0/10    Objective:        Cognitive Status:  · Behavioral Observations: Pleasant, alert & appropriate  · Orientation: Oriented to month, year, place and current situation   · Attention: impaired; redirection and repetition warranted  · Pragmatics: WNL  · Executive Function: Impaired; Poor planning and organization of simple tasks "      Language: Fluent with intermittent anomia; inconsistent hesitations prior to responses      Receptive Language:   Comprehension:   · Repetition and/or model required for Pt to complete simple commands, ~70% acc      Expressive Language:   Verbal:    · Compare/contrast: 100% acc   · Category exclusion: unable to complete w/ assistance   · Naming:   ? confrontational namin% acc with hesitation x1                  Motor Speech:  · No evidence of Apraxia of Speech or Dysarthria  ? Repetition completed w/ 100% acc   ? 100% intelligible      Voice: WNL    Oral Musculature Evaluation  · Oral Musculature: WFL  · Dentition: present and adequate  · Secretion Management: adequate  · Mucosal Quality: good  · Mandibular Strength and Mobility: WFL  · Oral Labial Strength and Mobility: WFL  · Lingual Strength and Mobility: WFL  · Velar Elevation: WFL  · Volitional Cough: Present  · Volitional Swallow: Hyolaryngeal movement present to digital palpation  · Voice Prior to PO Intake: clear; no dysphonia     Bedside Swallow Eval:   Consistencies Assessed:  · Thin liquids; ice chip x1, tsp x1, cup edge and 3oz via sequential cup sips  · Puree tsp x3  · Solids 1/4 cracker x2     Oral Phase:   · Pt with adequate oral containment and coordination across consistencies. Timely mastication, adequate bolus formation and oral clearance which resulted in no evidence of appreciable oral residue with solid consistencies.    Pharyngeal Phase:   · Adequate hyolaryngeal movement to digital palpation. Pt tolerated 3oz thin liquid via sequential sips with no overt s/s of aspiration or changes in vocal quality, passing 3oz water challenge. Therefore, risk of aspiration not indicated. Multiple swallows not observed across consistencies trialled.     Compensatory Strategies  · None       Assessment:     Melania Beavers is a 86 y.o. female with an SLP diagnosis of baseline cognitive linguistic deficits noted (SLP eval 10/2019). No acute deficits  at this time. Recommend resumption of POC per SLP at [facility].       Plan:     · Plan of Care reviewed with:  patient   · SLP Follow-Up:  No        Time Tracking:     SLP Treatment Date:   05/12/20  Speech Start Time:  1132  Speech Stop Time:  1143     Additional time: [id number]  Speech Total Time (min):  20 min    Billable Minutes: Eval 10  and Eval Swallow and Oral Function 10    Daniel Figueredo CCC-SLP  05/12/2020

## 2022-06-08 NOTE — H&P
[facility]  History & Physical    Patient Name: Melania Beavers  MRN: [id number]  Admission Date: 10/1/2019  Attending Physician: Jarad Tran MD  Primary Care Provider: Luis Brown MD         Patient information was obtained from patient, patient's daughter and ER records.     Subjective:     Principal Problem:<principal problem not specified>    Chief Complaint: aphasia  Chief Complaint   Patient presents with    Aphasia        HPI:   This 75 years old woman, past medical history of COPD, TIA, hypertension, who was recently admitted to the hospital about 3 weeks ago for episode of transient aphasia and confusion.  This morning when the patient awoke around 7:00 a.m., she was again aphasic.  Her daughter observed and she brought her to the hospital.  The patient was also confused at this time.  During her prior hospitalization she had extensive workup, including echocardiography, carotid Doppler, and brain MRI were negative.  In ER, her symptoms mostly resolved, the patient was awake alert oriented x3, in no focal deficits, also as per daughter her symptoms have improved.  Neurology has been consulted from the emergency department, no specific recommendations.  I did order brain MRI which showed new interval development of left frontal area, compatible with new infarction.  The patient denies headache, no double vision, no dizziness, no nausea, no vomiting, no chest pain, no cough, no fever, no mother focal weakness, no loss of urine, no sensorial deficits, no dysphagia, no other complaints at this time.  Considering the time of this new left frontal changes, compatible with stroke, is not known, the patient was not a tPA candidate in the emergency department.      Past Medical History:   Diagnosis Date    GERD (gastroesophageal reflux disease)     Hypertension        Past Surgical History:   Procedure Laterality Date    HYSTERECTOMY         Review of patient's allergies  Show Aperture Variable?: Yes indicates:   Allergen Reactions    Heparin analogues        No current facility-administered medications on file prior to encounter.      Current Outpatient Medications on File Prior to Encounter   Medication Sig    amLODIPine (NORVASC) 5 MG tablet Take 1 tablet (5 mg total) by mouth once daily.    ascorbic acid, vitamin C, (VITAMIN C) 500 MG tablet Take 500 mg by mouth 2 (two) times daily.    b complex vitamins capsule Take 1 capsule by mouth once daily.    cyanocobalamin (VITAMIN B-12) 1000 MCG tablet Take 100 mcg by mouth once daily.    fluticasone-salmeterol 250-50 mcg/dose (ADVAIR) 250-50 mcg/dose diskus inhaler Inhale 1 puff into the lungs 2 (two) times daily. Controller    omeprazole (PRILOSEC) 20 MG capsule Take 20 mg by mouth every morning.     aspirin 81 MG Chew Take 1 tablet (81 mg total) by mouth once daily.    atorvastatin (LIPITOR) 40 MG tablet Take 1 tablet (40 mg total) by mouth once daily.    FERRALET 90 DUAL-IRON DELIVERY 90-1-12-50 mg-mg-mcg-mg Tab TAKE 1 TABLET BY MOUTH DAILY    lanolin/mineral oil/petrolatum (ARTIFICIAL TEARS OPHT) Place 1 drop into both eyes 3 (three) times daily as needed.    levocetirizine (XYZAL) 5 MG tablet TAKE 5MG BY MOUTH DAILY AS NEEDED    megestrol (MEGACE) 400 mg/10 mL (40 mg/mL) Susp Take 200 mg by mouth every morning.    mometasone (NASONEX) 50 mcg/actuation nasal spray 2 sprays by Nasal route daily as needed.     ondansetron (ZOFRAN) 8 MG tablet Take 8 mg by mouth every 12 (twelve) hours as needed for Nausea.      Family History     Patient not able to recollect family history at this time        Tobacco Use    Smoking status: Never Smoker   Substance and Sexual Activity    Alcohol use: No    Drug use: No    Sexual activity: Never     Review of Systems  Objective:     Vital Signs (Most Recent):  Temp: 98.9 °F (37.2 °C) (10/01/19 0755)  Pulse: 73 (10/01/19 1000)  Resp: (!) 21 (10/01/19 1000)  BP: (!) 158/71 (10/01/19 1000)  SpO2: 100 % (10/01/19  1000) Vital Signs (24h Range):  Temp:  [98.9 °F (37.2 °C)] 98.9 °F (37.2 °C)  Pulse:  [73-82] 73  Resp:  [18-43] 21  SpO2:  [100 %] 100 %  BP: (150-182)/(67-81) 158/71     Weight: 51.5 kg (113 lb 8.6 oz)  Body mass index is 18.33 kg/m².    Physical Exam   Constitutional: She is oriented to person, place, and time. She appears well-developed and well-nourished.   HENT:   Head: Normocephalic.   Right Ear: External ear normal.   Left Ear: External ear normal.   Nose: Nose normal.   Mouth/Throat: Oropharynx is clear and moist.   Eyes: Pupils are equal, round, and reactive to light. Conjunctivae and EOM are normal.   Neck: Normal range of motion. Neck supple.   Cardiovascular: Normal rate, regular rhythm, normal heart sounds and intact distal pulses.   Pulmonary/Chest: Effort normal and breath sounds normal.   Abdominal: Soft. Bowel sounds are normal.   Musculoskeletal: Normal range of motion.   Neurological: She is alert and oriented to person, place, and time.   Skin: Skin is warm and dry.   Psychiatric: She has a normal mood and affect. Her behavior is normal. Judgment and thought content normal.   Nursing note and vitals reviewed.        CRANIAL NERVES     CN III, IV, VI   Pupils are equal, round, and reactive to light.  Extraocular motions are normal.       Significant Labs:   CBC:   Recent Labs   Lab 10/01/19  0819   WBC 6.01   HGB 8.9*   HCT 27.9*        CMP:   Recent Labs   Lab 10/01/19  0819      K 4.8      CO2 20*   GLU 87   BUN 24*   CREATININE 1.7*   CALCIUM 9.5   PROT 6.8   ALBUMIN 3.8   BILITOT 1.1*   ALKPHOS 37*   AST 17   ALT 6*   ANIONGAP 8   EGFRNONAA 27.1*       Significant Imaging:   MRI Brain Without Contrast [[id number]] Resulted: 10/01/19 1147   Order Status: Completed Updated: 10/01/19 1149   Narrative:     EXAMINATION:  MRI BRAIN WITHOUT CONTRAST    CLINICAL HISTORY:  TIA, initial screening;    TECHNIQUE:  Multiplanar noncontrast imaging is  Consent: The patient's consent was obtained including but not limited to risks of crusting, scabbing, blistering, scarring, darker or lighter pigmentary change, recurrence, incomplete removal and infection. Number Of Freeze-Thaw Cycles: 2 freeze-thaw cycles performed.    COMPARISON:  09/05/2019.    FINDINGS:  In the interval, there has been development of focal areas of decreased T1 and increased T2 signal with surrounding changes of increased T2 signal within the left frontal lobe involving the subcortical and periventricular white matter with extension into the corona radiata/centrum semiovale.  The findings are likely representative of areas of interval infarction.  There is mild associated diffusion restriction, at least partly related to T2 shine through.  The findings are likely representative of areas of mixed acute and subacute/remote infarction.    Otherwise, there are scattered changes of periventricular and subcortical white matter hyperintensity within both hemispheres, likely reflection of chronic microvascular ischemia.  Additionally, there are foci of probable remote small vessel infarction within the cerebellar hemispheres bilaterally, unchanged.    There are no findings of acute hemorrhage.  There is no mass effect or midline shift.  The ventricular system is appropriate in size and position for age.  There are no extra-axial fluid collections.    Appropriate flow voids are present within the major blood vessels.    The skull base and craniocervical junction appear unremarkable.   Impression:       Interval development of focal areas of signal alteration within the left frontal lobe compatible with areas of interval infarction.  While there are foci of encephalomalacia which are likely related to more remote areas of ischemia, there are findings of restricted diffusion compatible with areas of more recent or subacute infarction.    Additional chronic small vessel ischemic changes.      Electronically signed by: Paulino Orellana IV., MD  Date: 10/01/2019  Time: 11:47         Assessment/Plan:     Active Diagnoses:    Diagnosis Date Noted POA    TIA (transient ischemic attack) [G45.9] 09/06/2019 Yes    Acute metabolic encephalopathy [G93.41] 09/06/2019 Yes     HTN (hypertension) [I10] 09/05/2019 Yes    GERD (gastroesophageal reflux disease) [K21.9] 09/05/2019 Yes    Expressive aphasia [R47.01] 10/01/2019 Yes      Problems Resolved During this Admission:     VTE Risk Mitigation (From admission, onward)         Ordered     Place sequential compression device  Until discontinued      10/01/19 1213     IP VTE HIGH RISK PATIENT  Once      10/01/19 1213     Place sequential compression device  Until discontinued      10/01/19 1213     Place FAUSTO hose  Until discontinued      10/01/19 1213                Subacute vs acute new frontal left stroke  which is new compared to the last MRI which showed on September 5, 2019 in [facility]  Unclear time of onset  Patient is on aspirin, she will likely need to be on Plavix  She had cardiac Doppler, and echocardiography done last time no need to repeat this exam at this time  Neurology consult place, for Dr. Diamond, appreciate input  Physical therapy evaluation, speech therapy evaluation  Continue aspirin, continue atorvastatin  Neuro checks, q.4 hours for next 24 hr    Acute med folic encephalopathy  Supposedly assumed with her new subacute stroke this morning, resolved, continue to monitor    HTN  Resume home amlodipine, monitor    GERD  Chronic, resume PPI 2    DVT prophylaxis  SCDs, considering the patient has a heparin allergy    Jarad Tran MD  Department of Hospital Medicine   [facility]   Render Post-Care Instructions In Note?: no Detail Level: Generalized Post-Care Instructions: I reviewed with the patient in detail post-care instructions. Patient is to wear sunprotection, and avoid picking at any of the treated lesions. Pt may apply Vaseline to crusted or scabbing areas. Duration Of Freeze Thaw-Cycle (Seconds): 10

## 2022-11-30 NOTE — PLAN OF CARE
Problem: Physical Therapy Goal  Goal: Physical Therapy Goal  Goals to be met by: discharge     Patient will increase functional independence with mobility by performin. Supine to sit with Modified [place]  2. Sit to stand transfer with Modified [place]  3. Bed to chair transfer with Stand-by Assistance   4. Gait  x 150 feet with Supervision with no AD   Outcome: Ongoing (interventions implemented as appropriate)  Patient seen for ambulaltion/GT to facilitate improved mobility.       no

## 2024-06-20 NOTE — ED NOTES
Daughter reports that pt had some confusion and problems getting out the right words this morning.    Follows with Dr. Henao and referred to CT surgery as an outpatient

## (undated) DEVICE — DRESSING TEGADERM 4X4 3/4 TD1004

## (undated) DEVICE — SUTURE VICRYL 2-0 CT-1 36 VCP945H

## (undated) DEVICE — PAD BOVIE ADULT

## (undated) DEVICE — PADDING CAST 6 STERILE 30-322

## (undated) DEVICE — UNDERGLOVE BIOGEL PI MICRO BLUE SZ 8

## (undated) DEVICE — PACK BASIC V 88151

## (undated) DEVICE — SUTURE MONOCRYL 3-0 27 PS-1 MCP936H

## (undated) DEVICE — Device

## (undated) DEVICE — BANDAGE ACE STERILE 6 REB3116

## (undated) DEVICE — SPONGE GAUZE 10S 4X4  442214

## (undated) DEVICE — DRAPE IOBAN 19X9 3/4 6617

## (undated) DEVICE — TOWEL OR BLUE      MDT2168284

## (undated) DEVICE — DRAPE 3/4

## (undated) DEVICE — SOLUTION IRRI NS BOTTLE 1000ML R5200-01

## (undated) DEVICE — DRAPE C-ARMOR FLOUROSCAN IMAGING 5523

## (undated) DEVICE — STERISTRIP 1/2 R1547

## (undated) DEVICE — GLOVE BIOGEL PI   GOLD SIZE 8

## (undated) DEVICE — SET BASIN O R 31451126

## (undated) DEVICE — ADHESIVE MASTISOL VIAL 0523-48

## (undated) DEVICE — DRAPE C-ARM (FITS NEW C-ARM) 07-CA108

## (undated) DEVICE — COVER LIGHT HANDLE LB53

## (undated) DEVICE — BANDAGE COBAN 6 CBN1106

## (undated) DEVICE — UNDERGLOVE BIOGEL MICRO BLUE SZ 8.5